# Patient Record
Sex: MALE | Race: WHITE | NOT HISPANIC OR LATINO | URBAN - METROPOLITAN AREA
[De-identification: names, ages, dates, MRNs, and addresses within clinical notes are randomized per-mention and may not be internally consistent; named-entity substitution may affect disease eponyms.]

---

## 2018-01-04 ENCOUNTER — APPOINTMENT (RX ONLY)
Dept: URBAN - METROPOLITAN AREA CLINIC 31 | Facility: CLINIC | Age: 73
Setting detail: DERMATOLOGY
End: 2018-01-04

## 2018-01-04 DIAGNOSIS — L57.8 OTHER SKIN CHANGES DUE TO CHRONIC EXPOSURE TO NONIONIZING RADIATION: ICD-10-CM

## 2018-01-04 DIAGNOSIS — L57.0 ACTINIC KERATOSIS: ICD-10-CM

## 2018-01-04 DIAGNOSIS — L81.4 OTHER MELANIN HYPERPIGMENTATION: ICD-10-CM

## 2018-01-04 DIAGNOSIS — L82.1 OTHER SEBORRHEIC KERATOSIS: ICD-10-CM

## 2018-01-04 DIAGNOSIS — Z85.828 PERSONAL HISTORY OF OTHER MALIGNANT NEOPLASM OF SKIN: ICD-10-CM

## 2018-01-04 DIAGNOSIS — D18.0 HEMANGIOMA: ICD-10-CM

## 2018-01-04 DIAGNOSIS — L72.3 SEBACEOUS CYST: ICD-10-CM

## 2018-01-04 DIAGNOSIS — Z80.8 FAMILY HISTORY OF MALIGNANT NEOPLASM OF OTHER ORGANS OR SYSTEMS: ICD-10-CM

## 2018-01-04 DIAGNOSIS — D22 MELANOCYTIC NEVI: ICD-10-CM

## 2018-01-04 PROBLEM — G89.29 CHRONIC BILATERAL LOW BACK PAIN: Status: ACTIVE | Noted: 2018-01-04

## 2018-01-04 PROBLEM — D22.5 MELANOCYTIC NEVI OF TRUNK: Status: ACTIVE | Noted: 2018-01-04

## 2018-01-04 PROBLEM — D18.01 HEMANGIOMA OF SKIN AND SUBCUTANEOUS TISSUE: Status: ACTIVE | Noted: 2018-01-04

## 2018-01-04 PROBLEM — M54.50 CHRONIC BILATERAL LOW BACK PAIN: Status: ACTIVE | Noted: 2018-01-04

## 2018-01-04 PROCEDURE — 17003 DESTRUCT PREMALG LES 2-14: CPT

## 2018-01-04 PROCEDURE — 99213 OFFICE O/P EST LOW 20 MIN: CPT | Mod: 25

## 2018-01-04 PROCEDURE — ? COUNSELING

## 2018-01-04 PROCEDURE — 17000 DESTRUCT PREMALG LESION: CPT

## 2018-01-04 PROCEDURE — ? LIQUID NITROGEN

## 2018-01-04 ASSESSMENT — LOCATION DETAILED DESCRIPTION DERM
LOCATION DETAILED: RIGHT SUPERIOR HELIX
LOCATION DETAILED: LEFT CENTRAL TEMPLE
LOCATION DETAILED: LEFT INFERIOR MEDIAL MIDBACK
LOCATION DETAILED: LEFT SUPERIOR MEDIAL MIDBACK
LOCATION DETAILED: LEFT INFERIOR CENTRAL MALAR CHEEK
LOCATION DETAILED: NASAL TIP
LOCATION DETAILED: LEFT MEDIAL FRONTAL SCALP
LOCATION DETAILED: RIGHT INFERIOR FOREHEAD
LOCATION DETAILED: RIGHT SUPERIOR MEDIAL FOREHEAD
LOCATION DETAILED: RIGHT SUPERIOR LATERAL LOWER BACK
LOCATION DETAILED: RIGHT FOREHEAD
LOCATION DETAILED: LEFT CENTRAL MALAR CHEEK
LOCATION DETAILED: RIGHT PROXIMAL RADIAL DORSAL FOREARM
LOCATION DETAILED: RIGHT SUPERIOR MEDIAL MIDBACK
LOCATION DETAILED: LEFT PROXIMAL DORSAL FOREARM
LOCATION DETAILED: LEFT CENTRAL FRONTAL SCALP
LOCATION DETAILED: RIGHT PROXIMAL DORSAL FOREARM
LOCATION DETAILED: LEFT SUPERIOR LATERAL BUCCAL CHEEK
LOCATION DETAILED: LEFT DISTAL DORSAL FOREARM
LOCATION DETAILED: LEFT INFERIOR ANTERIOR NECK

## 2018-01-04 ASSESSMENT — LOCATION ZONE DERM
LOCATION ZONE: NECK
LOCATION ZONE: EAR
LOCATION ZONE: NOSE
LOCATION ZONE: FACE
LOCATION ZONE: SCALP
LOCATION ZONE: ARM
LOCATION ZONE: TRUNK

## 2018-01-04 ASSESSMENT — LOCATION SIMPLE DESCRIPTION DERM
LOCATION SIMPLE: LEFT CHEEK
LOCATION SIMPLE: LEFT TEMPLE
LOCATION SIMPLE: RIGHT EAR
LOCATION SIMPLE: LEFT LOWER BACK
LOCATION SIMPLE: NOSE
LOCATION SIMPLE: LEFT ANTERIOR NECK
LOCATION SIMPLE: RIGHT FOREHEAD
LOCATION SIMPLE: RIGHT FOREARM
LOCATION SIMPLE: LEFT SCALP
LOCATION SIMPLE: LEFT FOREARM
LOCATION SIMPLE: RIGHT LOWER BACK

## 2018-01-04 NOTE — HPI: SKIN LESION
Is This A New Presentation, Or A Follow-Up?: Skin Lesion
How Severe Is Your Skin Lesion?: mild
Has Your Skin Lesion Been Treated?: been treated
Additional History: Patient states that he had a cyst and he has punctured it a couple times with it coming back and would like to know if there any way to remove the sac.

## 2018-01-04 NOTE — HPI: UPPER BODY SKIN CHECK
How Severe Are Your Spot(S)?: mild
What Is The Reason For Today's Visit?: Excessive sun exposure
Additional History: Mother with hx of melanoma

## 2018-01-04 NOTE — PROCEDURE: LIQUID NITROGEN
Consent: The patient's consent was obtained including but not limited to risks of crusting, scabbing, blistering, scarring, darker or lighter pigmentary change, recurrence, incomplete removal and infection.
Render Post-Care Instructions In Note?: no
Duration Of Freeze Thaw-Cycle (Seconds): 0
Detail Level: Detailed
Post-Care Instructions: I reviewed with the patient in detail post-care instructions. Patient is to wear sunprotection, and avoid picking at any of the treated lesions. Pt may apply Vaseline  or Aquaphor to crusted or scabbing areas.

## 2018-06-05 PROBLEM — Z98.1 S/P LUMBAR FUSION: Status: ACTIVE | Noted: 2018-06-05

## 2018-06-05 PROBLEM — R26.2 DIFFICULTY WALKING: Status: ACTIVE | Noted: 2018-06-05

## 2018-06-26 ENCOUNTER — APPOINTMENT (RX ONLY)
Dept: URBAN - METROPOLITAN AREA CLINIC 31 | Facility: CLINIC | Age: 73
Setting detail: DERMATOLOGY
End: 2018-06-26

## 2018-06-26 DIAGNOSIS — L82.1 OTHER SEBORRHEIC KERATOSIS: ICD-10-CM

## 2018-06-26 DIAGNOSIS — L57.0 ACTINIC KERATOSIS: ICD-10-CM

## 2018-06-26 PROCEDURE — ? ADDITIONAL NOTES

## 2018-06-26 PROCEDURE — ? COUNSELING

## 2018-06-26 PROCEDURE — ? LIQUID NITROGEN

## 2018-06-26 PROCEDURE — 99212 OFFICE O/P EST SF 10 MIN: CPT | Mod: 25

## 2018-06-26 PROCEDURE — 17000 DESTRUCT PREMALG LESION: CPT

## 2018-06-26 ASSESSMENT — LOCATION SIMPLE DESCRIPTION DERM
LOCATION SIMPLE: LEFT FOREARM
LOCATION SIMPLE: NOSE

## 2018-06-26 ASSESSMENT — LOCATION DETAILED DESCRIPTION DERM
LOCATION DETAILED: LEFT PROXIMAL DORSAL FOREARM
LOCATION DETAILED: NASAL TIP

## 2018-06-26 ASSESSMENT — LOCATION ZONE DERM
LOCATION ZONE: NOSE
LOCATION ZONE: ARM

## 2018-06-26 NOTE — PROCEDURE: ADDITIONAL NOTES
Additional Notes: Includes spot of concern mentioned on intake on the left forearm
Additional Notes: Includes spot of concern mentioned on intake on nose. \\nOffered biopsy if patient worried, trial of LN2 done today.

## 2018-06-26 NOTE — PROCEDURE: LIQUID NITROGEN
Consent: The patient's consent was obtained including but not limited to risks of crusting, scabbing, blistering, scarring, darker or lighter pigmentary change, recurrence, incomplete removal and infection.
Detail Level: Detailed
Duration Of Freeze Thaw-Cycle (Seconds): 0
Post-Care Instructions: I reviewed with the patient in detail post-care instructions. Patient is to wear sunprotection, and avoid picking at any of the treated lesions. Pt may apply Vaseline  or Aquaphor to crusted or scabbing areas.
Render Post-Care Instructions In Note?: no

## 2018-08-02 ENCOUNTER — APPOINTMENT (RX ONLY)
Dept: URBAN - METROPOLITAN AREA CLINIC 31 | Facility: CLINIC | Age: 73
Setting detail: DERMATOLOGY
End: 2018-08-02

## 2018-08-02 DIAGNOSIS — L57.0 ACTINIC KERATOSIS: ICD-10-CM

## 2018-08-02 PROBLEM — D48.5 NEOPLASM OF UNCERTAIN BEHAVIOR OF SKIN: Status: ACTIVE | Noted: 2018-08-02

## 2018-08-02 PROCEDURE — 17000 DESTRUCT PREMALG LESION: CPT

## 2018-08-02 PROCEDURE — ? COUNSELING

## 2018-08-02 PROCEDURE — 11100: CPT | Mod: 59

## 2018-08-02 PROCEDURE — ? LIQUID NITROGEN

## 2018-08-02 PROCEDURE — ? BIOPSY BY SHAVE METHOD

## 2018-08-02 ASSESSMENT — LOCATION DETAILED DESCRIPTION DERM: LOCATION DETAILED: RIGHT DISTAL PRETIBIAL REGION

## 2018-08-02 ASSESSMENT — LOCATION SIMPLE DESCRIPTION DERM: LOCATION SIMPLE: RIGHT PRETIBIAL REGION

## 2018-08-02 ASSESSMENT — LOCATION ZONE DERM: LOCATION ZONE: LEG

## 2018-08-02 NOTE — PROCEDURE: LIQUID NITROGEN
Consent: The patient's consent was obtained including but not limited to risks of crusting, scabbing, blistering, scarring, darker or lighter pigmentary change, recurrence, incomplete removal and infection.
Render Post-Care Instructions In Note?: no
Detail Level: Detailed
Duration Of Freeze Thaw-Cycle (Seconds): 0
Post-Care Instructions: I reviewed with the patient in detail post-care instructions. Patient is to wear sunprotection, and avoid picking at any of the treated lesions. Pt may apply Vaseline  or Aquaphor to crusted or scabbing areas.

## 2018-08-02 NOTE — PROCEDURE: BIOPSY BY SHAVE METHOD
Notification Instructions: Patient will be notified of biopsy results. However, patient instructed to call the office if not contacted within 2 weeks.
Type Of Destruction Used: Curettage
Cryotherapy Text: The wound bed was treated with cryotherapy after the biopsy was performed.
Bill For Surgical Tray: no
Anesthesia Volume In Cc: 0
Electrodesiccation And Curettage Text: The wound bed was treated with electrodesiccation and curettage after the biopsy was performed.
Anesthesia Type: 1% lidocaine with epinephrine
Wound Care: Aquaphor
Billing Type: Third-Party Bill
Depth Of Biopsy: dermis
Curettage Text: The wound bed was treated with curettage after the biopsy was performed.
Dressing: Band-Aid
Biopsy Method: 15 blade
Post-Care Instructions: I reviewed with the patient in detail post-care instructions. Patient is to keep the biopsy site dry overnight, and then apply bacitracin twice daily until healed. Patient may apply hydrogen peroxide soaks to remove any crusting.
Lab: [id number]
Biopsy Type: H and E
Electrodesiccation Text: The wound bed was treated with electrodesiccation after the biopsy was performed.
Lab Facility: 
Detail Level: Detailed
Silver Nitrate Text: The wound bed was treated with silver nitrate after the biopsy was performed.
Consent: Written consent was obtained and risks were reviewed including but not limited to scarring, infection, bleeding, scabbing, incomplete removal, nerve damage and allergy to anesthesia.
Hemostasis: Aluminum Chloride and Electrocautery
Was A Bandage Applied: Yes
Size Of Lesion In Cm: 1.5

## 2018-08-07 ENCOUNTER — RX ONLY (OUTPATIENT)
Age: 73
Setting detail: RX ONLY
End: 2018-08-07

## 2018-08-07 ENCOUNTER — APPOINTMENT (RX ONLY)
Dept: URBAN - METROPOLITAN AREA CLINIC 31 | Facility: CLINIC | Age: 73
Setting detail: DERMATOLOGY
End: 2018-08-07

## 2018-08-07 PROBLEM — C44.91 BASAL CELL CARCINOMA OF SKIN, UNSPECIFIED: Status: ACTIVE | Noted: 2018-08-07

## 2018-08-07 PROCEDURE — ? MOHS SURGERY PHONE CONSULTATION

## 2018-08-07 RX ORDER — AMOXICILLIN 500 MG/1
CAPSULE ORAL
Qty: 4 | Refills: 0 | Status: ERX | COMMUNITY
Start: 2018-08-07

## 2018-08-07 NOTE — PROCEDURE: MOHS SURGERY PHONE CONSULTATION
Date Of Mohs Surgery: 08/16/2018
Does The Patient Have A Pacemaker Or Defibrillator?: No
Patient Reported Location: A-left proximal pretibial region
Does The Patient Take Blood Thinners?: Yes
Which Antibiotic Do They Take For Surgical Prophylaxis?: Amoxicillin (2 grams)
Referring Provider: Autumn Robbins
Office Location Of Mohs Surgery: [place]
If Yes- Details?: Heart attack in 2014. Stent placed
Detail Level: Simple
If Yes- What Blood Thinners Do They Take?: Aspirin
Pathology Report Dx If Different Than Diagnosis Selected: Basal Cell Carcinoma, Nodulo-Infiltrative Type
If Yes- Additional Details: Right knee and Left knee 2010
Additional Information?: Allergies: IODINE • NAPROSYN
If Yes- What Is The Patient's Pharmacy Number?: [email or web address]
Patient's Insurance: /MAGDALENO
Time Of Mohs Surgery: 10:15am
Pathology Accession #: [id number]

## 2018-08-16 ENCOUNTER — APPOINTMENT (RX ONLY)
Dept: URBAN - METROPOLITAN AREA CLINIC 31 | Facility: CLINIC | Age: 73
Setting detail: DERMATOLOGY
End: 2018-08-16

## 2018-08-16 PROBLEM — C44.719 BASAL CELL CARCINOMA OF SKIN OF LEFT LOWER LIMB, INCLUDING HIP: Status: ACTIVE | Noted: 2018-08-16

## 2018-08-16 PROCEDURE — 17313 MOHS 1 STAGE T/A/L: CPT

## 2018-08-16 PROCEDURE — ? MOHS SURGERY

## 2018-08-16 NOTE — PROCEDURE: MOHS SURGERY
Bi-Rhombic Flap Text: The defect edges were debeveled with a #15 scalpel blade.  Given the location of the defect and the proximity to free margins a bi-rhombic flap was deemed most appropriate.  Using a sterile surgical marker, an appropriate rhombic flap was drawn incorporating the defect. The area thus outlined was incised deep to adipose tissue with a #15 scalpel blade.  The skin margins were undermined to an appropriate distance in all directions utilizing iris scissors.
Post-Care Instructions: I reviewed with the patient in detail post-care instructions. Patient is not to engage in any heavy lifting, exercise, or swimming for the next 14 days. Should the patient develop any fevers, chills, bleeding, severe pain patient will contact the office immediately.
Cartilage Fenestration Performed?: No
Quadrants Reporting?: 0
Display The Individual Mohs Indications As Separate Paragraphs: Yes
Closure 4 Information: This tab is for additional flaps and grafts above and beyond our usual structured repairs.  Please note if you enter information here it will not currently bill and you will need to add the billing information manually.
Island Pedicle Flap-Requiring Vessel Identification Text: The defect edges were debeveled with a #15 scalpel blade.  Given the location of the defect, shape of the defect and the proximity to free margins an island pedicle advancement flap was deemed most appropriate.  Using a sterile surgical marker, an appropriate advancement flap was drawn, based on the axial vessel mentioned above, incorporating the defect, outlining the appropriate donor tissue and placing the expected incisions within the relaxed skin tension lines where possible.    The area thus outlined was incised deep to adipose tissue with a #15 scalpel blade.  The skin margins were undermined to an appropriate distance in all directions around the primary defect and laterally outward around the island pedicle utilizing iris scissors.  There was minimal undermining beneath the pedicle flap.
Bilateral Helical Rim Advancement Flap Text: The defect edges were debeveled with a #15 blade scalpel.  Given the location of the defect and the proximity to free margins (helical rim) a bilateral helical rim advancement flap was deemed most appropriate.  Using a sterile surgical marker, the appropriate advancement flaps were drawn incorporating the defect and placing the expected incisions between the helical rim and antihelix where possible.  The area thus outlined was incised through and through with a #15 scalpel blade.  With a skin hook and iris scissors, the flaps were gently and sharply undermined and freed up.
Mohs Histo Method Verbiage: Each section was then chromacoded and processed in the Mohs lab using the Mohs protocol and submitted for frozen section.
Stage 2: Additional Anesthesia Type: 1% lidocaine with epinephrine
Crescentic Advancement Flap Text: The defect edges were debeveled with a #15 scalpel blade.  Given the location of the defect and the proximity to free margins a crescentic advancement flap was deemed most appropriate.  Using a sterile surgical marker, the appropriate advancement flap was drawn incorporating the defect and placing the expected incisions within the relaxed skin tension lines where possible.    The area thus outlined was incised deep to adipose tissue with a #15 scalpel blade.  The skin margins were undermined to an appropriate distance in all directions utilizing iris scissors.
Graft Donor Site Bandage (Optional-Leave Blank If You Don't Want In Note): Steri-strips and a pressure bandage were applied to the donor site.
Complex Repair And Graft Additional Text (Will Appearing After The Standard Complex Repair Text): The complex repair was not sufficient to completely close the primary defect. The remaining additional defect was repaired with the graft mentioned below.
Surgeon/Pathologist Verbiage (Will Incorporate Name Of Surgeon From Intro If Not Blank): operated in two distinct and integrated capacities as the surgeon and pathologist.
Star Wedge Flap Text: The defect edges were debeveled with a #15 scalpel blade.  Given the location of the defect, shape of the defect and the proximity to free margins a star wedge flap was deemed most appropriate.  Using a sterile surgical marker, an appropriate rotation flap was drawn incorporating the defect and placing the expected incisions within the relaxed skin tension lines where possible. The area thus outlined was incised deep to adipose tissue with a #15 scalpel blade.  The skin margins were undermined to an appropriate distance in all directions utilizing iris scissors.
Localized Dermabrasion With Wire Brush Text: The patient was draped in routine manner.  Localized dermabrasion using 3 x 17 mm wire brush was performed in routine manner to papillary dermis. This spot dermabrasion is being performed to complete skin cancer reconstruction. It also will eliminate the other sun damaged precancerous cells that are known to be part of the regional effect of a lifetime's worth of sun exposure. This localized dermabrasion is therapeutic and should not be considered cosmetic in any regard.
Keystone Flap Text: The defect edges were debeveled with a #15 scalpel blade.  Given the location of the defect, shape of the defect a keystone flap was deemed most appropriate.  Using a sterile surgical marker, an appropriate keystone flap was drawn incorporating the defect, outlining the appropriate donor tissue and placing the expected incisions within the relaxed skin tension lines where possible. The area thus outlined was incised deep to adipose tissue with a #15 scalpel blade.  The skin margins were undermined to an appropriate distance in all directions around the primary defect and laterally outward around the flap utilizing iris scissors.
Area M Indication Text: Tumors in this location are included in Area M (cheek, forehead, scalp, neck, jawline and pretibial skin).  Mohs surgery is indicated for tumors in these anatomic locations.
Alternatives Discussed Intro (Do Not Add Period): I discussed alternative treatments to Mohs surgery and specifically discussed the risks and benefits of
Eye Protection Verbiage: Before proceeding with the stage, a plastic scleral shield was inserted. The globe was anesthetized with a few drops of 1% lidocaine with 1:100,000 epinephrine. Then, an appropriate sized scleral shield was chosen and coated with lacrilube ointment. The shield was gently inserted and left in place for the duration of each stage. After the stage was completed, the shield was gently removed.
Date Of Previous Biopsy (Optional): 08/02/2018
Secondary Intention Text (Leave Blank If You Do Not Want): The defect will heal with secondary intention.
Paramedian Forehead Flap Text: A decision was made to reconstruct the defect utilizing an interpolation axial flap and a staged reconstruction.  A telfa template was made of the defect.  This telfa template was then used to outline the paramedian forehead pedicle flap.  The donor area for the pedicle flap was then injected with anesthesia.  The flap was excised through the skin and subcutaneous tissue down to the layer of the underlying musculature.  The pedicle flap was carefully excised within this deep plane to maintain its blood supply.  The edges of the donor site were undermined.   The donor site was closed in a primary fashion.  The pedicle was then rotated into position and sutured.  Once the tube was sutured into place, adequate blood supply was confirmed with blanching and refill.  The pedicle was then wrapped with xeroform gauze and dressed appropriately with a telfa and gauze bandage to ensure continued blood supply and protect the attached pedicle.
Alar Island Pedicle Flap Text: The defect edges were debeveled with a #15 scalpel blade.  Given the location of the defect, shape of the defect and the proximity to the alar rim an island pedicle advancement flap was deemed most appropriate.  Using a sterile surgical marker, an appropriate advancement flap was drawn incorporating the defect, outlining the appropriate donor tissue and placing the expected incisions within the nasal ala running parallel to the alar rim. The area thus outlined was incised with a #15 scalpel blade.  The skin margins were undermined minimally to an appropriate distance in all directions around the primary defect and laterally outward around the island pedicle utilizing iris scissors.  There was minimal undermining beneath the pedicle flap.
Xenograft Text: The defect edges were debeveled with a #15 scalpel blade.  Given the location of the defect, shape of the defect and the proximity to free margins a xenograft was deemed most appropriate.  The graft was then trimmed to fit the size of the defect.  The graft was then placed in the primary defect and oriented appropriately.
Surgeon: Kin Bhatti M.D.
Modified Advancement Flap Text: The defect edges were debeveled with a #15 scalpel blade.  Given the location of the defect, shape of the defect and the proximity to free margins a modified advancement flap was deemed most appropriate.  Using a sterile surgical marker, an appropriate advancement flap was drawn incorporating the defect and placing the expected incisions within the relaxed skin tension lines where possible.    The area thus outlined was incised deep to adipose tissue with a #15 scalpel blade.  The skin margins were undermined to an appropriate distance in all directions utilizing iris scissors.
Hatchet Flap Text: The defect edges were debeveled with a #15 scalpel blade.  Given the location of the defect, shape of the defect and the proximity to free margins a hatchet flap was deemed most appropriate.  Using a sterile surgical marker, an appropriate hatchet flap was drawn incorporating the defect and placing the expected incisions within the relaxed skin tension lines where possible.    The area thus outlined was incised deep to adipose tissue with a #15 scalpel blade.  The skin margins were undermined to an appropriate distance in all directions utilizing iris scissors.
Hemostasis: Electrocautery
Complex Repair Preamble Text (Leave Blank If You Do Not Want): Extensive wide undermining was performed.
Referring Physician (Optional): WALTER STARK
Donor Site Anesthesia Type: same as repair anesthesia
Inflammation Suggestive Of Cancer Camouflage Histology Text: There was a dense lymphocytic infiltrate which prevented adequate histologic evaluation of adjacent structures.
Consent (Marginal Mandibular)/Introductory Paragraph: The rationale for Mohs was explained to the patient and consent was obtained. The risks, benefits and alternatives to therapy were discussed in detail. Specifically, the risks of damage to the marginal mandibular branch of the facial nerve, infection, scarring, bleeding, prolonged wound healing, incomplete removal, allergy to anesthesia, and recurrence were addressed. Prior to the procedure, the treatment site was clearly identified and confirmed by the patient. All components of Universal Protocol/PAUSE Rule completed.
H Plasty Text: Given the location of the defect, shape of the defect and the proximity to free margins a H-plasty was deemed most appropriate for repair.  Using a sterile surgical marker, the appropriate advancement arms of the H-plasty were drawn incorporating the defect and placing the expected incisions within the relaxed skin tension lines where possible. The area thus outlined was incised deep to adipose tissue with a #15 scalpel blade. The skin margins were undermined to an appropriate distance in all directions utilizing iris scissors.  The opposing advancement arms were then advanced into place in opposite direction and anchored with interrupted buried subcutaneous sutures.
Island Pedicle Flap With Canthal Suspension Text: The defect edges were debeveled with a #15 scalpel blade.  Given the location of the defect, shape of the defect and the proximity to free margins an island pedicle advancement flap was deemed most appropriate.  Using a sterile surgical marker, an appropriate advancement flap was drawn incorporating the defect, outlining the appropriate donor tissue and placing the expected incisions within the relaxed skin tension lines where possible. The area thus outlined was incised deep to adipose tissue with a #15 scalpel blade.  The skin margins were undermined to an appropriate distance in all directions around the primary defect and laterally outward around the island pedicle utilizing iris scissors.  There was minimal undermining beneath the pedicle flap. A suspension suture was placed in the canthal tendon to prevent tension and prevent ectropion.
Referred To Mid-Level For Closure Text (Leave Blank If You Do Not Want): After obtaining clear surgical margins the patient was sent to a mid-level provider for surgical repair.  The patient understands they will receive post-surgical care and follow-up from the mid-level provider.
Epidermal Closure Graft Donor Site (Optional): simple interrupted
Cheek-To-Nose Interpolation Flap Text: A decision was made to reconstruct the defect utilizing an interpolation axial flap and a staged reconstruction.  A telfa template was made of the defect.  This telfa template was then used to outline the Cheek-To-Nose Interpolation flap.  The donor area for the pedicle flap was then injected with anesthesia.  The flap was excised through the skin and subcutaneous tissue down to the layer of the underlying musculature.  The interpolation flap was carefully excised within this deep plane to maintain its blood supply.  The edges of the donor site were undermined.   The donor site was closed in a primary fashion.  The pedicle was then rotated into position and sutured.  Once the tube was sutured into place, adequate blood supply was confirmed with blanching and refill.  The pedicle was then wrapped with xeroform gauze and dressed appropriately with a telfa and gauze bandage to ensure continued blood supply and protect the attached pedicle.
Dermal Autograft Text: The defect edges were debeveled with a #15 scalpel blade.  Given the location of the defect, shape of the defect and the proximity to free margins a dermal autograft was deemed most appropriate.  Using a sterile surgical marker, the primary defect shape was transferred to the donor site. The area thus outlined was incised deep to adipose tissue with a #15 scalpel blade.  The harvested graft was then trimmed of adipose and epidermal tissue until only dermis was left.  The skin graft was then placed in the primary defect and oriented appropriately.
Cheiloplasty (Complex) Text: A decision was made to reconstruct the defect with a  cheiloplasty.  The defect was undermined extensively.  Additional obicularis oris muscle was excised with a 15 blade scalpel.  The defect was converted into a full thickness wedge to facilite a better cosmetic result.  Small vessels were then tied off with 5-0 monocyrl. The obicularis oris, superficial fascia, adipose and dermis were then reapproximated.  After the deeper layers were approximated the epidermis was reapproximated with particular care given to realign the vermilion border.
Unna Boot Text: An Unna boot was placed to help immobilize the limb and facilitate more rapid healing.
Transposition Flap Text: The defect edges were debeveled with a #15 scalpel blade.  Given the location of the defect and the proximity to free margins a transposition flap was deemed most appropriate.  Using a sterile surgical marker, an appropriate transposition flap was drawn incorporating the defect.    The area thus outlined was incised deep to adipose tissue with a #15 scalpel blade.  The skin margins were undermined to an appropriate distance in all directions utilizing iris scissors.
Bcc Infiltrative Histology Text: There were numerous aggregates of basaloid cells demonstrating an infiltrative pattern.
Bilobed Flap Text: The defect edges were debeveled with a #15 scalpel blade.  Given the location of the defect and the proximity to free margins a bilobe flap was deemed most appropriate.  Using a sterile surgical marker, an appropriate bilobe flap drawn around the defect.    The area thus outlined was incised deep to adipose tissue with a #15 scalpel blade.  The skin margins were undermined to an appropriate distance in all directions utilizing iris scissors.
Consent (Ear)/Introductory Paragraph: The rationale for Mohs was explained to the patient and consent was obtained. The risks, benefits and alternatives to therapy were discussed in detail. Specifically, the risks of ear deformity, infection, scarring, bleeding, prolonged wound healing, incomplete removal, allergy to anesthesia, nerve injury and recurrence were addressed. Prior to the procedure, the treatment site was clearly identified and confirmed by the patient. All components of Universal Protocol/PAUSE Rule completed.
Purse String (Intermediate) Text: Given the location of the defect and the characteristics of the surrounding skin a purse string intermediate closure was deemed most appropriate.  Undermining was performed circumfirentially around the surgical defect.  A purse string suture was then placed and tightened.
Closure 2 Information: This tab is for additional flaps and grafts, including complex repair and grafts and complex repair and flaps. You can also specify a different location for the additional defect, if the location is the same you do not need to select a new one. We will insert the automated text for the repair you select below just as we do for solitary flaps and grafts. Please note that at this time if you select a location with a different insurance zone you will need to override the ICD10 and CPT if appropriate.
Repair Type: No repair - secondary intention
Area H Indication Text: Tumors in this location are included in Area H (eyelids, eyebrows, nose, lips, chin, ear, pre-auricular, post-auricular, temple, genitalia, hands, feet, ankles and areola).  Tissue conservation is critical in these anatomic locations.
Area L Indication Text: Tumors in this location are included in Area L (trunk and extremities).  Mohs surgery is indicated for larger tumors, or tumors with aggressive histologic features, in these anatomic locations.
Referred To Oculoplastics For Closure Text (Leave Blank If You Do Not Want): After obtaining clear surgical margins the patient was sent to oculoplastics for surgical repair.  The patient understands they will receive post-surgical care and follow-up from the referring physician's office.
Melolabial Transposition Flap Text: The defect edges were debeveled with a #15 scalpel blade.  Given the location of the defect and the proximity to free margins a melolabial flap was deemed most appropriate.  Using a sterile surgical marker, an appropriate melolabial transposition flap was drawn incorporating the defect.    The area thus outlined was incised deep to adipose tissue with a #15 scalpel blade.  The skin margins were undermined to an appropriate distance in all directions utilizing iris scissors.
Rotation Flap Text: The defect edges were debeveled with a #15 scalpel blade.  Given the location of the defect, shape of the defect and the proximity to free margins a rotation flap was deemed most appropriate.  Using a sterile surgical marker, an appropriate rotation flap was drawn incorporating the defect and placing the expected incisions within the relaxed skin tension lines where possible.    The area thus outlined was incised deep to adipose tissue with a #15 scalpel blade.  The skin margins were undermined to an appropriate distance in all directions utilizing iris scissors.
Composite Graft Text: The defect edges were debeveled with a #15 scalpel blade.  Given the location of the defect, shape of the defect, the proximity to free margins and the fact the defect was full thickness a composite graft was deemed most appropriate.  The defect was outline and then transferred to the donor site.  A full thickness graft was then excised from the donor site. The graft was then placed in the primary defect, oriented appropriately and then sutured into place.  The secondary defect was then repaired using a primary closure.
Consent 3/Introductory Paragraph: I gave the patient a chance to ask questions they had about the procedure.  Following this I explained the Mohs procedure and consent was obtained. The risks, benefits and alternatives to therapy were discussed in detail. Specifically, the risks of infection, scarring, bleeding, prolonged wound healing, incomplete removal, allergy to anesthesia, nerve injury and recurrence were addressed. Prior to the procedure, the treatment site was clearly identified and confirmed by the patient. All components of Universal Protocol/PAUSE Rule completed.
Graft Cartilage Fenestration Text: The cartilage was fenestrated with a 2mm punch biopsy to help facilitate graft survival and healing.
Advancement Flap (Single) Text: The defect edges were debeveled with a #15 scalpel blade.  Given the location of the defect and the proximity to free margins a single advancement flap was deemed most appropriate.  Using a sterile surgical marker, an appropriate advancement flap was drawn incorporating the defect and placing the expected incisions within the relaxed skin tension lines where possible.    The area thus outlined was incised deep to adipose tissue with a #15 scalpel blade.  The skin margins were undermined to an appropriate distance in all directions utilizing iris scissors.
Body Location Override (Optional - Billing Will Still Be Based On Selected Body Map Location If Applicable): left proximal pretibial region
V-Y Plasty Text: The defect edges were debeveled with a #15 scalpel blade.  Given the location of the defect, shape of the defect and the proximity to free margins an V-Y advancement flap was deemed most appropriate.  Using a sterile surgical marker, an appropriate advancement flap was drawn incorporating the defect and placing the expected incisions within the relaxed skin tension lines where possible.    The area thus outlined was incised deep to adipose tissue with a #15 scalpel blade.  The skin margins were undermined to an appropriate distance in all directions utilizing iris scissors.
Consent 1/Introductory Paragraph: The rationale for Mohs was explained to the patient and consent was obtained. The risks, benefits and alternatives to therapy were discussed in detail. Specifically, the risks of infection, scarring, bleeding, prolonged wound healing, incomplete removal, allergy to anesthesia, nerve injury and recurrence were addressed. Prior to the procedure, the treatment site was clearly identified and confirmed by the patient. All components of Universal Protocol/PAUSE Rule completed.
O-T Advancement Flap Text: The defect edges were debeveled with a #15 scalpel blade.  Given the location of the defect, shape of the defect and the proximity to free margins an O-T advancement flap was deemed most appropriate.  Using a sterile surgical marker, an appropriate advancement flap was drawn incorporating the defect and placing the expected incisions within the relaxed skin tension lines where possible.    The area thus outlined was incised deep to adipose tissue with a #15 scalpel blade.  The skin margins were undermined to an appropriate distance in all directions utilizing iris scissors.
Ear Star Wedge Flap Text: The defect edges were debeveled with a #15 blade scalpel.  Given the location of the defect and the proximity to free margins (helical rim) an ear star wedge flap was deemed most appropriate.  Using a sterile surgical marker, the appropriate flap was drawn incorporating the defect and placing the expected incisions between the helical rim and antihelix where possible.  The area thus outlined was incised through and through with a #15 scalpel blade.
Postop Diagnosis: same
Interpolation Flap Text: A decision was made to reconstruct the defect utilizing an interpolation axial flap and a staged reconstruction.  A telfa template was made of the defect.  This telfa template was then used to outline the interpolation flap.  The donor area for the pedicle flap was then injected with anesthesia.  The flap was excised through the skin and subcutaneous tissue down to the layer of the underlying musculature.  The interpolation flap was carefully excised within this deep plane to maintain its blood supply.  The edges of the donor site were undermined.   The donor site was closed in a primary fashion.  The pedicle was then rotated into position and sutured.  Once the tube was sutured into place, adequate blood supply was confirmed with blanching and refill.  The pedicle was then wrapped with xeroform gauze and dressed appropriately with a telfa and gauze bandage to ensure continued blood supply and protect the attached pedicle.
Advancement-Rotation Flap Text: The defect edges were debeveled with a #15 scalpel blade.  Given the location of the defect, shape of the defect and the proximity to free margins an advancement-rotation flap was deemed most appropriate.  Using a sterile surgical marker, an appropriate flap was drawn incorporating the defect and placing the expected incisions within the relaxed skin tension lines where possible. The area thus outlined was incised deep to adipose tissue with a #15 scalpel blade.  The skin margins were undermined to an appropriate distance in all directions utilizing iris scissors.
Mastoid Interpolation Flap Text: A decision was made to reconstruct the defect utilizing an interpolation axial flap and a staged reconstruction.  A telfa template was made of the defect.  This telfa template was then used to outline the mastoid interpolation flap.  The donor area for the pedicle flap was then injected with anesthesia.  The flap was excised through the skin and subcutaneous tissue down to the layer of the underlying musculature.  The pedicle flap was carefully excised within this deep plane to maintain its blood supply.  The edges of the donor site were undermined.   The donor site was closed in a primary fashion.  The pedicle was then rotated into position and sutured.  Once the tube was sutured into place, adequate blood supply was confirmed with blanching and refill.  The pedicle was then wrapped with xeroform gauze and dressed appropriately with a telfa and gauze bandage to ensure continued blood supply and protect the attached pedicle.
Consent (Lip)/Introductory Paragraph: The rationale for Mohs was explained to the patient and consent was obtained. The risks, benefits and alternatives to therapy were discussed in detail. Specifically, the risks of lip deformity, changes in the oral aperture, infection, scarring, bleeding, prolonged wound healing, incomplete removal, allergy to anesthesia, nerve injury and recurrence were addressed. Prior to the procedure, the treatment site was clearly identified and confirmed by the patient. All components of Universal Protocol/PAUSE Rule completed.
Referred To Plastics For Closure Text (Leave Blank If You Do Not Want): After obtaining clear surgical margins the patient was sent to plastics for surgical repair.  The patient understands they will receive post-surgical care and follow-up from the referring physician's office.
Z Plasty Text: The lesion was extirpated to the level of the fat with a #15 scalpel blade.  Given the location of the defect, shape of the defect and the proximity to free margins a Z-plasty was deemed most appropriate for repair.  Using a sterile surgical marker, the appropriate transposition arms of the Z-plasty were drawn incorporating the defect and placing the expected incisions within the relaxed skin tension lines where possible.    The area thus outlined was incised deep to adipose tissue with a #15 scalpel blade.  The skin margins were undermined to an appropriate distance in all directions utilizing iris scissors.  The opposing transposition arms were then transposed into place in opposite direction and anchored with interrupted buried subcutaneous sutures.
X Size Of Lesion In Cm (Optional): 0.9
Subsequent Stages Histo Method Verbiage: Using a similar technique to that described above, a thin layer of tissue was removed from all areas where tumor was visible on the previous stage.  The tissue was again oriented, mapped, dyed, and processed as above.
Trilobed Flap Text: The defect edges were debeveled with a #15 scalpel blade.  Given the location of the defect and the proximity to free margins a trilobed flap was deemed most appropriate.  Using a sterile surgical marker, an appropriate trilobed flap drawn around the defect.    The area thus outlined was incised deep to adipose tissue with a #15 scalpel blade.  The skin margins were undermined to an appropriate distance in all directions utilizing iris scissors.
Full Thickness Lip Wedge Repair (Flap) Text: Given the location of the defect and the proximity to free margins a full thickness wedge repair was deemed most appropriate.  Using a sterile surgical marker, the appropriate repair was drawn incorporating the defect and placing the expected incisions perpendicular to the vermilion border.  The vermilion border was also meticulously outlined to ensure appropriate reapproximation during the repair.  The area thus outlined was incised through and through with a #15 scalpel blade.  The muscularis and dermis were reaproximated with deep sutures following hemostasis. Care was taken to realign the vermilion border before proceeding with the superficial closure.  Once the vermilion was realigned the superfical and mucosal closure was finished.
Split-Thickness Skin Graft Text: The defect edges were debeveled with a #15 scalpel blade.  Given the location of the defect, shape of the defect and the proximity to free margins a split thickness skin graft was deemed most appropriate.  Using a sterile surgical marker, the primary defect shape was transferred to the donor site. The split thickness graft was then harvested.  The skin graft was then placed in the primary defect and oriented appropriately.
Surgical Defect Length In Cm (Optional): 2
Consent (Spinal Accessory)/Introductory Paragraph: The rationale for Mohs was explained to the patient and consent was obtained. The risks, benefits and alternatives to therapy were discussed in detail. Specifically, the risks of damage to the spinal accessory nerve, infection, scarring, bleeding, prolonged wound healing, incomplete removal, allergy to anesthesia, and recurrence were addressed. Prior to the procedure, the treatment site was clearly identified and confirmed by the patient. All components of Universal Protocol/PAUSE Rule completed.
Same Histology In Subsequent Stages Text: The pattern and morphology of the tumor is as described in the first stage.
Dressing: pressure dressing with telfa
Consent Type: Consent 1 (Standard)
Lazy S Intermediate Repair Preamble Text (Leave Blank If You Do Not Want): Undermining was performed with blunt dissection.
O-Z Plasty Text: The defect edges were debeveled with a #15 scalpel blade.  Given the location of the defect, shape of the defect and the proximity to free margins an O-Z plasty (double transposition flap) was deemed most appropriate.  Using a sterile surgical marker, the appropriate transposition flaps were drawn incorporating the defect and placing the expected incisions within the relaxed skin tension lines where possible.    The area thus outlined was incised deep to adipose tissue with a #15 scalpel blade.  The skin margins were undermined to an appropriate distance in all directions utilizing iris scissors.  Hemostasis was achieved with electrocautery.  The flaps were then transposed into place, one clockwise and the other counterclockwise, and anchored with interrupted buried subcutaneous sutures.
Wound Care: Aquaphor
Tissue Cultured Epidermal Autograft Text: The defect edges were debeveled with a #15 scalpel blade.  Given the location of the defect, shape of the defect and the proximity to free margins a tissue cultured epidermal autograft was deemed most appropriate.  The graft was then trimmed to fit the size of the defect.  The graft was then placed in the primary defect and oriented appropriately.
Double Island Pedicle Flap Text: The defect edges were debeveled with a #15 scalpel blade.  Given the location of the defect, shape of the defect and the proximity to free margins a double island pedicle advancement flap was deemed most appropriate.  Using a sterile surgical marker, an appropriate advancement flap was drawn incorporating the defect, outlining the appropriate donor tissue and placing the expected incisions within the relaxed skin tension lines where possible.    The area thus outlined was incised deep to adipose tissue with a #15 scalpel blade.  The skin margins were undermined to an appropriate distance in all directions around the primary defect and laterally outward around the island pedicle utilizing iris scissors.  There was minimal undermining beneath the pedicle flap.
Consent (Temporal Branch)/Introductory Paragraph: The rationale for Mohs was explained to the patient and consent was obtained. The risks, benefits and alternatives to therapy were discussed in detail. Specifically, the risks of damage to the temporal branch of the facial nerve, infection, scarring, bleeding, prolonged wound healing, incomplete removal, allergy to anesthesia, and recurrence were addressed. Prior to the procedure, the treatment site was clearly identified and confirmed by the patient. All components of Universal Protocol/PAUSE Rule completed.
Mohs Method Verbiage: An incision at a 45 degree angle following the standard Mohs approach was done and the specimen was harvested as a microscopic controlled layer.
Suture Removal: 14 days
Posterior Auricular Interpolation Flap Text: A decision was made to reconstruct the defect utilizing an interpolation axial flap and a staged reconstruction.  A telfa template was made of the defect.  This telfa template was then used to outline the posterior auricular interpolation flap.  The donor area for the pedicle flap was then injected with anesthesia.  The flap was excised through the skin and subcutaneous tissue down to the layer of the underlying musculature.  The pedicle flap was carefully excised within this deep plane to maintain its blood supply.  The edges of the donor site were undermined.   The donor site was closed in a primary fashion.  The pedicle was then rotated into position and sutured.  Once the tube was sutured into place, adequate blood supply was confirmed with blanching and refill.  The pedicle was then wrapped with xeroform gauze and dressed appropriately with a telfa and gauze bandage to ensure continued blood supply and protect the attached pedicle.
Mucosal Advancement Flap Text: Given the location of the defect, shape of the defect and the proximity to free margins a mucosal advancement flap was deemed most appropriate. Incisions were made with a 15 blade scalpel in the appropriate fashion along the cutaneous vermilion border and the mucosal lip. The remaining actinically damaged mucosal tissue was excised.  The mucosal advancement flap was then elevated to the gingival sulcus with care taken to preserve the neurovascular structures and advanced into the primary defect. Care was taken to ensure that precise realignment of the vermilion border was achieved.
Previous Accession (Optional): [id number]
Cheek Interpolation Flap Text: A decision was made to reconstruct the defect utilizing an interpolation axial flap and a staged reconstruction.  A telfa template was made of the defect.  This telfa template was then used to outline the Cheek Interpolation flap.  The donor area for the pedicle flap was then injected with anesthesia.  The flap was excised through the skin and subcutaneous tissue down to the layer of the underlying musculature.  The interpolation flap was carefully excised within this deep plane to maintain its blood supply.  The edges of the donor site were undermined.   The donor site was closed in a primary fashion.  The pedicle was then rotated into position and sutured.  Once the tube was sutured into place, adequate blood supply was confirmed with blanching and refill.  The pedicle was then wrapped with xeroform gauze and dressed appropriately with a telfa and gauze bandage to ensure continued blood supply and protect the attached pedicle.
Referred To Asc For Closure Text (Leave Blank If You Do Not Want): After obtaining clear surgical margins the patient was sent to an ASC for surgical repair.  The patient understands they will receive post-surgical care and follow-up from the ASC physician.
Island Pedicle Flap Text: The defect edges were debeveled with a #15 scalpel blade.  Given the location of the defect, shape of the defect and the proximity to free margins an island pedicle advancement flap was deemed most appropriate.  Using a sterile surgical marker, an appropriate advancement flap was drawn incorporating the defect, outlining the appropriate donor tissue and placing the expected incisions within the relaxed skin tension lines where possible.    The area thus outlined was incised deep to adipose tissue with a #15 scalpel blade.  The skin margins were undermined to an appropriate distance in all directions around the primary defect and laterally outward around the island pedicle utilizing iris scissors.  There was minimal undermining beneath the pedicle flap.
Detail Level: Detailed
Manual Repair Warning Statement: We plan on removing the manually selected variable below in favor of our much easier automatic structured text blocks found in the previous tab. We decided to do this to help make the flow better and give you the full power of structured data. Manual selection is never going to be ideal in our platform and I would encourage you to avoid using manual selection from this point on, especially since I will be sunsetting this feature. It is important that you do one of two things with the customized text below. First, you can save all of the text in a word file so you can have it for future reference. Second, transfer the text to the appropriate area in the Library tab. Lastly, if there is a flap or graft type which we do not have you need to let us know right away so I can add it in before the variable is hidden. No need to panic, we plan to give you roughly 6 months to make the change.
Ear Wedge Repair Text: A wedge excision was completed by carrying down an excision through the full thickness of the ear and cartilage with an inward facing Burow's triangle. The wound was then closed in a layered fashion.
Anesthesia Volume In Cc: 6
Partial Purse String (Intermediate) Text: Given the location of the defect and the characteristics of the surrounding skin an intermediate purse string closure was deemed most appropriate.  Undermining was performed circumfirentially around the surgical defect.  A purse string suture was then placed and tightened. Wound tension only allowed a partial closure of the circular defect.
A-T Advancement Flap Text: The defect edges were debeveled with a #15 scalpel blade.  Given the location of the defect, shape of the defect and the proximity to free margins an A-T advancement flap was deemed most appropriate.  Using a sterile surgical marker, an appropriate advancement flap was drawn incorporating the defect and placing the expected incisions within the relaxed skin tension lines where possible.    The area thus outlined was incised deep to adipose tissue with a #15 scalpel blade.  The skin margins were undermined to an appropriate distance in all directions utilizing iris scissors.
O-T Plasty Text: The defect edges were debeveled with a #15 scalpel blade.  Given the location of the defect, shape of the defect and the proximity to free margins an O-T plasty was deemed most appropriate.  Using a sterile surgical marker, an appropriate O-T plasty was drawn incorporating the defect and placing the expected incisions within the relaxed skin tension lines where possible.    The area thus outlined was incised deep to adipose tissue with a #15 scalpel blade.  The skin margins were undermined to an appropriate distance in all directions utilizing iris scissors.
No Repair - Repaired With Adjacent Surgical Defect Text (Leave Blank If You Do Not Want): After obtaining clear surgical margins the defect was repaired concurrently with another surgical defect which was in close approximation.
Helical Rim Advancement Flap Text: The defect edges were debeveled with a #15 blade scalpel.  Given the location of the defect and the proximity to free margins (helical rim) a double helical rim advancement flap was deemed most appropriate.  Using a sterile surgical marker, the appropriate advancement flaps were drawn incorporating the defect and placing the expected incisions between the helical rim and antihelix where possible.  The area thus outlined was incised through and through with a #15 scalpel blade.  With a skin hook and iris scissors, the flaps were gently and sharply undermined and freed up.
Mohs Case Number: [id number]
Burow's Advancement Flap Text: The defect edges were debeveled with a #15 scalpel blade.  Given the location of the defect and the proximity to free margins a Burow's advancement flap was deemed most appropriate.  Using a sterile surgical marker, the appropriate advancement flap was drawn incorporating the defect and placing the expected incisions within the relaxed skin tension lines where possible.    The area thus outlined was incised deep to adipose tissue with a #15 scalpel blade.  The skin margins were undermined to an appropriate distance in all directions utilizing iris scissors.
Epidermal Autograft Text: The defect edges were debeveled with a #15 scalpel blade.  Given the location of the defect, shape of the defect and the proximity to free margins an epidermal autograft was deemed most appropriate.  Using a sterile surgical marker, the primary defect shape was transferred to the donor site. The epidermal graft was then harvested.  The skin graft was then placed in the primary defect and oriented appropriately.
Home Suture Removal Text: Patient was provided instructions on removing sutures and will remove their sutures at home.  If they have any questions or difficulties they will call the office.
Epidermal Closure: running
Purse String (Simple) Text: Given the location of the defect and the characteristics of the surrounding skin a purse string closure was deemed most appropriate.  Undermining was performed circumfirentially around the surgical defect.  A purse string suture was then placed and tightened.
Complex Repair And Flap Additional Text (Will Appearing After The Standard Complex Repair Text): The complex repair was not sufficient to completely close the primary defect. The remaining additional defect was repaired with the flap mentioned below.
Dressing (No Sutures): dry sterile dressing
Stage 1: Number Of Blocks?: 1
Wound Care (No Sutures): Petrolatum
Medical Necessity Statement: Based on my medical judgement, Mohs surgery is the most appropriate treatment for this cancer compared to other treatments.
Tumor Debulked?: curette
Tarsorrhaphy Text: A tarsorrhaphy was performed using Frost sutures.
Cheiloplasty (Less Than 50%) Text: A decision was made to reconstruct the defect with a  cheiloplasty.  The defect was undermined extensively.  Additional obicularis oris muscle was excised with a 15 blade scalpel.  The defect was converted into a full thickness wedge, of less than 50% of the vertical height of the lip, to facilite a better cosmetic result.  Small vessels were then tied off with 5-0 monocyrl. The obicularis oris, superficial fascia, adipose and dermis were then reapproximated.  After the deeper layers were approximated the epidermis was reapproximated with particular care given to realign the vermilion border.
Surgical Defect Width In Cm (Optional): 1.3
Mercedes Flap Text: The defect edges were debeveled with a #15 scalpel blade.  Given the location of the defect, shape of the defect and the proximity to free margins a Mercedes flap was deemed most appropriate.  Using a sterile surgical marker, an appropriate advancement flap was drawn incorporating the defect and placing the expected incisions within the relaxed skin tension lines where possible. The area thus outlined was incised deep to adipose tissue with a #15 scalpel blade.  The skin margins were undermined to an appropriate distance in all directions utilizing iris scissors.
Partial Purse String (Simple) Text: Given the location of the defect and the characteristics of the surrounding skin a simple purse string closure was deemed most appropriate.  Undermining was performed circumfirentially around the surgical defect.  A purse string suture was then placed and tightened. Wound tension only allowed a partial closure of the circular defect.
Melolabial Interpolation Flap Text: A decision was made to reconstruct the defect utilizing an interpolation axial flap and a staged reconstruction.  A telfa template was made of the defect.  This telfa template was then used to outline the melolabial interpolation flap.  The donor area for the pedicle flap was then injected with anesthesia.  The flap was excised through the skin and subcutaneous tissue down to the layer of the underlying musculature.  The pedicle flap was carefully excised within this deep plane to maintain its blood supply.  The edges of the donor site were undermined.   The donor site was closed in a primary fashion.  The pedicle was then rotated into position and sutured.  Once the tube was sutured into place, adequate blood supply was confirmed with blanching and refill.  The pedicle was then wrapped with xeroform gauze and dressed appropriately with a telfa and gauze bandage to ensure continued blood supply and protect the attached pedicle.
Referred To Otolaryngology For Closure Text (Leave Blank If You Do Not Want): After obtaining clear surgical margins the patient was sent to otolaryngology for surgical repair.  The patient understands they will receive post-surgical care and follow-up from the referring physician's office.
Consent (Nose)/Introductory Paragraph: The rationale for Mohs was explained to the patient and consent was obtained. The risks, benefits and alternatives to therapy were discussed in detail. Specifically, the risks of nasal deformity, changes in the flow of air through the nose, infection, scarring, bleeding, prolonged wound healing, incomplete removal, allergy to anesthesia, nerve injury and recurrence were addressed. Prior to the procedure, the treatment site was clearly identified and confirmed by the patient. All components of Universal Protocol/PAUSE Rule completed.
Consent 2/Introductory Paragraph: Mohs surgery was explained to the patient and consent was obtained. The risks, benefits and alternatives to therapy were discussed in detail. Specifically, the risks of infection, scarring, bleeding, prolonged wound healing, incomplete removal, allergy to anesthesia, nerve injury and recurrence were addressed. Prior to the procedure, the treatment site was clearly identified and confirmed by the patient. All components of Universal Protocol/PAUSE Rule completed.
O-L Flap Text: The defect edges were debeveled with a #15 scalpel blade.  Given the location of the defect, shape of the defect and the proximity to free margins an O-L flap was deemed most appropriate.  Using a sterile surgical marker, an appropriate advancement flap was drawn incorporating the defect and placing the expected incisions within the relaxed skin tension lines where possible.    The area thus outlined was incised deep to adipose tissue with a #15 scalpel blade.  The skin margins were undermined to an appropriate distance in all directions utilizing iris scissors.
Banner Transposition Flap Text: The defect edges were debeveled with a #15 scalpel blade.  Given the location of the defect and the proximity to free margins a Banner transposition flap was deemed most appropriate.  Using a sterile surgical marker, an appropriate flap drawn around the defect. The area thus outlined was incised deep to adipose tissue with a #15 scalpel blade.  The skin margins were undermined to an appropriate distance in all directions utilizing iris scissors.
Mohs Rapid Report Verbiage: The area of clinically evident tumor was marked with skin marking ink and appropriately hatched.  The initial incision was made following the Mohs approach through the skin.  The specimen was taken to the lab, divided into the necessary number of pieces, chromacoded and processed according to the Mohs protocol.  This was repeated in successive stages until a tumor free defect was achieved.
Mauc Instructions: By selecting yes to the question below the MAUC number will be added into the note.  This will be calculated automatically based on the diagnosis chosen, the size entered, the body zone selected (H,M,L) and the specific indications you chose. You will also have the option to override the Mohs AUC if you disagree with the automatically calculated number and this option is found in the Case Summary tab.
Ftsg Text: The defect edges were debeveled with a #15 scalpel blade.  Given the location of the defect, shape of the defect and the proximity to free margins a full thickness skin graft was deemed most appropriate.  Using a sterile surgical marker, the primary defect shape was transferred to the donor site. The area thus outlined was incised deep to adipose tissue with a #15 scalpel blade.  The harvested graft was then trimmed of adipose tissue until only dermis and epidermis was left.  The skin margins of the secondary defect were undermined to an appropriate distance in all directions utilizing iris scissors.  The secondary defect was closed with interrupted buried subcutaneous sutures.  The skin edges were then re-apposed with running  sutures.  The skin graft was then placed in the primary defect and oriented appropriately.
Non-Graft Cartilage Fenestration Text: The cartilage was fenestrated with a 2mm punch biopsy to help facilitate healing.
No Residual Tumor Seen Histology Text: There were no malignant cells seen in the sections examined.
Repair Performed By Another Provider Text (Leave Blank If You Do Not Want): After obtaining clear surgical margins the defect was repaired by another provider.
Muscle Hinge Flap Text: The defect edges were debeveled with a #15 scalpel blade.  Given the size, depth and location of the defect and the proximity to free margins a muscle hinge flap was deemed most appropriate.  Using a sterile surgical marker, an appropriate hinge flap was drawn incorporating the defect. The area thus outlined was incised with a #15 scalpel blade.  The skin margins were undermined to an appropriate distance in all directions utilizing iris scissors.
Bilobed Transposition Flap Text: The defect edges were debeveled with a #15 scalpel blade.  Given the location of the defect and the proximity to free margins a bilobed transposition flap was deemed most appropriate.  Using a sterile surgical marker, an appropriate bilobe flap drawn around the defect.    The area thus outlined was incised deep to adipose tissue with a #15 scalpel blade.  The skin margins were undermined to an appropriate distance in all directions utilizing iris scissors.
Cartilage Graft Text: The defect edges were debeveled with a #15 scalpel blade.  Given the location of the defect, shape of the defect, the fact the defect involved a full thickness cartilage defect a cartilage graft was deemed most appropriate.  An appropriate donor site was identified, cleansed, and anesthetized. The cartilage graft was then harvested and transferred to the recipient site, oriented appropriately and then sutured into place.  The secondary defect was then repaired using a primary closure.
Bcc Histology Text: There were numerous aggregates of basaloid cells.
Initial Size Of Lesion: 1.6
W Plasty Text: The lesion was extirpated to the level of the fat with a #15 scalpel blade.  Given the location of the defect, shape of the defect and the proximity to free margins a W-plasty was deemed most appropriate for repair.  Using a sterile surgical marker, the appropriate transposition arms of the W-plasty were drawn incorporating the defect and placing the expected incisions within the relaxed skin tension lines where possible.    The area thus outlined was incised deep to adipose tissue with a #15 scalpel blade.  The skin margins were undermined to an appropriate distance in all directions utilizing iris scissors.  The opposing transposition arms were then transposed into place in opposite direction and anchored with interrupted buried subcutaneous sutures.
Anesthesia Volume In Cc: 6.5
Consent (Scalp)/Introductory Paragraph: The rationale for Mohs was explained to the patient and consent was obtained. The risks, benefits and alternatives to therapy were discussed in detail. Specifically, the risks of changes in hair growth pattern secondary to repair, infection, scarring, bleeding, prolonged wound healing, incomplete removal, allergy to anesthesia, nerve injury and recurrence were addressed. Prior to the procedure, the treatment site was clearly identified and confirmed by the patient. All components of Universal Protocol/PAUSE Rule completed.
Consent (Near Eyelid Margin)/Introductory Paragraph: The rationale for Mohs was explained to the patient and consent was obtained. The risks, benefits and alternatives to therapy were discussed in detail. Specifically, the risks of ectropion or eyelid deformity, infection, scarring, bleeding, prolonged wound healing, incomplete removal, allergy to anesthesia, nerve injury and recurrence were addressed. Prior to the procedure, the treatment site was clearly identified and confirmed by the patient. All components of Universal Protocol/PAUSE Rule completed.
Rhombic Flap Text: The defect edges were debeveled with a #15 scalpel blade.  Given the location of the defect and the proximity to free margins a rhombic flap was deemed most appropriate.  Using a sterile surgical marker, an appropriate rhombic flap was drawn incorporating the defect.    The area thus outlined was incised deep to adipose tissue with a #15 scalpel blade.  The skin margins were undermined to an appropriate distance in all directions utilizing iris scissors.
S Plasty Text: Given the location and shape of the defect, and the orientation of relaxed skin tension lines, an S-plasty was deemed most appropriate for repair.  Using a sterile surgical marker, the appropriate outline of the S-plasty was drawn, incorporating the defect and placing the expected incisions within the relaxed skin tension lines where possible.  The area thus outlined was incised deep to adipose tissue with a #15 scalpel blade.  The skin margins were undermined to an appropriate distance in all directions utilizing iris scissors. The skin flaps were advanced over the defect.  The opposing margins were then approximated with interrupted buried subcutaneous sutures.
Skin Substitute Text: The defect edges were debeveled with a #15 scalpel blade.  Given the location of the defect, shape of the defect and the proximity to free margins a skin substitute graft was deemed most appropriate.  The graft material was trimmed to fit the size of the defect. The graft was then placed in the primary defect and oriented appropriately.
Estimated Blood Loss (Cc): minimal
Dorsal Nasal Flap Text: The defect edges were debeveled with a #15 scalpel blade.  Given the location of the defect and the proximity to free margins a dorsal nasal flap was deemed most appropriate.  Using a sterile surgical marker, an appropriate dorsal nasal flap was drawn around the defect.    The area thus outlined was incised deep to adipose tissue with a #15 scalpel blade.  The skin margins were undermined to an appropriate distance in all directions utilizing iris scissors.
Spiral Flap Text: The defect edges were debeveled with a #15 scalpel blade.  Given the location of the defect, shape of the defect and the proximity to free margins a spiral flap was deemed most appropriate.  Using a sterile surgical marker, an appropriate rotation flap was drawn incorporating the defect and placing the expected incisions within the relaxed skin tension lines where possible. The area thus outlined was incised deep to adipose tissue with a #15 scalpel blade.  The skin margins were undermined to an appropriate distance in all directions utilizing iris scissors.
Advancement Flap (Double) Text: The defect edges were debeveled with a #15 scalpel blade.  Given the location of the defect and the proximity to free margins a double advancement flap was deemed most appropriate.  Using a sterile surgical marker, the appropriate advancement flaps were drawn incorporating the defect and placing the expected incisions within the relaxed skin tension lines where possible.    The area thus outlined was incised deep to adipose tissue with a #15 scalpel blade.  The skin margins were undermined to an appropriate distance in all directions utilizing iris scissors.
V-Y Flap Text: The defect edges were debeveled with a #15 scalpel blade.  Given the location of the defect, shape of the defect and the proximity to free margins a V-Y flap was deemed most appropriate.  Using a sterile surgical marker, an appropriate advancement flap was drawn incorporating the defect and placing the expected incisions within the relaxed skin tension lines where possible.    The area thus outlined was incised deep to adipose tissue with a #15 scalpel blade.  The skin margins were undermined to an appropriate distance in all directions utilizing iris scissors.

## 2018-08-28 ENCOUNTER — APPOINTMENT (RX ONLY)
Dept: URBAN - METROPOLITAN AREA CLINIC 31 | Facility: CLINIC | Age: 73
Setting detail: DERMATOLOGY
End: 2018-08-28

## 2018-08-28 DIAGNOSIS — Z48.817 ENCOUNTER FOR SURGICAL AFTERCARE FOLLOWING SURGERY ON THE SKIN AND SUBCUTANEOUS TISSUE: ICD-10-CM

## 2018-08-28 PROCEDURE — 99024 POSTOP FOLLOW-UP VISIT: CPT

## 2018-08-28 PROCEDURE — ? POST-OP WOUND EVALUATION

## 2018-08-28 ASSESSMENT — LOCATION DETAILED DESCRIPTION DERM: LOCATION DETAILED: LEFT DISTAL PRETIBIAL REGION

## 2018-08-28 ASSESSMENT — LOCATION SIMPLE DESCRIPTION DERM: LOCATION SIMPLE: LEFT PRETIBIAL REGION

## 2018-08-28 ASSESSMENT — LOCATION ZONE DERM: LOCATION ZONE: LEG

## 2018-08-28 NOTE — PROCEDURE: POST-OP WOUND EVALUATION
Wound Diameter In Cm(Optional): 0
Detail Level: Detailed
Wound Evaluated By (Optional): Kin Bhatti MD
Body Location Override (Optional): left pretibial region
Add 56611 Cpt? (Important Note: In 2017 The Use Of 21168 Is Being Tracked By Cms To Determine Future Global Period Reimbursement For Global Periods): yes

## 2019-01-10 ENCOUNTER — APPOINTMENT (RX ONLY)
Dept: URBAN - METROPOLITAN AREA CLINIC 31 | Facility: CLINIC | Age: 74
Setting detail: DERMATOLOGY
End: 2019-01-10

## 2019-01-10 DIAGNOSIS — L85.3 XEROSIS CUTIS: ICD-10-CM

## 2019-01-10 DIAGNOSIS — D18.0 HEMANGIOMA: ICD-10-CM

## 2019-01-10 DIAGNOSIS — L82.1 OTHER SEBORRHEIC KERATOSIS: ICD-10-CM

## 2019-01-10 DIAGNOSIS — L56.5 DISSEMINATED SUPERFICIAL ACTINIC POROKERATOSIS (DSAP): ICD-10-CM

## 2019-01-10 DIAGNOSIS — L57.8 OTHER SKIN CHANGES DUE TO CHRONIC EXPOSURE TO NONIONIZING RADIATION: ICD-10-CM

## 2019-01-10 DIAGNOSIS — D22 MELANOCYTIC NEVI: ICD-10-CM

## 2019-01-10 DIAGNOSIS — L57.0 ACTINIC KERATOSIS: ICD-10-CM

## 2019-01-10 PROBLEM — D22.72 MELANOCYTIC NEVI OF LEFT LOWER LIMB, INCLUDING HIP: Status: ACTIVE | Noted: 2019-01-10

## 2019-01-10 PROBLEM — D22.5 MELANOCYTIC NEVI OF TRUNK: Status: ACTIVE | Noted: 2019-01-10

## 2019-01-10 PROBLEM — D18.01 HEMANGIOMA OF SKIN AND SUBCUTANEOUS TISSUE: Status: ACTIVE | Noted: 2019-01-10

## 2019-01-10 PROBLEM — D22.62 MELANOCYTIC NEVI OF LEFT UPPER LIMB, INCLUDING SHOULDER: Status: ACTIVE | Noted: 2019-01-10

## 2019-01-10 PROBLEM — D22.71 MELANOCYTIC NEVI OF RIGHT LOWER LIMB, INCLUDING HIP: Status: ACTIVE | Noted: 2019-01-10

## 2019-01-10 PROBLEM — D22.61 MELANOCYTIC NEVI OF RIGHT UPPER LIMB, INCLUDING SHOULDER: Status: ACTIVE | Noted: 2019-01-10

## 2019-01-10 PROCEDURE — ? LIQUID NITROGEN

## 2019-01-10 PROCEDURE — ? BENIGN DESTRUCTION

## 2019-01-10 PROCEDURE — 99214 OFFICE O/P EST MOD 30 MIN: CPT | Mod: 25

## 2019-01-10 PROCEDURE — 17000 DESTRUCT PREMALG LESION: CPT | Mod: 59

## 2019-01-10 PROCEDURE — 17110 DESTRUCTION B9 LES UP TO 14: CPT

## 2019-01-10 PROCEDURE — ? COUNSELING

## 2019-01-10 PROCEDURE — 17003 DESTRUCT PREMALG LES 2-14: CPT

## 2019-01-10 ASSESSMENT — LOCATION DETAILED DESCRIPTION DERM
LOCATION DETAILED: LEFT PROXIMAL DORSAL FOREARM
LOCATION DETAILED: RIGHT PROXIMAL PRETIBIAL REGION
LOCATION DETAILED: RIGHT MID-UPPER BACK
LOCATION DETAILED: RIGHT DISTAL PRETIBIAL REGION
LOCATION DETAILED: RIGHT RADIAL DORSAL HAND
LOCATION DETAILED: LEFT VENTRAL PROXIMAL FOREARM
LOCATION DETAILED: RIGHT VENTRAL PROXIMAL FOREARM
LOCATION DETAILED: RIGHT PROXIMAL DORSAL FOREARM
LOCATION DETAILED: LEFT LATERAL FOREHEAD
LOCATION DETAILED: LEFT SUPERIOR MEDIAL UPPER BACK
LOCATION DETAILED: LEFT SUPERIOR MEDIAL MIDBACK
LOCATION DETAILED: RIGHT SUPERIOR MEDIAL UPPER BACK
LOCATION DETAILED: RIGHT SUPERIOR LATERAL MALAR CHEEK
LOCATION DETAILED: RIGHT INFERIOR CENTRAL MALAR CHEEK
LOCATION DETAILED: RIGHT PROXIMAL POSTERIOR UPPER ARM
LOCATION DETAILED: LEFT FOREHEAD
LOCATION DETAILED: RIGHT DISTAL DORSAL FOREARM
LOCATION DETAILED: RIGHT MEDIAL INFERIOR CHEST
LOCATION DETAILED: LEFT ULNAR DORSAL HAND
LOCATION DETAILED: RIGHT INFERIOR LATERAL FOREHEAD
LOCATION DETAILED: LEFT MEDIAL UPPER BACK
LOCATION DETAILED: LEFT PROXIMAL CALF
LOCATION DETAILED: RIGHT PROXIMAL CALF
LOCATION DETAILED: LEFT PROXIMAL PRETIBIAL REGION
LOCATION DETAILED: LEFT DISTAL DORSAL FOREARM
LOCATION DETAILED: RIGHT ULNAR DORSAL HAND
LOCATION DETAILED: LEFT DISTAL POSTERIOR UPPER ARM
LOCATION DETAILED: RIGHT INFERIOR POSTERIOR NECK
LOCATION DETAILED: RIGHT LATERAL MALAR CHEEK
LOCATION DETAILED: LEFT PROXIMAL POSTERIOR UPPER ARM
LOCATION DETAILED: RIGHT DISTAL POSTERIOR UPPER ARM
LOCATION DETAILED: EPIGASTRIC SKIN
LOCATION DETAILED: LEFT SUPERIOR ANTERIOR NECK

## 2019-01-10 ASSESSMENT — LOCATION SIMPLE DESCRIPTION DERM
LOCATION SIMPLE: RIGHT CHEEK
LOCATION SIMPLE: LEFT FOREHEAD
LOCATION SIMPLE: LEFT LOWER BACK
LOCATION SIMPLE: LEFT HAND
LOCATION SIMPLE: LEFT CALF
LOCATION SIMPLE: LEFT UPPER ARM
LOCATION SIMPLE: CHEST
LOCATION SIMPLE: LEFT PRETIBIAL REGION
LOCATION SIMPLE: RIGHT HAND
LOCATION SIMPLE: RIGHT UPPER ARM
LOCATION SIMPLE: RIGHT CALF
LOCATION SIMPLE: LEFT UPPER BACK
LOCATION SIMPLE: POSTERIOR NECK
LOCATION SIMPLE: RIGHT UPPER BACK
LOCATION SIMPLE: RIGHT FOREARM
LOCATION SIMPLE: ABDOMEN
LOCATION SIMPLE: LEFT ANTERIOR NECK
LOCATION SIMPLE: RIGHT PRETIBIAL REGION
LOCATION SIMPLE: LEFT FOREARM
LOCATION SIMPLE: RIGHT FOREHEAD

## 2019-01-10 ASSESSMENT — LOCATION ZONE DERM
LOCATION ZONE: LEG
LOCATION ZONE: TRUNK
LOCATION ZONE: HAND
LOCATION ZONE: NECK
LOCATION ZONE: ARM
LOCATION ZONE: FACE

## 2019-01-10 NOTE — PROCEDURE: LIQUID NITROGEN
Number Of Freeze-Thaw Cycles: 1 freeze-thaw cycle
Render Post-Care Instructions In Note?: no
Post-Care Instructions: I reviewed with the patient in detail post-care instructions. Patient is to wear sunprotection, and avoid picking at any of the treated lesions. Pt may apply Vaseline to crusted or scabbing areas.
Consent: The patient's consent was obtained including but not limited to risks of crusting, scabbing, blistering, scarring, darker or lighter pigmentary change, recurrence, incomplete removal and infection.
Detail Level: Detailed
Duration Of Freeze Thaw-Cycle (Seconds): 15

## 2019-01-10 NOTE — PROCEDURE: BENIGN DESTRUCTION
Medical Necessity Information: It is in your best interest to select a reason for this procedure from the list below. All of these items fulfill various CMS LCD requirements except the new and changing color options.
Render Post-Care Instructions In Note?: yes
Consent: The patient's consent was obtained including but not limited to risks of crusting, scabbing, blistering, scarring, darker or lighter pigmentary change, recurrence, incomplete removal and infection.
Add 52 Modifier (Optional): no
Anesthesia Volume In Cc: 0.5
Medical Necessity Clause: This procedure was medically necessary because the lesions that were treated were:
Detail Level: Detailed
Post-Care Instructions: I reviewed with the patient in detail post-care instructions. Patient is to wear sunprotection, and avoid picking at any of the treated lesions. Pt may apply Vaseline to crusted or scabbing areas.
Treatment Number (Will Not Render If 0): 0

## 2019-07-11 ENCOUNTER — APPOINTMENT (RX ONLY)
Dept: URBAN - METROPOLITAN AREA CLINIC 4 | Facility: CLINIC | Age: 74
Setting detail: DERMATOLOGY
End: 2019-07-11

## 2019-07-11 DIAGNOSIS — L56.5 DISSEMINATED SUPERFICIAL ACTINIC POROKERATOSIS (DSAP): ICD-10-CM

## 2019-07-11 DIAGNOSIS — L90.5 SCAR CONDITIONS AND FIBROSIS OF SKIN: ICD-10-CM

## 2019-07-11 DIAGNOSIS — Z85.828 PERSONAL HISTORY OF OTHER MALIGNANT NEOPLASM OF SKIN: ICD-10-CM

## 2019-07-11 DIAGNOSIS — L82.1 OTHER SEBORRHEIC KERATOSIS: ICD-10-CM

## 2019-07-11 DIAGNOSIS — L57.0 ACTINIC KERATOSIS: ICD-10-CM

## 2019-07-11 PROCEDURE — 17000 DESTRUCT PREMALG LESION: CPT

## 2019-07-11 PROCEDURE — ? COUNSELING

## 2019-07-11 PROCEDURE — 99213 OFFICE O/P EST LOW 20 MIN: CPT | Mod: 25

## 2019-07-11 PROCEDURE — ? LIQUID NITROGEN

## 2019-07-11 PROCEDURE — 17003 DESTRUCT PREMALG LES 2-14: CPT

## 2019-07-11 PROCEDURE — ? ADDITIONAL NOTES

## 2019-07-11 ASSESSMENT — LOCATION DETAILED DESCRIPTION DERM
LOCATION DETAILED: LEFT DISTAL DORSAL FOREARM
LOCATION DETAILED: RIGHT DISTAL PRETIBIAL REGION
LOCATION DETAILED: LEFT SUPERIOR PARIETAL SCALP
LOCATION DETAILED: RIGHT CENTRAL FRONTAL SCALP
LOCATION DETAILED: POSTERIOR MID-PARIETAL SCALP
LOCATION DETAILED: LEFT CENTRAL FRONTAL SCALP
LOCATION DETAILED: PERIUMBILICAL SKIN
LOCATION DETAILED: LEFT PROXIMAL PRETIBIAL REGION
LOCATION DETAILED: RIGHT SUPERIOR FOREHEAD
LOCATION DETAILED: LEFT SUPERIOR FOREHEAD
LOCATION DETAILED: LEFT SUPERIOR UPPER BACK
LOCATION DETAILED: NASAL TIP

## 2019-07-11 ASSESSMENT — LOCATION SIMPLE DESCRIPTION DERM
LOCATION SIMPLE: LEFT SCALP
LOCATION SIMPLE: SCALP
LOCATION SIMPLE: RIGHT FOREHEAD
LOCATION SIMPLE: RIGHT SCALP
LOCATION SIMPLE: RIGHT PRETIBIAL REGION
LOCATION SIMPLE: LEFT PRETIBIAL REGION
LOCATION SIMPLE: ABDOMEN
LOCATION SIMPLE: LEFT UPPER BACK
LOCATION SIMPLE: POSTERIOR SCALP
LOCATION SIMPLE: LEFT FOREARM
LOCATION SIMPLE: LEFT FOREHEAD
LOCATION SIMPLE: NOSE

## 2019-07-11 ASSESSMENT — LOCATION ZONE DERM
LOCATION ZONE: LEG
LOCATION ZONE: TRUNK
LOCATION ZONE: NOSE
LOCATION ZONE: SCALP
LOCATION ZONE: ARM
LOCATION ZONE: FACE

## 2019-07-11 NOTE — PROCEDURE: ADDITIONAL NOTES
Additional Notes: Discussed possibility of BCC. \\nOffered biopsy or watch and observe. \\nReassure and observe for changes.
Detail Level: Simple
Additional Notes: Includes spots of concern mentioned on intake. \\nRecommend gentle cleansers and moisturizers daily, like Cetaphil or CeraVe. Moisturize within 3 minutes of showering.

## 2019-10-31 ENCOUNTER — APPOINTMENT (RX ONLY)
Dept: URBAN - METROPOLITAN AREA CLINIC 4 | Facility: CLINIC | Age: 74
Setting detail: DERMATOLOGY
End: 2019-10-31

## 2019-10-31 DIAGNOSIS — D22 MELANOCYTIC NEVI: ICD-10-CM

## 2019-10-31 DIAGNOSIS — L82.1 OTHER SEBORRHEIC KERATOSIS: ICD-10-CM

## 2019-10-31 DIAGNOSIS — L57.8 OTHER SKIN CHANGES DUE TO CHRONIC EXPOSURE TO NONIONIZING RADIATION: ICD-10-CM

## 2019-10-31 DIAGNOSIS — Z71.89 OTHER SPECIFIED COUNSELING: ICD-10-CM

## 2019-10-31 DIAGNOSIS — L57.0 ACTINIC KERATOSIS: ICD-10-CM

## 2019-10-31 DIAGNOSIS — L81.4 OTHER MELANIN HYPERPIGMENTATION: ICD-10-CM

## 2019-10-31 DIAGNOSIS — Z85.828 PERSONAL HISTORY OF OTHER MALIGNANT NEOPLASM OF SKIN: ICD-10-CM

## 2019-10-31 DIAGNOSIS — D18.0 HEMANGIOMA: ICD-10-CM

## 2019-10-31 PROBLEM — D22.5 MELANOCYTIC NEVI OF TRUNK: Status: ACTIVE | Noted: 2019-10-31

## 2019-10-31 PROBLEM — D48.5 NEOPLASM OF UNCERTAIN BEHAVIOR OF SKIN: Status: ACTIVE | Noted: 2019-10-31

## 2019-10-31 PROBLEM — D18.01 HEMANGIOMA OF SKIN AND SUBCUTANEOUS TISSUE: Status: ACTIVE | Noted: 2019-10-31

## 2019-10-31 PROCEDURE — ? BIOPSY BY SHAVE METHOD

## 2019-10-31 PROCEDURE — 69100 BIOPSY OF EXTERNAL EAR: CPT | Mod: 59

## 2019-10-31 PROCEDURE — ? COUNSELING

## 2019-10-31 PROCEDURE — ? LIQUID NITROGEN

## 2019-10-31 PROCEDURE — 17000 DESTRUCT PREMALG LESION: CPT

## 2019-10-31 PROCEDURE — 17003 DESTRUCT PREMALG LES 2-14: CPT

## 2019-10-31 PROCEDURE — 99213 OFFICE O/P EST LOW 20 MIN: CPT | Mod: 25

## 2019-10-31 ASSESSMENT — LOCATION ZONE DERM
LOCATION ZONE: LEG
LOCATION ZONE: NOSE
LOCATION ZONE: TRUNK
LOCATION ZONE: SCALP
LOCATION ZONE: FACE
LOCATION ZONE: NECK
LOCATION ZONE: ARM

## 2019-10-31 ASSESSMENT — LOCATION SIMPLE DESCRIPTION DERM
LOCATION SIMPLE: SCALP
LOCATION SIMPLE: LEFT ANTERIOR NECK
LOCATION SIMPLE: RIGHT LOWER BACK
LOCATION SIMPLE: LEFT LOWER BACK
LOCATION SIMPLE: NOSE
LOCATION SIMPLE: LEFT PRETIBIAL REGION
LOCATION SIMPLE: LEFT FOREARM
LOCATION SIMPLE: LEFT SCALP
LOCATION SIMPLE: LEFT FOREHEAD
LOCATION SIMPLE: LEFT UPPER BACK
LOCATION SIMPLE: RIGHT SCALP
LOCATION SIMPLE: RIGHT FOREARM
LOCATION SIMPLE: RIGHT ZYGOMA
LOCATION SIMPLE: LEFT CHEEK

## 2019-10-31 ASSESSMENT — LOCATION DETAILED DESCRIPTION DERM
LOCATION DETAILED: LEFT PROXIMAL DORSAL FOREARM
LOCATION DETAILED: RIGHT SUPERIOR LATERAL LOWER BACK
LOCATION DETAILED: LEFT INFERIOR CENTRAL MALAR CHEEK
LOCATION DETAILED: LEFT CENTRAL FRONTAL SCALP
LOCATION DETAILED: LEFT SUPERIOR UPPER BACK
LOCATION DETAILED: LEFT DISTAL DORSAL FOREARM
LOCATION DETAILED: LEFT PROXIMAL PRETIBIAL REGION
LOCATION DETAILED: RIGHT PROXIMAL DORSAL FOREARM
LOCATION DETAILED: LEFT SUPERIOR PARIETAL SCALP
LOCATION DETAILED: RIGHT CENTRAL ZYGOMA
LOCATION DETAILED: LEFT SUPERIOR FOREHEAD
LOCATION DETAILED: LEFT FOREHEAD
LOCATION DETAILED: LEFT INFERIOR ANTERIOR NECK
LOCATION DETAILED: LEFT SUPERIOR MEDIAL MIDBACK
LOCATION DETAILED: LEFT SUPERIOR LATERAL BUCCAL CHEEK
LOCATION DETAILED: RIGHT SUPERIOR MEDIAL MIDBACK
LOCATION DETAILED: RIGHT PROXIMAL RADIAL DORSAL FOREARM
LOCATION DETAILED: RIGHT CENTRAL FRONTAL SCALP
LOCATION DETAILED: NASAL TIP
LOCATION DETAILED: LEFT INFERIOR MEDIAL MIDBACK

## 2019-10-31 NOTE — PROCEDURE: BIOPSY BY SHAVE METHOD
Hemostasis: Aluminum Chloride and Electrocautery
Render In Bullet Format When Appropriate: No
Size Of Lesion In Cm: 0.7
Lab: [id number]
Billing Type: Third-Party Bill
Curettage Text: The wound bed was treated with curettage after the biopsy was performed.
Cryotherapy Text: The wound bed was treated with cryotherapy after the biopsy was performed.
Post-Care Instructions: I reviewed with the patient in detail post-care instructions. Patient is to keep the biopsy site dry overnight, and then apply bacitracin twice daily until healed. Patient may apply hydrogen peroxide soaks to remove any crusting.
Anesthesia Type: 1% lidocaine with epinephrine
Wound Care: Aquaphor
Additional Anesthesia Volume In Cc (Will Not Render If 0): 0
Biopsy Type: H and E
Depth Of Biopsy: dermis
Lab Facility: 
Consent: Written consent was obtained and risks were reviewed including but not limited to scarring, infection, bleeding, scabbing, incomplete removal, nerve damage and allergy to anesthesia.
Electrodesiccation Text: The wound bed was treated with electrodesiccation after the biopsy was performed.
Notification Instructions: Patient will be notified of biopsy results. However, patient instructed to call the office if not contacted within 2 weeks.
Type Of Destruction Used: Curettage
Biopsy Method: 15 blade
Was A Bandage Applied: Yes
Electrodesiccation And Curettage Text: The wound bed was treated with electrodesiccation and curettage after the biopsy was performed.
Silver Nitrate Text: The wound bed was treated with silver nitrate after the biopsy was performed.
Detail Level: Detailed
Dressing: Band-Aid

## 2019-11-25 ENCOUNTER — APPOINTMENT (RX ONLY)
Dept: URBAN - METROPOLITAN AREA CLINIC 31 | Facility: CLINIC | Age: 74
Setting detail: DERMATOLOGY
End: 2019-11-25

## 2019-11-25 DIAGNOSIS — L57.0 ACTINIC KERATOSIS: ICD-10-CM

## 2019-11-25 PROCEDURE — 17000 DESTRUCT PREMALG LESION: CPT

## 2019-11-25 PROCEDURE — ? LIQUID NITROGEN

## 2019-11-25 PROCEDURE — ? COUNSELING

## 2019-11-25 ASSESSMENT — LOCATION ZONE DERM: LOCATION ZONE: EAR

## 2019-11-25 ASSESSMENT — LOCATION DETAILED DESCRIPTION DERM: LOCATION DETAILED: RIGHT SUPERIOR CRUS OF ANTIHELIX

## 2019-11-25 ASSESSMENT — LOCATION SIMPLE DESCRIPTION DERM: LOCATION SIMPLE: RIGHT EAR

## 2020-01-09 ENCOUNTER — APPOINTMENT (RX ONLY)
Dept: URBAN - METROPOLITAN AREA CLINIC 4 | Facility: CLINIC | Age: 75
Setting detail: DERMATOLOGY
End: 2020-01-09

## 2020-01-09 DIAGNOSIS — L85.3 XEROSIS CUTIS: ICD-10-CM

## 2020-01-09 DIAGNOSIS — L57.8 OTHER SKIN CHANGES DUE TO CHRONIC EXPOSURE TO NONIONIZING RADIATION: ICD-10-CM

## 2020-01-09 DIAGNOSIS — Z80.8 FAMILY HISTORY OF MALIGNANT NEOPLASM OF OTHER ORGANS OR SYSTEMS: ICD-10-CM

## 2020-01-09 DIAGNOSIS — L81.4 OTHER MELANIN HYPERPIGMENTATION: ICD-10-CM

## 2020-01-09 DIAGNOSIS — Z85.828 PERSONAL HISTORY OF OTHER MALIGNANT NEOPLASM OF SKIN: ICD-10-CM

## 2020-01-09 DIAGNOSIS — D22 MELANOCYTIC NEVI: ICD-10-CM

## 2020-01-09 DIAGNOSIS — D18.0 HEMANGIOMA: ICD-10-CM

## 2020-01-09 DIAGNOSIS — Z71.89 OTHER SPECIFIED COUNSELING: ICD-10-CM

## 2020-01-09 DIAGNOSIS — L82.1 OTHER SEBORRHEIC KERATOSIS: ICD-10-CM

## 2020-01-09 DIAGNOSIS — L57.0 ACTINIC KERATOSIS: ICD-10-CM

## 2020-01-09 PROBLEM — D18.01 HEMANGIOMA OF SKIN AND SUBCUTANEOUS TISSUE: Status: ACTIVE | Noted: 2020-01-09

## 2020-01-09 PROBLEM — D48.5 NEOPLASM OF UNCERTAIN BEHAVIOR OF SKIN: Status: ACTIVE | Noted: 2020-01-09

## 2020-01-09 PROBLEM — D22.5 MELANOCYTIC NEVI OF TRUNK: Status: ACTIVE | Noted: 2020-01-09

## 2020-01-09 PROCEDURE — ? COUNSELING

## 2020-01-09 PROCEDURE — ? BIOPSY BY SHAVE METHOD

## 2020-01-09 PROCEDURE — ? LIQUID NITROGEN

## 2020-01-09 PROCEDURE — ? DIAGNOSIS COMMENT

## 2020-01-09 PROCEDURE — 99213 OFFICE O/P EST LOW 20 MIN: CPT | Mod: 25

## 2020-01-09 PROCEDURE — 17003 DESTRUCT PREMALG LES 2-14: CPT

## 2020-01-09 PROCEDURE — ? ADDITIONAL NOTES

## 2020-01-09 PROCEDURE — 11102 TANGNTL BX SKIN SINGLE LES: CPT

## 2020-01-09 PROCEDURE — 17000 DESTRUCT PREMALG LESION: CPT | Mod: 59

## 2020-01-09 ASSESSMENT — LOCATION DETAILED DESCRIPTION DERM
LOCATION DETAILED: RIGHT CENTRAL MALAR CHEEK
LOCATION DETAILED: RIGHT PROXIMAL DORSAL FOREARM
LOCATION DETAILED: RIGHT SUPERIOR LATERAL LOWER BACK
LOCATION DETAILED: LEFT INFERIOR CENTRAL MALAR CHEEK
LOCATION DETAILED: RIGHT SUPERIOR MEDIAL MIDBACK
LOCATION DETAILED: LEFT PROXIMAL PRETIBIAL REGION
LOCATION DETAILED: RIGHT CENTRAL FRONTAL SCALP
LOCATION DETAILED: LEFT INFERIOR ANTERIOR NECK
LOCATION DETAILED: RIGHT PROXIMAL RADIAL DORSAL FOREARM
LOCATION DETAILED: LEFT CENTRAL FRONTAL SCALP
LOCATION DETAILED: LEFT INFERIOR MEDIAL MIDBACK
LOCATION DETAILED: LEFT DISTAL DORSAL FOREARM
LOCATION DETAILED: LEFT SUPERIOR PARIETAL SCALP
LOCATION DETAILED: LEFT PROXIMAL DORSAL FOREARM
LOCATION DETAILED: RIGHT SUPERIOR PARIETAL SCALP
LOCATION DETAILED: LEFT SUPERIOR UPPER BACK
LOCATION DETAILED: LEFT SUPERIOR MEDIAL MIDBACK
LOCATION DETAILED: RIGHT MEDIAL UPPER BACK
LOCATION DETAILED: RIGHT FOREHEAD
LOCATION DETAILED: LEFT SUPERIOR LATERAL BUCCAL CHEEK

## 2020-01-09 ASSESSMENT — LOCATION SIMPLE DESCRIPTION DERM
LOCATION SIMPLE: RIGHT UPPER BACK
LOCATION SIMPLE: LEFT ANTERIOR NECK
LOCATION SIMPLE: LEFT PRETIBIAL REGION
LOCATION SIMPLE: LEFT UPPER BACK
LOCATION SIMPLE: RIGHT FOREHEAD
LOCATION SIMPLE: LEFT FOREARM
LOCATION SIMPLE: RIGHT CHEEK
LOCATION SIMPLE: SCALP
LOCATION SIMPLE: RIGHT LOWER BACK
LOCATION SIMPLE: RIGHT FOREARM
LOCATION SIMPLE: LEFT LOWER BACK
LOCATION SIMPLE: RIGHT SCALP
LOCATION SIMPLE: LEFT CHEEK
LOCATION SIMPLE: LEFT SCALP

## 2020-01-09 ASSESSMENT — LOCATION ZONE DERM
LOCATION ZONE: NECK
LOCATION ZONE: SCALP
LOCATION ZONE: TRUNK
LOCATION ZONE: LEG
LOCATION ZONE: ARM
LOCATION ZONE: FACE

## 2020-01-09 NOTE — PROCEDURE: BIOPSY BY SHAVE METHOD
Hide Biopsy Depth?: No
Dressing: Band-Aid
Hemostasis: Aluminum Chloride and Electrocautery
Silver Nitrate Text: The wound bed was treated with silver nitrate after the biopsy was performed.
Curettage Text: The wound bed was treated with curettage after the biopsy was performed.
Depth Of Biopsy: dermis
Size Of Lesion In Cm: 0.8
Billing Type: Third-Party Bill
Lab: [id number]
Additional Anesthesia Volume In Cc (Will Not Render If 0): 0
Cryotherapy Text: The wound bed was treated with cryotherapy after the biopsy was performed.
Wound Care: Aquaphor
Lab Facility: 
Biopsy Type: H and E
Anesthesia Type: 1% lidocaine with epinephrine
Post-Care Instructions: I reviewed with the patient in detail post-care instructions. Patient is to keep the biopsy site dry overnight, and then apply bacitracin twice daily until healed. Patient may apply hydrogen peroxide soaks to remove any crusting.
Consent: Written consent was obtained and risks were reviewed including but not limited to scarring, infection, bleeding, scabbing, incomplete removal, nerve damage and allergy to anesthesia.
Electrodesiccation Text: The wound bed was treated with electrodesiccation after the biopsy was performed.
Notification Instructions: Patient will be notified of biopsy results. However, patient instructed to call the office if not contacted within 2 weeks.
Biopsy Method: 15 blade
Type Of Destruction Used: Curettage
Detail Level: Detailed
Electrodesiccation And Curettage Text: The wound bed was treated with electrodesiccation and curettage after the biopsy was performed.
Was A Bandage Applied: Yes

## 2020-01-09 NOTE — PROCEDURE: ADDITIONAL NOTES
Detail Level: Simple
Additional Notes: Recommended moisturizing daily.
Additional Notes: Includes spots of concern on intake.

## 2020-03-11 NOTE — PROCEDURE: COUNSELING
Detail Level: Zone Plan: .\\n\\nI spoke to PCP medical director, who will help coordinate therapy for patient via insurance. Detail Level: Zone Continue Regimen: .\\nSilvadene 1% cream, apply to affected areas twice daily x 10 more days\\nMupirocin 2% cream, apply to affected areas twice daily x 10 more days\\n\\nLeg elevation stressed. Initiate Treatment: .\\n- Triamcinolone 0.1% cream Apply to right leg twice daily for 2 weeks (on closed wounds)

## 2020-05-20 PROBLEM — S46.011A TRAUMATIC COMPLETE TEAR OF RIGHT ROTATOR CUFF: Status: ACTIVE | Noted: 2020-05-20

## 2020-05-20 PROBLEM — K21.00 GASTROESOPHAGEAL REFLUX DISEASE WITH ESOPHAGITIS: Status: ACTIVE | Noted: 2020-05-20

## 2020-05-20 PROBLEM — I25.10 CAD (CORONARY ARTERY DISEASE): Status: ACTIVE | Noted: 2020-05-20

## 2020-05-20 PROBLEM — I10 ESSENTIAL HYPERTENSION: Status: ACTIVE | Noted: 2020-05-20

## 2020-05-20 PROBLEM — I21.9 MI (MYOCARDIAL INFARCTION) (HCC): Status: ACTIVE | Noted: 2020-05-20

## 2020-05-20 PROBLEM — J44.9 CHRONIC OBSTRUCTIVE PULMONARY DISEASE (COPD) (HCC): Status: ACTIVE | Noted: 2020-05-20

## 2020-05-27 ENCOUNTER — APPOINTMENT (RX ONLY)
Dept: URBAN - METROPOLITAN AREA CLINIC 4 | Facility: CLINIC | Age: 75
Setting detail: DERMATOLOGY
End: 2020-05-27

## 2020-05-27 DIAGNOSIS — D18.0 HEMANGIOMA: ICD-10-CM

## 2020-05-27 DIAGNOSIS — L82.1 OTHER SEBORRHEIC KERATOSIS: ICD-10-CM

## 2020-05-27 DIAGNOSIS — L57.0 ACTINIC KERATOSIS: ICD-10-CM

## 2020-05-27 DIAGNOSIS — L57.8 OTHER SKIN CHANGES DUE TO CHRONIC EXPOSURE TO NONIONIZING RADIATION: ICD-10-CM

## 2020-05-27 DIAGNOSIS — Z85.828 PERSONAL HISTORY OF OTHER MALIGNANT NEOPLASM OF SKIN: ICD-10-CM

## 2020-05-27 DIAGNOSIS — D22 MELANOCYTIC NEVI: ICD-10-CM

## 2020-05-27 DIAGNOSIS — Z80.8 FAMILY HISTORY OF MALIGNANT NEOPLASM OF OTHER ORGANS OR SYSTEMS: ICD-10-CM

## 2020-05-27 DIAGNOSIS — L81.4 OTHER MELANIN HYPERPIGMENTATION: ICD-10-CM

## 2020-05-27 DIAGNOSIS — Z71.89 OTHER SPECIFIED COUNSELING: ICD-10-CM

## 2020-05-27 PROBLEM — D18.01 HEMANGIOMA OF SKIN AND SUBCUTANEOUS TISSUE: Status: ACTIVE | Noted: 2020-05-27

## 2020-05-27 PROBLEM — D48.5 NEOPLASM OF UNCERTAIN BEHAVIOR OF SKIN: Status: ACTIVE | Noted: 2020-05-27

## 2020-05-27 PROBLEM — D22.5 MELANOCYTIC NEVI OF TRUNK: Status: ACTIVE | Noted: 2020-05-27

## 2020-05-27 PROCEDURE — ? BIOPSY BY SHAVE METHOD

## 2020-05-27 PROCEDURE — 99214 OFFICE O/P EST MOD 30 MIN: CPT | Mod: 25

## 2020-05-27 PROCEDURE — ? COUNSELING

## 2020-05-27 PROCEDURE — ? DIAGNOSIS COMMENT

## 2020-05-27 PROCEDURE — 17003 DESTRUCT PREMALG LES 2-14: CPT

## 2020-05-27 PROCEDURE — 11102 TANGNTL BX SKIN SINGLE LES: CPT

## 2020-05-27 PROCEDURE — ? LIQUID NITROGEN

## 2020-05-27 PROCEDURE — 17000 DESTRUCT PREMALG LESION: CPT | Mod: 59

## 2020-05-27 ASSESSMENT — LOCATION DETAILED DESCRIPTION DERM
LOCATION DETAILED: LEFT SUPERIOR PARIETAL SCALP
LOCATION DETAILED: LEFT SUPERIOR MEDIAL MIDBACK
LOCATION DETAILED: LEFT SUPERIOR LATERAL BUCCAL CHEEK
LOCATION DETAILED: LEFT PROXIMAL DORSAL FOREARM
LOCATION DETAILED: LEFT INFERIOR ANTERIOR NECK
LOCATION DETAILED: RIGHT SUPERIOR MEDIAL MIDBACK
LOCATION DETAILED: LEFT SUPERIOR UPPER BACK
LOCATION DETAILED: LEFT CENTRAL FRONTAL SCALP
LOCATION DETAILED: RIGHT PROXIMAL RADIAL DORSAL FOREARM
LOCATION DETAILED: RIGHT SUPERIOR LATERAL LOWER BACK
LOCATION DETAILED: MID-FRONTAL SCALP
LOCATION DETAILED: LEFT PROXIMAL PRETIBIAL REGION
LOCATION DETAILED: RIGHT CENTRAL FRONTAL SCALP
LOCATION DETAILED: LEFT INFERIOR MEDIAL MIDBACK
LOCATION DETAILED: RIGHT PROXIMAL DORSAL FOREARM
LOCATION DETAILED: LEFT MEDIAL FRONTAL SCALP
LOCATION DETAILED: LEFT INFERIOR CENTRAL MALAR CHEEK
LOCATION DETAILED: LEFT DISTAL DORSAL FOREARM

## 2020-05-27 ASSESSMENT — LOCATION ZONE DERM
LOCATION ZONE: LEG
LOCATION ZONE: SCALP
LOCATION ZONE: TRUNK
LOCATION ZONE: ARM
LOCATION ZONE: NECK
LOCATION ZONE: FACE

## 2020-05-27 ASSESSMENT — LOCATION SIMPLE DESCRIPTION DERM
LOCATION SIMPLE: RIGHT FOREARM
LOCATION SIMPLE: SCALP
LOCATION SIMPLE: LEFT FOREARM
LOCATION SIMPLE: RIGHT LOWER BACK
LOCATION SIMPLE: RIGHT SCALP
LOCATION SIMPLE: LEFT UPPER BACK
LOCATION SIMPLE: ANTERIOR SCALP
LOCATION SIMPLE: LEFT PRETIBIAL REGION
LOCATION SIMPLE: LEFT CHEEK
LOCATION SIMPLE: LEFT LOWER BACK
LOCATION SIMPLE: LEFT SCALP
LOCATION SIMPLE: LEFT ANTERIOR NECK

## 2020-05-27 NOTE — PROCEDURE: BIOPSY BY SHAVE METHOD
Detail Level: Detailed
Depth Of Biopsy: dermis
Was A Bandage Applied: Yes
Size Of Lesion In Cm: 2.2
X Size Of Lesion In Cm: 0
Biopsy Type: H and E
Biopsy Method: 15 blade
Anesthesia Type: 1% lidocaine with epinephrine
Hemostasis: Aluminum Chloride and Electrocautery
Wound Care: Aquaphor
Dressing: Band-Aid
Destruction After The Procedure: No
Type Of Destruction Used: Curettage
Curettage Text: The wound bed was treated with curettage after the biopsy was performed.
Cryotherapy Text: The wound bed was treated with cryotherapy after the biopsy was performed.
Electrodesiccation Text: The wound bed was treated with electrodesiccation after the biopsy was performed.
Electrodesiccation And Curettage Text: The wound bed was treated with electrodesiccation and curettage after the biopsy was performed.
Silver Nitrate Text: The wound bed was treated with silver nitrate after the biopsy was performed.
Lab: [id number]
Lab Facility: 
Consent: Written consent was obtained and risks were reviewed including but not limited to scarring, infection, bleeding, scabbing, incomplete removal, nerve damage and allergy to anesthesia.
Post-Care Instructions: I reviewed with the patient in detail post-care instructions. Patient is to keep the biopsy site dry overnight, and then apply bacitracin twice daily until healed. Patient may apply hydrogen peroxide soaks to remove any crusting.
Notification Instructions: Patient will be notified of biopsy results. However, patient instructed to call the office if not contacted within 2 weeks.
Billing Type: Third-Party Bill
Information: Selecting Yes will display possible errors in your note based on the variables you have selected. This validation is only offered as a suggestion for you. PLEASE NOTE THAT THE VALIDATION TEXT WILL BE REMOVED WHEN YOU FINALIZE YOUR NOTE. IF YOU WANT TO FAX A PRELIMINARY NOTE YOU WILL NEED TO TOGGLE THIS TO 'NO' IF YOU DO NOT WANT IT IN YOUR FAXED NOTE.

## 2020-05-27 NOTE — PROCEDURE: LIQUID NITROGEN
Render Post-Care Instructions In Note?: no
Post-Care Instructions: I reviewed with the patient in detail post-care instructions. Patient is to wear sunprotection, and avoid picking at any of the treated lesions. Pt may apply Vaseline  or Aquaphor to crusted or scabbing areas.
Detail Level: Detailed
Duration Of Freeze Thaw-Cycle (Seconds): 0
Consent: The patient's consent was obtained including but not limited to risks of crusting, scabbing, blistering, scarring, darker or lighter pigmentary change, recurrence, incomplete removal and infection.

## 2020-09-18 PROBLEM — G45.9 TIA (TRANSIENT ISCHEMIC ATTACK): Status: ACTIVE | Noted: 2020-09-18

## 2020-09-19 PROBLEM — G45.9 TIA (TRANSIENT ISCHEMIC ATTACK): Status: RESOLVED | Noted: 2020-09-18 | Resolved: 2020-09-19

## 2020-09-30 ENCOUNTER — APPOINTMENT (RX ONLY)
Dept: URBAN - METROPOLITAN AREA CLINIC 31 | Facility: CLINIC | Age: 75
Setting detail: DERMATOLOGY
End: 2020-09-30

## 2020-09-30 VITALS — TEMPERATURE: 97.5 F

## 2020-09-30 DIAGNOSIS — Z71.89 OTHER SPECIFIED COUNSELING: ICD-10-CM

## 2020-09-30 DIAGNOSIS — Z85.828 PERSONAL HISTORY OF OTHER MALIGNANT NEOPLASM OF SKIN: ICD-10-CM

## 2020-09-30 DIAGNOSIS — L57.0 ACTINIC KERATOSIS: ICD-10-CM

## 2020-09-30 PROBLEM — D48.5 NEOPLASM OF UNCERTAIN BEHAVIOR OF SKIN: Status: ACTIVE | Noted: 2020-09-30

## 2020-09-30 PROCEDURE — ? BIOPSY BY SHAVE METHOD

## 2020-09-30 PROCEDURE — ? COUNSELING

## 2020-09-30 PROCEDURE — 11102 TANGNTL BX SKIN SINGLE LES: CPT

## 2020-09-30 PROCEDURE — 99212 OFFICE O/P EST SF 10 MIN: CPT | Mod: 25

## 2020-09-30 ASSESSMENT — LOCATION SIMPLE DESCRIPTION DERM
LOCATION SIMPLE: SCALP
LOCATION SIMPLE: LEFT FOREARM
LOCATION SIMPLE: RIGHT SCALP
LOCATION SIMPLE: LEFT SCALP
LOCATION SIMPLE: RIGHT PRETIBIAL REGION

## 2020-09-30 ASSESSMENT — LOCATION ZONE DERM
LOCATION ZONE: SCALP
LOCATION ZONE: ARM
LOCATION ZONE: LEG

## 2020-09-30 ASSESSMENT — LOCATION DETAILED DESCRIPTION DERM
LOCATION DETAILED: LEFT CENTRAL FRONTAL SCALP
LOCATION DETAILED: RIGHT CENTRAL FRONTAL SCALP
LOCATION DETAILED: LEFT SUPERIOR PARIETAL SCALP
LOCATION DETAILED: RIGHT MEDIAL DISTAL PRETIBIAL REGION
LOCATION DETAILED: LEFT DISTAL DORSAL FOREARM

## 2020-09-30 NOTE — PROCEDURE: BIOPSY BY SHAVE METHOD
Detail Level: Detailed
Depth Of Biopsy: dermis
Was A Bandage Applied: Yes
Size Of Lesion In Cm: 2
X Size Of Lesion In Cm: 0
Biopsy Type: H and E
Biopsy Method: Personna blade
Anesthesia Type: 1% lidocaine with epinephrine
Anesthesia Volume In Cc: 0.5
Hemostasis: Drysol and Electrocautery
Wound Care: Vaseline
Dressing: bandage
Destruction After The Procedure: No
Type Of Destruction Used: Curettage
Curettage Text: The wound bed was treated with curettage after the biopsy was performed.
Cryotherapy Text: The wound bed was treated with cryotherapy after the biopsy was performed.
Electrodesiccation Text: The wound bed was treated with electrodesiccation after the biopsy was performed.
Electrodesiccation And Curettage Text: The wound bed was treated with electrodesiccation and curettage after the biopsy was performed.
Silver Nitrate Text: The wound bed was treated with silver nitrate after the biopsy was performed.
Lab: [id number]
Lab Facility: 
Consent: Written consent was obtained and risks were reviewed including but not limited to scarring, infection, bleeding, scabbing, incomplete removal, nerve damage and allergy to anesthesia.
Post-Care Instructions: I reviewed with the patient in detail post-care instructions. Patient is to keep the biopsy site dry overnight, and then apply vaseline twice daily until healed.
Notification Instructions: Patient will be notified of biopsy results. However, patient instructed to call the office if not contacted within 2 weeks.
Billing Type: Third-Party Bill
Information: Selecting Yes will display possible errors in your note based on the variables you have selected. This validation is only offered as a suggestion for you. PLEASE NOTE THAT THE VALIDATION TEXT WILL BE REMOVED WHEN YOU FINALIZE YOUR NOTE. IF YOU WANT TO FAX A PRELIMINARY NOTE YOU WILL NEED TO TOGGLE THIS TO 'NO' IF YOU DO NOT WANT IT IN YOUR FAXED NOTE.

## 2021-02-25 PROBLEM — Z95.0 PACEMAKER: Status: ACTIVE | Noted: 2021-02-25

## 2021-02-25 PROBLEM — F51.01 PRIMARY INSOMNIA: Status: ACTIVE | Noted: 2021-02-25

## 2021-02-25 PROBLEM — Z76.89 ENCOUNTER TO ESTABLISH CARE: Status: ACTIVE | Noted: 2021-02-25

## 2021-02-25 PROBLEM — E11.9 TYPE 2 DIABETES MELLITUS WITHOUT COMPLICATION, WITHOUT LONG-TERM CURRENT USE OF INSULIN (HCC): Status: ACTIVE | Noted: 2021-02-25

## 2021-04-20 ENCOUNTER — APPOINTMENT (RX ONLY)
Dept: URBAN - METROPOLITAN AREA CLINIC 31 | Facility: CLINIC | Age: 76
Setting detail: DERMATOLOGY
End: 2021-04-20

## 2021-04-20 DIAGNOSIS — Z85.828 PERSONAL HISTORY OF OTHER MALIGNANT NEOPLASM OF SKIN: ICD-10-CM

## 2021-04-20 DIAGNOSIS — L56.5 DISSEMINATED SUPERFICIAL ACTINIC POROKERATOSIS (DSAP): ICD-10-CM

## 2021-04-20 DIAGNOSIS — L81.4 OTHER MELANIN HYPERPIGMENTATION: ICD-10-CM

## 2021-04-20 DIAGNOSIS — L91.8 OTHER HYPERTROPHIC DISORDERS OF THE SKIN: ICD-10-CM

## 2021-04-20 DIAGNOSIS — D22 MELANOCYTIC NEVI: ICD-10-CM

## 2021-04-20 DIAGNOSIS — D18.0 HEMANGIOMA: ICD-10-CM

## 2021-04-20 DIAGNOSIS — L57.0 ACTINIC KERATOSIS: ICD-10-CM

## 2021-04-20 DIAGNOSIS — Z71.89 OTHER SPECIFIED COUNSELING: ICD-10-CM

## 2021-04-20 DIAGNOSIS — L82.1 OTHER SEBORRHEIC KERATOSIS: ICD-10-CM

## 2021-04-20 PROBLEM — D18.01 HEMANGIOMA OF SKIN AND SUBCUTANEOUS TISSUE: Status: ACTIVE | Noted: 2021-04-20

## 2021-04-20 PROBLEM — D22.5 MELANOCYTIC NEVI OF TRUNK: Status: ACTIVE | Noted: 2021-04-20

## 2021-04-20 PROBLEM — D22.62 MELANOCYTIC NEVI OF LEFT UPPER LIMB, INCLUDING SHOULDER: Status: ACTIVE | Noted: 2021-04-20

## 2021-04-20 PROBLEM — D22.61 MELANOCYTIC NEVI OF RIGHT UPPER LIMB, INCLUDING SHOULDER: Status: ACTIVE | Noted: 2021-04-20

## 2021-04-20 PROCEDURE — 99213 OFFICE O/P EST LOW 20 MIN: CPT | Mod: 25

## 2021-04-20 PROCEDURE — 17003 DESTRUCT PREMALG LES 2-14: CPT

## 2021-04-20 PROCEDURE — ? LIQUID NITROGEN

## 2021-04-20 PROCEDURE — ? COUNSELING

## 2021-04-20 PROCEDURE — ? ADDITIONAL NOTES

## 2021-04-20 PROCEDURE — 17000 DESTRUCT PREMALG LESION: CPT

## 2021-04-20 ASSESSMENT — LOCATION SIMPLE DESCRIPTION DERM
LOCATION SIMPLE: UPPER BACK
LOCATION SIMPLE: LEFT SCALP
LOCATION SIMPLE: LEFT SHOULDER
LOCATION SIMPLE: RIGHT CHEEK
LOCATION SIMPLE: RIGHT ELBOW
LOCATION SIMPLE: RIGHT UPPER BACK
LOCATION SIMPLE: LEFT CHEEK
LOCATION SIMPLE: RIGHT FOREHEAD
LOCATION SIMPLE: ABDOMEN
LOCATION SIMPLE: RIGHT TEMPLE
LOCATION SIMPLE: LEFT TEMPLE
LOCATION SIMPLE: LEFT FOREARM
LOCATION SIMPLE: SCALP
LOCATION SIMPLE: LEFT EYEBROW
LOCATION SIMPLE: RIGHT BREAST
LOCATION SIMPLE: RIGHT PRETIBIAL REGION
LOCATION SIMPLE: RIGHT FOREARM
LOCATION SIMPLE: RIGHT SCALP
LOCATION SIMPLE: RIGHT SHOULDER
LOCATION SIMPLE: LEFT HAND
LOCATION SIMPLE: RIGHT HAND
LOCATION SIMPLE: LEFT FOREHEAD
LOCATION SIMPLE: LEFT ELBOW

## 2021-04-20 ASSESSMENT — LOCATION DETAILED DESCRIPTION DERM
LOCATION DETAILED: RIGHT ANTECUBITAL SKIN
LOCATION DETAILED: LEFT MID TEMPLE
LOCATION DETAILED: RIGHT LATERAL ELBOW
LOCATION DETAILED: RIGHT PROXIMAL DORSAL FOREARM
LOCATION DETAILED: EPIGASTRIC SKIN
LOCATION DETAILED: LEFT VENTRAL DISTAL FOREARM
LOCATION DETAILED: INFERIOR THORACIC SPINE
LOCATION DETAILED: 2ND WEB SPACE LEFT HAND
LOCATION DETAILED: LEFT POSTERIOR SHOULDER
LOCATION DETAILED: RIGHT CENTRAL FRONTAL SCALP
LOCATION DETAILED: RIGHT PROXIMAL PRETIBIAL REGION
LOCATION DETAILED: LEFT LATERAL FOREHEAD
LOCATION DETAILED: RIGHT INFERIOR UPPER BACK
LOCATION DETAILED: RIGHT CENTRAL MALAR CHEEK
LOCATION DETAILED: LEFT CENTRAL MALAR CHEEK
LOCATION DETAILED: LEFT ELBOW
LOCATION DETAILED: RIGHT POSTERIOR SHOULDER
LOCATION DETAILED: RIGHT ULNAR DORSAL HAND
LOCATION DETAILED: LEFT LATERAL EYEBROW
LOCATION DETAILED: LEFT RADIAL DORSAL HAND
LOCATION DETAILED: LEFT PROXIMAL DORSAL FOREARM
LOCATION DETAILED: RIGHT INFRAMAMMARY CREASE (INNER QUADRANT)
LOCATION DETAILED: LEFT SUPERIOR PARIETAL SCALP
LOCATION DETAILED: PERIUMBILICAL SKIN
LOCATION DETAILED: LEFT VENTRAL PROXIMAL FOREARM
LOCATION DETAILED: LEFT CENTRAL FRONTAL SCALP
LOCATION DETAILED: RIGHT VENTRAL DISTAL FOREARM
LOCATION DETAILED: RIGHT RADIAL DORSAL HAND
LOCATION DETAILED: RIGHT SUPERIOR TEMPLE
LOCATION DETAILED: LEFT ULNAR DORSAL HAND
LOCATION DETAILED: RIGHT LATERAL FOREHEAD

## 2021-04-20 ASSESSMENT — LOCATION ZONE DERM
LOCATION ZONE: FACE
LOCATION ZONE: HAND
LOCATION ZONE: TRUNK
LOCATION ZONE: SCALP
LOCATION ZONE: ARM
LOCATION ZONE: LEG

## 2021-04-20 NOTE — PROCEDURE: ADDITIONAL NOTES
Additional Notes: Area of concern noted on intake.\\nDiscussed chronic nature of porokeratosis and small risk of development of skin cancer. Pt instructed to monitor area for any enlargement, increasing scale, emerging lesion, or other worsening, and f/u as needed.
Render Risk Assessment In Note?: no
Detail Level: Simple

## 2021-04-20 NOTE — HPI: SKIN LESION
Is This A New Presentation, Or A Follow-Up?: Skin Lesion
What Type Of Note Output Would You Prefer (Optional)?: Standard Output
How Severe Is Your Skin Lesion?: mild
Has Your Skin Lesion Been Treated?: been treated
When Was It Treated?: 9/2020

## 2021-06-09 PROBLEM — K22.70 BARRETT'S ESOPHAGUS WITHOUT DYSPLASIA: Status: ACTIVE | Noted: 2021-01-25

## 2021-06-09 PROBLEM — E66.09 OBESITY DUE TO EXCESS CALORIES: Status: ACTIVE | Noted: 2021-01-25

## 2021-06-09 PROBLEM — Z79.84 LONG TERM CURRENT USE OF ORAL HYPOGLYCEMIC DRUG: Status: ACTIVE | Noted: 2021-01-25

## 2021-06-09 PROBLEM — G47.33 OBSTRUCTIVE SLEEP APNEA SYNDROME: Status: ACTIVE | Noted: 2021-06-09

## 2021-06-09 PROBLEM — I25.2 HISTORY OF MI (MYOCARDIAL INFARCTION): Status: ACTIVE | Noted: 2021-06-09

## 2021-06-09 PROBLEM — K21.9 GASTRO-ESOPHAGEAL REFLUX DISEASE WITHOUT ESOPHAGITIS: Status: ACTIVE | Noted: 2021-06-09

## 2021-06-09 PROBLEM — E11.9 TYPE 2 DIABETES MELLITUS WITHOUT COMPLICATION (HCC): Status: ACTIVE | Noted: 2021-01-25

## 2021-06-09 PROBLEM — E78.5 DYSLIPIDEMIA: Status: ACTIVE | Noted: 2021-01-25

## 2021-06-09 PROBLEM — I50.32 CHRONIC HEART FAILURE WITH PRESERVED EJECTION FRACTION (HCC): Status: ACTIVE | Noted: 2021-06-09

## 2021-06-09 PROBLEM — L03.90 CELLULITIS: Status: ACTIVE | Noted: 2021-01-26

## 2021-06-09 PROBLEM — R09.02 HYPOXIA: Status: ACTIVE | Noted: 2021-01-25

## 2021-06-09 PROBLEM — I51.89 DIASTOLIC DYSFUNCTION: Status: ACTIVE | Noted: 2021-01-25

## 2021-06-09 PROBLEM — R00.1 BRADYCARDIA: Status: ACTIVE | Noted: 2020-12-30

## 2021-06-09 PROBLEM — E78.5 HYPERLIPIDEMIA: Status: ACTIVE | Noted: 2021-06-09

## 2021-06-09 PROBLEM — I20.9 ANGINA PECTORIS (HCC): Status: ACTIVE | Noted: 2020-05-20

## 2021-06-09 PROBLEM — I44.2 AV BLOCK, 3RD DEGREE (HCC): Status: ACTIVE | Noted: 2020-12-30

## 2021-06-09 PROBLEM — K29.50 CHRONIC GASTRITIS WITHOUT BLEEDING: Status: ACTIVE | Noted: 2021-01-25

## 2021-06-09 PROBLEM — I34.0 NON-RHEUMATIC MITRAL REGURGITATION: Status: ACTIVE | Noted: 2021-01-25

## 2021-06-09 PROBLEM — J45.40 MODERATE PERSISTENT ASTHMA WITHOUT COMPLICATION: Status: ACTIVE | Noted: 2021-01-25

## 2021-06-09 PROBLEM — I48.0 PAROXYSMAL ATRIAL FIBRILLATION (HCC): Status: ACTIVE | Noted: 2021-02-02

## 2021-06-09 PROBLEM — K44.9 DIAPHRAGMATIC HERNIA WITHOUT OBSTRUCTION AND WITHOUT GANGRENE: Status: ACTIVE | Noted: 2021-01-25

## 2021-06-10 PROBLEM — Z76.89 ENCOUNTER TO ESTABLISH CARE: Status: RESOLVED | Noted: 2021-02-25 | Resolved: 2021-06-10

## 2021-10-21 ENCOUNTER — APPOINTMENT (RX ONLY)
Dept: URBAN - METROPOLITAN AREA CLINIC 31 | Facility: CLINIC | Age: 76
Setting detail: DERMATOLOGY
End: 2021-10-21

## 2021-10-21 DIAGNOSIS — L81.4 OTHER MELANIN HYPERPIGMENTATION: ICD-10-CM

## 2021-10-21 DIAGNOSIS — D485 NEOPLASM OF UNCERTAIN BEHAVIOR OF SKIN: ICD-10-CM

## 2021-10-21 DIAGNOSIS — Z71.89 OTHER SPECIFIED COUNSELING: ICD-10-CM

## 2021-10-21 DIAGNOSIS — L57.0 ACTINIC KERATOSIS: ICD-10-CM

## 2021-10-21 DIAGNOSIS — L82.1 OTHER SEBORRHEIC KERATOSIS: ICD-10-CM

## 2021-10-21 DIAGNOSIS — D18.0 HEMANGIOMA: ICD-10-CM

## 2021-10-21 DIAGNOSIS — Z85.828 PERSONAL HISTORY OF OTHER MALIGNANT NEOPLASM OF SKIN: ICD-10-CM

## 2021-10-21 DIAGNOSIS — D22 MELANOCYTIC NEVI: ICD-10-CM

## 2021-10-21 PROBLEM — D22.5 MELANOCYTIC NEVI OF TRUNK: Status: ACTIVE | Noted: 2021-10-21

## 2021-10-21 PROBLEM — D22.62 MELANOCYTIC NEVI OF LEFT UPPER LIMB, INCLUDING SHOULDER: Status: ACTIVE | Noted: 2021-10-21

## 2021-10-21 PROBLEM — D48.5 NEOPLASM OF UNCERTAIN BEHAVIOR OF SKIN: Status: ACTIVE | Noted: 2021-10-21

## 2021-10-21 PROBLEM — D22.61 MELANOCYTIC NEVI OF RIGHT UPPER LIMB, INCLUDING SHOULDER: Status: ACTIVE | Noted: 2021-10-21

## 2021-10-21 PROBLEM — D18.01 HEMANGIOMA OF SKIN AND SUBCUTANEOUS TISSUE: Status: ACTIVE | Noted: 2021-10-21

## 2021-10-21 PROCEDURE — ? LIQUID NITROGEN

## 2021-10-21 PROCEDURE — 17000 DESTRUCT PREMALG LESION: CPT | Mod: 59

## 2021-10-21 PROCEDURE — 17003 DESTRUCT PREMALG LES 2-14: CPT

## 2021-10-21 PROCEDURE — ? COUNSELING

## 2021-10-21 PROCEDURE — 99213 OFFICE O/P EST LOW 20 MIN: CPT | Mod: 25

## 2021-10-21 PROCEDURE — 11102 TANGNTL BX SKIN SINGLE LES: CPT

## 2021-10-21 PROCEDURE — ? BIOPSY BY SHAVE METHOD

## 2021-10-21 ASSESSMENT — LOCATION ZONE DERM
LOCATION ZONE: EAR
LOCATION ZONE: SCALP
LOCATION ZONE: ARM
LOCATION ZONE: HAND
LOCATION ZONE: TRUNK
LOCATION ZONE: FACE

## 2021-10-21 ASSESSMENT — LOCATION SIMPLE DESCRIPTION DERM
LOCATION SIMPLE: LEFT LOWER BACK
LOCATION SIMPLE: RIGHT CHEEK
LOCATION SIMPLE: RIGHT EAR
LOCATION SIMPLE: LEFT UPPER ARM
LOCATION SIMPLE: LEFT FOREARM
LOCATION SIMPLE: RIGHT FOREARM
LOCATION SIMPLE: RIGHT SCALP
LOCATION SIMPLE: LEFT SCALP
LOCATION SIMPLE: RIGHT UPPER BACK
LOCATION SIMPLE: RIGHT HAND
LOCATION SIMPLE: RIGHT SHOULDER
LOCATION SIMPLE: RIGHT ZYGOMA
LOCATION SIMPLE: LEFT CHEEK
LOCATION SIMPLE: ABDOMEN
LOCATION SIMPLE: LEFT SHOULDER
LOCATION SIMPLE: RIGHT ELBOW
LOCATION SIMPLE: LEFT HAND
LOCATION SIMPLE: RIGHT LOWER BACK
LOCATION SIMPLE: LEFT UPPER BACK
LOCATION SIMPLE: LEFT ELBOW
LOCATION SIMPLE: SCALP

## 2021-10-21 ASSESSMENT — LOCATION DETAILED DESCRIPTION DERM
LOCATION DETAILED: RIGHT VENTRAL DISTAL FOREARM
LOCATION DETAILED: LEFT CENTRAL FRONTAL SCALP
LOCATION DETAILED: LEFT VENTRAL DISTAL FOREARM
LOCATION DETAILED: LEFT POSTERIOR SHOULDER
LOCATION DETAILED: RIGHT INFERIOR CENTRAL MALAR CHEEK
LOCATION DETAILED: RIGHT CENTRAL FRONTAL SCALP
LOCATION DETAILED: LEFT ANTECUBITAL SKIN
LOCATION DETAILED: RIGHT DISTAL DORSAL FOREARM
LOCATION DETAILED: LEFT SUPERIOR UPPER BACK
LOCATION DETAILED: RIGHT MEDIAL ZYGOMA
LOCATION DETAILED: LEFT DISTAL POSTERIOR UPPER ARM
LOCATION DETAILED: RIGHT ANTECUBITAL SKIN
LOCATION DETAILED: RIGHT ELBOW
LOCATION DETAILED: RIGHT SUPERIOR MEDIAL MIDBACK
LOCATION DETAILED: LEFT ULNAR DORSAL HAND
LOCATION DETAILED: RIGHT POSTERIOR SHOULDER
LOCATION DETAILED: LEFT INFERIOR CENTRAL MALAR CHEEK
LOCATION DETAILED: LEFT INFERIOR MEDIAL MIDBACK
LOCATION DETAILED: LEFT SUPERIOR PARIETAL SCALP
LOCATION DETAILED: EPIGASTRIC SKIN
LOCATION DETAILED: LEFT MEDIAL UPPER BACK
LOCATION DETAILED: RIGHT SUPERIOR HELIX
LOCATION DETAILED: RIGHT INFERIOR MEDIAL UPPER BACK
LOCATION DETAILED: RIGHT ULNAR DORSAL HAND
LOCATION DETAILED: LEFT PROXIMAL DORSAL FOREARM

## 2021-10-21 NOTE — PROCEDURE: LIQUID NITROGEN
Render Note In Bullet Format When Appropriate: No
Post-Care Instructions: I reviewed with the patient in detail post-care instructions. Patient is to wear sunprotection, and avoid picking at any of the treated lesions. Pt may apply Vaseline to crusted or scabbing areas.
Detail Level: Detailed
Show Applicator Variable?: Yes
Duration Of Freeze Thaw-Cycle (Seconds): 0
Consent: The patient's consent was obtained including but not limited to risks of crusting, scabbing, blistering, scarring, darker or lighter pigmentary change, recurrence, incomplete removal and infection.

## 2022-01-27 ENCOUNTER — APPOINTMENT (RX ONLY)
Dept: URBAN - METROPOLITAN AREA CLINIC 31 | Facility: CLINIC | Age: 77
Setting detail: DERMATOLOGY
End: 2022-01-27

## 2022-01-27 DIAGNOSIS — L82.0 INFLAMED SEBORRHEIC KERATOSIS: ICD-10-CM

## 2022-01-27 DIAGNOSIS — D22 MELANOCYTIC NEVI: ICD-10-CM

## 2022-01-27 DIAGNOSIS — L72.0 EPIDERMAL CYST: ICD-10-CM

## 2022-01-27 PROBLEM — D22.5 MELANOCYTIC NEVI OF TRUNK: Status: ACTIVE | Noted: 2022-01-27

## 2022-01-27 PROCEDURE — ? COUNSELING

## 2022-01-27 PROCEDURE — ? BENIGN DESTRUCTION

## 2022-01-27 PROCEDURE — 99213 OFFICE O/P EST LOW 20 MIN: CPT | Mod: 25

## 2022-01-27 PROCEDURE — ? ADDITIONAL NOTES

## 2022-01-27 PROCEDURE — 17110 DESTRUCTION B9 LES UP TO 14: CPT

## 2022-01-27 ASSESSMENT — LOCATION SIMPLE DESCRIPTION DERM
LOCATION SIMPLE: RIGHT CHEEK
LOCATION SIMPLE: LEFT CHEEK
LOCATION SIMPLE: LEFT LOWER BACK
LOCATION SIMPLE: LEFT SCALP

## 2022-01-27 ASSESSMENT — LOCATION DETAILED DESCRIPTION DERM
LOCATION DETAILED: LEFT INFERIOR MEDIAL MIDBACK
LOCATION DETAILED: LEFT CENTRAL FRONTAL SCALP
LOCATION DETAILED: LEFT CENTRAL MALAR CHEEK
LOCATION DETAILED: RIGHT CENTRAL MALAR CHEEK

## 2022-01-27 ASSESSMENT — LOCATION ZONE DERM
LOCATION ZONE: TRUNK
LOCATION ZONE: SCALP
LOCATION ZONE: FACE

## 2022-01-27 NOTE — PROCEDURE: BENIGN DESTRUCTION
Consent: The patient's consent was obtained including but not limited to risks of crusting, scabbing, blistering, scarring, darker or lighter pigmentary change, recurrence, incomplete removal and infection.
Detail Level: Detailed
Include Z78.9 (Other Specified Conditions Influencing Health Status) As An Associated Diagnosis?: No
Treatment Number (Will Not Render If 0): 0
Medical Necessity Clause: Treatment of this lesion necessary based on:
Anesthesia Volume In Cc: 0.5
Medical Necessity Information: It is in your best interest to select a reason for this procedure from the list below. All of these items fulfill various CMS LCD requirements except the new and changing color options.
Post-Care Instructions: I reviewed with the patient in detail post-care instructions. Patient is to wear sunprotection, and avoid picking at any of the treated lesions. Pt may apply Vaseline to crusted or scabbing areas.

## 2022-01-27 NOTE — PROCEDURE: ADDITIONAL NOTES
Render Risk Assessment In Note?: no
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 As he is bothered by their presence, the pt is given information for [facility] in order to seek consultation for surgical management.
Detail Level: Zone
Additional Notes: F/u in event of any recurrent pigment or lesion.

## 2022-01-27 NOTE — PROCEDURE: MIPS QUALITY
Detail Level: Detailed
Quality 226: Preventive Care And Screening: Tobacco Use: Screening And Cessation Intervention: Patient screened for tobacco use and is an ex/non-smoker
Quality 111:Pneumonia Vaccination Status For Older Adults: Pneumococcal Vaccination Previously Received
Quality 265: Biopsy Follow-Up: Biopsy results reviewed, communicated, tracked, and documented
Quality 130: Documentation Of Current Medications In The Medical Record: Current Medications Documented

## 2022-03-15 ENCOUNTER — HOSPITAL ENCOUNTER (OUTPATIENT)
Facility: MEDICAL CENTER | Age: 77
End: 2022-03-15
Attending: PLASTIC SURGERY
Payer: MEDICARE

## 2022-03-15 LAB — PATHOLOGY CONSULT NOTE: NORMAL

## 2022-03-15 PROCEDURE — 88305 TISSUE EXAM BY PATHOLOGIST: CPT

## 2022-03-17 LAB — PATHOLOGY CONSULT NOTE: NORMAL

## 2022-07-28 NOTE — HPI: SKIN LESION
Is This A New Presentation, Or A Follow-Up?: Growth
What Type Of Note Output Would You Prefer (Optional)?: Standard Output
How Severe Is Your Skin Lesion?: moderate
Has Your Skin Lesion Been Treated?: not been treated
Is This A New Presentation, Or A Follow-Up?: Skin Lesion
Follow up with hand within 1-2 days    take tylenol for pain   take antibiotics x 7 days   keep splint in place     return if new or worsening symptoms     Fracture    A fracture is a break in one of your bones. This can occur from a variety of injuries, especially traumatic ones. Symptoms include pain, bruising, or swelling. Do not use the injured limb. If a fracture is in one of the bones below your waist, do not put weight on that limb unless instructed to do so by your healthcare provider. Crutches or a cane may have been provided. A splint or cast may have been applied by your health care provider. Make sure to keep it dry and follow up with an orthopedist as instructed.    SEEK IMMEDIATE MEDICAL CARE IF YOU HAVE ANY OF THE FOLLOWING SYMPTOMS: numbness, tingling, increasing pain, or weakness in any part of the injured limb.

## 2022-08-03 PROBLEM — E66.01 MORBID OBESITY (HCC): Status: ACTIVE | Noted: 2021-01-25

## 2022-08-03 PROBLEM — G62.9 PERIPHERAL NEUROPATHY: Status: ACTIVE | Noted: 2022-08-03

## 2023-02-01 PROBLEM — J44.9 CHRONIC OBSTRUCTIVE PULMONARY DISEASE (HCC): Status: RESOLVED | Noted: 2020-05-20 | Resolved: 2023-02-01

## 2023-02-16 PROBLEM — M43.26 FUSION OF SPINE, LUMBAR REGION: Status: ACTIVE | Noted: 2018-03-25

## 2023-02-16 PROBLEM — I25.2 OLD MYOCARDIAL INFARCTION: Status: ACTIVE | Noted: 2018-03-25

## 2023-02-16 PROBLEM — M96.1 POSTLAMINECTOMY SYNDROME, NOT ELSEWHERE CLASSIFIED: Status: ACTIVE | Noted: 2018-03-25

## 2023-02-16 PROBLEM — K22.719 BARRETT'S ESOPHAGUS WITH DYSPLASIA, UNSPECIFIED: Status: ACTIVE | Noted: 2018-03-25

## 2023-02-16 PROBLEM — G47.00 INSOMNIA, UNSPECIFIED: Status: ACTIVE | Noted: 2018-03-29

## 2023-02-16 PROBLEM — M62.81 MUSCLE WEAKNESS (GENERALIZED): Status: ACTIVE | Noted: 2018-03-25

## 2023-02-16 PROBLEM — E78.5 HYPERLIPIDEMIA, UNSPECIFIED: Status: ACTIVE | Noted: 2018-03-25

## 2023-02-16 PROBLEM — K21.9 GASTRO-ESOPHAGEAL REFLUX DISEASE WITHOUT ESOPHAGITIS: Status: ACTIVE | Noted: 2018-03-25

## 2023-02-16 PROBLEM — M19.90 UNSPECIFIED OSTEOARTHRITIS, UNSPECIFIED SITE: Status: ACTIVE | Noted: 2018-03-25

## 2023-02-16 PROBLEM — R48.8 OTHER SYMBOLIC DYSFUNCTIONS: Status: ACTIVE | Noted: 2018-03-25

## 2023-02-16 PROBLEM — M26.81 ANTERIOR SOFT TISSUE IMPINGEMENT: Status: ACTIVE | Noted: 2018-03-25

## 2023-04-20 ENCOUNTER — APPOINTMENT (RX ONLY)
Dept: URBAN - METROPOLITAN AREA CLINIC 31 | Facility: CLINIC | Age: 78
Setting detail: DERMATOLOGY
End: 2023-04-20

## 2023-04-20 DIAGNOSIS — L57.0 ACTINIC KERATOSIS: ICD-10-CM

## 2023-04-20 DIAGNOSIS — Z71.89 OTHER SPECIFIED COUNSELING: ICD-10-CM

## 2023-04-20 DIAGNOSIS — Z85.828 PERSONAL HISTORY OF OTHER MALIGNANT NEOPLASM OF SKIN: ICD-10-CM

## 2023-04-20 DIAGNOSIS — L82.1 OTHER SEBORRHEIC KERATOSIS: ICD-10-CM

## 2023-04-20 DIAGNOSIS — D22 MELANOCYTIC NEVI: ICD-10-CM

## 2023-04-20 DIAGNOSIS — L81.4 OTHER MELANIN HYPERPIGMENTATION: ICD-10-CM

## 2023-04-20 PROBLEM — D22.61 MELANOCYTIC NEVI OF RIGHT UPPER LIMB, INCLUDING SHOULDER: Status: ACTIVE | Noted: 2023-04-20

## 2023-04-20 PROBLEM — D22.62 MELANOCYTIC NEVI OF LEFT UPPER LIMB, INCLUDING SHOULDER: Status: ACTIVE | Noted: 2023-04-20

## 2023-04-20 PROCEDURE — ? COUNSELING

## 2023-04-20 PROCEDURE — 99213 OFFICE O/P EST LOW 20 MIN: CPT | Mod: 25

## 2023-04-20 PROCEDURE — ? LIQUID NITROGEN

## 2023-04-20 PROCEDURE — 17000 DESTRUCT PREMALG LESION: CPT

## 2023-04-20 PROCEDURE — 17003 DESTRUCT PREMALG LES 2-14: CPT

## 2023-04-20 ASSESSMENT — LOCATION DETAILED DESCRIPTION DERM
LOCATION DETAILED: LEFT PROXIMAL DORSAL FOREARM
LOCATION DETAILED: RIGHT DISTAL DORSAL FOREARM
LOCATION DETAILED: RIGHT SUPERIOR OCCIPITAL SCALP
LOCATION DETAILED: EPIGASTRIC SKIN
LOCATION DETAILED: RIGHT ULNAR DORSAL HAND
LOCATION DETAILED: LEFT ANTECUBITAL SKIN
LOCATION DETAILED: LEFT SUPERIOR FOREHEAD
LOCATION DETAILED: LEFT ULNAR DORSAL HAND
LOCATION DETAILED: RIGHT FOREHEAD
LOCATION DETAILED: RIGHT CENTRAL FRONTAL SCALP
LOCATION DETAILED: LEFT SUPERIOR PARIETAL SCALP
LOCATION DETAILED: NASAL TIP
LOCATION DETAILED: LEFT FOREHEAD
LOCATION DETAILED: LEFT DISTAL POSTERIOR UPPER ARM
LOCATION DETAILED: RIGHT ANTECUBITAL SKIN
LOCATION DETAILED: RIGHT ELBOW
LOCATION DETAILED: LEFT MEDIAL UPPER BACK
LOCATION DETAILED: LEFT CENTRAL FRONTAL SCALP
LOCATION DETAILED: LEFT POSTERIOR SHOULDER
LOCATION DETAILED: LEFT INFERIOR CENTRAL MALAR CHEEK
LOCATION DETAILED: RIGHT POSTERIOR SHOULDER
LOCATION DETAILED: RIGHT INFERIOR CENTRAL MALAR CHEEK

## 2023-04-20 ASSESSMENT — LOCATION SIMPLE DESCRIPTION DERM
LOCATION SIMPLE: LEFT UPPER ARM
LOCATION SIMPLE: LEFT UPPER BACK
LOCATION SIMPLE: RIGHT ELBOW
LOCATION SIMPLE: SCALP
LOCATION SIMPLE: LEFT CHEEK
LOCATION SIMPLE: RIGHT FOREARM
LOCATION SIMPLE: RIGHT CHEEK
LOCATION SIMPLE: LEFT HAND
LOCATION SIMPLE: LEFT SCALP
LOCATION SIMPLE: LEFT FOREARM
LOCATION SIMPLE: POSTERIOR SCALP
LOCATION SIMPLE: LEFT FOREHEAD
LOCATION SIMPLE: RIGHT HAND
LOCATION SIMPLE: LEFT SHOULDER
LOCATION SIMPLE: NOSE
LOCATION SIMPLE: ABDOMEN
LOCATION SIMPLE: RIGHT FOREHEAD
LOCATION SIMPLE: RIGHT SHOULDER
LOCATION SIMPLE: RIGHT SCALP
LOCATION SIMPLE: LEFT ELBOW

## 2023-04-20 ASSESSMENT — LOCATION ZONE DERM
LOCATION ZONE: TRUNK
LOCATION ZONE: SCALP
LOCATION ZONE: FACE
LOCATION ZONE: ARM
LOCATION ZONE: NOSE
LOCATION ZONE: HAND

## 2023-09-06 ENCOUNTER — APPOINTMENT (RX ONLY)
Dept: URBAN - METROPOLITAN AREA CLINIC 31 | Facility: CLINIC | Age: 78
Setting detail: DERMATOLOGY
End: 2023-09-06

## 2023-09-06 DIAGNOSIS — L57.0 ACTINIC KERATOSIS: ICD-10-CM

## 2023-09-06 PROCEDURE — 17000 DESTRUCT PREMALG LESION: CPT

## 2023-09-06 PROCEDURE — 17003 DESTRUCT PREMALG LES 2-14: CPT

## 2023-09-06 PROCEDURE — ? LIQUID NITROGEN

## 2023-09-06 ASSESSMENT — LOCATION DETAILED DESCRIPTION DERM
LOCATION DETAILED: LEFT SUPERIOR MEDIAL FOREHEAD
LOCATION DETAILED: RIGHT SUPERIOR FOREHEAD
LOCATION DETAILED: LEFT SUPERIOR PARIETAL SCALP
LOCATION DETAILED: LEFT CENTRAL PARIETAL SCALP
LOCATION DETAILED: LEFT MEDIAL FRONTAL SCALP
LOCATION DETAILED: RIGHT CENTRAL PARIETAL SCALP

## 2023-09-06 ASSESSMENT — LOCATION SIMPLE DESCRIPTION DERM
LOCATION SIMPLE: SCALP
LOCATION SIMPLE: LEFT FOREHEAD
LOCATION SIMPLE: RIGHT FOREHEAD
LOCATION SIMPLE: LEFT SCALP

## 2023-09-06 ASSESSMENT — LOCATION ZONE DERM
LOCATION ZONE: FACE
LOCATION ZONE: SCALP

## 2024-03-12 PROBLEM — E11.29 TYPE 2 DIABETES MELLITUS WITH RENAL COMPLICATION (HCC): Status: ACTIVE | Noted: 2021-01-25

## 2024-04-09 ENCOUNTER — APPOINTMENT (RX ONLY)
Dept: URBAN - METROPOLITAN AREA CLINIC 31 | Facility: CLINIC | Age: 79
Setting detail: DERMATOLOGY
End: 2024-04-09

## 2024-04-09 DIAGNOSIS — Z71.89 OTHER SPECIFIED COUNSELING: ICD-10-CM

## 2024-04-09 DIAGNOSIS — L57.0 ACTINIC KERATOSIS: ICD-10-CM

## 2024-04-09 DIAGNOSIS — D18.0 HEMANGIOMA: ICD-10-CM

## 2024-04-09 DIAGNOSIS — Z85.828 PERSONAL HISTORY OF OTHER MALIGNANT NEOPLASM OF SKIN: ICD-10-CM

## 2024-04-09 DIAGNOSIS — L81.4 OTHER MELANIN HYPERPIGMENTATION: ICD-10-CM

## 2024-04-09 DIAGNOSIS — D22 MELANOCYTIC NEVI: ICD-10-CM

## 2024-04-09 DIAGNOSIS — L82.1 OTHER SEBORRHEIC KERATOSIS: ICD-10-CM

## 2024-04-09 PROBLEM — D22.5 MELANOCYTIC NEVI OF TRUNK: Status: ACTIVE | Noted: 2024-04-09

## 2024-04-09 PROBLEM — D18.01 HEMANGIOMA OF SKIN AND SUBCUTANEOUS TISSUE: Status: ACTIVE | Noted: 2024-04-09

## 2024-04-09 PROCEDURE — 17000 DESTRUCT PREMALG LESION: CPT

## 2024-04-09 PROCEDURE — ? COUNSELING

## 2024-04-09 PROCEDURE — ? LIQUID NITROGEN

## 2024-04-09 PROCEDURE — 17003 DESTRUCT PREMALG LES 2-14: CPT

## 2024-04-09 PROCEDURE — 99213 OFFICE O/P EST LOW 20 MIN: CPT | Mod: 25

## 2024-04-09 ASSESSMENT — LOCATION ZONE DERM
LOCATION ZONE: SCALP
LOCATION ZONE: FACE
LOCATION ZONE: TRUNK

## 2024-04-09 ASSESSMENT — LOCATION DETAILED DESCRIPTION DERM
LOCATION DETAILED: RIGHT MEDIAL FRONTAL SCALP
LOCATION DETAILED: RIGHT SUPERIOR FOREHEAD
LOCATION DETAILED: RIGHT MEDIAL UPPER BACK
LOCATION DETAILED: RIGHT MID-UPPER BACK
LOCATION DETAILED: RIGHT SUPERIOR MEDIAL UPPER BACK
LOCATION DETAILED: RIGHT SUPERIOR UPPER BACK

## 2024-04-09 ASSESSMENT — LOCATION SIMPLE DESCRIPTION DERM
LOCATION SIMPLE: RIGHT UPPER BACK
LOCATION SIMPLE: RIGHT SCALP
LOCATION SIMPLE: RIGHT FOREHEAD

## 2024-04-09 NOTE — PROCEDURE: COUNSELING
Detail Level: Generalized
Sunscreen Recommendations: Sun screen (SPF 30 or greater) should be applied during peak UV exposure (between 10am and 2pm) and reapplied after exercise or swimming.\\nRecommended La Roche- Posay Anthelios with SPF 60 or Chad Tone Water Babies with SPF 30 and above.

## 2024-05-01 ENCOUNTER — APPOINTMENT (RX ONLY)
Dept: URBAN - METROPOLITAN AREA CLINIC 31 | Facility: CLINIC | Age: 79
Setting detail: DERMATOLOGY
End: 2024-05-01

## 2024-05-01 DIAGNOSIS — L82.1 OTHER SEBORRHEIC KERATOSIS: ICD-10-CM

## 2024-05-01 PROCEDURE — 99212 OFFICE O/P EST SF 10 MIN: CPT

## 2024-05-01 PROCEDURE — ? COUNSELING

## 2024-05-01 ASSESSMENT — LOCATION DETAILED DESCRIPTION DERM
LOCATION DETAILED: LEFT PROXIMAL POSTERIOR UPPER ARM
LOCATION DETAILED: RIGHT LATERAL MANDIBULAR CHEEK

## 2024-05-01 ASSESSMENT — LOCATION SIMPLE DESCRIPTION DERM
LOCATION SIMPLE: LEFT UPPER ARM
LOCATION SIMPLE: RIGHT CHEEK

## 2024-05-01 ASSESSMENT — LOCATION ZONE DERM
LOCATION ZONE: FACE
LOCATION ZONE: ARM

## 2024-08-16 PROBLEM — J15.9 BACTERIAL PNEUMONIA: Status: ACTIVE | Noted: 2024-08-16

## 2024-08-16 PROBLEM — E83.42 HYPOMAGNESEMIA: Status: ACTIVE | Noted: 2024-08-16

## 2024-08-16 PROBLEM — N17.9 AKI (ACUTE KIDNEY INJURY) (HCC): Status: ACTIVE | Noted: 2024-08-16

## 2024-08-16 PROBLEM — E87.20 LACTIC ACIDOSIS: Status: ACTIVE | Noted: 2024-08-16

## 2024-08-16 PROBLEM — D72.829 LEUKOCYTOSIS: Status: ACTIVE | Noted: 2024-08-16

## 2024-08-17 PROBLEM — A41.9 SEPTIC SHOCK (HCC): Status: ACTIVE | Noted: 2024-08-17

## 2024-08-17 PROBLEM — R65.21 SEPTIC SHOCK (HCC): Status: ACTIVE | Noted: 2024-08-17

## 2024-08-17 PROBLEM — R78.81 GRAM-POSITIVE BACTEREMIA: Status: ACTIVE | Noted: 2024-08-17

## 2024-10-10 ENCOUNTER — APPOINTMENT (RX ONLY)
Dept: URBAN - METROPOLITAN AREA CLINIC 31 | Facility: CLINIC | Age: 79
Setting detail: DERMATOLOGY
End: 2024-10-10

## 2024-10-10 DIAGNOSIS — L57.0 ACTINIC KERATOSIS: ICD-10-CM

## 2024-10-10 PROCEDURE — ? LIQUID NITROGEN

## 2024-10-10 PROCEDURE — 17003 DESTRUCT PREMALG LES 2-14: CPT

## 2024-10-10 PROCEDURE — 17000 DESTRUCT PREMALG LESION: CPT

## 2024-10-10 ASSESSMENT — LOCATION SIMPLE DESCRIPTION DERM
LOCATION SIMPLE: LEFT SCALP
LOCATION SIMPLE: LEFT FOREHEAD
LOCATION SIMPLE: RIGHT FOREHEAD

## 2024-10-10 ASSESSMENT — LOCATION DETAILED DESCRIPTION DERM
LOCATION DETAILED: LEFT MEDIAL FRONTAL SCALP
LOCATION DETAILED: LEFT SUPERIOR FOREHEAD
LOCATION DETAILED: RIGHT SUPERIOR FOREHEAD

## 2024-10-10 ASSESSMENT — LOCATION ZONE DERM
LOCATION ZONE: SCALP
LOCATION ZONE: FACE

## 2025-01-25 PROBLEM — J43.8 OTHER EMPHYSEMA (HCC): Status: ACTIVE | Noted: 2020-05-20

## 2025-01-25 PROBLEM — R29.6 FALLS FREQUENTLY: Status: ACTIVE | Noted: 2025-01-25

## 2025-01-25 PROBLEM — F43.21 GRIEF REACTION: Status: ACTIVE | Noted: 2025-01-25

## 2025-01-25 PROBLEM — E11.9 NON-INSULIN DEPENDENT TYPE 2 DIABETES MELLITUS (HCC): Status: ACTIVE | Noted: 2025-01-25

## 2025-01-25 PROBLEM — H01.143: Status: ACTIVE | Noted: 2025-01-25

## 2025-01-25 PROBLEM — R60.0 EDEMA OF BOTH LEGS: Status: ACTIVE | Noted: 2025-01-25

## 2025-01-26 ENCOUNTER — HOSPITAL ENCOUNTER (OUTPATIENT)
Dept: RADIOLOGY | Facility: MEDICAL CENTER | Age: 80
End: 2025-01-26

## 2025-01-26 ENCOUNTER — HOSPITAL ENCOUNTER (INPATIENT)
Facility: MEDICAL CENTER | Age: 80
LOS: 2 days | DRG: 964 | End: 2025-01-28
Attending: EMERGENCY MEDICINE | Admitting: STUDENT IN AN ORGANIZED HEALTH CARE EDUCATION/TRAINING PROGRAM
Payer: MEDICARE

## 2025-01-26 DIAGNOSIS — N17.9 AKI (ACUTE KIDNEY INJURY) (HCC): ICD-10-CM

## 2025-01-26 DIAGNOSIS — E53.8 B12 DEFICIENCY: ICD-10-CM

## 2025-01-26 DIAGNOSIS — S32.10XA CLOSED FRACTURE OF SACRUM, UNSPECIFIED PORTION OF SACRUM, INITIAL ENCOUNTER (HCC): ICD-10-CM

## 2025-01-26 DIAGNOSIS — I95.1 ORTHOSTASIS: ICD-10-CM

## 2025-01-26 DIAGNOSIS — R29.6 MULTIPLE FALLS: ICD-10-CM

## 2025-01-26 DIAGNOSIS — R29.6 FALLS FREQUENTLY: ICD-10-CM

## 2025-01-26 DIAGNOSIS — F51.01 PRIMARY INSOMNIA: ICD-10-CM

## 2025-01-26 DIAGNOSIS — R78.81 GRAM-POSITIVE BACTEREMIA: ICD-10-CM

## 2025-01-26 DIAGNOSIS — F43.21 GRIEF REACTION: ICD-10-CM

## 2025-01-26 DIAGNOSIS — I62.9 INTRACRANIAL HEMORRHAGE (HCC): ICD-10-CM

## 2025-01-26 PROBLEM — T14.90XA TRAUMA: Status: ACTIVE | Noted: 2025-01-26

## 2025-01-26 PROBLEM — S06.5XAA SUBDURAL HEMATOMA (HCC): Status: ACTIVE | Noted: 2025-01-26

## 2025-01-26 PROBLEM — G93.40 ENCEPHALOPATHY: Status: RESOLVED | Noted: 2025-01-26 | Resolved: 2025-01-26

## 2025-01-26 PROBLEM — G93.40 ENCEPHALOPATHY: Status: ACTIVE | Noted: 2025-01-26

## 2025-01-26 PROBLEM — R53.81 DECLINING FUNCTIONAL STATUS: Status: ACTIVE | Noted: 2025-01-26

## 2025-01-26 PROBLEM — Z71.89 ACP (ADVANCE CARE PLANNING): Status: ACTIVE | Noted: 2021-02-25

## 2025-01-26 LAB
ALBUMIN SERPL BCP-MCNC: 3.1 G/DL (ref 3.2–4.9)
ALBUMIN/GLOB SERPL: 1 G/DL
ALP SERPL-CCNC: 61 U/L (ref 30–99)
ALT SERPL-CCNC: 17 U/L (ref 2–50)
ANION GAP SERPL CALC-SCNC: 11 MMOL/L (ref 7–16)
AST SERPL-CCNC: 28 U/L (ref 12–45)
BASOPHILS # BLD AUTO: 0.2 % (ref 0–1.8)
BASOPHILS # BLD: 0.02 K/UL (ref 0–0.12)
BILIRUB SERPL-MCNC: 1 MG/DL (ref 0.1–1.5)
BUN SERPL-MCNC: 13 MG/DL (ref 8–22)
CALCIUM ALBUM COR SERPL-MCNC: 9.1 MG/DL (ref 8.5–10.5)
CALCIUM SERPL-MCNC: 8.4 MG/DL (ref 8.5–10.5)
CHLORIDE SERPL-SCNC: 101 MMOL/L (ref 96–112)
CO2 SERPL-SCNC: 22 MMOL/L (ref 20–33)
CREAT SERPL-MCNC: 0.89 MG/DL (ref 0.5–1.4)
EOSINOPHIL # BLD AUTO: 0.14 K/UL (ref 0–0.51)
EOSINOPHIL NFR BLD: 1.5 % (ref 0–6.9)
ERYTHROCYTE [DISTWIDTH] IN BLOOD BY AUTOMATED COUNT: 49.2 FL (ref 35.9–50)
GFR SERPLBLD CREATININE-BSD FMLA CKD-EPI: 87 ML/MIN/1.73 M 2
GLOBULIN SER CALC-MCNC: 3 G/DL (ref 1.9–3.5)
GLUCOSE BLD STRIP.AUTO-MCNC: 115 MG/DL (ref 65–99)
GLUCOSE SERPL-MCNC: 105 MG/DL (ref 65–99)
HCT VFR BLD AUTO: 40.7 % (ref 42–52)
HGB BLD-MCNC: 13.6 G/DL (ref 14–18)
HIV 1+2 AB+HIV1 P24 AG SERPL QL IA: NORMAL
IMM GRANULOCYTES # BLD AUTO: 0.03 K/UL (ref 0–0.11)
IMM GRANULOCYTES NFR BLD AUTO: 0.3 % (ref 0–0.9)
LYMPHOCYTES # BLD AUTO: 1.76 K/UL (ref 1–4.8)
LYMPHOCYTES NFR BLD: 18.3 % (ref 22–41)
MCH RBC QN AUTO: 30.8 PG (ref 27–33)
MCHC RBC AUTO-ENTMCNC: 33.4 G/DL (ref 32.3–36.5)
MCV RBC AUTO: 92.3 FL (ref 81.4–97.8)
MONOCYTES # BLD AUTO: 1.2 K/UL (ref 0–0.85)
MONOCYTES NFR BLD AUTO: 12.5 % (ref 0–13.4)
NEUTROPHILS # BLD AUTO: 6.48 K/UL (ref 1.82–7.42)
NEUTROPHILS NFR BLD: 67.2 % (ref 44–72)
NRBC # BLD AUTO: 0 K/UL
NRBC BLD-RTO: 0 /100 WBC (ref 0–0.2)
PLATELET # BLD AUTO: 236 K/UL (ref 164–446)
PMV BLD AUTO: 9.7 FL (ref 9–12.9)
POTASSIUM SERPL-SCNC: 4.4 MMOL/L (ref 3.6–5.5)
PROT SERPL-MCNC: 6.1 G/DL (ref 6–8.2)
RBC # BLD AUTO: 4.41 M/UL (ref 4.7–6.1)
SODIUM SERPL-SCNC: 134 MMOL/L (ref 135–145)
T PALLIDUM AB SER QL IA: NORMAL
TSH SERPL-ACNC: 1.73 UIU/ML (ref 0.35–5.5)
VIT B12 SERPL-MCNC: 296 PG/ML (ref 211–911)
WBC # BLD AUTO: 9.6 K/UL (ref 4.8–10.8)

## 2025-01-26 PROCEDURE — 82962 GLUCOSE BLOOD TEST: CPT

## 2025-01-26 PROCEDURE — 99222 1ST HOSP IP/OBS MODERATE 55: CPT | Performed by: STUDENT IN AN ORGANIZED HEALTH CARE EDUCATION/TRAINING PROGRAM

## 2025-01-26 PROCEDURE — 84425 ASSAY OF VITAMIN B-1: CPT

## 2025-01-26 PROCEDURE — 99497 ADVNCD CARE PLAN 30 MIN: CPT | Mod: 25 | Performed by: STUDENT IN AN ORGANIZED HEALTH CARE EDUCATION/TRAINING PROGRAM

## 2025-01-26 PROCEDURE — 84443 ASSAY THYROID STIM HORMONE: CPT

## 2025-01-26 PROCEDURE — 82607 VITAMIN B-12: CPT

## 2025-01-26 PROCEDURE — 36415 COLL VENOUS BLD VENIPUNCTURE: CPT

## 2025-01-26 PROCEDURE — 86780 TREPONEMA PALLIDUM: CPT

## 2025-01-26 PROCEDURE — 85025 COMPLETE CBC W/AUTO DIFF WBC: CPT

## 2025-01-26 PROCEDURE — 770001 HCHG ROOM/CARE - MED/SURG/GYN PRIV*

## 2025-01-26 PROCEDURE — 700102 HCHG RX REV CODE 250 W/ 637 OVERRIDE(OP): Performed by: STUDENT IN AN ORGANIZED HEALTH CARE EDUCATION/TRAINING PROGRAM

## 2025-01-26 PROCEDURE — A9270 NON-COVERED ITEM OR SERVICE: HCPCS | Performed by: STUDENT IN AN ORGANIZED HEALTH CARE EDUCATION/TRAINING PROGRAM

## 2025-01-26 PROCEDURE — 99223 1ST HOSP IP/OBS HIGH 75: CPT | Mod: 25 | Performed by: STUDENT IN AN ORGANIZED HEALTH CARE EDUCATION/TRAINING PROGRAM

## 2025-01-26 PROCEDURE — 305950 HCHG YELLOW TRAUMA ACT PRE-NOTIFY NO CC

## 2025-01-26 PROCEDURE — G0475 HIV COMBINATION ASSAY: HCPCS

## 2025-01-26 PROCEDURE — 80053 COMPREHEN METABOLIC PANEL: CPT

## 2025-01-26 PROCEDURE — 700105 HCHG RX REV CODE 258: Performed by: EMERGENCY MEDICINE

## 2025-01-26 PROCEDURE — 99285 EMERGENCY DEPT VISIT HI MDM: CPT

## 2025-01-26 RX ORDER — LABETALOL HYDROCHLORIDE 5 MG/ML
10 INJECTION, SOLUTION INTRAVENOUS EVERY 4 HOURS PRN
Status: DISCONTINUED | OUTPATIENT
Start: 2025-01-26 | End: 2025-01-28 | Stop reason: HOSPADM

## 2025-01-26 RX ORDER — TRAZODONE HYDROCHLORIDE 100 MG/1
100 TABLET ORAL NIGHTLY
COMMUNITY

## 2025-01-26 RX ORDER — SODIUM CHLORIDE 9 MG/ML
1000 INJECTION, SOLUTION INTRAVENOUS ONCE
Status: COMPLETED | OUTPATIENT
Start: 2025-01-26 | End: 2025-01-26

## 2025-01-26 RX ORDER — ATORVASTATIN CALCIUM 40 MG/1
40 TABLET, FILM COATED ORAL
Status: DISCONTINUED | OUTPATIENT
Start: 2025-01-26 | End: 2025-01-28 | Stop reason: HOSPADM

## 2025-01-26 RX ORDER — ONDANSETRON 2 MG/ML
4 INJECTION INTRAMUSCULAR; INTRAVENOUS EVERY 4 HOURS PRN
Status: DISCONTINUED | OUTPATIENT
Start: 2025-01-26 | End: 2025-01-28 | Stop reason: HOSPADM

## 2025-01-26 RX ORDER — ONDANSETRON 4 MG/1
4 TABLET, ORALLY DISINTEGRATING ORAL EVERY 4 HOURS PRN
Status: DISCONTINUED | OUTPATIENT
Start: 2025-01-26 | End: 2025-01-28 | Stop reason: HOSPADM

## 2025-01-26 RX ORDER — INSULIN LISPRO 100 [IU]/ML
1-6 INJECTION, SOLUTION INTRAVENOUS; SUBCUTANEOUS
Status: DISCONTINUED | OUTPATIENT
Start: 2025-01-26 | End: 2025-01-28 | Stop reason: HOSPADM

## 2025-01-26 RX ORDER — TRAZODONE HYDROCHLORIDE 100 MG/1
100 TABLET ORAL NIGHTLY
Status: DISCONTINUED | OUTPATIENT
Start: 2025-01-26 | End: 2025-01-28 | Stop reason: HOSPADM

## 2025-01-26 RX ORDER — INSULIN LISPRO 100 [IU]/ML
0.2 INJECTION, SOLUTION INTRAVENOUS; SUBCUTANEOUS
Status: DISCONTINUED | OUTPATIENT
Start: 2025-01-27 | End: 2025-01-26

## 2025-01-26 RX ORDER — TAMSULOSIN HYDROCHLORIDE 0.4 MG/1
0.4 CAPSULE ORAL
Status: DISCONTINUED | OUTPATIENT
Start: 2025-01-27 | End: 2025-01-28 | Stop reason: HOSPADM

## 2025-01-26 RX ORDER — BUMETANIDE 1 MG/1
1 TABLET ORAL 2 TIMES DAILY
Status: DISCONTINUED | OUTPATIENT
Start: 2025-01-26 | End: 2025-01-28 | Stop reason: HOSPADM

## 2025-01-26 RX ORDER — ACETAMINOPHEN 325 MG/1
650 TABLET ORAL EVERY 6 HOURS PRN
Status: DISCONTINUED | OUTPATIENT
Start: 2025-01-26 | End: 2025-01-28 | Stop reason: HOSPADM

## 2025-01-26 RX ORDER — DEXTROSE MONOHYDRATE 25 G/50ML
25 INJECTION, SOLUTION INTRAVENOUS
Status: DISCONTINUED | OUTPATIENT
Start: 2025-01-26 | End: 2025-01-28 | Stop reason: HOSPADM

## 2025-01-26 RX ORDER — CARVEDILOL 6.25 MG/1
12.5 TABLET ORAL 2 TIMES DAILY WITH MEALS
Status: DISCONTINUED | OUTPATIENT
Start: 2025-01-26 | End: 2025-01-28 | Stop reason: HOSPADM

## 2025-01-26 RX ADMIN — CARVEDILOL 12.5 MG: 12.5 TABLET, FILM COATED ORAL at 18:55

## 2025-01-26 RX ADMIN — ATORVASTATIN CALCIUM 40 MG: 40 TABLET, FILM COATED ORAL at 21:09

## 2025-01-26 RX ADMIN — BUMETANIDE 1 MG: 1 TABLET ORAL at 18:55

## 2025-01-26 RX ADMIN — TRAZODONE HYDROCHLORIDE 100 MG: 100 TABLET ORAL at 21:09

## 2025-01-26 RX ADMIN — SODIUM CHLORIDE 1000 ML: 9 INJECTION, SOLUTION INTRAVENOUS at 17:17

## 2025-01-26 ASSESSMENT — FIBROSIS 4 INDEX
FIB4 SCORE: 2.27
FIB4 SCORE: 2.07

## 2025-01-26 ASSESSMENT — ENCOUNTER SYMPTOMS
RESPIRATORY NEGATIVE: 1
PSYCHIATRIC NEGATIVE: 1
EYES NEGATIVE: 1
CARDIOVASCULAR NEGATIVE: 1
MUSCULOSKELETAL NEGATIVE: 1
WEAKNESS: 1
GASTROINTESTINAL NEGATIVE: 1

## 2025-01-26 ASSESSMENT — PAIN DESCRIPTION - PAIN TYPE: TYPE: ACUTE PAIN

## 2025-01-26 NOTE — ED TRIAGE NOTES
"Chief Complaint   Patient presents with    Trauma Yellow     Pt presents to ER as trauma YELLOW transfer from [facility] after multiple GLF at home. Pt was found to have ICH with +thinners (xarelto) Pt arrives A+Ox4, GCS 15.          Pt arrives to ER for above complaint. Pt has had multiple falls at home- pt lives at home alone. Pt has POLST with comfort measures only. ERP to call pt's son.     BP (!) 145/65   Pulse (!) 20   Temp 36.6 °C (97.9 °F)   Resp (!) 63   Ht 1.803 m (5' 11\")   Wt 112 kg (248 lb)   SpO2 95%     "

## 2025-01-26 NOTE — ASSESSMENT & PLAN NOTE
GLF.  Trauma Yellow Transfer Activation from [facility] in [place], [place].  Daniel Coe MD. Trauma Surgery.

## 2025-01-26 NOTE — RESPIRATORY CARE
Responded to trauma yellow transfer. Pt on RA. No respiratory distress noted. Respiratory released by ER MD.

## 2025-01-26 NOTE — PROGRESS NOTES
Trauma Response    Referral: Trauma yellow transfer Response    Intervention: SW responded to trauma yellow transfer.  Pt was B[place] [facility] after GLF.  Pt was alert upon arrival.  Pts name is Chilango Carr (: [birth year]).  SW obtained the following pt information: Per EMS pt fell yesterday and was taken to [place] where he was found to have a small head bleed.  SW was did not get in touch with any of pts family or friends as pt arrived to trauma bay with his cell phone in his hand and speaking coherently.    Plan: SW will remain available to support as necessary.

## 2025-01-26 NOTE — CONSULTS
CHIEF COMPLAINT: GLF.     HISTORY OF PRESENT ILLNESS: The patient is a 79 year-old White elderly man who presents to the trauma bay as a transfer from [place] for a ICH. He sustained two GLFs yesterday morning/afternoon, he states that his legs felt weak and he fell. He denies any LOC. He does take xerelto. The original CTH was read as negative overnight, but then over read this am with a small punctate bleed along the septum pellucidum. A repeat CT head this am was stable. He was subsequently transferred to [facility] for higher level of care. His xerelto was held    Today, he stats he feels ok and only c/o right hip pain. His is alert and oriented. He does have a POLST which states he only wants comfort measures which he confirms. In addiiton to his ICH a CT of the cspine shows no acute injury and a CT of the right hip shos a S5 vertebral body fracture    TRIAGE CATEGORY: The patient was triaged as a Trauma Yellow Transfer Activation. The patient was initially evaluated at [facility] in [place], [place] where CT imaging demonstrated a ICH. He was transported to [facility] in [place], [place] for a Trauma Surgery and Neurosurgical trauma evaluation. An expeditious primary and secondary survey with required adjuncts was conducted. See Trauma Narrator for full details.    PAST MEDICAL HISTORY:  has a past medical history of Arthritis, Perez's esophagus, COPD (chronic obstructive pulmonary disease) (HCC), Dental disorder, Gastroesophageal reflux disease with esophagitis (5/20/2020), HTN (hypertension), Hypertension, Indigestion, Myocardial infarct (HCC) (2011 and 2018), and Snoring.    PAST SURGICAL HISTORY:  has a past surgical history that includes zzz cardiac cath (2012); cholecystectomy (2012); knee arthroplasty total (2007); shoulder arthroscopy w/ rotator cuff repair (2008); toe fusion (2006); cataract extraction with iol (2011); tonsillectomy; colonoscopy (2009); egd esophagus with  "endoscopic us (2009); colonoscopy - endo (8/13/2014); orif, wrist (Right, 1992); zzz cardiac cath (9/27/2012); amputation, toe; gastroscopy-endo (N/A, 6/22/2015); pr shldr arthroscop,surg,w/rotat cuff repr (Right, 5/20/2020); and other cardiac surgery.    ALLERGIES:   Allergies   Allergen Reactions    Iodine Unspecified    Naproxen Unspecified       CURRENT MEDICATIONS:   Home Medications       Reviewed by Enriqueta Castro R.N. (Registered Nurse) on 01/26/25 at 1538  Med List Status: Partial     Medication Last Dose Status   atorvastatin (LIPITOR) 40 MG Tab  Active   bumetanide (BUMEX) 1 MG Tab  Active   carvedilol (COREG) 12.5 MG Tab  Active   Empagliflozin (JARDIANCE) 10 MG Tab tablet  Active   fluticasone-umeclidinium-vilanterol (TRELEGY ELLIPTA) 200-62.5-25 mcg/act inhaler  Active   GEMTESA 75 MG Tab  Active   Lancets  Active   losartan (COZAAR) 100 MG Tab  Active   metFORMIN (GLUCOPHAGE) 500 MG Tab  Active   Multiple Vitamins-Minerals (CENTRUM ADULTS) Tab tablet  Active   multivitamin (THERAGRAN) TABS  Active   nitroglycerin (NITROSTAT) 0.4 MG SL Tab  Active   omeprazole (PRILOSEC) 20 MG delayed-release capsule  Active   potassium chloride SA (KDUR) 20 MEQ Tab CR  Active   tamsulosin (FLOMAX) 0.4 MG capsule  Active                  FAMILY HISTORY: family history includes Cancer (age of onset: 95) in his father; Multiple Sclerosis in his sister.    SOCIAL HISTORY:  reports that he quit smoking about 49 years ago. His smoking use included cigarettes. He quit smokeless tobacco use about 17 years ago. He reports current alcohol use. He reports that he does not use drugs.    REVIEW OF SYSTEMS: Comprehensive review of systems is negative with the exception of the aforementioned HPI, PMH, and PSH bullets in accordance with CMS guidelines.    PHYSICAL EXAMINATION:    Vital Signs: BP (!) 145/65   Pulse 63   Temp 36.6 °C (97.9 °F)   Resp 20   Ht 1.803 m (5' 11\")   Wt 112 kg (248 lb)   SpO2 95%   Physical " Exam  Vitals and nursing note reviewed.   Constitutional:       Appearance: Normal appearance.   HENT:      Head: Normocephalic.      Nose: Nose normal.      Mouth/Throat:      Mouth: Mucous membranes are dry.      Pharynx: Oropharynx is clear.   Eyes:      General: No scleral icterus.     Conjunctiva/sclera: Conjunctivae normal.   Cardiovascular:      Rate and Rhythm: Normal rate and regular rhythm.   Pulmonary:      Effort: Pulmonary effort is normal. No respiratory distress.   Abdominal:      General: Abdomen is flat. There is no distension.      Palpations: Abdomen is soft.      Tenderness: There is no abdominal tenderness.   Musculoskeletal:         General: Swelling present. No tenderness or deformity.      Cervical back: Normal range of motion. No rigidity or tenderness.   Skin:     General: Skin is warm and dry.   Neurological:      General: No focal deficit present.      Mental Status: He is alert.   Psychiatric:         Mood and Affect: Mood normal.       EVENT NOTE - C-SPINE CLEARANCE  CLINICAL EVALUATION (NEXUS CRITERIA):  1. Altered level of consciousness: No  2. Intoxication:  No  3. Focal neurologic deficit:  No  4. Midline c-spine tenderness: No  5. Distracting injury: No  Outside CT Cspine negative  Cspine cleared    LABORATORY VALUES:   Recent Labs     01/25/25  1840 01/26/25  0535   WBC 11.4* 9.4   RBC 4.56* 4.02*   HEMOGLOBIN 14.1 12.4*   HEMATOCRIT 41.5* 37.0*   MCV 91.0 92.0   MCH 30.9 30.8   MCHC 34.0 33.5   RDW 14.5 14.6*   PLATELETCT 256 241   MPV 9.7 10.0     Recent Labs     01/25/25  1840 01/26/25  0535   SODIUM 138 140   POTASSIUM 4.1 4.2   CHLORIDE 102 105   CO2 24 28   GLUCOSE 131* 106*   BUN 13 12   CREATININE 1.3 1.1   CALCIUM 8.9 8.5     Recent Labs     01/25/25  1840   ASTSGOT 29   ALTSGPT 21   TBILIRUBIN 1.4*   ALKPHOSPHAT 66            IMAGING:   No orders to display   Outside imaging reviewed     ASSESSMENT AND PLAN:   Trauma  GLF.  Trauma Yellow Transfer Activation from [facility] in [place], [place].  Daniel Coe MD. Trauma Surgery.    SDH (subdural hematoma) (HCC)  SDH   Finding missed on initial head CT 1/25  Repeat head CT 1/26 without progression   Patient is comfort care measures only  Small ICH on xerelto qualifies as a BIG 3, however he has already been observed for 24 hours and has a stable CTH.  OK for q4 neurochecks  OK for admission to medicine for work up of weakness    Sacral fracture (HCC)  S5 fracture   Comfort care measures only  Donut cushion ordered    DISPOSITION: Medical evaluation and admission for weakness. [facility] Trauma Surgery Service will follow. Interval Trauma tertiary survey.     ____________________________________     Daniel Coe M.D.    DD: 1/26/2025  3:34 PM  Injury Scoring Scale

## 2025-01-26 NOTE — ASSESSMENT & PLAN NOTE
CT from sending facility: What likely represent hemorrhage along the septum pellucidum is stable from study on 1/25  Finding was initially missed on first head CT 1/25  Repeat head CT 1/26 without progression   Patient is comfort care measures only

## 2025-01-26 NOTE — ED NOTES
Pt is a 79 year old M who had a mGLF x 2 yesterday. On Xarelto for Afib. Head CT showed a small ICH. He is alert and oriented x 4, moving all extremities and following commands, neuro is intact. Dr. Saenz was consulted by [facility].     Pt is transferred from [facility] via [facility].

## 2025-01-27 ENCOUNTER — APPOINTMENT (OUTPATIENT)
Dept: RADIOLOGY | Facility: MEDICAL CENTER | Age: 80
DRG: 964 | End: 2025-01-27
Attending: STUDENT IN AN ORGANIZED HEALTH CARE EDUCATION/TRAINING PROGRAM
Payer: MEDICARE

## 2025-01-27 PROBLEM — E53.8 B12 DEFICIENCY: Status: ACTIVE | Noted: 2025-01-27

## 2025-01-27 LAB
ANION GAP SERPL CALC-SCNC: 10 MMOL/L (ref 7–16)
BUN SERPL-MCNC: 12 MG/DL (ref 8–22)
CALCIUM SERPL-MCNC: 8.1 MG/DL (ref 8.5–10.5)
CHLORIDE SERPL-SCNC: 101 MMOL/L (ref 96–112)
CO2 SERPL-SCNC: 25 MMOL/L (ref 20–33)
CREAT SERPL-MCNC: 1.01 MG/DL (ref 0.5–1.4)
ERYTHROCYTE [DISTWIDTH] IN BLOOD BY AUTOMATED COUNT: 49.1 FL (ref 35.9–50)
GFR SERPLBLD CREATININE-BSD FMLA CKD-EPI: 76 ML/MIN/1.73 M 2
GLUCOSE BLD STRIP.AUTO-MCNC: 123 MG/DL (ref 65–99)
GLUCOSE BLD STRIP.AUTO-MCNC: 144 MG/DL (ref 65–99)
GLUCOSE BLD STRIP.AUTO-MCNC: 162 MG/DL (ref 65–99)
GLUCOSE BLD STRIP.AUTO-MCNC: 166 MG/DL (ref 65–99)
GLUCOSE SERPL-MCNC: 142 MG/DL (ref 65–99)
HCT VFR BLD AUTO: 40.5 % (ref 42–52)
HGB BLD-MCNC: 13.4 G/DL (ref 14–18)
MCH RBC QN AUTO: 30.7 PG (ref 27–33)
MCHC RBC AUTO-ENTMCNC: 33.1 G/DL (ref 32.3–36.5)
MCV RBC AUTO: 92.9 FL (ref 81.4–97.8)
PLATELET # BLD AUTO: 260 K/UL (ref 164–446)
PMV BLD AUTO: 9.4 FL (ref 9–12.9)
POTASSIUM SERPL-SCNC: 3.7 MMOL/L (ref 3.6–5.5)
RBC # BLD AUTO: 4.36 M/UL (ref 4.7–6.1)
SODIUM SERPL-SCNC: 136 MMOL/L (ref 135–145)
WBC # BLD AUTO: 10.5 K/UL (ref 4.8–10.8)

## 2025-01-27 PROCEDURE — A9270 NON-COVERED ITEM OR SERVICE: HCPCS | Performed by: STUDENT IN AN ORGANIZED HEALTH CARE EDUCATION/TRAINING PROGRAM

## 2025-01-27 PROCEDURE — 36415 COLL VENOUS BLD VENIPUNCTURE: CPT

## 2025-01-27 PROCEDURE — 770001 HCHG ROOM/CARE - MED/SURG/GYN PRIV*

## 2025-01-27 PROCEDURE — 97535 SELF CARE MNGMENT TRAINING: CPT

## 2025-01-27 PROCEDURE — 700102 HCHG RX REV CODE 250 W/ 637 OVERRIDE(OP): Performed by: INTERNAL MEDICINE

## 2025-01-27 PROCEDURE — 82962 GLUCOSE BLOOD TEST: CPT | Mod: 91

## 2025-01-27 PROCEDURE — 80048 BASIC METABOLIC PNL TOTAL CA: CPT

## 2025-01-27 PROCEDURE — 70551 MRI BRAIN STEM W/O DYE: CPT

## 2025-01-27 PROCEDURE — 85027 COMPLETE CBC AUTOMATED: CPT

## 2025-01-27 PROCEDURE — A9270 NON-COVERED ITEM OR SERVICE: HCPCS | Performed by: INTERNAL MEDICINE

## 2025-01-27 PROCEDURE — 97166 OT EVAL MOD COMPLEX 45 MIN: CPT

## 2025-01-27 PROCEDURE — 97162 PT EVAL MOD COMPLEX 30 MIN: CPT

## 2025-01-27 PROCEDURE — 700102 HCHG RX REV CODE 250 W/ 637 OVERRIDE(OP): Performed by: STUDENT IN AN ORGANIZED HEALTH CARE EDUCATION/TRAINING PROGRAM

## 2025-01-27 PROCEDURE — 99233 SBSQ HOSP IP/OBS HIGH 50: CPT | Performed by: INTERNAL MEDICINE

## 2025-01-27 RX ORDER — GAUZE BANDAGE 2" X 2"
100 BANDAGE TOPICAL DAILY
Status: DISCONTINUED | OUTPATIENT
Start: 2025-01-27 | End: 2025-01-28 | Stop reason: HOSPADM

## 2025-01-27 RX ORDER — QUETIAPINE FUMARATE 25 MG/1
12.5 TABLET, FILM COATED ORAL NIGHTLY
Status: DISCONTINUED | OUTPATIENT
Start: 2025-01-27 | End: 2025-01-28 | Stop reason: HOSPADM

## 2025-01-27 RX ORDER — MULTIVITAMIN WITH IRON
1000 TABLET ORAL DAILY
Status: DISCONTINUED | OUTPATIENT
Start: 2025-01-27 | End: 2025-01-28 | Stop reason: HOSPADM

## 2025-01-27 RX ADMIN — BUMETANIDE 1 MG: 1 TABLET ORAL at 06:21

## 2025-01-27 RX ADMIN — ATORVASTATIN CALCIUM 40 MG: 40 TABLET, FILM COATED ORAL at 21:35

## 2025-01-27 RX ADMIN — CYANOCOBALAMIN TAB 500 MCG 1000 MCG: 500 TAB at 13:16

## 2025-01-27 RX ADMIN — TRAZODONE HYDROCHLORIDE 100 MG: 100 TABLET ORAL at 21:35

## 2025-01-27 RX ADMIN — CARVEDILOL 12.5 MG: 12.5 TABLET, FILM COATED ORAL at 17:58

## 2025-01-27 RX ADMIN — THERA TABS 1 TABLET: TAB at 06:21

## 2025-01-27 RX ADMIN — BUMETANIDE 1 MG: 1 TABLET ORAL at 17:58

## 2025-01-27 RX ADMIN — QUETIAPINE FUMARATE 12.5 MG: 25 TABLET ORAL at 21:35

## 2025-01-27 RX ADMIN — TAMSULOSIN HYDROCHLORIDE 0.4 MG: 0.4 CAPSULE ORAL at 09:42

## 2025-01-27 RX ADMIN — Medication 100 MG: at 17:58

## 2025-01-27 RX ADMIN — INSULIN LISPRO 1 UNITS: 100 INJECTION, SOLUTION INTRAVENOUS; SUBCUTANEOUS at 22:02

## 2025-01-27 RX ADMIN — CARVEDILOL 12.5 MG: 12.5 TABLET, FILM COATED ORAL at 09:42

## 2025-01-27 RX ADMIN — INSULIN LISPRO 1 UNITS: 100 INJECTION, SOLUTION INTRAVENOUS; SUBCUTANEOUS at 13:19

## 2025-01-27 SDOH — ECONOMIC STABILITY: TRANSPORTATION INSECURITY
IN THE PAST 12 MONTHS, HAS LACK OF RELIABLE TRANSPORTATION KEPT YOU FROM MEDICAL APPOINTMENTS, MEETINGS, WORK OR FROM GETTING THINGS NEEDED FOR DAILY LIVING?: NO

## 2025-01-27 SDOH — ECONOMIC STABILITY: TRANSPORTATION INSECURITY
IN THE PAST 12 MONTHS, HAS THE LACK OF TRANSPORTATION KEPT YOU FROM MEDICAL APPOINTMENTS OR FROM GETTING MEDICATIONS?: NO

## 2025-01-27 ASSESSMENT — PAIN DESCRIPTION - PAIN TYPE
TYPE: ACUTE PAIN

## 2025-01-27 ASSESSMENT — COGNITIVE AND FUNCTIONAL STATUS - GENERAL
WALKING IN HOSPITAL ROOM: A LOT
DAILY ACTIVITIY SCORE: 18
TOILETING: A LOT
MOVING TO AND FROM BED TO CHAIR: A LITTLE
MOBILITY SCORE: 15
STANDING UP FROM CHAIR USING ARMS: A LITTLE
HELP NEEDED FOR BATHING: A LOT
HELP NEEDED FOR BATHING: A LOT
TURNING FROM BACK TO SIDE WHILE IN FLAT BAD: A LOT
DRESSING REGULAR UPPER BODY CLOTHING: A LITTLE
TOILETING: A LITTLE
SUGGESTED CMS G CODE MODIFIER DAILY ACTIVITY: CK
CLIMB 3 TO 5 STEPS WITH RAILING: A LOT
SUGGESTED CMS G CODE MODIFIER DAILY ACTIVITY: CK
CLIMB 3 TO 5 STEPS WITH RAILING: A LOT
MOVING FROM LYING ON BACK TO SITTING ON SIDE OF FLAT BED: A LOT
DRESSING REGULAR UPPER BODY CLOTHING: A LOT
MOVING TO AND FROM BED TO CHAIR: A LOT
MOBILITY SCORE: 12
MOVING FROM LYING ON BACK TO SITTING ON SIDE OF FLAT BED: A LOT
WALKING IN HOSPITAL ROOM: A LITTLE
SUGGESTED CMS G CODE MODIFIER MOBILITY: CK
STANDING UP FROM CHAIR USING ARMS: A LOT
PERSONAL GROOMING: A LITTLE
DRESSING REGULAR LOWER BODY CLOTHING: A LOT
EATING MEALS: A LITTLE
SUGGESTED CMS G CODE MODIFIER MOBILITY: CL
TURNING FROM BACK TO SIDE WHILE IN FLAT BAD: A LOT
DAILY ACTIVITIY SCORE: 14
DRESSING REGULAR LOWER BODY CLOTHING: A LOT

## 2025-01-27 ASSESSMENT — SOCIAL DETERMINANTS OF HEALTH (SDOH)
WITHIN THE LAST YEAR, HAVE TO BEEN RAPED OR FORCED TO HAVE ANY KIND OF SEXUAL ACTIVITY BY YOUR PARTNER OR EX-PARTNER?: NO
WITHIN THE PAST 12 MONTHS, YOU WORRIED THAT YOUR FOOD WOULD RUN OUT BEFORE YOU GOT THE MONEY TO BUY MORE: NEVER TRUE
IN THE PAST 12 MONTHS, HAS THE ELECTRIC, GAS, OIL, OR WATER COMPANY THREATENED TO SHUT OFF SERVICE IN YOUR HOME?: NO
WITHIN THE LAST YEAR, HAVE TO BEEN RAPED OR FORCED TO HAVE ANY KIND OF SEXUAL ACTIVITY BY YOUR PARTNER OR EX-PARTNER?: NO
WITHIN THE LAST YEAR, HAVE YOU BEEN AFRAID OF YOUR PARTNER OR EX-PARTNER?: NO
WITHIN THE LAST YEAR, HAVE YOU BEEN KICKED, HIT, SLAPPED, OR OTHERWISE PHYSICALLY HURT BY YOUR PARTNER OR EX-PARTNER?: NO
WITHIN THE LAST YEAR, HAVE YOU BEEN HUMILIATED OR EMOTIONALLY ABUSED IN OTHER WAYS BY YOUR PARTNER OR EX-PARTNER?: NO
WITHIN THE PAST 12 MONTHS, THE FOOD YOU BOUGHT JUST DIDN'T LAST AND YOU DIDN'T HAVE MONEY TO GET MORE: NEVER TRUE
WITHIN THE LAST YEAR, HAVE YOU BEEN AFRAID OF YOUR PARTNER OR EX-PARTNER?: NO
WITHIN THE LAST YEAR, HAVE YOU BEEN HUMILIATED OR EMOTIONALLY ABUSED IN OTHER WAYS BY YOUR PARTNER OR EX-PARTNER?: NO

## 2025-01-27 ASSESSMENT — GAIT ASSESSMENTS
ASSISTIVE DEVICE: FRONT WHEEL WALKER
GAIT LEVEL OF ASSIST: MINIMAL ASSIST
DEVIATION: BRADYKINETIC;DECREASED HEEL STRIKE;DECREASED TOE OFF;OTHER (COMMENT)
DISTANCE (FEET): 45

## 2025-01-27 ASSESSMENT — ENCOUNTER SYMPTOMS
CONSTIPATION: 0
WEIGHT LOSS: 0
PHOTOPHOBIA: 0
COUGH: 0
DIZZINESS: 0
SENSORY CHANGE: 0
MYALGIAS: 0
DOUBLE VISION: 0
TREMORS: 0
SPEECH CHANGE: 0
WEAKNESS: 1
NECK PAIN: 0
SHORTNESS OF BREATH: 0
TINGLING: 0
HEADACHES: 0
FEVER: 0
DIARRHEA: 0
VOMITING: 0
PALPITATIONS: 0
BACK PAIN: 0
MYALGIAS: 1
HEMOPTYSIS: 0
NERVOUS/ANXIOUS: 1
NAUSEA: 0
ORTHOPNEA: 0
CLAUDICATION: 0
ABDOMINAL PAIN: 0
BLURRED VISION: 0
CHILLS: 0

## 2025-01-27 ASSESSMENT — LIFESTYLE VARIABLES
TOTAL SCORE: 0
TOTAL SCORE: 0
CONSUMPTION TOTAL: NEGATIVE
DOES PATIENT WANT TO STOP DRINKING: NO
AVERAGE NUMBER OF DAYS PER WEEK YOU HAVE A DRINK CONTAINING ALCOHOL: 0
EVER HAD A DRINK FIRST THING IN THE MORNING TO STEADY YOUR NERVES TO GET RID OF A HANGOVER: NO
ALCOHOL_USE: NO
ON A TYPICAL DAY WHEN YOU DRINK ALCOHOL HOW MANY DRINKS DO YOU HAVE: 0
HOW MANY TIMES IN THE PAST YEAR HAVE YOU HAD 5 OR MORE DRINKS IN A DAY: 0
TOTAL SCORE: 0
HAVE PEOPLE ANNOYED YOU BY CRITICIZING YOUR DRINKING: NO
EVER FELT BAD OR GUILTY ABOUT YOUR DRINKING: NO
HAVE YOU EVER FELT YOU SHOULD CUT DOWN ON YOUR DRINKING: NO

## 2025-01-27 ASSESSMENT — ACTIVITIES OF DAILY LIVING (ADL): TOILETING: INDEPENDENT

## 2025-01-27 ASSESSMENT — PATIENT HEALTH QUESTIONNAIRE - PHQ9
SUM OF ALL RESPONSES TO PHQ9 QUESTIONS 1 AND 2: 0
2. FEELING DOWN, DEPRESSED, IRRITABLE, OR HOPELESS: NOT AT ALL
1. LITTLE INTEREST OR PLEASURE IN DOING THINGS: NOT AT ALL

## 2025-01-27 NOTE — PROGRESS NOTES
4 Eyes Skin Assessment Completed by SUZI Lloyd and SUZI Kearns.    Head WDL  Ears WDL  Nose WDL  Mouth WDL  Neck WDL  Breast/Chest WDL  Shoulder Blades WDL  Spine surgical scar  (R) Arm/Elbow/Hand Redness, Blanching, and Scab  (L) Arm/Elbow/Hand Redness, Blanching, and Scab  Abdomen WDL  Groin Redness and Blanching  Scrotum/Coccyx/Buttocks Redness and Blanching  (R) Leg Scab, Swelling, and Edema  (L) Leg Redness, Blanching, Scar, Scab, Swelling, and Edema  (R) Heel/Foot/Toe Swelling and Edema, scab, redness, blanching  (L) Heel/Foot/Toe Swelling and Edema, scab, redness, blanching          Devices In Places Blood Pressure Cuff and Pulse Ox      Interventions In Place InterDry, Heel Mepilex, Sacral Mepilex, Elbow Mepilex, Q2 Turns, and Barrier Cream    Possible Skin Injury No    Pictures Uploaded Into Epic Yes  Wound Consult Placed N/A  RN Wound Prevention Protocol Ordered Yes

## 2025-01-27 NOTE — CARE PLAN
The patient is Stable - Low risk of patient condition declining or worsening    Shift Goals  Clinical Goals: neuro monitoring and safety  Patient Goals: updates  Family Goals: NICHOLE    Progress made toward(s) clinical / shift goals:    Problem: Neuro Status  Goal: Neuro status will remain stable or improve  1/27/2025 0227 by Gabino Ngo R.N.  Outcome: Progressing  Note: Q 4 neuro checks in place. Patient A&O x 3, disoriented to time. FSI.      Problem: Skin Integrity  Goal: Skin integrity is maintained or improved  1/27/2025 0227 by Gabino Ngo R.N.  Outcome: Progressing  Note: Mepelix in use. TAPS in use. Heel float boots in use.      Problem: Fall Risk  Goal: Patient will remain free from falls  1/27/2025 0227 by ELLEN DuttaN.  Outcome: Progressing  Note: Fall precautions in place. Bed locked and in the lowest position. Call light in reach. Strip alarm in use.     Patient is not progressing towards the following goals: N/A

## 2025-01-27 NOTE — PROGRESS NOTES
Hospital Medicine Daily Progress Note    Date of Service  2025    Chief Complaint  Vince Carr is a 79 y.o. male admitted 2025 with falls    Hospital Course:    79-year-old male with history of atrial fibrillation, coronary artery disease, diabetes and hypertension who presented  with fall.  Recently his wife  and since the patient lives by himself, patient states he is getting worse and weaker, recurrent falls, patient was found on the ground by neighbors, CT scan of her head showed subdural hematoma, Xarelto was held, trauma was consulted also neurosurgeon who did not recommend any intervention, repeat MRI without contrast later showed stable however recommended MRI for brain with contrast in 6 weeks due to abnormal and alternative possibility for bleeding.  His vital signs around baseline and patient was on room air, no significant leukocytosis and patient has a chronic anemia around 13 also kidney function was stable with no signs of infection.  PT and OT evaluation.        Interval Problem Update  -Evaluate and examined the patient at bedside, patient stated he has generalized weakness with some pain on the right hip  -Labs reviewed and vital signs reviewed, around baseline.  -PT and OT, PMR and likely placement  -MRI without contrast for brain was reviewed and needed repeat MRI with contrast in 6 weeks.  -Trauma and neurosurgery cleared the patient.   -Start with B12 supplementations.  -Case was discussed in detail with the patient and his son also daughter-in-law, answered all their questions, discussed the plan of care, they understood and agreed.      I have discussed this patient's plan of care and discharge plan at IDT rounds today with Case Management, Nursing, Nursing leadership, and other members of the IDT team.    Consultants/Specialty  Neurosurgery and trauma    Code Status  DNAR/DNI    Disposition  The patient is medically cleared for discharge to home or a post-acute  facility.  Anticipate discharge to: skilled nursing facility    I have placed the appropriate orders for post-discharge needs.    Review of Systems  Review of Systems   Constitutional:  Positive for malaise/fatigue. Negative for chills, fever and weight loss.   HENT:  Negative for ear pain, hearing loss and tinnitus.    Eyes:  Negative for blurred vision, double vision and photophobia.   Respiratory:  Negative for cough and hemoptysis.    Cardiovascular:  Negative for chest pain, palpitations, orthopnea and claudication.   Gastrointestinal:  Negative for abdominal pain, constipation, diarrhea, nausea and vomiting.   Genitourinary:  Negative for dysuria, frequency and urgency.   Musculoskeletal:  Negative for myalgias and neck pain.   Skin:  Negative for rash.   Neurological:  Positive for weakness. Negative for dizziness and speech change.   Psychiatric/Behavioral:  The patient is nervous/anxious.         Physical Exam  Temp:  [36.1 °C (97 °F)-37.1 °C (98.8 °F)] 36.1 °C (97 °F)  Pulse:  [61-77] 70  Resp:  [17-20] 17  BP: ()/(63-85) 140/75  SpO2:  [92 %-97 %] 96 %    Physical Exam  Constitutional:       General: He is not in acute distress.     Appearance: He is not ill-appearing.   Eyes:      General: No scleral icterus.  Cardiovascular:      Rate and Rhythm: Normal rate.      Heart sounds: No murmur heard.  Pulmonary:      Effort: No respiratory distress.      Breath sounds: No wheezing.   Abdominal:      General: There is no distension.      Tenderness: There is no abdominal tenderness. There is no right CVA tenderness, left CVA tenderness or guarding.   Musculoskeletal:      Right lower leg: No edema.      Left lower leg: No edema.   Lymphadenopathy:      Cervical: No cervical adenopathy.   Skin:     Coloration: Skin is not jaundiced.      Findings: No bruising, lesion or rash.   Neurological:      General: No focal deficit present.      Mental Status: He is alert and oriented to person, place, and time.  Mental status is at baseline.      Cranial Nerves: No cranial nerve deficit.      Motor: No weakness.      Gait: Gait normal.   Psychiatric:      Comments: Patient looks sad however denied depression         Fluids    Intake/Output Summary (Last 24 hours) at 2025 1522  Last data filed at 2025 1100  Gross per 24 hour   Intake 1240 ml   Output 750 ml   Net 490 ml        Laboratory  Recent Labs     25  0535 25  1744 25  0109   WBC 9.4 9.6 10.5   RBC 4.02* 4.41* 4.36*   HEMOGLOBIN 12.4* 13.6* 13.4*   HEMATOCRIT 37.0* 40.7* 40.5*   MCV 92.0 92.3 92.9   MCH 30.8 30.8 30.7   MCHC 33.5 33.4 33.1   RDW 14.6* 49.2 49.1   PLATELETCT 241 236 260   MPV 10.0 9.7 9.4     Recent Labs     25  0535 25  1744 25  0109   SODIUM 140 134* 136   POTASSIUM 4.2 4.4 3.7   CHLORIDE 105 101 101   CO2 28 22 25   GLUCOSE 106* 105* 142*   BUN 12 13 12   CREATININE 1.1 0.89 1.01   CALCIUM 8.5 8.4* 8.1*                   Imaging  MR-BRAIN-W/O   Final Result         T1 shortening noted within the posterior septum pellucidum and adjacent bilateral fornices more prominent on the left side. This is an unusual location for a bleed, however given the signal characteristics, this may represent subacute hemorrhage. Another    alternative possibility is a long-standing lesion such as a lipoma that is partially calcified. There is no evidence of layering intraventricular hemorrhage or subarachnoid hemorrhage. Comparison with prior remote examinations of the brain would be    helpful, if available. Recommend MRI brain with contrast follow-up in 6 weeks.           Assessment/Plan  * Declining functional status  Assessment & Plan  Failure to thrive with the frailty  Worsening recently after his wife   Patient lives by himself, recurrent falls  MRI without contrast did not show any signs of stroke however patient needs repeat MRI with contrast in 6 weeks  PT and OT and placement  Nonpharmacological treatment avoid  delirium  Patient might need long-term placement like a group home  Close monitoring and consider SSRI if needed, patient denied any SI or HI        B12 deficiency  Assessment & Plan  Start with supplementation orally    Intracranial hemorrhage (HCC)- (present on admission)  Assessment & Plan  Patient has had 2 CT heads, second CT head showing a stable bleed to the septum pellucidum.    Cleared by trauma and neurosurgery  Neurocheck every 4 hours  Hold Xarelto for now  Fall precautions    Sacral fracture (HCC)- (present on admission)  Assessment & Plan  CT showing S5 sacral fracture  PT OT and placement  Pain medications as needed, stool softener    SDH (subdural hematoma) ([facility])- (present on admission)  Assessment & Plan  Likely related to trauma  Trauma and neurosurgeon cleared the patient  For PT and OT and placement  Consider CT scan for head for any changes on neuroexam    Non-insulin dependent type 2 diabetes mellitus (HCC)- (present on admission)  Assessment & Plan  A1c 7 controlled for his age   sliding-scale  Holding oral medications including metformin    PAF (paroxysmal atrial fibrillation) ([facility])  Assessment & Plan  Continue home medications  Hold Xarelto, patient might need repeat images before resume anticoagulation also extensive discussion with the patient and family    Dyslipidemia- (present on admission)  Assessment & Plan  Continue atorvastatin    CAD in native artery- (present on admission)  Assessment & Plan  Holding antiplatelets and anticoagulation due to intracranial hemorrhage  Denies significant chest pain  Continue atorvastatin and beta-blockers    Hypertension  Assessment & Plan  Avoid aggressive treatment   Continue carvedilol 12.5 mg twice daily       ACP (advance care planning)  Assessment & Plan  DNR/DNI         VTE prophylaxis:   SCDs/TEDs   pharmacologic prophylaxis contraindicated due to Subdural hematoma      I have performed a physical exam and reviewed and updated ROS and Plan  today (1/27/2025). In review of yesterday's note (1/26/2025), there are no changes except as documented above.      Greater than 51 minutes spent prepping to see patient (e.g. review of tests) obtaining and/or reviewing separately obtained history. Performing a medically appropriate examination and/ evaluation.  Counseling and educating the patient/family/caregiver.  Ordering medications, tests, or procedures.  Referring and communicating with other health care professionals.  Documenting clinical information in EPIC.  Independently interpreting results and communicating results to patient/family/caregiver.  Care coordination

## 2025-01-27 NOTE — DISCHARGE PLANNING
Case Management Discharge Planning    Admission Date: 1/26/2025  GMLOS: 2.5  ALOS: 1    6-Clicks ADL Score: 14  6-Clicks Mobility Score: 12  PT and/or OT Eval ordered: Yes  Post-acute Referrals Ordered: Yes  Post-acute Choice Obtained: Yes  Has referral(s) been sent to post-acute provider:  Yes      Anticipated Discharge Dispo: Discharge Disposition: D/T to SNF with Medicare cert in anticipation of skilled care (03)    DME Needed: No    Action(s) Taken: DC Assessment Complete (See below) and Choice obtained    Escalations Completed: Provider    Medically Clear: No    Next Steps: f/u with pt and medical team to discuss dc needs and barriers.    Barriers to Discharge: Medical clearance    Is the patient up for discharge tomorrow: No      Care Transition Team Assessment    RN CM met with Pt at bedside, Pt's son and daughter in law is present as well. Pt is oriented to self and place but tends to be very forgetful. Pt's son provided all the information. Pt lives alone in a one story house in [facility]. At baseline, Pt is independent with his ADL's and AIDL's. Pt owns walker and cane. PCP is Dr. Gela Galeana. Pt's son Morgan is listed as emergency contact. Morgan lives at [facility]. Pt has no history of drug/ETOH abuse. Pt is agreeable for post acute placement and prefers [facility]. Pt's son is aware that they will cover the transport cost.     RN CM called [facility] and confirmed they have a bed for Pt on Wednesday. Pt needs 3 midnight stay. Dr. Wallace informed.     Information Source  Orientation Level: Oriented to person, Oriented to place, Disoriented to time, Disoriented to situation  Information Given By: Other (Comments) (son)  Who is responsible for making decisions for patient? : Legal next of kin  Name(s) of Primary Decision Maker: Morgan Carr    Readmission Evaluation  Is this a readmission?: Yes - unplanned readmission    Elopement Risk  Legal Hold: No  Ambulatory or Self Mobile  in Wheelchair: No-Not an Elopement Risk  Elopement Risk: Not at Risk for Elopement    Interdisciplinary Discharge Planning  Lives with - Patient's Self Care Capacity: Alone and Unable to Care For Self  Patient or legal guardian wants to designate a caregiver: No  Support Systems: Children, Family Member(s), Friends / Neighbors  Housing / Facility: [address]    Discharge Preparedness  What is your plan after discharge?: Skilled nursing facility  What are your discharge supports?: Child  Prior Functional Level: Ambulatory, Independent with Activities of Daily Living, Drives Self, Uses Cane, Uses Walker  Difficulity with ADLs: None  Difficulity with IADLs: None    Functional Assesment  Prior Functional Level: Ambulatory, Independent with Activities of Daily Living, Drives Self, Uses Cane, Uses Walker    Finances  Financial Barriers to Discharge: No  Prescription Coverage: Yes    Vision / Hearing Impairment  Vision Impairment : No  Hearing Impairment : No         Advance Directive  Advance Directive?: POLST    Domestic Abuse  Have you ever been the victim of abuse or violence?: No  Possible Abuse/Neglect Reported to:: Not Applicable    Psychological Assessment  History of Substance Abuse: None    Discharge Risks or Barriers  Discharge risks or barriers?: Complex medical needs  Patient risk factors: Complex medical needs    Anticipated Discharge Information  Discharge Disposition: D/T to SNF with Medicare cert in anticipation of skilled care (03)

## 2025-01-27 NOTE — HOSPITAL COURSE
79-year-old male with history of atrial fibrillation, coronary artery disease, diabetes and hypertension who presented  with fall.  Recently his wife  and since the patient lives by himself, patient states he is getting worse and weaker, recurrent falls, patient was found on the ground by neighbors.  CT scan of his head showed subdural hematoma.  Patient was also found to have a sacral fracture.  Xarelto was held.  Trauma was consulted also neurosurgeon who did not recommend any intervention.  Repeat MRI without contrast later showed stable however recommended MRI for brain with contrast in 6 weeks due to abnormal and alternative possibility for bleeding.  His vital signs around baseline and patient was on room air, no significant leukocytosis and patient has a chronic anemia around 13 also kidney function was stable with no signs of infection.  PT and OT evaluation recommended postacute placement.  Patient was accepted by inpatient rehab.  Medically stable to discharge to inpatient rehab.  Hold rivaroxaban until follow-up MRI brain.

## 2025-01-27 NOTE — ASSESSMENT & PLAN NOTE
Failure to thrive with the frailty  Worsening recently after his wife   Patient lives by himself, recurrent falls  MRI without contrast did not show any signs of stroke however patient needs repeat MRI with contrast in 6 weeks  PT and OT and placement  Nonpharmacological treatment avoid delirium  Patient might need long-term placement like a group home  Close monitoring and consider SSRI if needed, patient denied any SI or HI

## 2025-01-27 NOTE — THERAPY
"Occupational Therapy   Initial Evaluation     Patient Name: Vince Carr  Age:  79 y.o., Sex:  male  Medical Record #: [id number]  Today's Date: 1/27/2025     Precautions  Precautions: Fall Risk  Comments: S5 fx    Assessment  Patient is 79 y.o. male  history of type 2 diabetes, atrial fibrillation on Xarelto, CAD status post stent 2021, COPD on 2 L of home oxygen.  Patient reported to have fallen about 10 times within the last month, mostly when BLE are weak. CT revealed small punctate bleed along the septum pellucidum as well as a S5 vertebral body fracture. Admitted d/t SDH. Awaiting MRI.   Pt seen for OT eval. Pt reports he lives alone since his wife passing this month, his son has been staying with him. Pt reports frequent falls at home, demo'd with impaired strength, balance, activity tolerance, and cognition impacting all ADLs and functional mobility. Pt performing functional mobility with FWW Darrel overall however required increased assist with LOB in bathroom. Pt will continue to benefit from inpt OT. Recommend post acute placement upon dc.     Plan    Occupational Therapy Initial Treatment Plan   Treatment Interventions: Self Care / Activities of Daily Living, Adaptive Equipment, Neuro Re-Education / Balance, Therapeutic Exercises, Therapeutic Activity  Treatment Frequency: 3 Times per Week  Duration: Until Therapy Goals Met    DC Equipment Recommendations: (P) Unable to determine at this time  Discharge Recommendations: (P) Recommend post-acute placement for additional occupational therapy services prior to discharge home     Subjective    \"I was yelling out for the neighbors\"      Objective       01/27/25 1050   Prior Living Situation   Prior Services Home-Independent   Housing / Facility 1 Story House   Steps Into Home 1   Steps In Home 0   Bathroom Set up Walk In Shower;Shower Chair;Grab Bars   Equipment Owned 4-Wheel Walker;Single Point Cane;Grab Bar(s) By Toilet;Tub / Shower Seat   Lives with - " Patient's Self Care Capacity Alone and Unable to Care For Self   Comments Pt lives alone, spouse recently   and son has been staying with him intermittently.   Prior Level of ADL Function   Self Feeding Independent   Grooming / Hygiene Independent   Bathing Independent   Dressing Independent   Toileting Independent   Prior Level of IADL Function   Medication Management Independent   Laundry Independent   Kitchen Mobility Independent   Finances Independent   Home Management Independent   Shopping Independent   Prior Level Of Mobility Independent With Device in Community   Driving / Transportation Driving Independent   Occupation (Pre-Hospital Vocational) Not Employed   Comments pt normally is fully independent   History of Falls   History of Falls Yes   Date of Last Fall   (reason for admit, found down outside home)   Precautions   Precautions Fall Risk   Comments S5 fx   Vitals   Patient BP Position Sitting   Blood Pressure  92/79   Pulse Oximetry 96 %   O2 Delivery Device None - Room Air   Pain   Intervention Repositioned   Pain 0 - 10 Group   Location Back;Hip;Buttock   Therapist Pain Assessment During Activity;Post Activity Pain Same as Prior to Activity;Nurse Notified  (unrated pain)   Cognition    Cognition / Consciousness X   Level of Consciousness Alert   Ability To Follow Commands 1 Step   Safety Awareness Impaired   New Learning Impaired   Attention Impaired   Comments pt disoriented to exact date. able to make needs known and follow commands, pt with inconsistencies in PLOF and demo'd with diminished memory and higher level problem solving   Passive ROM Upper Body   Passive ROM Upper Body WDL   Active ROM Upper Body   Active ROM Upper Body  WDL   Dominant Hand Right   Strength Upper Body   Upper Body Strength  X   Gross Strength Generalized Weakness, Equal Bilaterally.    Sensation Upper Body   Upper Extremity Sensation  WDL   Upper Body Muscle Tone   Upper Body Muscle Tone  WDL   Neurological  Concerns   Neurological Concerns No   Coordination Upper Body   Coordination WDL   Balance Assessment   Sitting Balance (Static) Fair   Sitting Balance (Dynamic) Fair -   Standing Balance (Static) Fair -   Standing Balance (Dynamic) Poor +   Weight Shift Sitting Fair   Weight Shift Standing Poor   Comments w/FWW   Bed Mobility    Supine to Sit Moderate Assist   Rolling Moderate Assist to Lt.   Comments HOB slightly elevated ,use of bed rail   ADL Assessment   Eating Independent   Grooming Supervision;Seated  (oral care, hand hygiene)   Upper Body Dressing Minimal Assist   Lower Body Dressing Maximal Assist   Toileting Minimal Assist  (assist for thoroughness)   Functional Mobility   Sit to Stand Minimal Assist   Bed, Chair, Wheelchair Transfer Minimal Assist   Toilet Transfers Minimal Assist   Transfer Method Stand Step   Mobility sup< sit EOB> i nrm> bathroom> chair   Comments w.FWW   Visual Perception   Visual Perception  WDL   Edema / Skin Assessment   Edema / Skin  X   Comments BLE edema, bruised B toe   Activity Tolerance   Sitting in Chair post session   Sitting Edge of Bed 5 min   Standing 6 min total   Comments limited d/t fatigue   Patient / Family Goals   Patient / Family Goal #1 to get better   Short Term Goals   Short Term Goal # 1 Pt will complete toilet transfers SBA   Short Term Goal # 2 Pt will complete LB dressing with A/E SBA   Short Term Goal # 3 Pt will complete toileting SBA   Short Term Goal # 4 pt will complete standing grooming tasks SBA   Education Group   Education Provided Activities of Daily Living;Role of Occupational Therapist   Role of Occupational Therapist Patient Response Patient;Acceptance;Explanation;Demonstration;Action Demonstration;Verbal Demonstration   ADL Patient Response Patient;Acceptance;Demonstration;Explanation;Verbal Demonstration;Action Demonstration   Occupational Therapy Initial Treatment Plan    Treatment Interventions Self Care / Activities of Daily  Living;Adaptive Equipment;Neuro Re-Education / Balance;Therapeutic Exercises;Therapeutic Activity   Treatment Frequency 3 Times per Week   Duration Until Therapy Goals Met   Problem List   Problem List Decreased Active Daily Living Skills;Decreased Upper Extremity Strength Right;Decreased Upper Extremity Strength Left;Decreased Functional Mobility;Decreased Activity Tolerance;Safety Awareness Deficits / Cognition;Impaired Posture / Trunk Alignment;Impaired Postural Control / Balance   Anticipated Discharge Equipment and Recommendations   DC Equipment Recommendations Unable to determine at this time   Discharge Recommendations Recommend post-acute placement for additional occupational therapy services prior to discharge home   Interdisciplinary Plan of Care Collaboration   IDT Collaboration with  Nursing;Physical Therapist   Patient Position at End of Therapy Seated;Chair Alarm On;Tray Table within Reach;Call Light within Reach   Collaboration Comments RN updated   Session Information   Date / Session Number  1/27, #1 (1/3, 2/2)       Patient seen for team evaluation with Physical Therapist for the following reason(s):  Patient required 2 person assistance for safety and to provide effective interventions. Each discipline assisted patient with appropriate and separate goals. Occupational Therapist facilitated ADLs, standing balance while Physical Therapist simultaneously treated pt according to POC.

## 2025-01-27 NOTE — ASSESSMENT & PLAN NOTE
Continue home medications  Hold Xarelto, patient might need repeat images before resume anticoagulation also extensive discussion with the patient and family

## 2025-01-27 NOTE — ED NOTES
"Med rec updated and complete. Allergies reviewed.    Pt able to confirm name and date of birth.    Pt remembers taking his medications on 01/24/25.   Pt stated that he doesn't remember taking them yesterday ( 01/25/25) and didn't take any medication today 01/26/25. Pt stated he was unable to get \" his legs\" to work .    Last dose of Rivaroxaban 01/24/25.    Pt denies antibiotic use in last 30 days at home.      Preferred pharmacy  [facility] = [phone]  "

## 2025-01-27 NOTE — PROGRESS NOTES
"          Mental status adequate for full examination?: Yes        REVIEW OF SYSTEMS:  Review of Systems   Constitutional:  Negative for chills and fever.   Eyes:  Negative for blurred vision and double vision.   Respiratory:  Negative for cough and shortness of breath.    Cardiovascular:  Negative for chest pain.   Gastrointestinal:  Negative for abdominal pain, nausea and vomiting.   Musculoskeletal:  Positive for myalgias. Negative for back pain, joint pain and neck pain.   Neurological:  Negative for tingling, tremors, sensory change, speech change and headaches.       PHYSICAL EXAMINATION:  BP (!) 141/85   Pulse 75   Temp 36.6 °C (97.9 °F) (Temporal)   Resp 17   Ht 1.803 m (5' 11\")   Wt 116 kg (256 lb 6.3 oz)   SpO2 97%   BMI 35.76 kg/m²   Physical Exam  Vitals and nursing note reviewed.   Constitutional:       Appearance: He is not toxic-appearing.   HENT:      Head: Normocephalic.      Right Ear: External ear normal.      Left Ear: External ear normal.      Nose: Nose normal.      Mouth/Throat:      Mouth: Mucous membranes are dry.      Pharynx: Oropharynx is clear.   Eyes:      Extraocular Movements: Extraocular movements intact.      Conjunctiva/sclera: Conjunctivae normal.   Cardiovascular:      Rate and Rhythm: Normal rate and regular rhythm.      Pulses: Normal pulses.   Pulmonary:      Effort: Pulmonary effort is normal. No respiratory distress.   Chest:      Chest wall: No tenderness.   Abdominal:      General: There is no distension.      Palpations: Abdomen is soft.      Tenderness: There is no abdominal tenderness. There is no guarding.   Musculoskeletal:      Cervical back: No tenderness.      Right lower leg: Edema present.      Left lower leg: Edema present.   Skin:     General: Skin is warm.      Capillary Refill: Capillary refill takes 2 to 3 seconds.   Neurological:      Mental Status: He is alert and oriented to person, place, and time.         LABORATORY VALUES:  Recent Labs     " 01/26/25  0535 01/26/25  1744 01/27/25  0109   WBC 9.4 9.6 10.5   RBC 4.02* 4.41* 4.36*   HEMOGLOBIN 12.4* 13.6* 13.4*   HEMATOCRIT 37.0* 40.7* 40.5*   MCV 92.0 92.3 92.9   MCH 30.8 30.8 30.7   MCHC 33.5 33.4 33.1   RDW 14.6* 49.2 49.1   PLATELETCT 241 236 260   MPV 10.0 9.7 9.4     Recent Labs     01/26/25  0535 01/26/25  1744 01/27/25  0109   SODIUM 140 134* 136   POTASSIUM 4.2 4.4 3.7   CHLORIDE 105 101 101   CO2 28 22 25   GLUCOSE 106* 105* 142*   BUN 12 13 12   CREATININE 1.1 0.89 1.01   CALCIUM 8.5 8.4* 8.1*     Recent Labs     01/25/25  1840 01/26/25 1744   ASTSGOT 29 28   ALTSGPT 21 17   TBILIRUBIN 1.4* 1.0   ALKPHOSPHAT 66 61   GLOBULIN  --  3.0           IMAGING:  MR-BRAIN-W/O   Final Result         T1 shortening noted within the posterior septum pellucidum and adjacent bilateral fornices more prominent on the left side. This is an unusual location for a bleed, however given the signal characteristics, this may represent subacute hemorrhage. Another    alternative possibility is a long-standing lesion such as a lipoma that is partially calcified. There is no evidence of layering intraventricular hemorrhage or subarachnoid hemorrhage. Comparison with prior remote examinations of the brain would be    helpful, if available. Recommend MRI brain with contrast follow-up in 6 weeks.            CAGE Results: negative Blood Alcohol>0.08: not completed       PDI Score: Not completed  (Score > 23 = Psychiatry consult)        Newly identified traumatic injuries.  None noted at time of exam     Continue care per primary team and neurosurgery  Trauma will sign off, please call with any questions or concerns

## 2025-01-27 NOTE — DISCHARGE PLANNING
0945  DPA sent referral to [place] SNF's. Per RN NEERAJ Spence.

## 2025-01-27 NOTE — ASSESSMENT & PLAN NOTE
Patient has had 2 CT heads, second CT head showing a stable bleed to the septum pellucidum.    Cleared by trauma and neurosurgery  Neurocheck every 4 hours  Hold Xarelto for now  Fall precautions

## 2025-01-27 NOTE — ASSESSMENT & PLAN NOTE
Holding antiplatelets and anticoagulation due to intracranial hemorrhage  Denies significant chest pain  Continue atorvastatin and beta-blockers

## 2025-01-27 NOTE — ED NOTES
Patient is awake and alert. Vital signs are stable. Fall precautions in place. Patient has no additional needs at this time.

## 2025-01-27 NOTE — ED NOTES
Bedside report to Modesta ARCHER.  POC discussed with patient. Call light within reach, all needs addressed at this time.         Fall risk interventions in place: In place (all applicable per [place] Fall risk assessment)   Continuous monitoring: Cardiac Leads, Pulse Ox, or Blood Pressure  IVF/IV medications: Not Applicable   Oxygen: Room Air  Bedside sitter: Not Applicable   Isolation: Not Applicable

## 2025-01-27 NOTE — ASSESSMENT & PLAN NOTE
Likely related to trauma  Trauma and neurosurgeon cleared the patient  For PT and OT and placement  Consider CT scan for head for any changes on neuroexam

## 2025-01-27 NOTE — H&P
Hospital Medicine History & Physical Note    Date of Service  1/26/2025    Primary Care Physician  SOL Alcocer.    Consultants  General surgery    Code Status  DNAR/DNI    Chief Complaint  Chief Complaint   Patient presents with    Trauma Yellow     Pt presents to ER as trauma YELLOW transfer from [facility] after multiple GLF at home. Pt was found to have ICH with +thinners (xarelto) Pt arrives A+Ox4, GCS 15.        History of Presenting Illness  Vince Carr is a 79 y.o. male who presented 1/26/2025 with an ICH.    Patient has a history of type 2 diabetes, atrial fibrillation on Xarelto, CAD status post stent 2021, COPD on 2 L of home oxygen.  Patient reported to have fallen about 10 times within the last month.  He was recently admitted at [facility] for a fall when his legs gave away, causing him to fall on the ground.  He was on the ground for some time until neighbors were able to check on him.  He was brought to outside facility ER.  CT head was read as normal initially, however upon second read, it showed a small bleed to the septum pellucidum.  His Xarelto was then held, repeat CT head was done, which showed no changes.  CT hip showing S5 vertebral body fracture.  Hospitalist at outside hospital spoke with neurosurgery at [facility], and requested transfer to ED for neurosurgery evaluation.    At [facility] ED,  CT head performed in ER at [facility] showing hemorrhage along the septum pellucidum.  Stable compared to prior CTs.  ERP spoke with neurosurgery, no intervention from their standpoint. Admitted for further workup.    I discussed the plan of care with patient.    Review of Systems  Review of Systems   Constitutional:  Positive for malaise/fatigue.   HENT: Negative.     Eyes: Negative.    Respiratory: Negative.     Cardiovascular: Negative.    Gastrointestinal: Negative.    Genitourinary: Negative.    Musculoskeletal: Negative.    Skin: Negative.    Neurological:  Positive for  weakness.   Endo/Heme/Allergies: Negative.    Psychiatric/Behavioral: Negative.         Past Medical History   has a past medical history of Arthritis, Perez's esophagus, COPD (chronic obstructive pulmonary disease) (HCC), Dental disorder, Gastroesophageal reflux disease with esophagitis (5/20/2020), HTN (hypertension), Hypertension, Indigestion, Myocardial infarct (HCC) (2011 and 2018), and Snoring.    Surgical History   has a past surgical history that includes zzz cardiac cath (2012); cholecystectomy (2012); knee arthroplasty total (2007); shoulder arthroscopy w/ rotator cuff repair (2008); toe fusion (2006); cataract extraction with iol (2011); tonsillectomy; colonoscopy (2009); egd esophagus with endoscopic us (2009); colonoscopy - endo (8/13/2014); orif, wrist (Right, 1992); zzz cardiac cath (9/27/2012); amputation, toe; gastroscopy-endo (N/A, 6/22/2015); pr shldr arthroscop,surg,w/rotat cuff repr (Right, 5/20/2020); and other cardiac surgery.     Family History  family history includes Cancer (age of onset: 95) in his father; Multiple Sclerosis in his sister.   Family history reviewed with patient. There is no family history that is pertinent to the chief complaint.     Social History   reports that he quit smoking about 49 years ago. His smoking use included cigarettes. He quit smokeless tobacco use about 17 years ago. He reports current alcohol use. He reports that he does not use drugs.    Allergies  Allergies   Allergen Reactions    Iodine Unspecified    Naproxen Unspecified       Medications  Prior to Admission Medications   Prescriptions Last Dose Informant Patient Reported? Taking?   Empagliflozin (JARDIANCE) 10 MG Tab tablet 1/24/2025  No No   Sig: Take 1 Tablet by mouth every day.   GEMTESA 75 MG Tab 1/24/2025  Yes No   Sig: Take 75 mg by mouth every day.   atorvastatin (LIPITOR) 40 MG Tab 1/24/2025  No No   Sig: TAKE ONE TABLET BY MOUTH AT BEDTIME   bumetanide (BUMEX) 1 MG Tab 1/24/2025  Yes No    Sig: Take 1 mg by mouth 2 times a day.   carvedilol (COREG) 12.5 MG Tab 1/24/2025  Yes No   Sig: Take 12.5 mg by mouth 2 times a day.   fluticasone-umeclidinium-vilanterol (TRELEGY ELLIPTA) 200-62.5-25 mcg/act inhaler 1/24/2025  Yes No   Sig: Trelegy Ellipta 200 mcg-62.5 mcg-25 mcg powder for inhalation   inhale 1 puff by mouth and INTO THE LUNGS once daily   metFORMIN (GLUCOPHAGE) 500 MG Tab 1/24/2025  No No   Sig: Take 2 Tablets by mouth 2 times a day with meals.   multivitamin (THERAGRAN) TABS 1/24/2025  Yes No   Sig: Take 1 Tab by mouth every day.   omeprazole (PRILOSEC) 20 MG delayed-release capsule 1/24/2025  No No   Sig: Take 1 Capsule by mouth every day.   potassium chloride SA (KDUR) 20 MEQ Tab CR 1/24/2025  No No   Sig: TAKE ONE TABLET BY MOUTH EVERY DAY   rivaroxaban (XARELTO) 20 MG Tab tablet 1/24/2025  Yes Yes   Sig: Take 20 mg by mouth with dinner.   tamsulosin (FLOMAX) 0.4 MG capsule 1/24/2025  No No   Sig: TAKE ONE CAPSULE BY MOUTH EVERY DAY ONE-HALF HOUR AFTER BREAKFAST      Facility-Administered Medications: None       Physical Exam  Temp:  [36.4 °C (97.5 °F)-37.3 °C (99.2 °F)] 36.6 °C (97.9 °F)  Pulse:  [62-82] 63  Resp:  [18-31] 20  BP: ()/(45-84) 145/65  SpO2:  [92 %-98 %] 95 %  Blood Pressure : (!) 145/65   Temperature: 36.6 °C (97.9 °F)   Pulse: 63   Respiration: 20   Pulse Oximetry: 95 %       Physical Exam  Constitutional:       Appearance: Normal appearance. He is normal weight.      Comments: Overall generalized weakness  Follows simple commands     HENT:      Head: Normocephalic.      Nose: Nose normal.      Mouth/Throat:      Mouth: Mucous membranes are moist.   Eyes:      Pupils: Pupils are equal, round, and reactive to light.   Cardiovascular:      Rate and Rhythm: Normal rate and regular rhythm.      Pulses: Normal pulses.   Pulmonary:      Effort: Pulmonary effort is normal.      Breath sounds: Normal breath sounds.   Abdominal:      General: Abdomen is flat. Bowel sounds are  "normal.      Palpations: Abdomen is soft.   Musculoskeletal:         General: Swelling present.      Cervical back: Neck supple.   Skin:     General: Skin is warm.   Neurological:      General: No focal deficit present.      Mental Status: He is alert.      Comments: AAO x 1         Laboratory:  Recent Labs     01/25/25 1840 01/26/25  0535   WBC 11.4* 9.4   RBC 4.56* 4.02*   HEMOGLOBIN 14.1 12.4*   HEMATOCRIT 41.5* 37.0*   MCV 91.0 92.0   MCH 30.9 30.8   MCHC 34.0 33.5   RDW 14.5 14.6*   PLATELETCT 256 241   MPV 9.7 10.0     Recent Labs     01/25/25 1840 01/26/25  0535   SODIUM 138 140   POTASSIUM 4.1 4.2   CHLORIDE 102 105   CO2 24 28   GLUCOSE 131* 106*   BUN 13 12   CREATININE 1.3 1.1   CALCIUM 8.9 8.5     Recent Labs     01/25/25 1840 01/26/25  0535   ALTSGPT 21  --    ASTSGOT 29  --    ALKPHOSPHAT 66  --    TBILIRUBIN 1.4*  --    LIPASE 15*  --    GLUCOSE 131* 106*         No results for input(s): \"NTPROBNP\" in the last 72 hours.      No results for input(s): \"TROPONINT\" in the last 72 hours.    Imaging:  No orders to display       X-Ray:  I have personally reviewed the images and compared with prior images.    Assessment/Plan:  Justification for Admission Status  I anticipate this patient will require at least two midnights for appropriate medical management, necessitating inpatient admission because pt has ich    Patient will need a Med/Surg bed on MEDICAL service .  The need is secondary to ich.    * Declining functional status  Assessment & Plan  Discussed with son and daughter-in-law at bedside.  They are concerned about patient's worsening cognitive and functional status in the last year and a half.  Patient has had many episodes of confusion and asking questions repetitively.  He normally ambulates with a cane, but this has increasingly become more difficult for patient.  I explained to them that I suspect this could be age-related decline and/or dementia but will order further studies to rule out " metabolic causes as a culprit for his symptoms  B12, RPR, HIV, B1, TSH ordered  MRI brain without contrast  PT OT        Intracranial hemorrhage (HCC)- (present on admission)  Assessment & Plan  Patient has had 2 CT heads, second CT head showing a stable bleed to the septum pellucidum.  Cleared by trauma and neurosurgery  Neurocheck every 4 hours  Hold Xarelto for now  Fall precautions    Sacral fracture (HCC)- (present on admission)  Assessment & Plan  CT showing S5 sacral fracture  PT OT  Pain medications as needed    Non-insulin dependent type 2 diabetes mellitus ([facility])- (present on admission)  Assessment & Plan  Sliding-scale    PAF (paroxysmal atrial fibrillation) ([facility])  Assessment & Plan  Continue home medications  Hold Xarelto    Dyslipidemia- (present on admission)  Assessment & Plan  Lipitor    ACP (advance care planning)  Assessment & Plan  18 minutes spent discussing goals of care with patient, his son and daughter-in-law at bedside.  Son states that there was a confusion regarding patient's previous POLST form which indicated comfort care measures only.  Patient and family members agree that patient should be DNR/DNI, okay for medical treatment.    Hypertension  Assessment & Plan  Resume home meds        VTE prophylaxis: pharmacologic prophylaxis contraindicated due to ich

## 2025-01-27 NOTE — DISCHARGE PLANNING
[facility] Transitional Care Coordination     Referral from: Dr. Wallace  Insurance Provider on Facesheet: MCR  Potential Rehab Diagnosis: ICH     Chart review indicates patient may have on going medical management and may have therapy needs to possibly meet inpatient rehab facility criteria with the goal of returning to community.     D/C support: [facility]     Physiatry consultation pended per protocol.  Would appreciate therapy evaluations as clinically appropriate   TCC will follow      Thank you for the referral.

## 2025-01-27 NOTE — RESPIRATORY CARE
COPD EDUCATION by COPD CLINICAL EDUCATOR  1/27/2025 at 7:19 AM by Kasey Jarvis, RRT     Patient reviewed by education team. Patient does not have a history or diagnosis of COPD and is a non-smoker. He see's [facility] for his MARSHA/RLD Moderate Persistent Asthma.  Therefore, patient does not qualify for the COPD program.

## 2025-01-27 NOTE — ED PROVIDER NOTES
ED Provider Note    CHIEF COMPLAINT  Chief Complaint   Patient presents with    Trauma Yellow     Pt presents to ER as trauma YELLOW transfer from [facility] after multiple GLF at home. Pt was found to have ICH with +thinners (xarelto) Pt arrives A+Ox4, GCS 15.        EXTERNAL RECORDS REVIEWED  Records from [facility], ground-level fall with ICH, S5 fracture    HPI/ROS  LIMITATION TO HISTORY   Disorientation  OUTSIDE HISTORIAN(S):  EMS    Vince Carr is a 79 y.o. male who presents via transfer for evaluation of an intracranial hemorrhage status post ground-level fall.  The patient initially presented yesterday, there were 2 falls reported at home, he had a CT scan that apparently was negative for hemorrhage as reported by the nighttime virtual radiology but when it was read in the morning by staff radiologist there is indication of intracranial hemorrhage.  He is anticoagulated so a repeat head CT was obtained, redemonstration of the hemorrhage but it was stable without worsening.  For that reason the patient was transferred here for further evaluation.  He actually has no specific complaints.  He does seem a little bit confused.    In reviewing the records, the patient also has an S5 sacral fracture.  Additionally, he has a POLST form indicating comfort measures only.      I was able to reach out to the son Morgan via telephone, he is the power of , he reports for the past week the father has been a little confused which is atypical.  He does live alone with his 2 dogs.        Morgan and his wife are now at the bedside, they report that the POLST form is incorrect and a misunderstanding, he is not comfort measures only.  He does wish to be DNR but otherwise would like to pursue treatment options.  Additionally, the patient's daughter-in-law reports that she was told at the outside facility he was orthostatic, when he sat up his blood pressure got as low as 60/40.        PAST MEDICAL  HISTORY   has a past medical history of Arthritis, Perez's esophagus, COPD (chronic obstructive pulmonary disease) (HCC), Dental disorder, Gastroesophageal reflux disease with esophagitis (2020), HTN (hypertension), Hypertension, Indigestion, Myocardial infarct (HCC) ( and ), and Snoring.    SURGICAL HISTORY   has a past surgical history that includes zzz cardiac cath (); cholecystectomy (); knee arthroplasty total (); shoulder arthroscopy w/ rotator cuff repair (); toe fusion (); cataract extraction with iol (); tonsillectomy; colonoscopy (); egd esophagus with endoscopic us (); colonoscopy - endo (2014); orif, wrist (Right, ); zzz cardiac cath (2012); amputation, toe; gastroscopy-endo (N/A, 2015); shldr arthroscop,surg,w/rotat cuff repr (Right, 2020); and other cardiac surgery.    FAMILY HISTORY  Family History   Problem Relation Age of Onset    Cancer Father 95        Prostate    Multiple Sclerosis Sister        SOCIAL HISTORY  Social History     Tobacco Use    Smoking status: Former     Current packs/day: 0.00     Types: Cigarettes     Quit date: 1975     Years since quittin.5    Smokeless tobacco: Former     Quit date: 2007   Vaping Use    Vaping status: Never Used   Substance and Sexual Activity    Alcohol use: Yes     Comment: rare    Drug use: No    Sexual activity: Not on file       CURRENT MEDICATIONS  Home Medications       Reviewed by Enriqueta Castro R.N. (Registered Nurse) on 25 at 1538  Med List Status: Partial     Medication Last Dose Status   atorvastatin (LIPITOR) 40 MG Tab  Active   bumetanide (BUMEX) 1 MG Tab  Active   carvedilol (COREG) 12.5 MG Tab  Active   Empagliflozin (JARDIANCE) 10 MG Tab tablet  Active   fluticasone-umeclidinium-vilanterol (TRELEGY ELLIPTA) 200-62.5-25 mcg/act inhaler  Active   GEMTESA 75 MG Tab  Active   Lancets  Active   losartan (COZAAR) 100 MG Tab  Active   metFORMIN  "(GLUCOPHAGE) 500 MG Tab  Active   Multiple Vitamins-Minerals (CENTRUM ADULTS) Tab tablet  Active   multivitamin (THERAGRAN) TABS  Active   nitroglycerin (NITROSTAT) 0.4 MG SL Tab  Active   omeprazole (PRILOSEC) 20 MG delayed-release capsule  Active   potassium chloride SA (KDUR) 20 MEQ Tab CR  Active   tamsulosin (FLOMAX) 0.4 MG capsule  Active                    ALLERGIES  Allergies   Allergen Reactions    Iodine Unspecified    Naproxen Unspecified       PHYSICAL EXAM  VITAL SIGNS: BP (!) 145/65   Pulse 63   Temp 36.6 °C (97.9 °F)   Resp 20   Ht 1.803 m (5' 11\")   Wt 112 kg (248 lb)   SpO2 95%   BMI 34.59 kg/m²    Constitutional: Awake, alert, no acute distress  HENT: Normocephalic, no obvious evidence of acute trauma.  Eyes: No scleral icterus. Normal conjunctiva   Thorax & Lungs: Normal nonlabored respirations. I appreciate no wheezing, rhonchi or rales. There is normal air movement.  Upon cardiac ascultation I appreciate a regular heart rhythm and a normal rate.   Abdomen: The abdomen is not visibly distended. Upon palpation, I find it to be without tenderness.  No mass appreciated.  Skin: The exposed portions of skin reveal no obvious rash or other abnormalities.  Extremities/Musculoskeletal: He does have some tenderness overlying the lowest portion of the sacral region but no midline lumbar tenderness.  Neurologic: Awake, alert, is disoriented to time.  Normal and symmetric upper and lower extremity motor and sensory function bilaterally      EKG/LABS  Results for orders placed or performed during the hospital encounter of 01/26/25   CBC WITH DIFFERENTIAL    Collection Time: 01/26/25  5:44 PM   Result Value Ref Range    WBC 9.6 4.8 - 10.8 K/uL    RBC 4.41 (L) 4.70 - 6.10 M/uL    Hemoglobin 13.6 (L) 14.0 - 18.0 g/dL    Hematocrit 40.7 (L) 42.0 - 52.0 %    MCV 92.3 81.4 - 97.8 fL    MCH 30.8 27.0 - 33.0 pg    MCHC 33.4 32.3 - 36.5 g/dL    RDW 49.2 35.9 - 50.0 fL    Platelet Count 236 164 - 446 K/uL    MPV " 9.7 9.0 - 12.9 fL    Neutrophils-Polys 67.20 44.00 - 72.00 %    Lymphocytes 18.30 (L) 22.00 - 41.00 %    Monocytes 12.50 0.00 - 13.40 %    Eosinophils 1.50 0.00 - 6.90 %    Basophils 0.20 0.00 - 1.80 %    Immature Granulocytes 0.30 0.00 - 0.90 %    Nucleated RBC 0.00 0.00 - 0.20 /100 WBC    Neutrophils (Absolute) 6.48 1.82 - 7.42 K/uL    Lymphs (Absolute) 1.76 1.00 - 4.80 K/uL    Monos (Absolute) 1.20 (H) 0.00 - 0.85 K/uL    Eos (Absolute) 0.14 0.00 - 0.51 K/uL    Baso (Absolute) 0.02 0.00 - 0.12 K/uL    Immature Granulocytes (abs) 0.03 0.00 - 0.11 K/uL    NRBC (Absolute) 0.00 K/uL   COMP METABOLIC PANEL    Collection Time: 01/26/25  5:44 PM   Result Value Ref Range    Sodium 134 (L) 135 - 145 mmol/L    Potassium 4.4 3.6 - 5.5 mmol/L    Chloride 101 96 - 112 mmol/L    Co2 22 20 - 33 mmol/L    Anion Gap 11.0 7.0 - 16.0    Glucose 105 (H) 65 - 99 mg/dL    Bun 13 8 - 22 mg/dL    Creatinine 0.89 0.50 - 1.40 mg/dL    Calcium 8.4 (L) 8.5 - 10.5 mg/dL    Correct Calcium 9.1 8.5 - 10.5 mg/dL    AST(SGOT) 28 12 - 45 U/L    ALT(SGPT) 17 2 - 50 U/L    Alkaline Phosphatase 61 30 - 99 U/L    Total Bilirubin 1.0 0.1 - 1.5 mg/dL    Albumin 3.1 (L) 3.2 - 4.9 g/dL    Total Protein 6.1 6.0 - 8.2 g/dL    Globulin 3.0 1.9 - 3.5 g/dL    A-G Ratio 1.0 g/dL   VITAMIN B12    Collection Time: 01/26/25  5:44 PM   Result Value Ref Range    Vitamin B12 -True Cobalamin 296 211 - 911 pg/mL   TSH    Collection Time: 01/26/25  5:44 PM   Result Value Ref Range    TSH 1.730 0.350 - 5.500 uIU/mL   ESTIMATED GFR    Collection Time: 01/26/25  5:44 PM   Result Value Ref Range    GFR (CKD-EPI) 87 >60 mL/min/1.73 m 2   HIV AG/AB COMBO ASSAY SCREENING    Collection Time: 01/26/25  6:00 PM   Result Value Ref Range    HIV Ag/Ab Combo Assay Non-Reactive Non Reactive   T.PALLIDUM AB DEBORA (SCREENING)    Collection Time: 01/26/25  6:00 PM   Result Value Ref Range    Syphilis, Treponemal Qual Non-Reactive Non-Reactive            COURSE & MEDICAL DECISION  MAKING    ASSESSMENT, COURSE AND PLAN  Care Narrative: This is a 79-year-old male who is anticoagulated transferred here with a stable intracranial hemorrhage.  He actually fell yesterday, CT scan yesterday was missed read by nighttime radiologist, when it was read this morning by their staff radiologist it revealed a small ICH, CT was repeated and the ICH was stable.  He was transferred here but upon arrival we are presented with a POLST form indicating comfort measures only.  Imaging at the outside facility also revealed a sacral fracture.  At this point the son who has power of  Morgan is on his way.  We will discuss how to proceed with the son once he arrives especially considering the patient wishes to experience comfort measures only.  Of note, I discussed the case with the orthopedic team, they recommended spine consultation for the S5 fracture, I did discuss the case with Dr. Saenz, on-call for neurosurgery and spine surgery, no surgical intervention indicated      Morgan has arrived.  First, regarding the POLST.  This was a mistake.  The son reports that his father did not understand what he was filling out.  While he does wish to be DNR, he does not wish to be under any sort of comfort measures only status.  He reports that for the past week or 2 he has been confused, just like he is now.  That is atypical for him.  Also he apparently was orthostatic at the outside facility with a blood pressure as low as 60/40 when he sits or stands.  That has not been redemonstrated here but he will receive some more IV fluids.  Blood work will be obtained.  At this point I did discuss the case with Dr. Coe again, given the time since the initial diagnosis of ICH with a repeat head CT in the interim there is no need for admission to the trauma floor.  He recommends admission to the medical floor for workup.  I discussed the case with Dr. Rainey, admitting hospitalist who has accepted the patient for  admission      DISPOSITION AND DISCUSSIONS  I have discussed management of the patient with the following physicians and RASHEL's: Dr. Saenz, neurosurgery.  Dr. Coe, trauma surgery.  Dr. Rainey, hospitalist      FINAL DIAGNOSIS  1. Intracranial hemorrhage (HCC)    2. Closed fracture of sacrum, unspecified portion of sacrum, initial encounter (HCC)    3. Multiple falls    4. Orthostasis         Electronically signed by: Sudarshan Pitt M.D., 1/26/2025 4:00 PM

## 2025-01-28 ENCOUNTER — HOSPITAL ENCOUNTER (INPATIENT)
Facility: REHABILITATION | Age: 80
DRG: 949 | End: 2025-01-28
Attending: PHYSICAL MEDICINE & REHABILITATION | Admitting: PHYSICAL MEDICINE & REHABILITATION
Payer: MEDICARE

## 2025-01-28 VITALS
HEART RATE: 78 BPM | HEIGHT: 71 IN | DIASTOLIC BLOOD PRESSURE: 59 MMHG | SYSTOLIC BLOOD PRESSURE: 95 MMHG | OXYGEN SATURATION: 95 % | BODY MASS INDEX: 35.9 KG/M2 | TEMPERATURE: 97.9 F | RESPIRATION RATE: 18 BRPM | WEIGHT: 256.39 LBS

## 2025-01-28 DIAGNOSIS — R07.9 CHEST PAIN, UNSPECIFIED TYPE: ICD-10-CM

## 2025-01-28 DIAGNOSIS — F43.21 GRIEF REACTION: ICD-10-CM

## 2025-01-28 DIAGNOSIS — R41.89 COGNITIVE IMPAIRMENT: ICD-10-CM

## 2025-01-28 DIAGNOSIS — I62.9 INTRACRANIAL HEMORRHAGE (HCC): ICD-10-CM

## 2025-01-28 DIAGNOSIS — F51.01 PRIMARY INSOMNIA: ICD-10-CM

## 2025-01-28 LAB
GLUCOSE BLD STRIP.AUTO-MCNC: 136 MG/DL (ref 65–99)
GLUCOSE BLD STRIP.AUTO-MCNC: 147 MG/DL (ref 65–99)
GLUCOSE BLD STRIP.AUTO-MCNC: 149 MG/DL (ref 65–99)
GLUCOSE BLD STRIP.AUTO-MCNC: 203 MG/DL (ref 65–99)

## 2025-01-28 PROCEDURE — 82962 GLUCOSE BLOOD TEST: CPT | Mod: 91

## 2025-01-28 PROCEDURE — 700102 HCHG RX REV CODE 250 W/ 637 OVERRIDE(OP): Performed by: INTERNAL MEDICINE

## 2025-01-28 PROCEDURE — 770010 HCHG ROOM/CARE - REHAB SEMI PRIVAT*

## 2025-01-28 PROCEDURE — 94760 N-INVAS EAR/PLS OXIMETRY 1: CPT

## 2025-01-28 PROCEDURE — A9270 NON-COVERED ITEM OR SERVICE: HCPCS | Performed by: PHYSICAL MEDICINE & REHABILITATION

## 2025-01-28 PROCEDURE — 700102 HCHG RX REV CODE 250 W/ 637 OVERRIDE(OP): Performed by: STUDENT IN AN ORGANIZED HEALTH CARE EDUCATION/TRAINING PROGRAM

## 2025-01-28 PROCEDURE — 99223 1ST HOSP IP/OBS HIGH 75: CPT | Performed by: PHYSICAL MEDICINE & REHABILITATION

## 2025-01-28 PROCEDURE — 700102 HCHG RX REV CODE 250 W/ 637 OVERRIDE(OP): Performed by: PHYSICAL MEDICINE & REHABILITATION

## 2025-01-28 PROCEDURE — 82962 GLUCOSE BLOOD TEST: CPT

## 2025-01-28 PROCEDURE — A9270 NON-COVERED ITEM OR SERVICE: HCPCS | Performed by: INTERNAL MEDICINE

## 2025-01-28 PROCEDURE — A9270 NON-COVERED ITEM OR SERVICE: HCPCS | Performed by: STUDENT IN AN ORGANIZED HEALTH CARE EDUCATION/TRAINING PROGRAM

## 2025-01-28 PROCEDURE — 99239 HOSP IP/OBS DSCHRG MGMT >30: CPT | Performed by: INTERNAL MEDICINE

## 2025-01-28 RX ORDER — BUMETANIDE 1 MG/1
1 TABLET ORAL 2 TIMES DAILY
Status: DISCONTINUED | OUTPATIENT
Start: 2025-01-28 | End: 2025-02-11 | Stop reason: HOSPADM

## 2025-01-28 RX ORDER — TRAZODONE HYDROCHLORIDE 50 MG/1
50 TABLET ORAL
Status: DISCONTINUED | OUTPATIENT
Start: 2025-01-28 | End: 2025-02-11 | Stop reason: HOSPADM

## 2025-01-28 RX ORDER — HYDRALAZINE HYDROCHLORIDE 25 MG/1
25 TABLET, FILM COATED ORAL EVERY 8 HOURS PRN
Status: DISCONTINUED | OUTPATIENT
Start: 2025-01-28 | End: 2025-02-11 | Stop reason: HOSPADM

## 2025-01-28 RX ORDER — LANOLIN ALCOHOL/MO/W.PET/CERES
100 CREAM (GRAM) TOPICAL DAILY
Status: SHIPPED
Start: 2025-01-29

## 2025-01-28 RX ORDER — BUMETANIDE 1 MG/1
1 TABLET ORAL 2 TIMES DAILY
Status: CANCELLED | OUTPATIENT
Start: 2025-01-28

## 2025-01-28 RX ORDER — OXYCODONE HYDROCHLORIDE 5 MG/1
5 TABLET ORAL
Status: DISCONTINUED | OUTPATIENT
Start: 2025-01-28 | End: 2025-02-11 | Stop reason: HOSPADM

## 2025-01-28 RX ORDER — GAUZE BANDAGE 2" X 2"
100 BANDAGE TOPICAL DAILY
Status: CANCELLED | OUTPATIENT
Start: 2025-01-29

## 2025-01-28 RX ORDER — TAMSULOSIN HYDROCHLORIDE 0.4 MG/1
0.4 CAPSULE ORAL
Status: CANCELLED | OUTPATIENT
Start: 2025-01-29

## 2025-01-28 RX ORDER — OXYCODONE HYDROCHLORIDE 10 MG/1
10 TABLET ORAL
Status: DISCONTINUED | OUTPATIENT
Start: 2025-01-28 | End: 2025-02-11 | Stop reason: HOSPADM

## 2025-01-28 RX ORDER — ONDANSETRON 4 MG/1
4 TABLET, ORALLY DISINTEGRATING ORAL 4 TIMES DAILY PRN
Status: DISCONTINUED | OUTPATIENT
Start: 2025-01-28 | End: 2025-02-11 | Stop reason: HOSPADM

## 2025-01-28 RX ORDER — TRAZODONE HYDROCHLORIDE 100 MG/1
100 TABLET ORAL NIGHTLY
Status: CANCELLED | OUTPATIENT
Start: 2025-01-28

## 2025-01-28 RX ORDER — CARVEDILOL 6.25 MG/1
12.5 TABLET ORAL 2 TIMES DAILY WITH MEALS
Status: CANCELLED | OUTPATIENT
Start: 2025-01-28

## 2025-01-28 RX ORDER — OMEPRAZOLE 20 MG/1
20 CAPSULE, DELAYED RELEASE ORAL DAILY
Status: DISCONTINUED | OUTPATIENT
Start: 2025-01-29 | End: 2025-02-11 | Stop reason: HOSPADM

## 2025-01-28 RX ORDER — ATORVASTATIN CALCIUM 40 MG/1
40 TABLET, FILM COATED ORAL
Status: DISCONTINUED | OUTPATIENT
Start: 2025-01-28 | End: 2025-02-11 | Stop reason: HOSPADM

## 2025-01-28 RX ORDER — MULTIVITAMIN WITH IRON
1000 TABLET ORAL DAILY
Status: CANCELLED | OUTPATIENT
Start: 2025-01-29

## 2025-01-28 RX ORDER — MIDAZOLAM HYDROCHLORIDE 5 MG/ML
5 INJECTION INTRAMUSCULAR; INTRAVENOUS PRN
Status: DISCONTINUED | OUTPATIENT
Start: 2025-01-28 | End: 2025-02-11 | Stop reason: HOSPADM

## 2025-01-28 RX ORDER — POLYETHYLENE GLYCOL 3350 17 G/17G
1 POWDER, FOR SOLUTION ORAL
Status: DISCONTINUED | OUTPATIENT
Start: 2025-01-28 | End: 2025-02-11 | Stop reason: HOSPADM

## 2025-01-28 RX ORDER — INSULIN LISPRO 100 [IU]/ML
1-6 INJECTION, SOLUTION INTRAVENOUS; SUBCUTANEOUS
Status: CANCELLED | OUTPATIENT
Start: 2025-01-28

## 2025-01-28 RX ORDER — AMOXICILLIN 250 MG
2 CAPSULE ORAL EVERY EVENING
Status: DISCONTINUED | OUTPATIENT
Start: 2025-01-28 | End: 2025-02-11 | Stop reason: HOSPADM

## 2025-01-28 RX ORDER — QUETIAPINE FUMARATE 25 MG/1
12.5 TABLET, FILM COATED ORAL NIGHTLY
Status: CANCELLED | OUTPATIENT
Start: 2025-01-28

## 2025-01-28 RX ORDER — TRAZODONE HYDROCHLORIDE 100 MG/1
100 TABLET ORAL NIGHTLY
Status: DISCONTINUED | OUTPATIENT
Start: 2025-01-28 | End: 2025-02-11 | Stop reason: HOSPADM

## 2025-01-28 RX ORDER — QUETIAPINE FUMARATE 25 MG/1
12.5 TABLET, FILM COATED ORAL NIGHTLY
Status: DISCONTINUED | OUTPATIENT
Start: 2025-01-28 | End: 2025-02-06

## 2025-01-28 RX ORDER — CARBOXYMETHYLCELLULOSE SODIUM 5 MG/ML
1 SOLUTION/ DROPS OPHTHALMIC PRN
Status: DISCONTINUED | OUTPATIENT
Start: 2025-01-28 | End: 2025-02-11 | Stop reason: HOSPADM

## 2025-01-28 RX ORDER — ECHINACEA PURPUREA EXTRACT 125 MG
2 TABLET ORAL PRN
Status: DISCONTINUED | OUTPATIENT
Start: 2025-01-28 | End: 2025-02-11 | Stop reason: HOSPADM

## 2025-01-28 RX ORDER — CARVEDILOL 12.5 MG/1
12.5 TABLET ORAL 2 TIMES DAILY WITH MEALS
Status: DISCONTINUED | OUTPATIENT
Start: 2025-01-28 | End: 2025-02-02

## 2025-01-28 RX ORDER — INSULIN LISPRO 100 [IU]/ML
1-6 INJECTION, SOLUTION INTRAVENOUS; SUBCUTANEOUS
Status: DISCONTINUED | OUTPATIENT
Start: 2025-01-28 | End: 2025-01-30

## 2025-01-28 RX ORDER — ATORVASTATIN CALCIUM 40 MG/1
40 TABLET, FILM COATED ORAL
Status: CANCELLED | OUTPATIENT
Start: 2025-01-28

## 2025-01-28 RX ORDER — TAMSULOSIN HYDROCHLORIDE 0.4 MG/1
0.4 CAPSULE ORAL
Status: DISCONTINUED | OUTPATIENT
Start: 2025-01-29 | End: 2025-02-11 | Stop reason: HOSPADM

## 2025-01-28 RX ORDER — MULTIVITAMIN WITH IRON
1000 TABLET ORAL DAILY
Status: DISCONTINUED | OUTPATIENT
Start: 2025-01-29 | End: 2025-02-11 | Stop reason: HOSPADM

## 2025-01-28 RX ORDER — ONDANSETRON 2 MG/ML
4 INJECTION INTRAMUSCULAR; INTRAVENOUS 4 TIMES DAILY PRN
Status: DISCONTINUED | OUTPATIENT
Start: 2025-01-28 | End: 2025-02-11 | Stop reason: HOSPADM

## 2025-01-28 RX ORDER — ALUMINA, MAGNESIA, AND SIMETHICONE 2400; 2400; 240 MG/30ML; MG/30ML; MG/30ML
20 SUSPENSION ORAL
Status: DISCONTINUED | OUTPATIENT
Start: 2025-01-28 | End: 2025-02-11 | Stop reason: HOSPADM

## 2025-01-28 RX ORDER — GAUZE BANDAGE 2" X 2"
100 BANDAGE TOPICAL DAILY
Status: DISCONTINUED | OUTPATIENT
Start: 2025-01-29 | End: 2025-02-11 | Stop reason: HOSPADM

## 2025-01-28 RX ORDER — DEXTROSE MONOHYDRATE 25 G/50ML
25 INJECTION, SOLUTION INTRAVENOUS
Status: CANCELLED | OUTPATIENT
Start: 2025-01-28

## 2025-01-28 RX ORDER — QUETIAPINE FUMARATE 25 MG/1
12.5 TABLET, FILM COATED ORAL NIGHTLY
Status: ON HOLD
Start: 2025-01-28 | End: 2025-02-10

## 2025-01-28 RX ORDER — ACETAMINOPHEN 325 MG/1
650 TABLET ORAL EVERY 4 HOURS PRN
Status: DISCONTINUED | OUTPATIENT
Start: 2025-01-28 | End: 2025-02-11 | Stop reason: HOSPADM

## 2025-01-28 RX ORDER — DEXTROSE MONOHYDRATE 25 G/50ML
25 INJECTION, SOLUTION INTRAVENOUS
Status: DISCONTINUED | OUTPATIENT
Start: 2025-01-28 | End: 2025-01-30

## 2025-01-28 RX ADMIN — CARVEDILOL 12.5 MG: 12.5 TABLET, FILM COATED ORAL at 08:21

## 2025-01-28 RX ADMIN — BUMETANIDE 1 MG: 1 TABLET ORAL at 05:56

## 2025-01-28 RX ADMIN — FLUTICASONE FUROATE, UMECLIDINIUM BROMIDE AND VILANTEROL TRIFENATATE 1 PUFF: 200; 62.5; 25 POWDER RESPIRATORY (INHALATION) at 05:56

## 2025-01-28 RX ADMIN — ATORVASTATIN CALCIUM 40 MG: 40 TABLET, FILM COATED ORAL at 20:39

## 2025-01-28 RX ADMIN — TAMSULOSIN HYDROCHLORIDE 0.4 MG: 0.4 CAPSULE ORAL at 08:21

## 2025-01-28 RX ADMIN — TRAZODONE HYDROCHLORIDE 100 MG: 100 TABLET ORAL at 20:39

## 2025-01-28 RX ADMIN — CYANOCOBALAMIN TAB 500 MCG 1000 MCG: 500 TAB at 05:56

## 2025-01-28 RX ADMIN — THERA TABS 1 TABLET: TAB at 05:56

## 2025-01-28 RX ADMIN — Medication 100 MG: at 05:56

## 2025-01-28 RX ADMIN — BUMETANIDE 1 MG: 1 TABLET ORAL at 20:50

## 2025-01-28 RX ADMIN — INSULIN LISPRO 2 UNITS: 100 INJECTION, SOLUTION INTRAVENOUS; SUBCUTANEOUS at 13:25

## 2025-01-28 ASSESSMENT — LIFESTYLE VARIABLES
TOTAL SCORE: 0
AVERAGE NUMBER OF DAYS PER WEEK YOU HAVE A DRINK CONTAINING ALCOHOL: 0
EVER_SMOKED: YES
EVER HAD A DRINK FIRST THING IN THE MORNING TO STEADY YOUR NERVES TO GET RID OF A HANGOVER: NO
EVER FELT BAD OR GUILTY ABOUT YOUR DRINKING: NO
TOTAL SCORE: 0
HAVE PEOPLE ANNOYED YOU BY CRITICIZING YOUR DRINKING: NO
HAVE YOU EVER FELT YOU SHOULD CUT DOWN ON YOUR DRINKING: NO
TOTAL SCORE: 0
ON A TYPICAL DAY WHEN YOU DRINK ALCOHOL HOW MANY DRINKS DO YOU HAVE: 0
DOES PATIENT WANT TO STOP DRINKING: NO
HOW MANY TIMES IN THE PAST YEAR HAVE YOU HAD 5 OR MORE DRINKS IN A DAY: 0
ALCOHOL_USE: NO
CONSUMPTION TOTAL: NEGATIVE

## 2025-01-28 ASSESSMENT — PATIENT HEALTH QUESTIONNAIRE - PHQ9
SUM OF ALL RESPONSES TO PHQ9 QUESTIONS 1 AND 2: 0
2. FEELING DOWN, DEPRESSED, IRRITABLE, OR HOPELESS: NOT AT ALL
1. LITTLE INTEREST OR PLEASURE IN DOING THINGS: NOT AT ALL
2. FEELING DOWN, DEPRESSED, IRRITABLE, OR HOPELESS: NOT AT ALL
SUM OF ALL RESPONSES TO PHQ9 QUESTIONS 1 AND 2: 0
1. LITTLE INTEREST OR PLEASURE IN DOING THINGS: NOT AT ALL

## 2025-01-28 ASSESSMENT — PAIN DESCRIPTION - PAIN TYPE
TYPE: ACUTE PAIN
TYPE: ACUTE PAIN

## 2025-01-28 ASSESSMENT — FIBROSIS 4 INDEX: FIB4 SCORE: 2.06

## 2025-01-28 NOTE — THERAPY
Physical Therapy   Initial Evaluation     Patient Name: Vince Carr  Age:  79 y.o., Sex:  male  Medical Record #: [id number]  Today's Date: 1/27/2025     Precautions  Precautions: Fall Risk  Comments: S5 fx    Assessment  Patient is 79 y.o. male transfer from [facility] on 1/26/2025 after multiple GLF at home.    Currently been managed for intracranial hemorrhage, sacral fracture     CT hip 1/26: S5 vertebral body fracture.    PMH: type 2 diabetes, atrial fibrillation, CAD status post stent 2021, COPD on 2 L of home oxygen, GERD, HTN, MI, DLP   H/O 10 falls within the last month     Patient seen for PT evaluation. Patient in bed, agreeable for the session. Able to demonstrate functional mobility tasks as detailed below. Currently appears to be below baseline level of functional mobility. Will continue to benefit from PT services to help improve overall functional mobility. Recommend post-acute placement at this time.     Plan    Physical Therapy Initial Treatment Plan   Treatment Plan : Bed Mobility, Equipment, Family / Caregiver Training, Gait Training, Neuro Re-Education / Balance, Stair Training, Therapeutic Activities, Therapeutic Exercise  Treatment Frequency: 4 Times per Week  Duration: Until Therapy Goals Met    DC Equipment Recommendations: Unable to determine at this time  Discharge Recommendations: Recommend post-acute placement for additional physical therapy services prior to discharge home     Objective     01/27/25 1048   Time In/Time Out   Therapy Start Time 1014   Therapy End Time 1048   Total Therapy Time 34   Initial Contact Note    Initial Contact Note Order Received and Verified, Physical Therapy Evaluation in Progress with Full Report to Follow.   Precautions   Precautions Fall Risk;Spinal / Back Precautions    Comments Spinal/back precautions for comfort   Vitals   Pulse 86   Patient BP Position Sitting  (EOB)   Blood Pressure  92/79   Pulse Oximetry 95 %   O2 Delivery Device None - Room Air    Vitals Comments Post activity, seated in the chair: BP 95/61, MAP 72, HR 88, SpO2 95   Pain   Pain Scales 0 to 10 Scale    Intervention Repositioned   Pain 0 - 10 Group   Location Hip;Back;Other (Comments)  (Sacral bone)   Therapist Pain Assessment Prior to Activity;During Activity;Post Activity;Post Activity Pain Same as Prior to Activity   Prior Living Situation   Prior Services None   Housing / Facility 1 Story House   Steps Into Home 1  (Threshold)   Steps In Home 0   Rail None   Equipment Owned Front-Wheel Walker;4-Wheel Walker;Single Point Cane;Oxygen   Lives with - Patient's Self Care Capacity Adult Children;Related Adult  (Son, Daughter in law)   Comments Patient's wife passed away recently, since then his son & daughter in law were staying with him. His son is a retired . Patient was inconsistent with the above information, may have to confirm with family when able.   Prior Level of Functional Mobility   Bed Mobility Independent   Transfer Status Independent   Ambulation Independent   Ambulation Distance Limited community   Assistive Devices Used Single Point Cane   Stairs Independent   History of Falls   History of Falls Yes   Date of Last Fall   (Per EMR, H/O multiple falls at home)   Cognition    Cognition / Consciousness X   Orientation Level   (Oriented to self, place, month, year)   Level of Consciousness Alert   Ability To Follow Commands 1 Step   Safety Awareness Impaired   New Learning Impaired   Attention Impaired   Passive ROM Lower Body   Passive ROM Lower Body WDL   Active ROM Lower Body    Active ROM Lower Body  X   Comments B/L hip flexion-95 degree, B/L ankle DF/PF <10 degree, B/L knee flexion-slightly impaired, B/L knee extension-70 degree   Strength Lower Body   Lower Body Strength  X   Comments Grossly B/L hip flexor 3/5, B/L knee extensor 3+/5, B/L knee flexor 3+/5, B/L ankle DF/PF 3/5   Sensation Lower Body   Lower Extremity Sensation   X   Comments Reports of  numbness/tingling in LE, worse on B/L foot. Reports of absent/very minimal light touch sensation in B/L foot   Lower Body Muscle Tone   Lower Body Muscle Tone  WDL   Other Treatments   Other Treatments Provided Discussed about DC recommendations-explained about possible need for short term rehab at this time, patient agreeable for the same. Discussed about appropriate DME for ambulation, patient agreeable to use FWW for improved balance. Reinforced importance of daily & frequent mobility, OOB to chair for meals and ambulate with assistance from nursing staff.   Balance Assessment   Sitting Balance (Static) Fair   Sitting Balance (Dynamic) Fair -   Standing Balance (Static) Fair -   Standing Balance (Dynamic) Poor +   Weight Shift Sitting Fair   Weight Shift Standing Poor   Comments Seated EOB; Standing with FWW   Bed Mobility    Supine to Sit Moderate Assist   Scooting Minimal Assist   Rolling Moderate Assist to Lt.   Comments HOB raised upto 25 degree, use of bed rail, towards L side; cues for log roll technique   Gait Analysis   Gait Level Of Assist Minimal Assist   Assistive Device Front Wheel Walker   Distance (Feet) 45  (2 seated rest breaks)   Deviation Bradykinetic;Decreased Heel Strike;Decreased Toe Off;Other (Comment)  (Asymmetrical step & stride length, forward trunk lean)   Comments Cues for directions, appropriate use of FWW, distance b/w self and AD, pacing   Functional Mobility   Sit to Stand Minimal Assist   Bed, Chair, Wheelchair Transfer Minimal Assist   Transfer Method Stand Step   Mobility Bed-EOB-sit-stand-ambulate in the room-EOB-sit-stand-ambulate to bathroom-toilet transfer-ambulate in the room-chair   Comments W FWW; Cues for hand placement, LE placement, sequencing   6 Clicks Assessment - How much HELP from from another person do you currently need... (If the patient hasn't done an activity recently, how much help from another person do you think he/she would need if he/she tried?)   Turning  from your back to your side while in a flat bed without using bedrails? 2   Moving from lying on your back to sitting on the side of a flat bed without using bedrails? 2   Moving to and from a bed to a chair (including a wheelchair)? 3   Standing up from a chair using your arms (e.g., wheelchair, or bedside chair)? 3   Walking in hospital room? 3   Climbing 3-5 steps with a railing? 2   6 clicks Mobility Score 15   Activity Tolerance   Sitting in Chair Post session   Edema / Skin Assessment   Edema / Skin  X   Comments LE edema   Patient / Family Goals    Patient / Family Goal #1 To return to prior level of functional mobility   Short Term Goals    Short Term Goal # 1 Patient will perform supine-sit, sit-supine with HOB flat without rails with supervision in 6 visits to safely get in & out of bed   Short Term Goal # 2 Patient will perform sit-stand with LRAD with supervision in 6 visits to progress with functional mobility   Short Term Goal # 3 Patient will perform chair transfers with LRAD with supervision in 6 visits to safely get OOB to chair   Short Term Goal # 4 Patient will ambulate 150 feet with LRAD with supervision in 6 visits to safely ambulate household & limited community distance   Short Term Goal # 5 Patient will negotiate 1 step threshold with supervision with LRAD in 6 visits to safely get in & out home   Education Group   Education Provided Role of Physical Therapist   Role of Physical Therapist Patient Response Patient;Acceptance;Explanation;Verbal Demonstration   Physical Therapy Initial Treatment Plan    Treatment Plan  Bed Mobility;Equipment;Family / Caregiver Training;Gait Training;Neuro Re-Education / Balance;Stair Training;Therapeutic Activities;Therapeutic Exercise   Treatment Frequency 4 Times per Week   Duration Until Therapy Goals Met   Problem List    Problems Pain;Impaired Bed Mobility;Impaired Transfers;Impaired Ambulation;Functional Strength Deficit;Impaired Balance;Decreased Activity  Tolerance;Safety Awareness Deficits / Cognition   Anticipated Discharge Equipment and Recommendations   DC Equipment Recommendations Unable to determine at this time   Discharge Recommendations Recommend post-acute placement for additional physical therapy services prior to discharge home   Interdisciplinary Plan of Care Collaboration   IDT Collaboration with  Nursing;Occupational Therapist   Patient Position at End of Therapy Call Light within Reach;Tray Table within Reach;Seated;Chair Alarm On   Session Information   Date / Session Number  1/27-1(1/4, 2/2)     Patient seen for team evaluation with Occupational Therapist for the following reason(s):  Patient required 2 person assistance for safety and to provide effective interventions. Each discipline assisted patient with appropriate and separate goals.

## 2025-01-28 NOTE — DISCHARGE SUMMARY
Discharge Summary    CHIEF COMPLAINT ON ADMISSION  Chief Complaint   Patient presents with    Trauma Yellow     Pt presents to ER as trauma YELLOW transfer from [facility] after multiple GLF at home. Pt was found to have ICH with +thinners (xarelto) Pt arrives A+Ox4, GCS 15.        Reason for Admission  Trauma yellow transfer 79M     Admission Date  2025    CODE STATUS  DNAR/DNI    HPI & HOSPITAL COURSE  79-year-old male with history of atrial fibrillation, coronary artery disease, diabetes and hypertension who presented  with fall.  Recently his wife  and since the patient lives by himself, patient states he is getting worse and weaker, recurrent falls, patient was found on the ground by neighbors.  CT scan of his head showed subdural hematoma.  Patient was also found to have a sacral fracture.  Xarelto was held.  Trauma was consulted also neurosurgeon who did not recommend any intervention.  Repeat MRI without contrast later showed stable however recommended MRI for brain with contrast in 6 weeks due to abnormal and alternative possibility for bleeding.  His vital signs around baseline and patient was on room air, no significant leukocytosis and patient has a chronic anemia around 13 also kidney function was stable with no signs of infection.  PT and OT evaluation recommended postacute placement.  Patient was accepted by inpatient rehab.  Medically stable to discharge to inpatient rehab.  Hold rivaroxaban until follow-up MRI brain.    Therefore, he is discharged in fair and stable condition to an inpatient rehabilitation hospital.    The patient met 2-midnight criteria for an inpatient stay at the time of discharge.    Discharge Date  2025      FOLLOW UP ITEMS POST DISCHARGE  None    DISCHARGE DIAGNOSES  Principal Problem:    Declining functional status (POA: Yes)  Active Problems:    Hypertension (POA: Yes)    CAD in native artery (POA: Yes)    ACP (advance care planning) (POA: Yes)    Dyslipidemia  (POA: Yes)    PAF (paroxysmal atrial fibrillation) (HCC) (POA: Yes)    Non-insulin dependent type 2 diabetes mellitus (HCC) (POA: Yes)    SDH (subdural hematoma) (HCC) (POA: Yes)    Trauma (POA: Yes)    Sacral fracture (HCC) (POA: Yes)    Intracranial hemorrhage (HCC) (POA: Yes)    B12 deficiency (POA: Yes)  Resolved Problems:    Encephalopathy (POA: Unknown)      FOLLOW UP  Future Appointments   Date Time Provider Department Center   11/25/2025  9:00 AM Dylan Bell M.D. [facility] None     [address] [address]  [phone]          MEDICATIONS ON DISCHARGE     Medication List        START taking these medications        Instructions   cyanocobalamin 1000 MCG Tabs  Start taking on: January 29, 2025  Commonly known as: Vitamin B12   Take 1 Tablet by mouth every day.  Dose: 1,000 mcg     QUEtiapine 25 MG Tabs  Commonly known as: SEROquel   Take 0.5 Tablets by mouth every evening.  Dose: 12.5 mg     thiamine 100 MG tablet  Start taking on: January 29, 2025  Commonly known as: Thiamine   Take 1 Tablet by mouth every day.  Dose: 100 mg            CONTINUE taking these medications        Instructions   atorvastatin 40 MG Tabs  Commonly known as: Lipitor   TAKE ONE TABLET BY MOUTH AT BEDTIME  Dose: 40 mg     bumetanide 1 MG Tabs  Commonly known as: Bumex   Take 1 mg by mouth 2 times a day.  Dose: 1 mg     carvedilol 12.5 MG Tabs  Commonly known as: Coreg   Take 12.5 mg by mouth 2 times a day.  Dose: 12.5 mg     Gemtesa 75 MG tablet  Generic drug: vibegron   Take 75 mg by mouth every day.  Dose: 75 mg     Jardiance 10 MG Tabs tablet  Generic drug: Empagliflozin   Take 1 Tablet by mouth every day.  Dose: 1 Tablet     metFORMIN 500 MG Tabs  Commonly known as: Glucophage   Take 2 Tablets by mouth 2 times a day with meals.  Dose: 1,000 mg     multivitamin Tabs   Take 1 Tab by mouth every day.  Dose: 1 Tablet     omeprazole 20 MG delayed-release  capsule  Commonly known as: PriLOSEC   Take 1 Capsule by mouth every day.  Dose: 20 mg     potassium chloride SA 20 MEQ Tbcr  Commonly known as: Kdur   TAKE ONE TABLET BY MOUTH EVERY DAY  Dose: 20 mEq     tamsulosin 0.4 MG capsule  Commonly known as: Flomax   TAKE ONE CAPSULE BY MOUTH EVERY DAY ONE-HALF HOUR AFTER BREAKFAST  Dose: 0.4 mg     traZODone 100 MG Tabs  Commonly known as: Desyrel   Take 100 mg by mouth every evening.  Dose: 100 mg     Trelegy Ellipta 200-62.5-25 MCG/ACT inhaler  Generic drug: fluticasone-umeclidinium-vilanterol   Trelegy Ellipta 200 mcg-62.5 mcg-25 mcg powder for inhalation   inhale 1 puff by mouth and INTO THE LUNGS once daily            STOP taking these medications      Xarelto 20 MG Tabs tablet  Generic drug: rivaroxaban              Allergies  Allergies   Allergen Reactions    Iodine Unspecified    Naproxen Unspecified       DIET  Orders Placed This Encounter   Procedures    Diet Order Diet: Regular     Standing Status:   Standing     Number of Occurrences:   1     Order Specific Question:   Diet:     Answer:   Regular [1]       ACTIVITY  As tolerated.  Weight bearing as tolerated    CONSULTATIONS  Trauma, NSG    PROCEDURES  None    LABORATORY  Lab Results   Component Value Date    SODIUM 136 01/27/2025    POTASSIUM 3.7 01/27/2025    CHLORIDE 101 01/27/2025    CO2 25 01/27/2025    GLUCOSE 142 (H) 01/27/2025    BUN 12 01/27/2025    CREATININE 1.01 01/27/2025    GLOMRATE 53 (L) 07/12/2022        Lab Results   Component Value Date    WBC 10.5 01/27/2025    HEMOGLOBIN 13.4 (L) 01/27/2025    HEMATOCRIT 40.5 (L) 01/27/2025    PLATELETCT 260 01/27/2025        I discussed medications and side effects with the patient.  I discussed prognosis and importance of medical compliance with the patient.  I counseled the patient about diet, exercise, weight loss, smoking cessation, and life style modifications.  All questions and concerns have been addressed.  Total time of the discharge process was  36 minutes.

## 2025-01-28 NOTE — DISCHARGE PLANNING
GMT to transfer Chilango to [facility] today [id number]  Via mitch Chan accepting  Bedside RN to call [phone] for report  Care team notified via Voalte    Son Morgan notified via message left on phone

## 2025-01-28 NOTE — DISCHARGE PLANNING
Receieved call back from son Morgan.  Introduced IPR level of care and goals  Discussed admission and family training.  Discussed clothing needs, locations and possible LOS.  Chilango will be returning to his single story home with [address].  Morgan and his wife will be staying with Chilango until further notice.  They will provide 24/7 care upon discharge from [facility].

## 2025-01-28 NOTE — CARE PLAN
The patient is Stable - Low risk of patient condition declining or worsening    Shift Goals  Clinical Goals: Monitor neuro status, mobiltiy, maintain skin integrity  Patient Goals: rest  Family Goals: NICHOLE    Progress made toward(s) clinical / shift goals:    Problem: Knowledge Deficit - Stroke Education  Goal: Patient's knowledge of stroke and risk factors will improve  Outcome: Progressing  Note: Patient forgetful. Patient verbalized understanding plan of care. Teaching reinforcement as needed.     Problem: Neuro Status  Goal: Neuro status will remain stable or improve  Outcome: Progressing  Note: Patient aox3 not oriented to time.      Problem: Hemodynamic Monitoring  Goal: Patient's hemodynamics, fluid balance and neurologic status will be stable or improve  Outcome: Progressing     Problem: Urinary Elimination  Goal: Establish and maintain regular urinary output  Outcome: Progressing  Note: Patient able to void in urinal.     Problem: Skin Integrity  Goal: Skin integrity is maintained or improved  Outcome: Progressing  Note: Patient encouraged mobility to prevent skin breakdown. Patient verbalized understanding. Teaching reinforcement as needed.      Problem: Fall Risk  Goal: Patient will remain free from falls  Outcome: Progressing  Note: Bed alarm in place. Bed locked and in lowest position.        Patient is not progressing towards the following goals:

## 2025-01-28 NOTE — PREADMISSION SCREENING NOTE
Pre-Admission Screening Form    Patient Information:   Name: Vince Carr     MRN: [id number]       : 1945      Age: 79 y.o.   Gender: male      Race: White [7]       Marital Status:  [2]  Family Contact: BrunoMorgan Chad        Relationship: Son [15]  Son [15]  Home Phone:              Cell Phone: [phone]  Advanced Directives: Copy in Chart  Code Status:  DNAR/DNI  Current Attending Provider: Augusto Reilly D.O.  Referring Physician: Dr. Wallace      Physiatrist Consult: Dr. Lino       Referral Date: 2025  Primary Payor Source:  MEDICARE  Secondary Payor Source:  [facility]    Medical Information:   Date of Admission to Acute Care [place]2025  Room Number: S198/01  Rehabilitation Diagnosis: 0002.22 - Brain Dysfunction: Traumatic, Closed Injury  Immunization History   Administered Date(s) Administered    Influenza Seasonal Injectable - Historical Data 2016, 2020    Influenza Vaccine Adult HD 09/10/2013, 2016, 2021    Influenza Vaccine Quad Inj (Pf) 2015, 2020    Influenza high-dose trivalent (PF) 10/12/2024    Influenza split virus trivalent (PF) 10/15/2011, 2012, 2013    Influenza, unspecified formulation 2018, 2019, 10/07/2022    Daniele SARS-CoV-2 Vaccine 2021, 2021    MODERNA BIVALENT BOOSTER SARS-COV-2 VACCINE (6+) 2023    Pneumococcal Conjugate Vaccine (Prevnar/PCV-13) 2016    Pneumococcal polysaccharide vaccine (PPSV-23) 2013, 2016    Tdap Vaccine 2021    Zoster Vaccine Live (ZVL) (Zostavax) - HISTORICAL DATA 2012, 2013    Zoster Vaccine Recombinant (RZV) (SHINGRIX) 2020, 2023     Allergies   Allergen Reactions    Iodine Unspecified    Naproxen Unspecified     Past Medical History:   Diagnosis Date    Arthritis     osteo    Perez's esophagus     COPD (chronic obstructive pulmonary disease) (HCC)     on O2 at  night / uses inhalers daily    Dental disorder      pt has partials    Gastroesophageal reflux disease with esophagitis 5/20/2020    HTN (hypertension)     on Losartan    Hypertension     Indigestion     GERD     Myocardial infarct (HCC) 2011 and 2018    stent in LAD, stent in rad    Snoring      Past Surgical History:   Procedure Laterality Date    PB SHLDR ARTHROSCOP,SURG,W/ROTAT CUFF REPB Right 5/20/2020    Procedure: RIGHT SHOULDER ARTHROSCOPY ROTATOR CUFF REPAIR, SUBACROMIAL DECOMPRESSION, DISTAL CLAVICLE RESECTION;  Surgeon: Jayant Gonzales M.D.;  Location: SURGERY [facility];  Service: Orthopedics    GASTROSCOPY-ENDO N/A 6/22/2015    Procedure: GASTROSCOPY-ENDO;  Surgeon: Torsten Tariq M.D.;  Location: SURGERY [facility];  Service:     COLONOSCOPY - ENDO  8/13/2014    Performed by Torsten Tariq M.D. at [facility]    ZZZ CARDIAC CATH  9/27/2012    stent in LAD    ZZZ CARDIAC CATH  2012    placement of stent in LAD    CHOLECYSTECTOMY  2012    CATARACT EXTRACTION WITH IOL  2011         COLONOSCOPY  2009    benign polyps found    EGD ESOPHAGUS WITH ENDOSCOPIC US  2009    Barretts esophagus found    SHOULDER ARTHROSCOPY W/ ROTATOR CUFF REPAIR  2008    R & L    KNEE ARTHROPLASTY TOTAL  2007    R & L    TOE FUSION  2006     left foot    ORIF, WRIST Right 1992    AMPUTATION, TOE      left foot 2nd toe    OTHER CARDIAC SURGERY      pace maker    TONSILLECTOMY         History Leading to Admission, Conditions that Caused the Need for Rehab (CMS): closed head injury, pelvic fx,b12 deficiency, A-fib, DM2, HTN, sundowning  Co-morbidities:  as listed above and below   Potential Risk - Complications: Aphasia, Cognitive Impairment, Contractures, Deep Vein Thrombosis, Dysphagia, Incontinence, Malnutrition, Pain, Paralysis, and Perceptual Impairment  Level of Risk: High    Ongoing Medical Management Needed (Medical/Nursing Needs):   Patient Active Problem List    Diagnosis Date Noted     B12 deficiency 01/27/2025    SDH (subdural hematoma) ([facility]) 01/26/2025    Trauma 01/26/2025    Sacral fracture ([facility]) 01/26/2025    Intracranial hemorrhage ([facility]) 01/26/2025    Declining functional status 01/26/2025    Falls frequently 01/25/2025    Grief reaction 01/25/2025    Edema of both legs 01/25/2025    Dryness of eyelid, right 01/25/2025    Non-insulin dependent type 2 diabetes mellitus ([facility]) 01/25/2025    Gram-positive bacteremia 08/17/2024    Septic shock ([facility]) 08/17/2024    Bacterial pneumonia 08/16/2024    KENDY (acute kidney injury) ([facility]) 08/16/2024    Leukocytosis 08/16/2024    Lactic acidosis 08/16/2024    Hypomagnesemia 08/16/2024    Peripheral neuropathy 08/03/2022    Chronic heart failure with preserved ejection fraction ([facility]) 06/09/2021    History of MI (myocardial infarction) 06/09/2021    Obstructive sleep apnea syndrome 06/09/2021    MRI safe cardiac pacemaker in situ 02/25/2021    ACP (advance care planning) 02/25/2021    PAF (paroxysmal atrial fibrillation) ([facility]) 02/02/2021    Cellulitis 01/26/2021    Type 2 diabetes mellitus with renal complication ([facility]) 01/25/2021    Body mass index (BMI)40.0-44.9, adult 01/25/2021    Chronic gastritis without bleeding 01/25/2021    Diaphragmatic hernia without obstruction and without gangrene 01/25/2021    Diastolic dysfunction 01/25/2021    Dyslipidemia 01/25/2021    Hypoxia 01/25/2021    Long term current use of oral hypoglycemic drug 01/25/2021    Moderate persistent asthma without complication 01/25/2021    Non-rheumatic mitral regurgitation 01/25/2021    Morbid obesity ([facility]) 01/25/2021    AV block, 3rd degree ([facility]) 12/30/2020    Bradycardia 12/30/2020    Other emphysema ([facility]) 05/20/2020    Hypertension 05/20/2020    CAD in native artery 05/20/2020    Angina pectoris ([facility]) 05/20/2020    Gastroesophageal reflux disease with esophagitis 05/20/2020    Traumatic complete tear of right rotator cuff 05/20/2020    S/P lumbar fusion 06/05/2018    Difficulty walking  06/05/2018    Insomnia, unspecified 03/29/2018    Perez's esophagus with dysplasia, unspecified 03/25/2018    Gastro-esophageal reflux disease without esophagitis 03/25/2018    Hyperlipidemia, unspecified 03/25/2018    Anterior soft tissue impingement 03/25/2018    Unspecified osteoarthritis, unspecified site 03/25/2018    Fusion of spine, lumbar region 03/25/2018    Muscle weakness (generalized) 03/25/2018    Old myocardial infarction 03/25/2018    Other symbolic dysfunctions 03/25/2018    Postlaminectomy syndrome, not elsewhere classified 03/25/2018    Chronic bilateral low back pain 01/04/2018                                                              Physical Medicine and Rehabilitation Consultation                                                                            Date of initial consultation: 1/28/2025  Consulting provider: Neville Wallace M.D.   Reason for consultation: assess for acute inpatient rehab appropriateness  LOS: 2 Day(s)     Chief complaint: Ground-level fall with ICH     HPI: The patient is a 79 y.o. right hand dominant male with a past medical history of atrial fibrillation on Xarelto, coronary artery disease, diabetes, hypertension, frequent falls;  who presented on 1/26/2025  3:10 PM as a transfer from [facility] with ICH.  Patient had 2 ground-level falls, reported due to weakness, no loss of consciousness but he does take Xarelto.  For this episode he was found down by neighbors who activated EMS.  Initial CT head was negative but repeat CT head due to anticoagulation showed ICH and patient was transferred to [facility].  CT head here on 1/25 found questionable ICH that was stable     The patient currently reports frequent falls, neck pain,  right hip pain. He uses a single point cane but has a 2ww and 4ww walker at home. He has not walked in the hospital yet but wants to return home. I spoke to his son who was considering SNF [place] and cites some cognitive decline. He  wants an inpatient setting for his father and some place where he will be challenged. After discussion, we decided IPR at [facility] would be best. Son is a  who is in the process of retiring.      ROS  Pertinent positives are mentioned in the HPI, all others reviewed and are negative.     Social Hx:  1 SH  1 SUZANNA  With: Alone unable to care for self.  Patient's wife  recently.     THERAPY:  Restrictions: Fall risk  PT: Functional mobility   : Min assist sit to stand and transfer     OT: ADLs  : Max assist lower body dressing, min assist toileting, upper body dressing     SLP:   None     IMAGING:  CT head 2025  What likely represents hemorrhage along the septum pellucidum is stable as previously described on study of 2025.     CT hip 2025  1.  S5 vertebral body fracture.  2.  No definite femoral fracture is seen.  3.  Please note that in a patient of this age with negative plain films when there is a high clinical suspicion of fracture, MRI is considered the imaging modality of choice.     PROCEDURES:  None      PMH:  Past Medical History  Past Medical History:  Diagnosis Date   Arthritis      osteo   Perez's esophagus     COPD (chronic obstructive pulmonary disease) (HCC)      on O2 at night / uses inhalers daily   Dental disorder       pt has partials   Gastroesophageal reflux disease with esophagitis 2020   HTN (hypertension)      on Losartan   Hypertension     Indigestion      GERD    Myocardial infarct ([facility])  and     stent in LAD, stent in rad   Snoring            PSH:  Past Surgical History  Past Surgical History:  Procedure Laterality Date   PB SHLDR ARTHROSCOP,SURG,W/ROTAT CUFF REPB Right 2020    Procedure: RIGHT SHOULDER ARTHROSCOPY ROTATOR CUFF REPAIR, SUBACROMIAL DECOMPRESSION, DISTAL CLAVICLE RESECTION;  Surgeon: Jayant Gonzales M.D.;  Location: SURGERY [facility];  Service: Orthopedics   GASTROSCOPY-ENDO N/A 2015    Procedure:  GASTROSCOPY-ENDO;  Surgeon: Torsten Tariq M.D.;  Location: SURGERY [facility];  Service:    COLONOSCOPY - ENDO   8/13/2014    Performed by Torsten Tariq M.D. at SURGERY [facility]   Z CARDIAC CATH   9/27/2012    stent in LAD   ZZZ CARDIAC CATH   2012    placement of stent in LAD   CHOLECYSTECTOMY   2012   CATARACT EXTRACTION WITH IOL   2011        COLONOSCOPY   2009    benign polyps found   EGD ESOPHAGUS WITH ENDOSCOPIC US   2009    Barretts esophagus found   SHOULDER ARTHROSCOPY W/ ROTATOR CUFF REPAIR   2008    R & L   KNEE ARTHROPLASTY TOTAL   2007    R & L   TOE FUSION   2006     left foot   ORIF, WRIST Right 1992   AMPUTATION, TOE        left foot 2nd toe   OTHER CARDIAC SURGERY        pace maker   TONSILLECTOMY              FHX:  Family History  Family History  Problem Relation Age of Onset   Cancer Father 95        Prostate   Multiple Sclerosis Sister            Medications:  Current Facility-Administered Medications  Medication Dose   cyanocobalamin (Vitamin B-12) tablet 1,000 mcg  1,000 mcg   thiamine (Vitamin B-1) tablet 100 mg  100 mg   QUEtiapine (SEROquel) tablet 12.5 mg  12.5 mg   atorvastatin (Lipitor) tablet 40 mg  40 mg   bumetanide (Bumex) tablet 1 mg  1 mg   carvedilol (Coreg) tablet 12.5 mg  12.5 mg   multivitamin tablet 1 Tablet  1 Tablet   tamsulosin (Flomax) capsule 0.4 mg  0.4 mg   traZODone (Desyrel) tablet 100 mg  100 mg   acetaminophen (Tylenol) tablet 650 mg  650 mg   ondansetron (Zofran) syringe/vial injection 4 mg  4 mg   ondansetron (Zofran ODT) dispertab 4 mg  4 mg   labetalol (Normodyne/Trandate) injection 10 mg  10 mg   insulin lispro (HumaLOG,AdmeLOG) subcutaneous injection  1-6 Units    And   dextrose 50% (D50W) injection 25 g  25 g   fluticasone-umeclidinium-vilanterol (Trelegy Ellipta) 200-62.5-25 mcg/act inhaler 1 Puff  1 Puff        Allergies:  Allergies  Allergies  Allergen Reactions   Iodine Unspecified   Naproxen Unspecified             Physical  "Exam:  Vitals: /76   Pulse 68   Temp 36.5 °C (97.7 °F) (Temporal)   Resp 18   Ht 1.803 m (5' 11\")   Wt 116 kg (256 lb 6.3 oz)   SpO2 94%   Gen: NAD  Head: NC/AT  Eyes/ Nose/ Mouth: PERRLA, moist mucous membranes  Cardio: RRR, good distal perfusion, warm extremities  Pulm: normal respiratory effort, no cyanosis   Abd: Soft NTND, negative borborygmi   Ext: No peripheral edema. No calf tenderness. No clubbing. 3+ Pedal edema right, 2 + pre-tibial edema right     Mental status: Awake and appropriate. Can't remember where he lives. Son cites some night time confusion  Speech: fluent, no aphasia or dysarthria     Motor:                            Upper Extremity  Myotome R L  Shoulder flexion C5 4+/5 5  Elbow flexion C5 4+/5 5  Wrist extension C6 5 5  Elbow extension C7 4+/5 5  Finger flexion C8 5 5  Finger abduction T1 5 5     Lower Extremity Myotome R L  Hip flexion L2 3/5 5  Knee extension L3 4/5 5  Ankle dorsiflexion L4 5 5  Toe extension L5 5 5  Ankle plantarflexion S1 5 5     Skin:    1/26 sacrum        1/26 right hallux         1/26 left hallux      Labs: Reviewed and significant for   Recent Labs    01/26/25  0535 01/26/25  1744 01/27/25  0109  RBC 4.02* 4.41* 4.36*  HEMOGLOBIN 12.4* 13.6* 13.4*  HEMATOCRIT 37.0* 40.7* 40.5*  PLATELETCT 241 236 260     Recent Labs    01/26/25  0535 01/26/25  1744 01/27/25  0109  SODIUM 140 134* 136  POTASSIUM 4.2 4.4 3.7  CHLORIDE 105 101 101  CO2 28 22 25  GLUCOSE 106* 105* 142*  BUN 12 13 12  CREATININE 1.1 0.89 1.01  CALCIUM 8.5 8.4* 8.1*     Recent Results  Recent Results (from the past 24 hours)  POCT glucose device results    Collection Time: 01/27/25  9:37 AM  Result Value Ref Range    POC Glucose, Blood 123 (H) 65 - 99 mg/dL  POCT glucose device results    Collection Time: 01/27/25  1:16 PM  Result Value Ref Range    POC Glucose, Blood 166 (H) 65 - 99 mg/dL  POCT glucose device results    Collection Time: 01/27/25  6:01 PM  Result Value Ref Range    POC " Glucose, Blood 144 (H) 65 - 99 mg/dL  POCT glucose device results    Collection Time: 01/27/25  9:37 PM  Result Value Ref Range    POC Glucose, Blood 162 (H) 65 - 99 mg/dL             ASSESSMENT:  Patient is a 79 y.o. male admitted with frequent falls      Rehabilitation: Impaired ADLs and mobility  Barriers to transfer include: Insurance authorization, TCCs to verify disposition, medical clearance and bed availability. All cases are subject to administrative review.      [facility] Code / Diagnosis to Support: 0002.22 - Brain Dysfunction: Traumatic, Closed Injury     Disposition recommendations:  - Good candidate for IPR.  Patient has deficits with mobility and ADLs secondary to closed head injury.  Patient has good discharge support from son, and a stable discharge environment which is single story home.   - TCC to verify DC support   -PMR to follow in the periphery for rehab appropriateness, please reach out with questions or request for medical management     Medical Complexity:     Closed head injury   - Secondary to frequent falls   - CT evidence of ICH  - Holding Xarelto, would appreciate recommendations on when to restart   - Has some night time confusion   - Needs comprehensive PT/OT and SLP   - Good candidate for IPR      Pelvic fracture   - CT evidence of S5 fracture   - WBAT   - Has right 'hip' pain   - Continue PT/OT   - Needs to be trained on walker      B12 deficiency   - Supplementing 1000 mcg daily      Paroxysmal Atrial fib   - On xarelto at home      DM 2  - A1c 7%  - SSI 0-6 units      HTN   - Bumex 1mg BID     Sundowning   - Seroquel 12.5mg QHS         DVT PPX: SCDs        Thank you for allowing us to participate in the care of this patient.      Total time: >80 minutes. This includes time spent reviewing patient's history, notes, labs, imaging, vitals, medications, examining patient, discussing care with patient and family including prognosis, risk reduction, benefits of treatment, and options for  next stage of care, and communicating recommendations to primary team.      Hebert Lino,    Physical Medicine and Rehabilitation                       CHIEF COMPLAINT: GLF.      HISTORY OF PRESENT ILLNESS: The patient is a 79 year-old White elderly man who presents to the trauma bay as a transfer from [place] for a ICH. He sustained two GLFs yesterday morning/afternoon, he states that his legs felt weak and he fell. He denies any LOC. He does take xerelto. The original CTH was read as negative overnight, but then over read this am with a small punctate bleed along the septum pellucidum. A repeat CT head this am was stable. He was subsequently transferred to [facility] for higher level of care. His xerelto was held     Today, he stats he feels ok and only c/o right hip pain. His is alert and oriented. He does have a POLST which states he only wants comfort measures which he confirms. In addiiton to his ICH a CT of the cspine shows no acute injury and a CT of the right hip shos a S5 vertebral body fracture     TRIAGE CATEGORY: The patient was triaged as a Trauma Yellow Transfer Activation. The patient was initially evaluated at [facility] in [place], [place] where CT imaging demonstrated a ICH. He was transported to [facility] in [place], [place] for a Trauma Surgery and Neurosurgical trauma evaluation. An expeditious primary and secondary survey with required adjuncts was conducted. See Trauma Narrator for full details.     PAST MEDICAL HISTORY:  has a past medical history of Arthritis, Perez's esophagus, COPD (chronic obstructive pulmonary disease) ([facility]), Dental disorder, Gastroesophageal reflux disease with esophagitis (5/20/2020), HTN (hypertension), Hypertension, Indigestion, Myocardial infarct (HCC) (2011 and 2018), and Snoring.     PAST SURGICAL HISTORY:  has a past surgical history that includes zzz cardiac cath (2012); cholecystectomy (2012); knee arthroplasty total (2007);  shoulder arthroscopy w/ rotator cuff repair (2008); toe fusion (2006); cataract extraction with iol (2011); tonsillectomy; colonoscopy (2009); egd esophagus with endoscopic us (2009); colonoscopy - endo (8/13/2014); orif, wrist (Right, 1992); zzz cardiac cath (9/27/2012); amputation, toe; gastroscopy-endo (N/A, 6/22/2015); pr shldr arthroscop,surg,w/rotat cuff repr (Right, 5/20/2020); and other cardiac surgery.     ALLERGIES:   Allergies  Allergies  Allergen Reactions   Iodine Unspecified   Naproxen Unspecified          CURRENT MEDICATIONS:   Home Medications          Reviewed by Enriqueta Castro R.N. (Registered Nurse) on 01/26/25 at 1538  Med List Status: Partial       Medication Last Dose Status  atorvastatin (LIPITOR) 40 MG Tab   Active  bumetanide (BUMEX) 1 MG Tab   Active  carvedilol (COREG) 12.5 MG Tab   Active  Empagliflozin (JARDIANCE) 10 MG Tab tablet   Active  fluticasone-umeclidinium-vilanterol (TRELEGY ELLIPTA) 200-62.5-25 mcg/act inhaler   Active  GEMTESA 75 MG Tab   Active  Lancets   Active  losartan (COZAAR) 100 MG Tab   Active  metFORMIN (GLUCOPHAGE) 500 MG Tab   Active  Multiple Vitamins-Minerals (CENTRUM ADULTS) Tab tablet   Active  multivitamin (THERAGRAN) TABS   Active  nitroglycerin (NITROSTAT) 0.4 MG SL Tab   Active  omeprazole (PRILOSEC) 20 MG delayed-release capsule   Active  potassium chloride SA (KDUR) 20 MEQ Tab CR   Active  tamsulosin (FLOMAX) 0.4 MG capsule   Active                     FAMILY HISTORY: family history includes Cancer (age of onset: 95) in his father; Multiple Sclerosis in his sister.     SOCIAL HISTORY:  reports that he quit smoking about 49 years ago. His smoking use included cigarettes. He quit smokeless tobacco use about 17 years ago. He reports current alcohol use. He reports that he does not use drugs.     REVIEW OF SYSTEMS: Comprehensive review of systems is negative with the exception of the aforementioned HPI, PMH, and PSH bullets in accordance with CMS  "guidelines.     PHYSICAL EXAMINATION:    Vital Signs: BP (!) 145/65   Pulse 63   Temp 36.6 °C (97.9 °F)   Resp 20   Ht 1.803 m (5' 11\")   Wt 112 kg (248 lb)   SpO2 95%   Physical Exam  Vitals and nursing note reviewed.   Constitutional:       Appearance: Normal appearance.   HENT:      Head: Normocephalic.      Nose: Nose normal.      Mouth/Throat:      Mouth: Mucous membranes are dry.      Pharynx: Oropharynx is clear.   Eyes:      General: No scleral icterus.     Conjunctiva/sclera: Conjunctivae normal.   Cardiovascular:      Rate and Rhythm: Normal rate and regular rhythm.   Pulmonary:      Effort: Pulmonary effort is normal. No respiratory distress.   Abdominal:      General: Abdomen is flat. There is no distension.      Palpations: Abdomen is soft.      Tenderness: There is no abdominal tenderness.   Musculoskeletal:         General: Swelling present. No tenderness or deformity.      Cervical back: Normal range of motion. No rigidity or tenderness.   Skin:     General: Skin is warm and dry.   Neurological:      General: No focal deficit present.      Mental Status: He is alert.   Psychiatric:         Mood and Affect: Mood normal.         EVENT NOTE - C-SPINE CLEARANCE  CLINICAL EVALUATION (NEXUS CRITERIA):  1.Altered level of consciousness: No  2.        Intoxication:  No  3.        Focal neurologic deficit:  No  4.Midline c-spine tenderness: No  5.Distracting injury: No  Outside CT Cspine negative  Cspine cleared     LABORATORY VALUES:   Recent Labs    01/25/25  1840 01/26/25  0535  WBC 11.4* 9.4  RBC 4.56* 4.02*  HEMOGLOBIN 14.1 12.4*  HEMATOCRIT 41.5* 37.0*  MCV 91.0 92.0  MCH 30.9 30.8  MCHC 34.0 33.5  RDW 14.5 14.6*  PLATELETCT 256 241  MPV 9.7 10.0     Recent Labs    01/25/25  1840 01/26/25  0535  SODIUM 138 140  POTASSIUM 4.1 4.2  CHLORIDE 102 105  CO2 24 28  GLUCOSE 131* 106*  BUN 13 12  CREATININE 1.3 1.1  CALCIUM 8.9 8.5     Recent Labs   " " 01/25/25  1840  ASTSGOT 29  ALTSGPT 21  TBILIRUBIN 1.4*  ALKPHOSPHAT 66            IMAGING:   No orders to display  Outside imaging reviewed      ASSESSMENT AND PLAN:   Trauma  GLF.  Trauma Yellow Transfer Activation from [facility] in [place], [place].  Daniel Coe MD. Trauma Surgery.     SDH (subdural hematoma) (HCC)  SDH   Finding missed on initial head CT 1/25  Repeat head CT 1/26 without progression   Patient is comfort care measures only  Small ICH on xerelto qualifies as a BIG 3, however he has already been observed for 24 hours and has a stable CTH.  OK for q4 neurochecks  OK for admission to medicine for work up of weakness     Sacral fracture (HCC)  S5 fracture   Comfort care measures only  Donut cushion ordered     DISPOSITION: Medical evaluation and admission for weakness. [facility] Trauma Surgery Service will follow. Interval Trauma tertiary survey.     ____________________________________     Daniel Coe M.D                          Current Vital Signs:   Temperature: 36.6 °C (97.9 °F) Pulse: 78 Respiration: 18 Blood Pressure : 95/59  Weight: 116 kg (256 lb 6.3 oz) Height: 180.3 cm (5' 11\")  Pulse Oximetry: 95 % O2 (LPM): 2      Completed Laboratory Reports:  Recent Labs     01/25/25  1840 01/26/25  0535 01/26/25  0749 01/26/25  1148 01/26/25  1744 01/27/25  0109   WBC 11.4* 9.4  --   --  9.6 10.5   HEMOGLOBIN 14.1 12.4*  --   --  13.6* 13.4*   HEMATOCRIT 41.5* 37.0*  --   --  40.7* 40.5*   PLATELETCT 256 241  --   --  236 260   SODIUM 138 140  --   --  134* 136   POTASSIUM 4.1 4.2  --   --  4.4 3.7   BUN 13 12  --   --  13 12   CREATININE 1.3 1.1  --   --  0.89 1.01   ALBUMIN 2.9*  --   --   --  3.1*  --    GLUCOSE 131* 106*  --   --  105* 142*   POCGLUCOSE  --   --  97 106*  --   --      Additional Labs: Not Applicable    Prior Living Situation:   Housing / Facility: 1 Story House  Steps Into Home: 1  Steps In Home: 0  Lives with - Patient's Self Care Capacity: Alone and " Unable to Care For Self  Equipment Owned: 4-Wheel Walker, Single Point Cane, Grab Bar(s) By Toilet, Tub / Shower Seat    Prior Level of Function / Living Situation:   Physical Therapy: Prior Services: Home-Independent  Housing / Facility: [address] [facility]  Steps Into Home: 1  Steps In Home: 0  Rail: None  Bathroom Set up: Walk In Shower, Shower Chair, Grab Bars  Equipment Owned: 4-Wheel Walker, Single Point Cane, Grab Bar(s) By Toilet, Tub / Shower Seat  Lives with - Patient's Self Care Capacity: Alone and Unable to Care For Self  Bed Mobility: Independent  Transfer Status: Independent  Ambulation: Independent  Assistive Devices Used: Single Point Cane  Stairs: Independent  Current Level of Function:   Gait Level Of Assist: Minimal Assist  Assistive Device: Front Wheel Walker  Distance (Feet): 45 (2 seated rest breaks)  Deviation: Bradykinetic, Decreased Heel Strike, Decreased Toe Off, Other (Comment) (Asymmetrical step & stride length, forward trunk lean)  Supine to Sit: Moderate Assist  Scooting: Minimal Assist  Rolling: Moderate Assist to Lt.  Comments: HOB slightly elevated ,use of bed rail  Sit to Stand: Minimal Assist  Bed, Chair, Wheelchair Transfer: Minimal Assist  Toilet Transfers: Minimal Assist  Transfer Method: Stand Step  Sitting in Chair: post session  Sitting Edge of Bed: 5 min  Standin min total  Occupational Therapy:   Self Feeding: Independent  Grooming / Hygiene: Independent  Bathing: Independent  Dressing: Independent  Toileting: Independent  Medication Management: Independent  Laundry: Independent  Kitchen Mobility: Independent  Finances: Independent  Home Management: Independent  Shopping: Independent  Prior Level Of Mobility: Independent With Device in Community  Driving / Transportation: Driving Independent  Prior Services: Home-Independent  Housing / Facility: [address] [facility]  Occupation (Pre-Hospital Vocational): Not Employed  Current Level of Function:   Eating: Independent  Upper Body  Dressing: Minimal Assist  Lower Body Dressing: Maximal Assist  Toileting: Minimal Assist (assist for thoroughness)  Speech Language Pathology:      Rehabilitation Prognosis/Potential: Good  Estimated Length of Stay: 12-14 days    Nursing:      Continent    Scope/Intensity of Services Recommended:  Physical Therapy: 1 hr / day  5 days / week. Therapeutic Interventions Required: Maximize Endurance, Mobility, Strength, and Safety  Occupational Therapy: 1 hr / day 5 days / week. Therapeutic Interventions Required: Maximize Self Care, ADLs, IADLs, and Energy Conservation  Speech & Language Pathology: 1 hr / day 5 days / week. Therapeutic Interventions Required: Maximize Cognition, Swallowing, and Safety  Rehabilitation Nursin/7. Therapeutic Interventions Required: Monitor Pain, Skin, Wound(s), Vital Signs, Intake and Output, Labs, Safety, Aspiration Risk, and Family Training  Rehabilitation Physician: 3 - 5 days / week. Therapeutic Interventions Required: Medical Management  Respiratory Care: consult. Therapeutic Interventions Required: Pulmonary Toileting  Dietician: consult. Therapeutic Interventions Required: Please advise on a diet that is both healthy and promotes healing    He requires 24-hour rehabilitation nursing to manage bowel and bladder function, skin care, wound, nutrition and fluid intake, pulmonary hygiene, pain control, safety, medication management, and patient/family goals. In addition, rehabilitation nursing will reiterate and reinforce therapy skills and equipment use, including ADLs, as well as provide education to the patient and family. Vince Aguirredelmar is willing to participate in and is able to tolerate the proposed plan of care.    Rehabilitation Goals and Plan (Expected frequency & duration of treatment in the IRF):   Return to the Community and Family Able to Provide 24/7 Assistance  Anticipated Date of Rehabilitation Admission: 2025  Patient/Family oriented IRF level of  care/facility/plan: Yes  Patient/Family willing to participate in IRF care/facility/plan: Yes  Patient able to tolerate IRF level of care proposed: Yes  Patient has potential to benefit IRF level of care proposed: Yes  Comments: Receieved call back from son Morgan.  Introduced IPR level of care and goals  Discussed admission and family training.  Discussed clothing needs, locations and possible LOS.  Chilango will be returning to his single story home with 1 [address].  Morgan and his wife will be staying with Chilango until further notice.  They will provide 24/7 care upon discharge from [facility]      Special Needs or Precautions - Medical Necessity:  Safety Concerns/Precautions:  Fall Risk / High Risk for Falls  Diet:   DIET ORDERS (From admission to next 24h)       Start     Ordered    01/26/25 1747  Diet Order Diet: Regular  ALL MEALS        Question:  Diet:  Answer:  Regular    01/26/25 1746                    Anticipated Discharge Destination / Patient/Family Goal:  Destination: Home with Assistance Support System: Family   Anticipated home health services: OT, PT, SLP, and Nursing  Previously used HH service/ provider: Not Applicable  Anticipated DME Needs: Walker  Outpatient Services: OT, PT, and SLP  Alternative resources to address additional identified needs:   Future Appointments   Date Time Provider Department Center   11/25/2025  9:00 AM CYNDIE Roman None       Pre-Screen Completed: 1/28/2025 11:44 AM Michelle Nesbitt L.P.N.

## 2025-01-28 NOTE — DISCHARGE PLANNING
Case Management Discharge Planning    Admission Date: 1/26/2025  GMLOS: 2.5  ALOS: 2    6-Clicks ADL Score: 18  6-Clicks Mobility Score: 15  PT and/or OT Eval ordered: Yes  Post-acute Referrals Ordered: Yes  Post-acute Choice Obtained: Yes  Has referral(s) been sent to post-acute provider:  Yes      Anticipated Discharge Dispo: Discharge Disposition: Disch to  rehab facility or distinct part unit (62)    DME Needed: No    Action(s) Taken: Updated Provider/Nurse on Discharge Plan    1130-Pt was discussed during rounds. Pt is medically cleared. Per note, Pt's son is now interested for [facility].    1220- RN CM received a communication from [facility] that they can take Pt today. Transport set up at 0701-0303 via [facility]. Pt's son Morgan updated. COBRA packet completed, given to bedside nurse.     Escalations Completed: Provider    Medically Clear: Yes    Next Steps: RN CM to continue to assist in Pt's discharge needs.     Barriers to Discharge: None    Is the patient up for discharge tomorrow: No  Pt is discharging today.

## 2025-01-28 NOTE — DISCHARGE PLANNING
PMR consult complete.  Per Dr. Lino:         Good candidate for IPR.  Patient has deficits with mobility and ADLs secondary to closed head injury.  Patient has good discharge support from son, and a stable discharge environment which is single story home.   - TCC to verify DC support    Call out to ric Mora [phone] no answer. TC

## 2025-01-28 NOTE — PROGRESS NOTES
Patient discharged to [facility] via [facility]. PIV removed, dressing applied. DC education provided to pt and son at bedside. All belongings accounted for at time of discharge. Report given to Lise.

## 2025-01-28 NOTE — CARE PLAN
The patient is Stable - Low risk of patient condition declining or worsening    Shift Goals  Clinical Goals: MRI and stable neuro assessments  Patient Goals: get out of bed  Family Goals: NICHOLE    Progress made toward(s) clinical / shift goals:    Problem: Neuro Status  Goal: Neuro status will remain stable or improve  Outcome: Progressing  Note: Patient has remained A&O x3-4 throughout this shift, patient is able to move all extremities but says he has numbness, tingling and no sensation when feet are touched but this has been going on for a couple of years now.      Problem: Mobility - Stroke  Goal: Patient's capacity to carry out activities will improve  Outcome: Progressing  Note: Patient was able to work with OT this shift and is X1 with a FWW, patient also tolerated sitting in the chair for breakfast and a few hours after breakfast before getting back to bed.        Patient is not progressing towards the following goals:

## 2025-01-28 NOTE — CONSULTS
Physical Medicine and Rehabilitation Consultation          Date of initial consultation: 2025  Consulting provider: Neville Wallace M.D.   Reason for consultation: assess for acute inpatient rehab appropriateness  LOS: 2 Day(s)    Chief complaint: Ground-level fall with ICH    HPI: The patient is a 79 y.o. right hand dominant male with a past medical history of atrial fibrillation on Xarelto, coronary artery disease, diabetes, hypertension, frequent falls;  who presented on 2025  3:10 PM as a transfer from [facility] with ICH.  Patient had 2 ground-level falls, reported due to weakness, no loss of consciousness but he does take Xarelto.  For this episode he was found down by neighbors who activated EMS.  Initial CT head was negative but repeat CT head due to anticoagulation showed ICH and patient was transferred to [facility].  CT head here on  found questionable ICH that was stable    The patient currently reports frequent falls, neck pain,  right hip pain. He uses a single point cane but has a 2ww and 4ww walker at home. He has not walked in the hospital yet but wants to return home. I spoke to his son who was considering SNF In [place] and cites some cognitive decline. He wants an inpatient setting for his father and some place where he will be challenged. After discussion, we decided IPR at [facility] would be best. Son is a  who is in the process of retiring.     ROS  Pertinent positives are mentioned in the HPI, all others reviewed and are negative.    Social Hx:  1 SH  1 SUZANNA  With: Alone unable to care for self.  Patient's wife  recently.    THERAPY:  Restrictions: Fall risk  PT: Functional mobility   : Min assist sit to stand and transfer    OT: ADLs  : Max assist lower body dressing, min assist toileting, upper body dressing    SLP:   None    IMAGING:  CT head 2025  What likely represents hemorrhage along the septum pellucidum is stable as previously described  on study of 1/25/2025.    CT hip 1/25/2025  1.  S5 vertebral body fracture.  2.  No definite femoral fracture is seen.  3.  Please note that in a patient of this age with negative plain films when there is a high clinical suspicion of fracture, MRI is considered the imaging modality of choice.    PROCEDURES:  None     PMH:  Past Medical History:   Diagnosis Date    Arthritis     osteo    Perez's esophagus     COPD (chronic obstructive pulmonary disease) (HCC)     on O2 at night / uses inhalers daily    Dental disorder      pt has partials    Gastroesophageal reflux disease with esophagitis 5/20/2020    HTN (hypertension)     on Losartan    Hypertension     Indigestion     GERD     Myocardial infarct (HCC) 2011 and 2018    stent in LAD, stent in rad    Snoring        PSH:  Past Surgical History:   Procedure Laterality Date    PB SHLDR ARTHROSCOP,SURG,W/ROTAT CUFF REPB Right 5/20/2020    Procedure: RIGHT SHOULDER ARTHROSCOPY ROTATOR CUFF REPAIR, SUBACROMIAL DECOMPRESSION, DISTAL CLAVICLE RESECTION;  Surgeon: Jayant Gonzales M.D.;  Location: SURGERY [facility];  Service: Orthopedics    GASTROSCOPY-ENDO N/A 6/22/2015    Procedure: GASTROSCOPY-ENDO;  Surgeon: Torsten Tariq M.D.;  Location: [facility];  Service:     COLONOSCOPY - ENDO  8/13/2014    Performed by Torsten Tariq M.D. 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    OTHER CARDIAC SURGERY      pace maker    TONSILLECTOMY         FHX:  Family History   Problem  "Relation Age of Onset    Cancer Father 95        Prostate    Multiple Sclerosis Sister        Medications:  Current Facility-Administered Medications   Medication Dose    cyanocobalamin (Vitamin B-12) tablet 1,000 mcg  1,000 mcg    thiamine (Vitamin B-1) tablet 100 mg  100 mg    QUEtiapine (SEROquel) tablet 12.5 mg  12.5 mg    atorvastatin (Lipitor) tablet 40 mg  40 mg    bumetanide (Bumex) tablet 1 mg  1 mg    carvedilol (Coreg) tablet 12.5 mg  12.5 mg    multivitamin tablet 1 Tablet  1 Tablet    tamsulosin (Flomax) capsule 0.4 mg  0.4 mg    traZODone (Desyrel) tablet 100 mg  100 mg    acetaminophen (Tylenol) tablet 650 mg  650 mg    ondansetron (Zofran) syringe/vial injection 4 mg  4 mg    ondansetron (Zofran ODT) dispertab 4 mg  4 mg    labetalol (Normodyne/Trandate) injection 10 mg  10 mg    insulin lispro (HumaLOG,AdmeLOG) subcutaneous injection  1-6 Units    And    dextrose 50% (D50W) injection 25 g  25 g    fluticasone-umeclidinium-vilanterol (Trelegy Ellipta) 200-62.5-25 mcg/act inhaler 1 Puff  1 Puff       Allergies:  Allergies   Allergen Reactions    Iodine Unspecified    Naproxen Unspecified         Physical Exam:  Vitals: /76   Pulse 68   Temp 36.5 °C (97.7 °F) (Temporal)   Resp 18   Ht 1.803 m (5' 11\")   Wt 116 kg (256 lb 6.3 oz)   SpO2 94%   Gen: NAD  Head: NC/AT  Eyes/ Nose/ Mouth: PERRLA, moist mucous membranes  Cardio: RRR, good distal perfusion, warm extremities  Pulm: normal respiratory effort, no cyanosis   Abd: Soft NTND, negative borborygmi   Ext: No peripheral edema. No calf tenderness. No clubbing. 3+ Pedal edema right, 2 + pre-tibial edema right    Mental status: Awake and appropriate. Can't remember where he lives. Son cites some night time confusion  Speech: fluent, no aphasia or dysarthria    Motor:      Upper Extremity  Myotome R L   Shoulder flexion C5 4+/5 5   Elbow flexion C5 4+/5 5   Wrist extension C6 5 5   Elbow extension C7 4+/5 5   Finger flexion C8 5 5   Finger " abduction T1 5 5     Lower Extremity Myotome R L   Hip flexion L2 3/5 5   Knee extension L3 4/5 5   Ankle dorsiflexion L4 5 5   Toe extension L5 5 5   Ankle plantarflexion S1 5 5     Skin:    1/26 sacrum       1/26 right hallux        1/26 left hallux     Labs: Reviewed and significant for   Recent Labs     01/26/25  0535 01/26/25  1744 01/27/25  0109   RBC 4.02* 4.41* 4.36*   HEMOGLOBIN 12.4* 13.6* 13.4*   HEMATOCRIT 37.0* 40.7* 40.5*   PLATELETCT 241 236 260     Recent Labs     01/26/25  0535 01/26/25  1744 01/27/25  0109   SODIUM 140 134* 136   POTASSIUM 4.2 4.4 3.7   CHLORIDE 105 101 101   CO2 28 22 25   GLUCOSE 106* 105* 142*   BUN 12 13 12   CREATININE 1.1 0.89 1.01   CALCIUM 8.5 8.4* 8.1*     Recent Results (from the past 24 hours)   POCT glucose device results    Collection Time: 01/27/25  9:37 AM   Result Value Ref Range    POC Glucose, Blood 123 (H) 65 - 99 mg/dL   POCT glucose device results    Collection Time: 01/27/25  1:16 PM   Result Value Ref Range    POC Glucose, Blood 166 (H) 65 - 99 mg/dL   POCT glucose device results    Collection Time: 01/27/25  6:01 PM   Result Value Ref Range    POC Glucose, Blood 144 (H) 65 - 99 mg/dL   POCT glucose device results    Collection Time: 01/27/25  9:37 PM   Result Value Ref Range    POC Glucose, Blood 162 (H) 65 - 99 mg/dL         ASSESSMENT:  Patient is a 79 y.o. male admitted with frequent falls     Rehabilitation: Impaired ADLs and mobility  Barriers to transfer include: Insurance authorization, TCCs to verify disposition, medical clearance and bed availability. All cases are subject to administrative review.     [facility] Code / Diagnosis to Support: 0002.22 - Brain Dysfunction: Traumatic, Closed Injury    Disposition recommendations:  - Good candidate for IPR.  Patient has deficits with mobility and ADLs secondary to closed head injury.  Patient has good discharge support from son, and a stable discharge environment which is single story home.   - TCC to verify DC  support   -PMR to follow in the periphery for rehab appropriateness, please reach out with questions or request for medical management    Medical Complexity:    Closed head injury   - Secondary to frequent falls   - CT evidence of ICH  - Holding Xarelto, would appreciate recommendations on when to restart   - Has some night time confusion   - Needs comprehensive PT/OT and SLP   - Good candidate for IPR     Pelvic fracture   - CT evidence of S5 fracture   - WBAT   - Has right 'hip' pain   - Continue PT/OT   - Needs to be trained on walker     B12 deficiency   - Supplementing 1000 mcg daily     Paroxysmal Atrial fib   - On xarelto at home     DM 2  - A1c 7%  - SSI 0-6 units     HTN   - Bumex 1mg BID    Sundowning   - Seroquel 12.5mg QHS       DVT PPX: SCDs      Thank you for allowing us to participate in the care of this patient.     Total time: >80 minutes. This includes time spent reviewing patient's history, notes, labs, imaging, vitals, medications, examining patient, discussing care with patient and family including prognosis, risk reduction, benefits of treatment, and options for next stage of care, and communicating recommendations to primary team.     Hebert Lino, DO   Physical Medicine and Rehabilitation     Please note that this dictation was created using voice recognition software. I have made every reasonable attempt to correct obvious errors, but there may be errors of grammar and possibly content that I did not discover before finalizing the note.

## 2025-01-29 ENCOUNTER — APPOINTMENT (OUTPATIENT)
Dept: OCCUPATIONAL THERAPY | Facility: REHABILITATION | Age: 80
DRG: 949 | End: 2025-01-29
Attending: PHYSICAL MEDICINE & REHABILITATION
Payer: MEDICARE

## 2025-01-29 ENCOUNTER — APPOINTMENT (OUTPATIENT)
Dept: SPEECH THERAPY | Facility: REHABILITATION | Age: 80
DRG: 949 | End: 2025-01-29
Attending: PHYSICAL MEDICINE & REHABILITATION
Payer: MEDICARE

## 2025-01-29 ENCOUNTER — APPOINTMENT (OUTPATIENT)
Dept: RADIOLOGY | Facility: REHABILITATION | Age: 80
DRG: 949 | End: 2025-01-29
Attending: PHYSICAL MEDICINE & REHABILITATION
Payer: MEDICARE

## 2025-01-29 ENCOUNTER — APPOINTMENT (OUTPATIENT)
Dept: PHYSICAL THERAPY | Facility: REHABILITATION | Age: 80
DRG: 949 | End: 2025-01-29
Attending: PHYSICAL MEDICINE & REHABILITATION
Payer: MEDICARE

## 2025-01-29 LAB
25(OH)D3 SERPL-MCNC: 32 NG/ML (ref 30–100)
ALBUMIN SERPL BCP-MCNC: 3 G/DL (ref 3.2–4.9)
ALBUMIN/GLOB SERPL: 0.9 G/DL
ALP SERPL-CCNC: 62 U/L (ref 30–99)
ALT SERPL-CCNC: 14 U/L (ref 2–50)
ANION GAP SERPL CALC-SCNC: 12 MMOL/L (ref 7–16)
APPEARANCE UR: CLEAR
AST SERPL-CCNC: 26 U/L (ref 12–45)
BASOPHILS # BLD AUTO: 0.2 % (ref 0–1.8)
BASOPHILS # BLD: 0.02 K/UL (ref 0–0.12)
BILIRUB SERPL-MCNC: 1 MG/DL (ref 0.1–1.5)
BILIRUB UR QL STRIP.AUTO: NEGATIVE
BUN SERPL-MCNC: 14 MG/DL (ref 8–22)
CALCIUM ALBUM COR SERPL-MCNC: 9.2 MG/DL (ref 8.5–10.5)
CALCIUM SERPL-MCNC: 8.4 MG/DL (ref 8.5–10.5)
CHLORIDE SERPL-SCNC: 101 MMOL/L (ref 96–112)
CO2 SERPL-SCNC: 22 MMOL/L (ref 20–33)
COLOR UR: ABNORMAL
CREAT SERPL-MCNC: 0.89 MG/DL (ref 0.5–1.4)
EOSINOPHIL # BLD AUTO: 0.03 K/UL (ref 0–0.51)
EOSINOPHIL NFR BLD: 0.2 % (ref 0–6.9)
ERYTHROCYTE [DISTWIDTH] IN BLOOD BY AUTOMATED COUNT: 49.8 FL (ref 35.9–50)
EST. AVERAGE GLUCOSE BLD GHB EST-MCNC: 151 MG/DL
GFR SERPLBLD CREATININE-BSD FMLA CKD-EPI: 87 ML/MIN/1.73 M 2
GLOBULIN SER CALC-MCNC: 3.2 G/DL (ref 1.9–3.5)
GLUCOSE BLD STRIP.AUTO-MCNC: 160 MG/DL (ref 65–99)
GLUCOSE BLD STRIP.AUTO-MCNC: 166 MG/DL (ref 65–99)
GLUCOSE BLD STRIP.AUTO-MCNC: 176 MG/DL (ref 65–99)
GLUCOSE BLD STRIP.AUTO-MCNC: 188 MG/DL (ref 65–99)
GLUCOSE SERPL-MCNC: 147 MG/DL (ref 65–99)
GLUCOSE UR STRIP.AUTO-MCNC: >=1000 MG/DL
HBA1C MFR BLD: 6.9 % (ref 4–5.6)
HCT VFR BLD AUTO: 40.5 % (ref 42–52)
HGB BLD-MCNC: 13.5 G/DL (ref 14–18)
IMM GRANULOCYTES # BLD AUTO: 0.09 K/UL (ref 0–0.11)
IMM GRANULOCYTES NFR BLD AUTO: 0.7 % (ref 0–0.9)
KETONES UR STRIP.AUTO-MCNC: ABNORMAL MG/DL
LEUKOCYTE ESTERASE UR QL STRIP.AUTO: NEGATIVE
LYMPHOCYTES # BLD AUTO: 0.95 K/UL (ref 1–4.8)
LYMPHOCYTES NFR BLD: 7.2 % (ref 22–41)
MCH RBC QN AUTO: 30.9 PG (ref 27–33)
MCHC RBC AUTO-ENTMCNC: 33.3 G/DL (ref 32.3–36.5)
MCV RBC AUTO: 92.7 FL (ref 81.4–97.8)
MICRO URNS: ABNORMAL
MONOCYTES # BLD AUTO: 1.65 K/UL (ref 0–0.85)
MONOCYTES NFR BLD AUTO: 12.5 % (ref 0–13.4)
NEUTROPHILS # BLD AUTO: 10.44 K/UL (ref 1.82–7.42)
NEUTROPHILS NFR BLD: 79.2 % (ref 44–72)
NITRITE UR QL STRIP.AUTO: NEGATIVE
NRBC # BLD AUTO: 0 K/UL
NRBC BLD-RTO: 0 /100 WBC (ref 0–0.2)
PH UR STRIP.AUTO: 6 [PH] (ref 5–8)
PLATELET # BLD AUTO: 274 K/UL (ref 164–446)
PMV BLD AUTO: 9.8 FL (ref 9–12.9)
POTASSIUM SERPL-SCNC: 4 MMOL/L (ref 3.6–5.5)
PROT SERPL-MCNC: 6.2 G/DL (ref 6–8.2)
PROT UR QL STRIP: NEGATIVE MG/DL
RBC # BLD AUTO: 4.37 M/UL (ref 4.7–6.1)
RBC UR QL AUTO: NEGATIVE
SODIUM SERPL-SCNC: 135 MMOL/L (ref 135–145)
SP GR UR STRIP.AUTO: 1.03
TSH SERPL DL<=0.005 MIU/L-ACNC: 1.38 UIU/ML (ref 0.38–5.33)
UROBILINOGEN UR STRIP.AUTO-MCNC: 1 EU/DL
VIT B1 BLD-MCNC: 158 NMOL/L (ref 70–180)
WBC # BLD AUTO: 13.2 K/UL (ref 4.8–10.8)

## 2025-01-29 PROCEDURE — 82306 VITAMIN D 25 HYDROXY: CPT

## 2025-01-29 PROCEDURE — A9270 NON-COVERED ITEM OR SERVICE: HCPCS

## 2025-01-29 PROCEDURE — A9270 NON-COVERED ITEM OR SERVICE: HCPCS | Performed by: PHYSICAL MEDICINE & REHABILITATION

## 2025-01-29 PROCEDURE — 99223 1ST HOSP IP/OBS HIGH 75: CPT | Performed by: PHYSICAL MEDICINE & REHABILITATION

## 2025-01-29 PROCEDURE — 97535 SELF CARE MNGMENT TRAINING: CPT

## 2025-01-29 PROCEDURE — 94760 N-INVAS EAR/PLS OXIMETRY 1: CPT

## 2025-01-29 PROCEDURE — 97162 PT EVAL MOD COMPLEX 30 MIN: CPT

## 2025-01-29 PROCEDURE — 81003 URINALYSIS AUTO W/O SCOPE: CPT

## 2025-01-29 PROCEDURE — 71045 X-RAY EXAM CHEST 1 VIEW: CPT

## 2025-01-29 PROCEDURE — 94640 AIRWAY INHALATION TREATMENT: CPT

## 2025-01-29 PROCEDURE — 92523 SPEECH SOUND LANG COMPREHEN: CPT

## 2025-01-29 PROCEDURE — 770010 HCHG ROOM/CARE - REHAB SEMI PRIVAT*

## 2025-01-29 PROCEDURE — 82962 GLUCOSE BLOOD TEST: CPT | Mod: 91

## 2025-01-29 PROCEDURE — 84443 ASSAY THYROID STIM HORMONE: CPT

## 2025-01-29 PROCEDURE — 85025 COMPLETE CBC W/AUTO DIFF WBC: CPT

## 2025-01-29 PROCEDURE — 36415 COLL VENOUS BLD VENIPUNCTURE: CPT

## 2025-01-29 PROCEDURE — 700102 HCHG RX REV CODE 250 W/ 637 OVERRIDE(OP)

## 2025-01-29 PROCEDURE — 700102 HCHG RX REV CODE 250 W/ 637 OVERRIDE(OP): Performed by: PHYSICAL MEDICINE & REHABILITATION

## 2025-01-29 PROCEDURE — 97530 THERAPEUTIC ACTIVITIES: CPT

## 2025-01-29 PROCEDURE — 80053 COMPREHEN METABOLIC PANEL: CPT

## 2025-01-29 PROCEDURE — 83036 HEMOGLOBIN GLYCOSYLATED A1C: CPT

## 2025-01-29 RX ORDER — ENOXAPARIN SODIUM 100 MG/ML
40 INJECTION SUBCUTANEOUS DAILY
Status: DISCONTINUED | OUTPATIENT
Start: 2025-01-30 | End: 2025-02-11 | Stop reason: HOSPADM

## 2025-01-29 RX ADMIN — QUETIAPINE FUMARATE 12.5 MG: 25 TABLET ORAL at 20:25

## 2025-01-29 RX ADMIN — OMEPRAZOLE 20 MG: 20 CAPSULE, DELAYED RELEASE ORAL at 08:32

## 2025-01-29 RX ADMIN — CYANOCOBALAMIN TAB 500 MCG 1000 MCG: 500 TAB at 08:32

## 2025-01-29 RX ADMIN — TAMSULOSIN HYDROCHLORIDE 0.4 MG: 0.4 CAPSULE ORAL at 08:32

## 2025-01-29 RX ADMIN — SENNOSIDES AND DOCUSATE SODIUM 2 TABLET: 50; 8.6 TABLET ORAL at 20:25

## 2025-01-29 RX ADMIN — ATORVASTATIN CALCIUM 40 MG: 40 TABLET, FILM COATED ORAL at 20:29

## 2025-01-29 RX ADMIN — INSULIN LISPRO 1 UNITS: 100 INJECTION, SOLUTION INTRAVENOUS; SUBCUTANEOUS at 11:11

## 2025-01-29 RX ADMIN — INSULIN LISPRO 1 UNITS: 100 INJECTION, SOLUTION INTRAVENOUS; SUBCUTANEOUS at 08:22

## 2025-01-29 RX ADMIN — THERA TABS 1 TABLET: TAB at 08:32

## 2025-01-29 RX ADMIN — INSULIN LISPRO 1 UNITS: 100 INJECTION, SOLUTION INTRAVENOUS; SUBCUTANEOUS at 17:32

## 2025-01-29 RX ADMIN — INSULIN LISPRO 1 UNITS: 100 INJECTION, SOLUTION INTRAVENOUS; SUBCUTANEOUS at 20:20

## 2025-01-29 RX ADMIN — FLUTICASONE FUROATE, UMECLIDINIUM BROMIDE AND VILANTEROL TRIFENATATE: 200; 62.5; 25 POWDER RESPIRATORY (INHALATION) at 06:27

## 2025-01-29 RX ADMIN — TRAZODONE HYDROCHLORIDE 100 MG: 100 TABLET ORAL at 20:25

## 2025-01-29 RX ADMIN — Medication 100 MG: at 08:32

## 2025-01-29 ASSESSMENT — ACTIVITIES OF DAILY LIVING (ADL)
TOILETING: INDEPENDENT
BED_CHAIR_WHEELCHAIR_TRANSFER_DESCRIPTION: ADAPTIVE EQUIPMENT

## 2025-01-29 ASSESSMENT — GAIT ASSESSMENTS
DISTANCE (FEET): 10
ASSISTIVE DEVICE: PARALLEL BARS
DEVIATION: ANTALGIC;DECREASED HEEL STRIKE;DECREASED TOE OFF;OTHER (COMMENT)
GAIT LEVEL OF ASSIST: TOTAL ASSIST X 2

## 2025-01-29 ASSESSMENT — BRIEF INTERVIEW FOR MENTAL STATUS (BIMS)
WHAT DAY OF THE WEEK IS IT: CORRECT
ASKED TO RECALL BED: NO, COULD NOT RECALL
INITIAL REPETITION OF BED BLUE SOCK - FIRST ATTEMPT: 3
ASKED TO RECALL SOCK: NO, COULD NOT RECALL
WHAT YEAR IS IT: NO ANSWER
ASKED TO RECALL BLUE: YES, AFTER CUEING (A COLOR")"
WHAT MONTH IS IT: MISSED BY 6 DAYS TO 1 MONTH
BIMS SUMMARY SCORE: 6

## 2025-01-29 ASSESSMENT — PAIN DESCRIPTION - PAIN TYPE
TYPE: ACUTE PAIN
TYPE: ACUTE PAIN

## 2025-01-29 NOTE — FLOWSHEET NOTE
01/28/25 1618   Events/Summary/Plan   Events/Summary/Plan RT Assessment   Skin Integrity Intact   Protective Device Foam Pads   Location ears   Vital Signs   Pulse 76   Respiration 18   Pulse Oximetry 96 %   $ Pulse Oximetry (Spot Check) Yes   Respiratory Assessment   Respiratory Pattern Within Normal Limits   Level of Consciousness Alert   Chest Exam   Work Of Breathing / Effort Within Normal Limits   Breath Sounds   RUL Breath Sounds Clear   RML Breath Sounds Clear   RLL Breath Sounds Clear   PRATIK Breath Sounds Clear   LLL Breath Sounds Clear   Oxygen   O2 (LPM) 2   O2 Delivery Device Nasal Cannula   Non-Invasive Ventilation [facility]   Nocturnal CPAP or BIPAP BIPAP - Home Unit   Cleanliness and Damage Inspection Performed Yes   Settings (If Known) 14/11   FiO2 or LPM 2   Smoking History   Have you ever smoked Yes   Have you smoked in the last 12 months No

## 2025-01-29 NOTE — FLOWSHEET NOTE
01/29/25 0626   Events/Summary/Plan   Events/Summary/Plan mdi   Vital Signs   Pulse 66   Respiration 18   Pulse Oximetry 96 %   $ Pulse Oximetry (Spot Check) Yes   Respiratory Assessment   Level of Consciousness Alert   Chest Exam   Work Of Breathing / Effort Within Normal Limits   Breath Sounds   RUL Breath Sounds Clear   RML Breath Sounds Clear   RLL Breath Sounds Clear   PRATIK Breath Sounds Clear   LLL Breath Sounds Clear   Oxygen   O2 (LPM) 2   O2 Delivery Device Silicone Nasal Cannula

## 2025-01-29 NOTE — WOUND TEAM
[facility]  Inpatient Services  Initial Wound and Skin Care Evaluation    Admission Date: 2025     Last order of IP CONSULT TO WOUND CARE was found on 2025 from Hospital Encounter on 2025     HPI, PMH, SH: Reviewed    Past Surgical History:   Procedure Laterality Date    PB SHLDR ARTHROSCOP,SURG,W/ROTAT CUFF REPB Right 2020    Procedure: RIGHT SHOULDER ARTHROSCOPY ROTATOR CUFF REPAIR, SUBACROMIAL DECOMPRESSION, DISTAL CLAVICLE RESECTION;  Surgeon: Jayant Goznales M.D.;  Location: [facility];  Service: Orthopedics    GASTROSCOPY-ENDO N/A 2015    Procedure: GASTROSCOPY-ENDO;  Surgeon: Torsten Tariq M.D.;  Location: [facility];  Service:     COLONOSCOPY - ENDO  2014    Performed by Torsten Tariq M.D. at [facility]    ZZZ CARDIAC CATH  2012    stent in LAD    Z CARDIAC CATH      placement of stent in LAD    CHOLECYSTECTOMY      CATARACT EXTRACTION WITH IOL           COLONOSCOPY      benign polyps found    EGD ESOPHAGUS WITH ENDOSCOPIC US      Barretts esophagus found    SHOULDER ARTHROSCOPY W/ ROTATOR CUFF REPAIR  2008    [facility]    KNEE ARTHROPLASTY TOTAL  2007    [facility]    TOE FUSION  2006     left foot    ORIF, WRIST Right 1992    AMPUTATION, TOE      left foot 2nd toe    OTHER CARDIAC SURGERY      pace maker    TONSILLECTOMY       Social History     Tobacco Use    Smoking status: Former     Current packs/day: 0.00     Types: Cigarettes     Quit date: 1975     Years since quittin.5    Smokeless tobacco: Former     Quit date: 2007   Substance Use Topics    Alcohol use: Yes     Comment: rare     No chief complaint on file.    Diagnosis: Intracranial hemorrhage (HCC) [I62.9]    Unit where seen by Wound Team: [facility]     WOUND CONSULT RELATED TO:  Bi-lateral feet, buttocks    WOUND TEAM PLAN OF CARE - Frequency of Follow-up:   Nursing to follow dressing orders written for wound care.  Contact wound team if area fails to progress, deteriorates or with any questions/concerns if something comes up before next scheduled follow up (See below as to whether wound is following and frequency of wound follow up)   Not following, consult as needed  - n/a    WOUND HISTORY:   Patient with bi-lateral abrasions to feet from [facility]. Pt has neuropathy and edema at baseline.       WOUND ASSESSMENT/LDA  Wound 01/25/25 Foot Left (Active)   Date First Assessed/Time First Assessed: 01/25/25 2325   Present on Original Admission: Yes  Location: Foot  Laterality: Left      Assessments 1/29/2025  1:00 PM   Wound Image     Site Assessment Intact;Dry;Scabbed   Periwound Assessment Red;Normal Temperature   Drainage Amount None   Dressing Status Open to Air   Wound Team Following Not following       Wound 01/25/25 Foot Anterior Right Wound (Active)   Date First Assessed/Time First Assessed: 01/25/25 2330   Present on Original Admission: Yes  Hand Hygiene Completed: Yes  Location: Foot  Wound Orientation: Anterior  Laterality: Right  Wound Description (Comments): Wound      Assessments 1/29/2025  1:00 PM   Wound Image     Site Assessment Dry;Intact;Scabbed   Periwound Assessment Dry;Intact   Dressing Status Open to Air   Wound Team Following Not following        Vascular:    CATALINA:   No results found.    Lab Values:    Lab Results   Component Value Date/Time    WBC 13.2 (H) 01/29/2025 11:34 AM    RBC 4.37 (L) 01/29/2025 11:34 AM    HEMOGLOBIN 13.5 (L) 01/29/2025 11:34 AM    HEMATOCRIT 40.5 (L) 01/29/2025 11:34 AM    CREACTPROT 7.8 (H) 04/01/2023 10:15 AM    SEDRATEWES 16 12/14/2017 02:50 PM    HBA1C 6.9 (H) 01/29/2025 11:34 AM         Culture Results show:  No results found for this or any previous visit (from the past 720 hours).    Pain Level/Medicated:  None, Tolerated without pain medication       INTERVENTIONS BY WOUND TEAM:  Chart and images reviewed. Discussed with bedside RN. All areas of concern (based on picture review,  LDA review and discussion with bedside RN) have been thoroughly assessed. Documentation of areas based on significant findings. This RN in to assess patient. Performed standard wound care which includes appropriate positioning, dressing removal and non-selective debridement. Pictures and measurements obtained weekly if/when required.    Wound:  Left foot  Cleansed/Non-selectively Debrided with:  Wound cleanser and Gauze  ABILIO , moisturizer applied     Wound:  Right foot  Cleansed/Non-selectively Debrided with:  Wound cleanser and Gauze  ABILIO, moisturizer applied    Advanced Wound Care Discharge Planning  Number of Clinicians necessary to complete wound care: 1  Is patient requiring IV pain medications for dressing changes:  No   Length of time for dressing change 30 min. (This does not include chart review, pre-medication time, set up, clean up or time spent charting.)    Interdisciplinary consultation: Patient, Bedside RN (Rossy)anna .  Pressure injury and staging reviewed with N/A.    EVALUATION / RATIONALE FOR TREATMENT:     Date:  01/29/25  Wound Status:  Initial evaluation    Bi-lateral toes with abrasions. Pt states he got these when he was trying to help his wife when she fell causing him to have a fall as well. He reports that he has edema and neuropathy at baseline. Educated on monitoring feet daily to check for wounds and make sure they are free from s/s infection. Suggested compression for the legs but patient refused stating he has had that before and hates it. Pt was in too much pain to turn ion bed to check buttocks he is currently on a waffle matress,photo does show some redness spoke with bedside nurse will check for blanching when he stands for therapy.          Goals: Steady decrease in wound area and depth weekly.    NURSING PLAN OF CARE ORDERS:  Skin care: See Skin Care orders    NUTRITION RECOMMENDATIONS   Wound Team Recommendations:  N/A    DIET ORDERS (From admission to next 24h)        Start     Ordered    01/28/25 1626  Diet Order Diet: Consistent CHO (Diabetic)  ALL MEALS        Question:  Diet:  Answer:  Consistent CHO (Diabetic)    01/28/25 1625                    PREVENTATIVE INTERVENTIONS:    Q shift Thanh - performed per nursing policy  Q shift pressure point assessments - performed per nursing policy    Surface/Positioning  Waffle overlay  - Currently in Place    Mobilization      Therapy working with patient      Anticipated discharge plans:  Self/Family Care        Vac Discharge Needs:  Vac Discharge plan is purely a recommendation from wound team and not a requirement for discharge unless otherwise stated by physician.  Not Applicable Pt not on a wound vac

## 2025-01-29 NOTE — H&P
Physical Medicine & Rehabilitation  History and Physical (H&P)  &     Post Admission Physician Evaluation (EMIL)       Date of Admission: 1/28/2025  Date of Service: 1/29/2025   Vince Carr    [facility] Code to Support Admission: 0002.22 - Brain Dysfunction: Traumatic, Closed Injury  Etiologic Diagnosis: Intracranial hemorrhage (HCC)    _______________________________________________    Chief Complaint: Decreased mobility    History of Present Illness:  Adapted from the PM&R Consult by Dr. Lino:   The patient is a 79 y.o. right hand dominant male with a past medical history of atrial fibrillation on Xarelto, coronary artery disease, diabetes, hypertension, frequent falls;  who presented on 1/26/2025  3:10 PM as a transfer from [facility] with ICH.  Patient had 2 ground-level falls, reported due to weakness, no loss of consciousness but he does take Xarelto.  For this episode he was found down by neighbors who activated EMS.  Initial CT head was negative but repeat CT head due to anticoagulation showed ICH and patient was transferred to [facility].  CT head here on 1/25 found questionable ICH that was stable     Patient tolerated transfer. He reports low back pain which he has a S5 fracture per CT imaging. He reports poor sleep. He reports no SOB. Denies NVD.     Review of Systems:     Comprehensive 14 point ROS was reviewed and all were negative except as noted elsewhere in this document.     Past Medical History:  Past Medical History:   Diagnosis Date    Arthritis     osteo    Perez's esophagus     COPD (chronic obstructive pulmonary disease) ([facility])     on O2 at night / uses inhalers daily    Dental disorder      pt has partials    Gastroesophageal reflux disease with esophagitis 5/20/2020    HTN (hypertension)     on Losartan    Hypertension     Indigestion     GERD     Myocardial infarct (HCC) 2011 and 2018    stent in LAD, stent in rad    Snoring        Past Surgical History:  Past Surgical History:    Procedure Laterality Date    PB SHLDR ARTHROSCOP,SURG,W/ROTAT CUFF REPB Right 5/20/2020    Procedure: RIGHT SHOULDER ARTHROSCOPY ROTATOR CUFF REPAIR, SUBACROMIAL DECOMPRESSION, DISTAL CLAVICLE RESECTION;  Surgeon: Jayant Gonzales M.D.;  Location: [facility];  Service: Orthopedics    GASTROSCOPY-ENDO N/A 6/22/2015    Procedure: GASTROSCOPY-ENDO;  Surgeon: Torsten Tariq M.D.;  Location: [facility];  Service:     COLONOSCOPY - ENDO  8/13/2014    Performed by Torsten Tariq M.D. at [facility]    Z CARDIAC CATH  9/27/2012    stent in LAD    ZZZ CARDIAC CATH  2012    placement of stent in LAD    CHOLECYSTECTOMY  2012    CATARACT EXTRACTION WITH IOL  2011         COLONOSCOPY  2009    benign polyps found    EGD ESOPHAGUS WITH ENDOSCOPIC US  2009    Barretts esophagus found    SHOULDER ARTHROSCOPY W/ ROTATOR CUFF REPAIR  2008    R & L    KNEE ARTHROPLASTY TOTAL  2007    R & L    TOE FUSION  2006     left foot    ORIF, WRIST Right 1992    AMPUTATION, TOE      left foot 2nd toe    OTHER CARDIAC SURGERY      pace maker    TONSILLECTOMY         Family History:  Family History   Problem Relation Age of Onset    Cancer Father 95        Prostate    Multiple Sclerosis Sister        Medications:  Current Facility-Administered Medications   Medication Dose    Pharmacy Consult Request ...Pain Management Review 1 Each  1 Each    hydrALAZINE (Apresoline) tablet 25 mg  25 mg    acetaminophen (Tylenol) tablet 650 mg  650 mg    senna-docusate (Pericolace Or Senokot S) 8.6-50 MG per tablet 2 Tablet  2 Tablet    And    polyethylene glycol/lytes (Miralax) Packet 1 Packet  1 Packet    docusate sodium (Enemeez) enema 283 mg  283 mg    magnesium hydroxide (Milk Of Magnesia) suspension 30 mL  30 mL    omeprazole (PriLOSEC) capsule 20 mg  20 mg    carboxymethylcellulose (Refresh Tears) 0.5 % ophthalmic drops 1 Drop  1 Drop    benzocaine-menthol (Cepacol) lozenge 1 Lozenge  1 Lozenge    mag  hydrox-al hydrox-simeth (Maalox Plus Es Or Mylanta Ds) suspension 20 mL  20 mL    ondansetron (Zofran ODT) dispertab 4 mg  4 mg    Or    ondansetron (Zofran) syringe/vial injection 4 mg  4 mg    traZODone (Desyrel) tablet 50 mg  50 mg    sodium chloride (Ocean) 0.65 % nasal spray 2 Spray  2 Spray    oxyCODONE immediate-release (Roxicodone) tablet 5 mg  5 mg    Or    oxyCODONE immediate release (Roxicodone) tablet 10 mg  10 mg    midazolam (VERSED) 5 mg/mL (1 mL vial)  5 mg    atorvastatin (Lipitor) tablet 40 mg  40 mg    bumetanide (Bumex) tablet 1 mg  1 mg    carvedilol (Coreg) tablet 12.5 mg  12.5 mg    cyanocobalamin (Vitamin B-12) tablet 1,000 mcg  1,000 mcg    fluticasone-umeclidinium-vilanterol (Trelegy Ellipta) 200-62.5-25 mcg/act inhaler 1 Puff  1 Puff    insulin lispro (HumaLOG,AdmeLOG) subcutaneous injection  1-6 Units    And    dextrose 50% (D50W) injection 25 g  25 g    multivitamin tablet 1 Tablet  1 Tablet    QUEtiapine (SEROquel) tablet 12.5 mg  12.5 mg    tamsulosin (Flomax) capsule 0.4 mg  0.4 mg    thiamine (Vitamin B-1) tablet 100 mg  100 mg    traZODone (Desyrel) tablet 100 mg  100 mg       Allergies:  Iodine and Naproxen    Psychosocial History:  Housing / Facility: 1 Story House  Steps Into Home: 1  Steps In Home: 0  Lives with - Patient's Self Care Capacity: Alone and Unable to Care For Self  Equipment Owned: 4-Wheel Walker, Single Point Cane, Grab Bar(s) By Toilet, Tub / Shower Seat     Prior Level of Function / Living Situation:   Physical Therapy: Prior Services: Home-Independent  Housing / Facility: 1 Story House  Steps Into Home: 1  Steps In Home: 0  Rail: None  Bathroom Set up: Walk In Shower, Shower Chair, Grab Bars  Equipment Owned: 4-Wheel Walker, Single Point Cane, Grab Bar(s) By Toilet, Tub / Shower Seat  Lives with - Patient's Self Care Capacity: Alone and Unable to Care For Self  Bed Mobility: Independent  Transfer Status: Independent  Ambulation: Independent  Assistive Devices  "Used: Single Point Cane  Stairs: Independent  Current Level of Function:   Gait Level Of Assist: Minimal Assist  Assistive Device: Front Wheel Walker  Distance (Feet): 45 (2 seated rest breaks)  Deviation: Bradykinetic, Decreased Heel Strike, Decreased Toe Off, Other (Comment) (Asymmetrical step & stride length, forward trunk lean)  Supine to Sit: Moderate Assist  Scooting: Minimal Assist  Rolling: Moderate Assist to Lt.  Comments: HOB slightly elevated ,use of bed rail  Sit to Stand: Minimal Assist  Bed, Chair, Wheelchair Transfer: Minimal Assist  Toilet Transfers: Minimal Assist  Transfer Method: Stand Step  Sitting in Chair: post session  Sitting Edge of Bed: 5 min  Standin min total  Occupational Therapy:   Self Feeding: Independent  Grooming / Hygiene: Independent  Bathing: Independent  Dressing: Independent  Toileting: Independent  Medication Management: Independent  Laundry: Independent  Kitchen Mobility: Independent  Finances: Independent  Home Management: Independent  Shopping: Independent  Prior Level Of Mobility: Independent With Device in Community  Driving / Transportation: Driving Independent  Prior Services: Home-Independent  Housing / Facility: [address] [place] [address] House  Occupation (Pre-Hospital Vocational): Not Employed  Current Level of Function:   Eating: Independent  Upper Body Dressing: Minimal Assist  Lower Body Dressing: Maximal Assist  Toileting: Minimal Assist (assist for thoroughness)    CURRENT LEVEL OF FUNCTION:   Same as level of function prior to admission to [facility]    Physical Examination:     VITAL SIGNS:   height is 1.803 m (5' 11\") and weight is 110 kg (243 lb 9.7 oz). His oral temperature is 36.7 °C (98.1 °F). His blood pressure is 115/60 and his pulse is 89. His respiration is 18 and oxygen saturation is 90%.   GENERAL: No apparent distress  HEENT: Normocephalic/atraumatic, EOMI, and PERRL  CARDIAC: Regular rate and rhythm, normal S1, S2   LUNGS: Clear to " auscultation   ABDOMINAL: soft and nontender    EXTREMITIES: no contractures, spasticity, or edema.   NEURO:  Mental status: answers questions appropriately follows commands  Speech: fluent, no aphasia or dysarthria  CRANIAL NERVES:  Face symmetric    Motor:    5/5 BUE  4/5 B HF/KE, 4+/5 BLE  Sensory: intact to light touch through out  DTRs: 2+ in bilateral biceps and patellar tendons    Radiology:                 Results for orders placed during the hospital encounter of 01/26/25    MR-BRAIN-W/O    Impression  T1 shortening noted within the posterior septum pellucidum and adjacent bilateral fornices more prominent on the left side. This is an unusual location for a bleed, however given the signal characteristics, this may represent subacute hemorrhage. Another  alternative possibility is a long-standing lesion such as a lipoma that is partially calcified. There is no evidence of layering intraventricular hemorrhage or subarachnoid hemorrhage. Comparison with prior remote examinations of the brain would be  helpful, if available. Recommend MRI brain with contrast follow-up in 6 weeks.        Laboratory Values:  Recent Labs     01/26/25  1744 01/27/25  0109 01/29/25  0630   SODIUM 134* 136 135   POTASSIUM 4.4 3.7 4.0   CHLORIDE 101 101 101   CO2 22 25 22   GLUCOSE 105* 142* 147*   BUN 13 12 14   CREATININE 0.89 1.01 0.89   CALCIUM 8.4* 8.1* 8.4*     Recent Labs     01/26/25  1744 01/27/25  0109 01/29/25  1134   WBC 9.6 10.5 13.2*   RBC 4.41* 4.36* 4.37*   HEMOGLOBIN 13.6* 13.4* 13.5*   HEMATOCRIT 40.7* 40.5* 40.5*   MCV 92.3 92.9 92.7   MCH 30.8 30.7 30.9   MCHC 33.4 33.1 33.3   RDW 49.2 49.1 49.8   PLATELETCT 236 260 274   MPV 9.7 9.4 9.8           Primary Rehabilitation Diagnosis:    This patient is a 79 y.o. male admitted for acute inpatient rehabilitation with Intracranial hemorrhage (HCC).    Impairments:   Cognitive  ADLs/IADLs  Mobility    Secondary Diagnosis/Medical Co-morbidities Affecting  Function  HTN/CAD/A fib  S5 fracture  Hyponatremia  Anemia  Leukocytosis  DM2 with hyperglycemia      Relevant Changes Since Preadmission Evaluation:    Status unchanged    The patient's rehabilitation potential is Good  The patient's medical prognosis is good    Rehabilitation Plan:   Discussion and Recommendations:   1. The patient requires an acute inpatient rehabilitation program with a coordinated program of care at an intensity and frequency not available at a lower level of care. This recommendation is substantiated by the patient's medical physicians who recommend that the patient's intervention and assessment of medical issues needs to be done at an acute level of care for patient's safety and maximum outcome.   2. A coordinated program of care will be supplied by an interdisciplinary team of physical therapy, occupational therapy, rehab physician, rehab nursing, and, if needed, speech therapy and rehab psychology. Rehab team presents a patient-specific rehabilitation and education program concentrating on prevention of future problems related to accessibility, mobility, skin, bowel, bladder, sexuality, and psychosocial and medical/surgical problems.   3. Need for Rehabilitation Physician: The rehab physician will be evaluating the patient on a multi-weekly basis to help coordinate the program of care. The rehab physician communicates between medical physicians, therapists, and nurses to maximize the patient's potential outcome. Specific areas in which the rehab physician will be providing daily assessment include the following:   A. Assessing the patient's heart rate and blood pressure response (vitals monitoring) to activity and making adjustments in medications or conservative measures as needed.   B. The rehab physician will be assessing the frequency at which the program can be increased to allow the patient to reach optimal functional outcome.   C. The rehab physician will also provide assessments in  daily skin care, especially in light of patient's impairments in mobility.   D. The rehab physician will provide special expertise in understanding how to work with functional impairment and recommend appropriate interventions, compensatory techniques, and education that will facilitate the patient's outcome.   4. Rehab R.N.   The rehab RN will be working with patient to carry over in room mobility and activities of daily living when the patient is not in 3 hours of skilled therapy. Rehab nursing will be working in conjunction with rehab physician to address all the medical issues above and continue to assess laboratory work and discuss abnormalities with the treating physicians, assess vitals, and response to activity, and discuss and report abnormalities with the rehab physician. Rehab RN will also continue daily skin care, supervise bladder/bowel program, instruct in medication administration, and ensure patient safety.   5. Rehab Therapy: Therapies to treat at intensity and frequency of (may change after completion of evaluation by all therapeutic disciplines):       PT:  Physical therapy to address mobility, transfer, gait training and evaluation for adaptive equipment needs 1 hour/day at least 5 days/week for the duration of the ELOS (see below)       OT:  Occupational therapy to address ADLs, self-care, home management training, functional mobility/transfers and assistive device evaluation, and community re-integration 1  hour/day at least 5 days/week for the duration of the ELOS (see below).        ST/Dysphagia:  Speech therapy to address speech, language, and cognitive deficits as well as swallowing difficulties with retraining/dysphagia management and community re-integration with comprehension, expression, cognitive training 1  hour/day at least 5 days/week for the duration of the ELOS (see below).     Medical management / Rehabilitation Issues/ Adverse Potential as part of rehabilitation plan      Rehabilitation Issues/Adverse Potential  1.  SDH - Patient with fall on 1/26/25 with SDH with conservative management. Patient demonstrates functional deficits in strength, balance, coordination, and ADL's. Patient is admitted to [facility] for comprehensive rehabilitation therapy as described below.   Rehabilitation nursing monitors bowel and bladder control, educates on medication administration, co-morbidities and monitors patient safety.    2.  Neurostimulants: None at this time but continue to assess daily for need to initiate should status change.    3.  DVT prophylaxis:  Patient is on Lovenox for anticoagulation upon transfer. Encourage OOB. Monitor daily for signs and symptoms of DVT including but not limited to swelling and pain to prevent the development of DVT that may interfere with therapies.    4.  GI prophylaxis:  On prilosec to prevent gastritis/dyspepsia which may interfere with therapies.    5.  Pain: No issues with pain currently / Controlled with APAP/Oxycodone    6.  Nutrition/Dysphagia: Dietician monitors nutrient intake, recommend supplements prn and provide nutrition education to pt/family to promote optimal nutrition for wound healing/recovery.     7.  Bladder/bowel:  Start bowel and bladder program as described below, to prevent constipation, urinary retention (which may lead to UTI), and urinary incontinence (which will impact upon pt's functional independence).   - Post void bladder scans, I&O cath for PVRs >400  - up to commode after meal     8.  Skin/dermal ulcer prophylaxis: Monitor for new skin conditions with q.2 h. turns as required to prevent the development of skin breakdown.     9.  Cognition/Behavior: As needed psychologist provides adjustment counseling to illness and psychosocial barriers that may be potential barriers to rehabilitation.     10. Respiratory therapy: RT performs O2 management prn, breathing retraining, pulmonary hygiene and bronchospasm  management prn to optimize participation in therapies.     Medical Co-Morbidities/Adverse Potential Affecting Function:   SDH - Patient with fall on 1/26/25 with SDH with conservative management  -PT and OT for mobility and ADLs. Per guidelines, 15 hours per week between PT, OT and/or SLP.  -Follow-up NSG. On Seroquel 12.5 mg QHS    HTN/CAD/A fib - Patient on Bumex, Coreg 12.5 mg    S5 fracture - Conservative management. PRN Tylenol and Oxycodone     Hyponatremia - Check AM CMP    Anemia - Check AM CBC    Leukocytosis - Check AM CBC    DM2 with hyperglycemia - Patient on SSI on transfer    BPH - Patient on Tamsulosin 0.4 mg daily    Insomnia - Patient on Trazodone 100 mg daily    COPD - Patient on Trelegy daily    Pain - Patient on PRN Tylenol and Oxycodone     Skin - Patient at risk for skin breakdown due to debility in areas including sacrum, achilles, elbows and head in addition to other sites. Nursing to assess skin daily.     GI Ppx - Patient on Prilosec for GERD prophylaxis. Patient on Senna-docusate for constipation prophylaxis.   Last BM:  (Prior to arrival)    DVT Ppx - Patient Lovenox on transfer.    I personally performed a complete drug regimen review and no potential clinically significant medication issues were identified.     Goals/Expected Level of Function Based on Current Medical and Functional Status:  (may change based on patient's medical status and rate of impairment recovery)  Transfers:   Supervision  Mobility/Gait:   Supervision  ADL's:   Supervision  Cognition:  Supervision    DISPOSITION: Discharge to pre-morbid independent living setting with the supportive care of patient's family.    ELOS: 10-14 days  ____________________________________    T. Car Chan MD/PhD  [facility] - Physical Medicine & Rehabilitation   [facility] - Brain Injury Medicine   ____________________________________    Pt was seen today for 76 min. Time spent included pre-admission screening, pre-admission review of vitals  and laboratory values/tests, unit/floor time, face-to-face time with the patient including physical examination, care coordination, counseling of patient and/or family, ordering medications/procedures/tests, discussion with other healthcare providers, and/or documentation in the electronic medical record.

## 2025-01-29 NOTE — THERAPY
Occupational Therapy   Initial Evaluation     Patient Name: Vince Carr  Age:  79 y.o., Sex:  male  Medical Record #: [id number]  Today's Date: 1/29/2025     Subjective    Pt in bed upon arrival, agreeable to participate in OT.      Objective     01/29/25 0701   OT Charge Group   Charges Yes   OT Self Care / ADL (Units) 1   OT Total Time Spent   OT Individual Total Time Spent (Mins) 60   Prior Living Situation   Prior Services None;Home-Independent   Housing / Facility [address] [facility]  ([place])   Steps Into Home 0  (threshold only)   Bathroom Set up Walk In Shower;Shower Glass Doors;Shower Chair   Equipment Owned 4-Wheel Walker;Single Point Cane;Tub / Shower Seat;Grab Bar(s) By Toilet;Sock Aid   Lives with - Patient's Self Care Capacity Alone and Able to Care For Self  (pt reports spouse passed away January 1st of this year)   Comments Pt reports he lives alone, spouse of 30+ years recently passed away (1/1/25); reports one of his sons (from [place]) will stay with him temporarily following DC from rehab.   Prior Level of ADL Function   Self Feeding Independent   Grooming / Hygiene Independent   Bathing Independent   Dressing Independent   Toileting Independent   Comments w/ sock aid, no AD   Prior Level of IADL Function   Medication Management Independent   Laundry Independent   Kitchen Mobility Independent   Finances   (pt reports spouse previously responsible for finance mgmt however she passed away earlier this month)   Home Management Independent   Shopping Independent   Prior Level Of Mobility Independent With Device in Community;Independent Without Device in Home  (SPC for outdoor mobility)   Driving / Transportation Driving Independent   Occupation (Pre-Hospital Vocational) Retired Due To Age  (Retired )   Prior Functioning: Everyday Activities   Self Care Independent   Indoor Mobility (Ambulation) Independent   Stairs Independent   Functional Cognition Independent   Prior Device  "Use None of the given options  (pt reports he used SPC for community mobility)   Vitals   O2 Delivery Device Room air w/o2 available   Cognition    Level of Consciousness Alert   Cognitive Pattern Assessment   Cognitive Pattern Assessment Used BIMS   Brief Interview for Mental Status (BIMS)   Repetition of Three Words (First Attempt) 3   Temporal Orientation: Year No answer  (\"I don't know\")   Temporal Orientation: Month Missed by 6 days to 1 month  (\"February\")   Temporal Orientation: Day Correct   Recall: \"Sock\" No, could not recall   Recall: \"Blue\" Yes, after cueing (\"a color\")   Recall: \"Bed\" No, could not recall   BIMS Summary Score 6   Confusion Assessment Method (CAM)   Is there evidence of an acute change in mental status from the patient's baseline? Yes  (decreased memory)   Inattention Behavior not present   Disorganized thinking Behavior not present   Altered level of consciousness Behavior not present   Vision Screen   Vision   (History of cataract surgery)   Active ROM Upper Body   Active ROM Upper Body  X   Dominant Hand Right   Strength Upper Body   Upper Body Strength  X   Comments Impaired proximal RUE strength due to shoulder pain; RUE supination limited to neutral; endorsed history of B rotator cuff surgery   Upper Body Muscle Tone   Upper Body Muscle Tone  WDL   Bed Mobility    Supine to Sit Standby Assist   Sit to Stand Minimal Assist   Scooting Contact Guard Assist   Hearing, Speech, and Vision   Ability to Hear Adequate   Ability to See in Adequate Light Adequate   Expression of Ideas and Wants Without difficulty   Understanding Verbal and Non-Verbal Content Understands   Functional Level of Assist   Eating Stand by Assist  (set-up)   Grooming Standby Assist  (set-up)   Bathing Minimal Assist   Upper Body Dressing Stand by Assist  (set-up to don t-shirt while seated)   Lower Body Dressing Maximal Assist   Toileting Minimal Assist   Bed, Chair, Wheelchair Transfer Minimal Assist   Toilet " Transfers Minimal Assist   Tub / Shower Transfers Minimal Assist   Eating   Assistance Needed Set-up / clean-up   CARE Score - Eating 5   Oral Hygiene   Assistance Needed Set-up / clean-up   CARE Score - Oral Hygiene 5   Shower/Bathe Self   Assistance Needed Physical assistance   Physical Assistance Level 25% or less   CARE Score - Shower/Bathe Self 3   Upper Body Dressing   Assistance Needed Set-up / clean-up   CARE Score - Upper Body Dressing 5   Lower Body Dressing   Assistance Needed Physical assistance   Physical Assistance Level 51%-75%   CARE Score - Lower Body Dressing 2   Putting On/Taking Off Footwear   Assistance Needed Physical assistance   Physical Assistance Level 51%-75%   CARE Score - Putting On/Taking Off Footwear 2   Toileting Hygiene   Assistance Needed Physical assistance   Physical Assistance Level 25% or less   CARE Score - Toileting Hygiene 3   Toilet Transfer   Assistance Needed Physical assistance   Physical Assistance Level 25% or less   CARE Score - Toilet Transfer 3   Problem List   Problem List Decreased Active Daily Living Skills;Decreased Homemaking Skills;Decreased Upper Extremity Strength Right;Decreased Upper Extremity AROM Right;Decreased Functional Mobility;Decreased Activity Tolerance;Safety Awareness Deficits / Cognition;Impaired Cognitive Function;Impaired Postural Control / Balance   Precautions   Precautions Fall Risk   Current Discharge Plan   Current Discharge Plan Return to Prior Living Situation   Benefit    Therapy Benefit Patient Would Benefit from Inpatient Rehab Occupational Therapy to Maximize [place] with ADLs, IADLs and Functional Mobility.   Interdisciplinary Plan of Care Collaboration   IDT Collaboration with  Physical Therapist   Patient Position at End of Therapy Seated;Self Releasing Lap Belt Applied   Collaboration Comments CLOF, POC   OT DME Recommendations   Bathroom Equipment   (pt owns shower chair)   Additional Equipment   (TBD; pt owns sock aid)    Strengths & Barriers   Strengths Willingly participates in therapeutic activities;Supportive family;Pleasant and cooperative;Motivated for self care and independence;Manages pain appropriately;Independent prior level of function   Barriers Pain;Limited mobility;Impaired balance;Impaired activity tolerance;Impaired functional cognition;Generalized weakness;Fatigue   Occupational Therapist Assigned   Assigned OT / Treatment Time / Comments ISAAC Mon-Thurs     Gordon w/ RN re: skin check for distal BLEs.     Assessment  Patient is 79 y.o. male with a diagnosis of intracranial hemorrhage. Per H&P. pt w/ a past medical history of atrial fibrillation on Xarelto, coronary artery disease, diabetes, hypertension, frequent falls; who presented on 1/26/2025 as a transfer from [facility] with ICH. Patient had 2 ground-level falls, reported due to weakness, no loss of consciousness. For this episode he was found down by neighbors who activated EMS. Initial CT head was negative but repeat CT head due to anticoagulation showed ICH and patient was transferred to [facility].     At baseline, pt reports he was independent with ADLs, IADLs and functional mobility (no AD for household distances and SPC for community mobility). Spouse of 30+ years recently passed away. Pt resides in single story home in [place] w/ 0 SUZANNA (threshold only), [facility] w/ shower chair and  shower head. Pt reports his son from [place] plans to stay w/ him temporarily upon DC from rehab. At time of OT eval, pt presents below functional baseline w/ primary barriers being pain (sacral and R shoulder), decreased memory, limited standing balance, decreased endurance and impaired mobility. Pt will benefit from daily skilled OT to address above impairments to maximize functional safety and independence prior to DC home.     Plan  Recommend Occupational Therapy  minutes per day 5-7 days per week for 2 weeks for the following treatments:  OT Cognitive Skill Dev,  OT Community Reintegration, OT Manual Ther Technique, OT Neuro Re-Ed/Balance, OT Sensory Int Techniques, OT Therapeutic Activity, OT Evaluation, and OT Therapeutic Exercise.    Passport items to be completed:  Perform bathroom transfers, complete dressing, complete feeding, get ready for the day, prepare a simple meal, participate in household tasks, adapt home for safety needs, demonstrate home exercise program, complete caregiver training     Goals:  Long term and short term goals have been discussed with patient and they are in agreement.    Occupational Therapy Goals (Active)       Problem: Dressing       Dates: Start:  01/29/25         Goal: STG-Within one week, patient will dress LB w/ mod A and AE as needed       Dates: Start:  01/29/25               Problem: Functional Transfers       Dates: Start:  01/29/25         Goal: STG-Within one week, patient will transfer to toilet w/ CGA w/ LRAD       Dates: Start:  01/29/25            Goal: STG-Within one week, patient will transfer to step in shower w/ CGA w/ LRAD       Dates: Start:  01/29/25               Problem: OT Long Term Goals       Dates: Start:  01/29/25         Goal: LTG-By discharge, patient will complete basic self care tasks w/ mod I for UB ADLs and supervision for LB ADLs       Dates: Start:  01/29/25            Goal: LTG-By discharge, patient will perform bathroom transfers w/ supervision w/ LRAD       Dates: Start:  01/29/25            Goal: LTG-By discharge, patient will complete basic home management w/ supervision w/ LRAD        Dates: Start:  01/29/25

## 2025-01-29 NOTE — CARE PLAN
"  Problem: Fall Risk - Rehab  Goal: Patient will remain free from falls  Outcome: Progressing   Ximena Cosme Fall risk Assessment Score: 19    High fall risk Interventions   - Bed and strip alarm   - Yellow sign by the door   - Yellow wrist band \"Fall risk\"  - Room near to the nurse station  - Do not leave patient unattended in the bathroom  - Fall risk education provided  - Call light within reach   - Yellow  socks   - Belongings within reach   - Bed in the lowest position        Problem: Pain - Standard  Goal: Alleviation of pain or a reduction in pain to the patient’s comfort goal  Outcome: Progressing   Patient is able to rate pain on -10 scale. Declines pain interventions at this time. Will continue to monitor.   "

## 2025-01-29 NOTE — CARE PLAN
The patient is Watcher - Medium risk of patient condition declining or worsening    Shift Goals  Clinical Goals: Safety  Patient Goals: Rest    Progress made toward(s) clinical / shift goals:    Problem: Knowledge Deficit - Standard  Goal: Patient and family/care givers will demonstrate understanding of plan of care, disease process/condition, diagnostic tests and medications  Outcome: Progressing     Problem: Fall Risk - Rehab  Goal: Patient will remain free from falls  Note: Patient uses call light consistently and appropriately. Patient waits for assistance and does not attempt to self transfer this shift. Patient is able to verbalize needs.

## 2025-01-29 NOTE — PROGRESS NOTES
NEW ADMIT FALL PREVENTION EDUCATION                                    AND INTERVENTION       Fall Prevention Education Video watched: Yes  Strip Alarm in place: Yes  Alarming seatbelt No   Does patient need a posey bed?: No   Does patient have the right size non-skid sock?: Yes      Admitting RN:  Lise Dhillon

## 2025-01-29 NOTE — PROGRESS NOTES
1543 Pt arrived at [facility] from [facility] via transport. Dr. Chan to follow. Initial assessment initiated. Pt oriented to room and facility routine and safety measures; pt education binder provided and discussed. Pt A/O x 3, continent of bowel and bladder. Mod assist for transfers. All wounds photographed and documented; photos uploaded to . Pt denies pain or discomfort at this time. Pt positioned for comfort in bed. Call light within reach, safety measures in place.

## 2025-01-29 NOTE — DISCHARGE PLANNING
CASE MANAGEMENT INITIAL ASSESSMENT    Admit Date:  2025     I met with pt  to discuss role of case management / discharge planning / team conference.  Tc to pt's son Morgan Carr @ [phone] to obtain info for assessment.      Patient is a  79 y.o. male transferred from [facility] where he was hospitalized from  to .     Dc disposition:  Pending depending on pt's needs; lives alone in [place]; spouse  25; no steps to enter; 1 threshold  has fww/4ww/home o2-does not consistently use either; spouse was managing finances; pt had difficulty managing bills; [facility] followed in th past; +advanced directive; + trust; pt has 2 small dogs @ home; resources are not available for assisted living /prison home.     Other support- Son Gianluca in  [place]  [phone]  PCP:  Gela ZULETA   Urology:  Dr Bell    Diagnosis: Intracranial hemorrhage (HCC) [I62.9]    Co-morbidities:   Patient Active Problem List    Diagnosis Date Noted    B12 deficiency 2025    SDH (subdural hematoma) ([facility]) 2025    Trauma 2025    Sacral fracture ([facility]) 2025    Intracranial hemorrhage ([facility]) 2025    Declining functional status 2025    Falls frequently 2025    Grief reaction 2025    Edema of both legs 2025    Dryness of eyelid, right 2025    Non-insulin dependent type 2 diabetes mellitus ([facility]) 2025    Gram-positive bacteremia 2024    Septic shock ([facility]) 2024    Bacterial pneumonia 2024    KENDY (acute kidney injury) ([facility]) 2024    Leukocytosis 2024    Lactic acidosis 2024    Hypomagnesemia 2024    Peripheral neuropathy 2022    Chronic heart failure with preserved ejection fraction (HCC) 2021    History of MI (myocardial infarction) 2021    Obstructive sleep apnea syndrome 2021    MRI safe cardiac pacemaker in situ 2021    ACP (advance care planning) 2021    PAF (paroxysmal  atrial fibrillation) ([facility]) 02/02/2021    Cellulitis 01/26/2021    Type 2 diabetes mellitus with renal complication ([facility]) 01/25/2021    Body mass index (BMI)40.0-44.9, adult 01/25/2021    Chronic gastritis without bleeding 01/25/2021    Diaphragmatic hernia without obstruction and without gangrene 01/25/2021    Diastolic dysfunction 01/25/2021    Dyslipidemia 01/25/2021    Hypoxia 01/25/2021    Long term current use of oral hypoglycemic drug 01/25/2021    Moderate persistent asthma without complication 01/25/2021    Non-rheumatic mitral regurgitation 01/25/2021    Morbid obesity ([facility]) 01/25/2021    AV block, 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    Bradycardia 12/30/2020    Other emphysema ([facility]) 05/20/2020    Hypertension 05/20/2020    CAD in native artery 05/20/2020    Angina pectoris ([facility]) 05/20/2020    Gastroesophageal reflux disease with esophagitis 05/20/2020    Traumatic complete tear of right rotator cuff 05/20/2020    S/P lumbar fusion 06/05/2018    Difficulty walking 06/05/2018    Insomnia, unspecified 03/29/2018    Perez's esophagus with dysplasia, unspecified 03/25/2018    Gastro-esophageal reflux disease without esophagitis 03/25/2018    Hyperlipidemia, unspecified 03/25/2018    Anterior soft tissue impingement 03/25/2018    Unspecified osteoarthritis, unspecified site 03/25/2018    Fusion of spine, lumbar region 03/25/2018    Muscle weakness (generalized) 03/25/2018    Old myocardial infarction 03/25/2018    Other symbolic dysfunctions 03/25/2018    Postlaminectomy syndrome, not elsewhere classified 03/25/2018    Chronic bilateral low back pain 01/04/2018     Prior Living Situation:  Housing / Facility: 1 Story House  Lives with - Patient's Self Care Capacity: Alone and Able to Care For Self    Prior Level of Function:  Medication Management: Independent  Finances:  (pt reports spouse previously responsible for finance mgmt however she passed away earlier this month)  Home Management: Independent  Shopping:  Independent  Prior Level Of Mobility: Independent With Device in Community, Independent Without Device in Home (SPC for outdoor mobility)  Driving / Transportation: Driving Independent      Other Information:  Occupation (Pre-Hospital Vocational): Retired Due To Age (Retired )      Patient / Family Goal:   Hopefully return home if he is able to live alone;  pt's son and his wife are able to stay at pt's home [place] on a temporary basis.     Plan:  1. Continue to follow patient through hospitalization and provide discharge planning in collaboration with patient, family, physicians and ancillary services.     2. Utilize community resources to ensure a safe discharge.     3.  Home health; follow up with pcp/urology

## 2025-01-29 NOTE — THERAPY
"Speech Language Pathology   Initial Assessment     Patient Name: Vince Carr  AGE:  79 y.o., SEX:  male  Medical Record #: [id number]  Today's Date: 1/29/2025     Subjective    Patient agreeable to evaluation but declined to get OOB.   Patient seen at bedside.  Patient admited to acute hospital on 1/26/25 after [facility] with subsequent ICH.     Objective       01/29/25 1331   Evaluation Charges   Charges Yes   SLP Speech Language Evaluation Speech Sound Language Comprehension   SLP Total Time Spent   SLP Individual Total Time Spent (Mins) 60   Precautions   Precautions Fall Risk   Prior Living Situation   Prior Services None   Housing / Facility 1 [address] House   Lives with - Patient's Self Care Capacity Alone and Unable to Care For Self  (per history from pre-admission screening.)   Prior Level Of Function   Communication Within Functional Limits   Hearing Within Functional Limits for Evaluation   Vision Within Functional Limits for Evaluation   Patient's Primary Language English   Occupation (Pre-Hospital Vocational) Retired Due To Age   Comments retired    Written Language Expression   Dominant Hand Right   Cognition    Orientation Level Not Oriented to Year   Level of Consciousness Alert   Comments \" my memory is shot\"   Cognition - Detailed   Cognitive-Linguistic (WDL) X   Simple Attention Minimal (4)   Moderate Attention Moderate (3)   Prospective Memory Severe (2)   Functional Memory Activities Severe (2)   Functional Problem Solving Minimal (4)   ABS (Agitated Behavior Scale)   Agitated Behavior Scale Performed No   Functional Level of Assist   Comprehension Modified Independent   Comprehension Description Other (comment)  (extra time)   Expression Modified Independent   Expression Description Other (comment)  (Extra time)   Social Interaction Independent   Problem Solving Minimal Assist   Problem Solving Description Seat belt;Increased time;Bed/chair alarm;Supervision;Therapy schedule;Verbal " cueing   Memory Maximal Assist   Memory Description Seat belt;Increased time;Bed/chair alarm;Supervision;Therapy schedule;Verbal cueing   Outcome Measures   Outcome Measures Utilized SCCAN   SCCAN (Scales of Cognitive and Communicative Ability for Neurorehabilitation)   Oral Expression - Raw Score 17   Oral Expression - Scale Performance Score 89   Orientation - Raw Score 10   Orientation - Scale Performance Score 83   Memory - Raw Score 5   Memory - Scale Performance Score 26   Speech Comprehension - Raw Score 12   Speech Comprehension - Scale Performance Score 92   Reading Comprehension - Raw Score 8   Reading Comprehension - Scale Performance Score 67   Writing - Raw Score 7   Writing - Scale Performance Score 100   Attention - Raw Score 10   Attention - Scale Performance Score 63   Problem Solving - Raw Score 18   Problem Solving - Scale Performance Score 78   SCCAN Total Raw Score 67   SCCAN Degree of Severity Moderate Impairment   Problem List   Problem List Cognitive-Linguistic Deficits;Attention Deficit;Reading Comprehension Deficit;Memory Deficit   Current Discharge Plan   Current Discharge Plan Return to Prior Living Situation  (Pre-admission scree reported that family can provide 24/7 ? for how long.)   Benefit   Therapy Benefit Patient would benefit from Inpatient Rehab Speech-Language Pathology to address above identified deficits.   Strengths & Barriers   Strengths Able to follow instructions;Motivated for self care and independence;Pleasant and cooperative;Supportive family;Willingly participates in therapeutic activities   Barriers Confused;Generalized weakness;Impaired activity tolerance;Impaired carryover of learning;Impaired functional cognition;Pain  (Patient complains of back and left shoulder pain.)   Speech Language Pathologist Assigned   Assigned SLP / Treatment Time / Comments LM 60 cog         Assessment    Adapted from the PM&R Consult by Dr. Lino:   The patient is a 79 y.o. right hand  dominant male with a past medical history of atrial fibrillation on Xarelto, coronary artery disease, diabetes, hypertension, frequent falls;  who presented on 1/26/2025  3:10 PM as a transfer from [facility] with ICH.  Patient had 2 ground-level falls, reported due to weakness, no loss of consciousness but he does take Xarelto.  For this episode he was found down by neighbors who activated EMS.  Initial CT head was negative but repeat CT head due to anticoagulation showed ICH and patient was transferred to [facility].  CT head here on 1/25 found questionable ICH that was stable.  Additional factors influencing patient status/progress (ie: cognitive factors, co-morbidities, social support, etc): Patient is recently .  He was living alone with frequent falls.  Pre-admission screen indicated that patient lived alone and was unable to care for self and that family can help 24/7, but it is unclear for how long.     Patient was seen for cognitive linguistic evaluation.  SCCAN administered.  Patient achieved a total raw score of 67 characteristic of an overall moderate cognitive linguistic impairment.  Patient achieved the following percentage scores for given subtests:  Oral Expression 89, Orientation 83, Memory 26, Speech Comprehension 92, Reading Comprehension 67, Writing 100, Attention  63, Problem solving 78.  Patient displayed mild impairment of verbal problem solving and orientation.  He displayed moderate impairment of attention and reading comprehension ( likely due to impaired attention).  He displayed severe impairment of memory.    Reviewed results of SCCAN assessment with patient and discussed goals and role of ST.      Plan  Recommend Speech Therapy 30-60 minutes per day 5-6 days per week for 2-3 weeks for the following treatments:  SLP Self Care / ADL Training , SLP Cognitive Skill Development, and SLP Group Treatment.    Passport items to be completed:  Express basic needs, understand food/liquid  recommendations, consistently follow swallow precautions, manage finances, manage medications, arrive to therapy appointments on time, complete daily memory log entries, solve problems related to safety situations, review education related to hospitalization, complete caregiver training     Goals:  Long term and short term goals have been discussed with patient and they are in agreement.    Speech Therapy Problems (Active)       Problem: Memory STGs       Dates: Start:  01/29/25         Goal: STG-Within one week, patient will recall daily events and new training with 50% acc with external and internal memory aids and strategies, with mod to max cues.       Dates: Start:  01/29/25               Problem: Problem Solving STGs       Dates: Start:  01/29/25         Goal: STG-Within one week, patient will perform selective attention tasks with 75% acc with min A.       Dates: Start:  01/29/25               Problem: Speech/Swallowing LTGs       Dates: Start:  01/29/25         Goal: LTG-By discharge, patient will solve complex problem and recall safety training with 80% acc with set up or spv.       Dates: Start:  01/29/25

## 2025-01-29 NOTE — PROGRESS NOTES
NURSING DAILY NOTE    Name: Vince Carr   Date of Admission: 1/28/2025   Admitting Diagnosis: Intracranial hemorrhage (HCC)  Attending Physician: MAGALY RICHARDSON M.D.  Allergies: Iodine and Naproxen    Safety  Patient Assist  Mod  Patient Precautions     Precaution Comments     Bed Transfer Status     Toilet Transfer Status      Assistive Devices  Rails, Wheelchair  Oxygen  Nasal Cannula  Diet/Therapeutic Dining  Current Diet Order   Procedures    Diet Order Diet: Consistent CHO (Diabetic)     Pill Administration  whole  Agitated Behavioral Scale     ABS Level of Severity       Fall Risk  Has the patient had a fall this admission?   No  Ximena Aguiar Fall Risk Scoring  16, HIGH RISK  Fall Risk Safety Measures  bed alarm, chair alarm, poor balance, and low vision/ hearing    Vitals  Temperature: 36.5 °C (97.7 °F)  Temp src: Oral  Pulse: 73  Respiration: 18  Blood Pressure : 126/73  Blood Pressure MAP (Calculated): 91 MM HG  BP Location: Right, Upper Arm  Patient BP Position: De Leon's Position     Oxygen  Pulse Oximetry: 96 %  O2 (LPM): 2  O2 Delivery Device: Nasal Cannula    Bowel and Bladder  Last Bowel Movement   (Prior to arrival)  Stool Type     Bowel Device     Continent  Bladder: Did not void   Bowel: No movement  Bladder Function     Genitourinary Assessment   Bladder Assessment (WDL):  WDL Except    Skin  Thanh Score   17  Sensory Interventions   Bed Types: Standard/Trauma Mattress  Skin Preventative Measures: Pillows in Use for Support / Positioning  Moisture Interventions  Moisturizers/Barriers: Barrier Wipes      Pain  Pain Rating Scale  0 - No Pain  Pain Location  Arm, Shoulder  Pain Location Orientation  Right  Pain Interventions   Declines    ADLs    Bathing      Linen Change      Personal Hygiene     Chlorhexidine Bath      Oral Care     Teeth/Dentures     Shave     Nutrition Percentage Eaten     Environmental Precautions      Patient Turns/Positioning  Supine  Patient Turns Assistance/Tolerance     Bed Positions     Head of Bed Elevated         Psychosocial/Neurologic Assessment  Psychosocial Assessment  Psychosocial (WDL):  Within Defined Limits  Neurologic Assessment  Neuro (WDL): Exceptions to WDL  Level of Consciousness: Alert  Orientation Level: Oriented X4  Cognition: Appropriate safety awareness, Follows commands  Speech: Clear  Pupil Assesment: No  EENT (WDL):  WDL Except    Cardio/Pulmonary Assessment  Edema   RLE Edema: 3+  LLE Edema: 3+  Respiratory Breath Sounds  RUL Breath Sounds: Clear  RML Breath Sounds: Clear  RLL Breath Sounds: Clear  PRATIK Breath Sounds: Clear  LLL Breath Sounds: Clear  Cardiac Assessment   Cardiac (WDL):  WDL Except

## 2025-01-29 NOTE — FLOWSHEET NOTE
01/28/25 1620   Protocol Assessment   Initial Assessment Yes   Patient History   Pulmonary Diagnosis MARSHA/COPD   Procedures Relevant to Respiratory Status none   Home O2 Yes   Oxygen liter flow 2   Oxygen at night only No   Nocturnal CPAP Yes   Nocturnal CPAP Pressures 14/11   Nocturnal CPAP Oxygen liter flow 2   Home Treatments/Frequency   (Doesn't use his home meds)   Sleep Apnea Screening   Have you had a sleep study? Yes   Have you been diagnosed with sleep apnea? Yes   Do you use a CPAP/BIPAP/AUTOPAP? Yes   Machine Home Setting   (14/11)   COPD Risk Screening   Do you have a history of COPD? Yes   Do you have a Pulmonologist? Yes

## 2025-01-29 NOTE — THERAPY
"Physical Therapy   Initial Evaluation     Patient Name: Vince Carr  Age:  79 y.o., Sex:  male  Medical Record #: [id number]  Today's Date: 1/29/2025     Subjective    Pt reported 0/10 R shoulder and hip pain at rest, and 9-10 pain with movement. In regards to use of SPC, pt said, \"Without it, I'd fall forward\".      Objective       01/29/25 1001   PT Charge Group   PT Therapeutic Activities (Units) 1   PT Evaluation PT Evaluation Mod   PT Total Time Spent   PT Individual Total Time Spent (Mins) 60   Precautions   Precautions Fall Risk   Vitals   Pulse Oximetry 90 %   O2 (LPM) 2   Vitals Comments Pt requested to use 2L with activity   Pain 0 - 10 Group   Location Shoulder;Hip   Location Orientation Right   Therapist Pain Assessment 0;Prior to Activity;During Activity;9;10   Cognition    Orientation Level Not Oriented to Day   Comments Intermittent confusion noted regarding a lost hat and fall \"earlier today\".   Gait Functional Level of Assist    Gait Level Of Assist Total Assist X 2  (min A + wc follow, declined another bout)   Assistive Device Parallel Bars   Distance (Feet) 10   # of Times Distance was Traveled 1   Deviation Antalgic;Decreased Heel Strike;Decreased Toe Off;Other (Comment)  (excessive hip ER R > L)   Wheelchair Functional Level of Assist   Wheelchair Assist Supervised   Distance Wheelchair (Feet or Distance) 30   Wheelchair Description Limited by fatigue   Stairs Functional Level of Assist   Level of Assist with Stairs Unable to Participate  (fatigued over level ground, delcined further functional assessment)   Transfer Functional Level of Assist   Bed, Chair, Wheelchair Transfer Minimal Assist   Bed Chair Wheelchair Transfer Description Adaptive equipment  (reach pivot wc <> hospital bed)   Bed Mobility    Supine to Sit Standby Assist   Sit to Supine Standby Assist   Sit to Stand Minimal Assist   Scooting Minimal Assist   Rolling Standby Assist   Interdisciplinary Plan of Care Collaboration "   IDT Collaboration with  Family / Caregiver   Patient Position at End of Therapy In Bed;Family / Friend in Room;Call Light within Reach;Tray Table within Reach;Phone within Reach   Collaboration Comments Son present at end of session, POC   PT DME Recommendations   Assistive Device   (Pt has FWW, 4WW, SPT, QC)   Physical Therapist Assigned   Assigned PT / Treatment Time / Comments 60   Strengths & Barriers   Strengths Motivated for self care and independence;Pleasant and cooperative;Supportive family;Willingly participates in therapeutic activities   Barriers Decreased endurance;Fatigue;Impaired activity tolerance;Impaired balance;Impaired insight/denial of deficits;Impaired functional cognition;Limited mobility;Pain  (lives alone, hx of ~10 falls in past month)   Roll Left and Right   Assistance Needed Verbal cues;Adaptive equipment   CARE Score - Roll Left and Right 4   Sit to Lying   Assistance Needed Verbal cues;Adaptive equipment   CARE Score - Sit to Lying 4   Lying to Sitting on Side of Bed   Assistance Needed Verbal cues;Adaptive equipment   CARE Score - Lying to Sitting on Side of Bed 4   Sit to Stand   Assistance Needed Physical assistance;Adaptive equipment   Physical Assistance Level 25% or less   CARE Score - Sit to Stand 3   Chair/Bed-to-Chair Transfer   Assistance Needed Physical assistance;Adaptive equipment   Physical Assistance Level 25% or less   CARE Score - Chair/Bed-to-Chair Transfer 3   Car Transfer   Reason if not Attempted Safety concerns   CARE Score - Car Transfer 88   Walk 10 Feet   Assistance Needed Physical assistance  (Min A + wc follow)   Physical Assistance Level Total assistance   CARE Score - Walk 10 Feet 1   Walk 50 Feet with Two Turns   Reason if not Attempted Safety concerns   CARE Score - Walk 50 Feet with Two Turns 88   Walk 150 Feet   Reason if not Attempted Safety concerns   CARE Score - Walk 150 Feet 88   Walking 10 Feet on Uneven Surfaces   Reason if not Attempted Safety  concerns   CARE Score - Walking 10 Feet on Uneven Surfaces 88   1 Step (Curb)   Reason if not Attempted Safety concerns   CARE Score - 1 Step (Curb) 88   4 Steps   Reason if not Attempted Safety concerns   CARE Score - 4 Steps 88   12 Steps   Reason if not Attempted Safety concerns   CARE Score - 12 Steps 88   Picking Up Object   Reason if not Attempted Safety concerns   CARE Score - Picking Up Object 88   Wheel 50 Feet with Two Turns   Assistance Needed Adaptive equipment;Verbal cues  (limited by fatigue, anticipate household amb at D/C)   CARE Score - Wheel 50 Feet with Two Turns 4   Type of Wheelchair/Scooter Manual   Wheel 150 Feet   Reason if not Attempted Safety concerns   CARE Score - Wheel 150 Feet 88     Pt was educated on rehab expectations, Passport rationale, and POC.     Assessment  Patient is 79 y.o. male with a diagnosis of Intracranial hemorrhage/ SDH, with conservative management. CT of LBP showed S5 fracture, being managed conservatively.  Additional factors influencing patient status / progress (ie: cognitive factors, co-morbidities, social support, etc): past medical history of atrial fibrillation on Xarelto, coronary artery disease, diabetes, hypertension, frequent falls.  Pt lives alone, but reports that his son can stay with I'm temporarily. Pt demonstrated intermittent confusion.      Plan  Recommend Physical Therapy  minutes per day 5-7 days per week for 2 weeks for the following treatments:  PT Group Therapy, PT E Stim Attended, PT Orthotics Training, PT Gait Training, PT Therapeutic Exercises, PT TENS Application, PT Neuro Re-Ed/Balance, PT Aquatic Therapy, PT Therapeutic Activity, PT Manual Therapy, and PT Evaluation.    Passport items to be completed:  Get in/out of bed safely, in/out of a vehicle, safely use mobility device, walk or wheel around home/community, navigate up and down stairs, show how to get up/down from the ground, ensure home is accessible, demonstrate HEP,  "complete caregiver training    Goals:  Long term and short term goals have been discussed with patient and they are in agreement.    Physical Therapy Problems (Active)       Problem: Mobility       Dates: Start:  01/29/25         Goal: STG-Within one week, patient will ambulate household distance 25 ft x2 FWW CGA       Dates: Start:  01/29/25            Goal: STG-Within one week, patient will ambulate up/down a 2\" step in // bars min A       Dates: Start:  01/29/25               Problem: Mobility Transfers       Dates: Start:  01/29/25         Goal: STG-Within one week, patient will transfer bed to chair CGA SPT FWW       Dates: Start:  01/29/25               Problem: PT-Long Term Goals       Dates: Start:  01/29/25         Goal: LTG-By discharge, patient will ambulate 50 ft LRAD SPV/mod I indoors        Dates: Start:  01/29/25            Goal: LTG-By discharge, patient will transfer one surface to another SPV/mod I SPT LRAD       Dates: Start:  01/29/25            Goal: LTG-By discharge, patient will amb up/down threshold for SUZANNA home with LRAD SPV       Dates: Start:  01/29/25              "

## 2025-01-29 NOTE — PROGRESS NOTES
4 Eyes Skin Assessment Completed by SUZI Lezama and SUZI Aguila.    Head WDL  Ears WDL  Nose WDL  Mouth WDL  Neck WDL  Breast/Chest WDL  Shoulder Blades WDL  Spine WDL  (R) Arm/Elbow/Hand Scab  (L) Arm/Elbow/Hand WDL  Abdomen WDL  Groin Redness  Scrotum/Coccyx/Buttocks Redness and Blanching  (R) Leg WDL  (L) Leg Scab  (R) Heel/Foot/Toe Redness, Blanching, and Scab  (L) Heel/Foot/Toe Redness, Blanching, and Scab          Devices In Places Na      Interventions In Place Waffle Overlay    Possible Skin Injury Yes    Pictures Uploaded Into Epic Yes  Wound Consult Placed Yes  RN Wound Prevention Protocol Ordered Yes

## 2025-01-29 NOTE — PROGRESS NOTES
NURSING DAILY NOTE    Name: Vince Carr   Date of Admission: 1/28/2025   Admitting Diagnosis: Intracranial hemorrhage (HCC)  Attending Physician: MAGALY RICHARDSON M.D.  Allergies: Iodine and Naproxen    Safety  Patient Assist  mod  Patient Precautions     Precaution Comments     Bed Transfer Status     Toilet Transfer Status      Assistive Devices  Rails, Wheelchair  Oxygen  Nasal Cannula  Diet/Therapeutic Dining  Current Diet Order   Procedures    Diet Order Diet: Consistent CHO (Diabetic)     Pill Administration  whole  Agitated Behavioral Scale     ABS Level of Severity       Fall Risk  Has the patient had a fall this admission?   No  Ximena Aguiar Fall Risk Scoring  16, HIGH RISK  Fall Risk Safety Measures  bed alarm    Vitals  Temperature: 36.5 °C (97.7 °F)  Temp src: Oral  Pulse: 76  Respiration: 18  Blood Pressure : 108/69  Blood Pressure MAP (Calculated): 82 MM HG  BP Location: Right, Upper Arm  Patient BP Position: De Leon's Position     Oxygen  Pulse Oximetry: 96 %  O2 (LPM): 2  O2 Delivery Device: Nasal Cannula    Bowel and Bladder  Last Bowel Movement   (prior to 1/26 per report)  Stool Type     Bowel Device     Continent  Bladder: Did not void   Bowel: No movement  Bladder Function     Genitourinary Assessment   Bladder Assessment (WDL):  WDL Except    Skin  Thanh Score   17  Sensory Interventions   Bed Types: Standard/Trauma Mattress  Skin Preventative Measures: Pillows in Use for Support / Positioning  Moisture Interventions  Moisturizers/Barriers: Moisturizer       Pain  Pain Rating Scale  3 - Sometimes distracts me  Pain Location  Arm, Shoulder  Pain Location Orientation  Right  Pain Interventions   Declines    ADLs    Bathing      Linen Change      Personal Hygiene     Chlorhexidine Bath      Oral Care     Teeth/Dentures     Shave     Nutrition Percentage Eaten     Environmental Precautions     Patient Turns/Positioning      Patient Turns Assistance/Tolerance     Bed Positions     Head of Bed Elevated         Psychosocial/Neurologic Assessment  Psychosocial Assessment  Psychosocial (WDL):  Within Defined Limits  Neurologic Assessment  Neuro (WDL): Exceptions to WDL  Level of Consciousness: Alert  Orientation Level: Oriented X4 (confused at times per report)  Cognition: Follows commands  Speech: Clear  Pupil Assesment: No  EENT (WDL):  WDL Except    Cardio/Pulmonary Assessment  Edema   RLE Edema: 3+, 4+  LLE Edema: 3+, 4+  Respiratory Breath Sounds  RUL Breath Sounds: Clear  RML Breath Sounds: Clear  RLL Breath Sounds: Clear  PRATIK Breath Sounds: Clear  LLL Breath Sounds: Clear  Cardiac Assessment   Cardiac (WDL):  WDL Except (hx afib, cad, htn, mi with stent)

## 2025-01-30 ENCOUNTER — APPOINTMENT (OUTPATIENT)
Dept: PHYSICAL THERAPY | Facility: REHABILITATION | Age: 80
DRG: 949 | End: 2025-01-30
Attending: PHYSICAL MEDICINE & REHABILITATION
Payer: MEDICARE

## 2025-01-30 ENCOUNTER — APPOINTMENT (OUTPATIENT)
Dept: SPEECH THERAPY | Facility: REHABILITATION | Age: 80
DRG: 949 | End: 2025-01-30
Attending: PHYSICAL MEDICINE & REHABILITATION
Payer: MEDICARE

## 2025-01-30 ENCOUNTER — APPOINTMENT (OUTPATIENT)
Dept: RADIOLOGY | Facility: REHABILITATION | Age: 80
DRG: 949 | End: 2025-01-30
Attending: HOSPITALIST
Payer: MEDICARE

## 2025-01-30 ENCOUNTER — APPOINTMENT (OUTPATIENT)
Dept: OCCUPATIONAL THERAPY | Facility: REHABILITATION | Age: 80
DRG: 949 | End: 2025-01-30
Attending: PHYSICAL MEDICINE & REHABILITATION
Payer: MEDICARE

## 2025-01-30 PROBLEM — R07.9 CHEST PAIN: Status: ACTIVE | Noted: 2025-01-30

## 2025-01-30 LAB
EKG IMPRESSION: NORMAL
GLUCOSE BLD STRIP.AUTO-MCNC: 149 MG/DL (ref 65–99)
NT-PROBNP SERPL IA-MCNC: 815 PG/ML (ref 0–125)
TROPONIN T SERPL-MCNC: 23 NG/L (ref 6–19)

## 2025-01-30 PROCEDURE — 97116 GAIT TRAINING THERAPY: CPT

## 2025-01-30 PROCEDURE — 700102 HCHG RX REV CODE 250 W/ 637 OVERRIDE(OP): Performed by: PHYSICAL MEDICINE & REHABILITATION

## 2025-01-30 PROCEDURE — 97130 THER IVNTJ EA ADDL 15 MIN: CPT

## 2025-01-30 PROCEDURE — 93010 ELECTROCARDIOGRAM REPORT: CPT | Performed by: INTERNAL MEDICINE

## 2025-01-30 PROCEDURE — 97535 SELF CARE MNGMENT TRAINING: CPT

## 2025-01-30 PROCEDURE — 36415 COLL VENOUS BLD VENIPUNCTURE: CPT

## 2025-01-30 PROCEDURE — 99222 1ST HOSP IP/OBS MODERATE 55: CPT | Performed by: HOSPITALIST

## 2025-01-30 PROCEDURE — 82962 GLUCOSE BLOOD TEST: CPT

## 2025-01-30 PROCEDURE — 84484 ASSAY OF TROPONIN QUANT: CPT

## 2025-01-30 PROCEDURE — 83880 ASSAY OF NATRIURETIC PEPTIDE: CPT

## 2025-01-30 PROCEDURE — 94760 N-INVAS EAR/PLS OXIMETRY 1: CPT

## 2025-01-30 PROCEDURE — 700102 HCHG RX REV CODE 250 W/ 637 OVERRIDE(OP): Performed by: HOSPITALIST

## 2025-01-30 PROCEDURE — 770010 HCHG ROOM/CARE - REHAB SEMI PRIVAT*

## 2025-01-30 PROCEDURE — 94640 AIRWAY INHALATION TREATMENT: CPT

## 2025-01-30 PROCEDURE — 97530 THERAPEUTIC ACTIVITIES: CPT

## 2025-01-30 PROCEDURE — A9270 NON-COVERED ITEM OR SERVICE: HCPCS | Performed by: HOSPITALIST

## 2025-01-30 PROCEDURE — 700111 HCHG RX REV CODE 636 W/ 250 OVERRIDE (IP): Mod: JZ | Performed by: PHYSICAL MEDICINE & REHABILITATION

## 2025-01-30 PROCEDURE — A9270 NON-COVERED ITEM OR SERVICE: HCPCS | Performed by: PHYSICAL MEDICINE & REHABILITATION

## 2025-01-30 PROCEDURE — 99233 SBSQ HOSP IP/OBS HIGH 50: CPT | Performed by: PHYSICAL MEDICINE & REHABILITATION

## 2025-01-30 PROCEDURE — 97129 THER IVNTJ 1ST 15 MIN: CPT

## 2025-01-30 PROCEDURE — 71100 X-RAY EXAM RIBS UNI 2 VIEWS: CPT

## 2025-01-30 PROCEDURE — 93005 ELECTROCARDIOGRAM TRACING: CPT | Mod: TC | Performed by: HOSPITALIST

## 2025-01-30 RX ORDER — MONTELUKAST SODIUM 10 MG/1
10 TABLET ORAL NIGHTLY
Status: DISCONTINUED | OUTPATIENT
Start: 2025-01-30 | End: 2025-02-11 | Stop reason: HOSPADM

## 2025-01-30 RX ADMIN — Medication 100 MG: at 08:53

## 2025-01-30 RX ADMIN — ACETAMINOPHEN 650 MG: 325 TABLET ORAL at 15:13

## 2025-01-30 RX ADMIN — OMEPRAZOLE 20 MG: 20 CAPSULE, DELAYED RELEASE ORAL at 08:56

## 2025-01-30 RX ADMIN — BUMETANIDE 1 MG: 1 TABLET ORAL at 08:53

## 2025-01-30 RX ADMIN — FLUTICASONE FUROATE, UMECLIDINIUM BROMIDE AND VILANTEROL TRIFENATATE 1 PUFF: 200; 62.5; 25 POWDER RESPIRATORY (INHALATION) at 06:01

## 2025-01-30 RX ADMIN — TAMSULOSIN HYDROCHLORIDE 0.4 MG: 0.4 CAPSULE ORAL at 08:53

## 2025-01-30 RX ADMIN — ENOXAPARIN SODIUM 40 MG: 100 INJECTION SUBCUTANEOUS at 08:53

## 2025-01-30 RX ADMIN — ATORVASTATIN CALCIUM 40 MG: 40 TABLET, FILM COATED ORAL at 21:17

## 2025-01-30 RX ADMIN — QUETIAPINE FUMARATE 12.5 MG: 25 TABLET ORAL at 21:17

## 2025-01-30 RX ADMIN — OXYCODONE 5 MG: 5 TABLET ORAL at 10:40

## 2025-01-30 RX ADMIN — SENNOSIDES AND DOCUSATE SODIUM 2 TABLET: 50; 8.6 TABLET ORAL at 21:17

## 2025-01-30 RX ADMIN — CARVEDILOL 12.5 MG: 12.5 TABLET, FILM COATED ORAL at 08:53

## 2025-01-30 RX ADMIN — AMOXICILLIN AND CLAVULANATE POTASSIUM 1 TABLET: 875; 125 TABLET, FILM COATED ORAL at 21:19

## 2025-01-30 RX ADMIN — TRAZODONE HYDROCHLORIDE 100 MG: 100 TABLET ORAL at 21:17

## 2025-01-30 RX ADMIN — THERA TABS 1 TABLET: TAB at 08:53

## 2025-01-30 RX ADMIN — AMOXICILLIN AND CLAVULANATE POTASSIUM 1 TABLET: 875; 125 TABLET, FILM COATED ORAL at 12:33

## 2025-01-30 RX ADMIN — CYANOCOBALAMIN TAB 500 MCG 1000 MCG: 500 TAB at 08:53

## 2025-01-30 RX ADMIN — MONTELUKAST 10 MG: 10 TABLET, FILM COATED ORAL at 21:17

## 2025-01-30 ASSESSMENT — ENCOUNTER SYMPTOMS
BRUISES/BLEEDS EASILY: 0
EYES NEGATIVE: 1
FEVER: 0
PALPITATIONS: 0
SHORTNESS OF BREATH: 0
CHILLS: 0
VOMITING: 0
NAUSEA: 0
ABDOMINAL PAIN: 0
POLYDIPSIA: 0
COUGH: 0

## 2025-01-30 ASSESSMENT — GAIT ASSESSMENTS
DISTANCE (FEET): 85
DEVIATION: ANTALGIC;DECREASED HEEL STRIKE;DECREASED TOE OFF;OTHER (COMMENT)
ASSISTIVE DEVICE: FRONT WHEEL WALKER
GAIT LEVEL OF ASSIST: TOTAL ASSIST X 2

## 2025-01-30 ASSESSMENT — PAIN DESCRIPTION - PAIN TYPE
TYPE: ACUTE PAIN
TYPE: ACUTE PAIN

## 2025-01-30 ASSESSMENT — ACTIVITIES OF DAILY LIVING (ADL)
BED_CHAIR_WHEELCHAIR_TRANSFER_DESCRIPTION: VERBAL CUEING;SUPERVISION FOR SAFETY;SET-UP OF EQUIPMENT
BED_CHAIR_WHEELCHAIR_TRANSFER_DESCRIPTION: SET-UP OF EQUIPMENT;SUPERVISION FOR SAFETY;VERBAL CUEING

## 2025-01-30 NOTE — FLOWSHEET NOTE
01/30/25 0559   Events/Summary/Plan   Events/Summary/Plan mdi   Vital Signs   Pulse 61   Respiration 16   Pulse Oximetry 97 %   $ Pulse Oximetry (Spot Check) Yes   Respiratory Assessment   Level of Consciousness Alert   Chest Exam   Work Of Breathing / Effort Within Normal Limits   Breath Sounds   RUL Breath Sounds Clear   RML Breath Sounds Clear   RLL Breath Sounds Clear   PRATIK Breath Sounds Clear   LLL Breath Sounds Clear   Oxygen   O2 (LPM) 2   O2 Delivery Device Silicone Nasal Cannula

## 2025-01-30 NOTE — PROGRESS NOTES
NURSING DAILY NOTE    Name: Vince Carr   Date of Admission: 1/28/2025   Admitting Diagnosis: Intracranial hemorrhage (HCC)  Attending Physician: MAGALY RICHARDSON M.D.  Allergies: Iodine and Naproxen    Safety  Patient Assist  Mod Assist  Patient Precautions  Fall Risk  Precaution Comments     Bed Transfer Status  Minimal Assist  Toilet Transfer Status   Minimal Assist  Assistive Devices  Rails, Wheelchair  Oxygen  Nasal Cannula  Diet/Therapeutic Dining  Current Diet Order   Procedures    Diet Order Diet: Consistent CHO (Diabetic)     Pill Administration  whole  Agitated Behavioral Scale     ABS Level of Severity       Fall Risk  Has the patient had a fall this admission?   No  Ximena Aguiar Fall Risk Scoring  19, HIGH RISK  Fall Risk Safety Measures  bed alarm, chair alarm, poor balance, and low vision/ hearing      Vitals  Temperature: 36.5 °C (97.7 °F)  Temp src: Oral  Pulse: 64  Respiration: 18  Blood Pressure : 115/64  Blood Pressure MAP (Calculated): 81 MM HG  BP Location: Left, Upper Arm  Patient BP Position: Supine     Oxygen  Pulse Oximetry: 97 %  O2 (LPM): 2  O2 Delivery Device: Nasal Cannula    Bowel and Bladder  Last Bowel Movement  01/27/25  Stool Type     Bowel Device     Continent  Bladder: Did not void   Bowel: No movement  Bladder Function  Number of Times Voided: 1  Urine Color: Yellow  Straight Catheter: 600 ml  Genitourinary Assessment   Bladder Assessment (WDL):  WDL Except  Diaz Catheter: Not Applicable  Urine Color: Yellow  Number of Bladder Accidents: 1  Total Number of Bladder of Accidents in Last 7 Days: 1  Time Void: Yes  Bladder Scan: Unable To Void  $ Bladder Scan Results (mL): 280    Skin  Thanh Score   17  Sensory Interventions   Bed Types: Standard/Trauma Mattress  Skin Preventative Measures: Pillows in Use for Support / Positioning  Moisture Interventions  Moisturizers/Barriers: Barrier Wipes      Pain  Pain  Rating Scale  0 - No Pain  Pain Location  Shoulder, Hip  Pain Location Orientation  Right  Pain Interventions   Declines    ADLs    Bathing      Linen Change      Personal Hygiene     Chlorhexidine Bath      Oral Care     Teeth/Dentures     Shave     Nutrition Percentage Eaten  Lunch, Between % Consumed  Environmental Precautions     Patient Turns/Positioning  Supine  Patient Turns Assistance/Tolerance  Tolerates Poorly  Bed Positions  Bed Controls On, Bed Locked  Head of Bed Elevated  Self regulated      Psychosocial/Neurologic Assessment  Psychosocial Assessment  Psychosocial (WDL):  Within Defined Limits  Patient Behaviors: Forgetful  Neurologic Assessment  Neuro (WDL): Exceptions to WDL  Level of Consciousness: Alert  Orientation Level: Oriented X4  Cognition: Appropriate safety awareness, Follows commands  Speech: Clear  Pupil Assesment: No  EENT (WDL):  WDL Except    Cardio/Pulmonary Assessment  Edema   RLE Edema: 3+  LLE Edema: 3+  Respiratory Breath Sounds  RUL Breath Sounds: Clear  RML Breath Sounds: Clear  RLL Breath Sounds: Clear  PRATIK Breath Sounds: Clear  LLL Breath Sounds: Clear  Cardiac Assessment   Cardiac (WDL):  WDL Except

## 2025-01-30 NOTE — CARE PLAN
Problem: Dressing  Goal: STG-Within one week, patient will dress LB w/ mod A and AE as needed  Outcome: Not Met     Problem: Functional Transfers  Goal: STG-Within one week, patient will transfer to toilet w/ CGA w/ LRAD  Outcome: Not Met  Goal: STG-Within one week, patient will transfer to step in shower w/ CGA w/ LRAD  Outcome: Not Met

## 2025-01-30 NOTE — ASSESSMENT & PLAN NOTE
Echo 11/25/24 EF 50%  BNP elevated but stable  On Lipitor, Coreg, and Bumex  Xarelto presently on hold for ICH

## 2025-01-30 NOTE — FLOWSHEET NOTE
01/30/25 1134   Patient Events   Interdisciplinary Rounds Attendance at Rounds (30 Min)  (EKG)

## 2025-01-30 NOTE — CARE PLAN
ProblemProblem: Bladder / Voiding  Goal: Patient will establish and maintain regular urinary output  Outcome: Not Met      Problem: Diabetes Management  Goal: Patient's ability to maintain appropriate glucose levels will be maintained or improve  Note: Patient received diabetic diet education.          The patient is Stable - Low risk of patient condition declining or worsening    Shift Goals  Clinical Goals: Safety  Patient Goals: Rest

## 2025-01-30 NOTE — CONSULTS
HOSPITAL MEDICINE CONSULTATION    Requesting Physician:  Dr. Chan    Reason for Consult:  Chest Pain    History of Present Illness:  The patient is a 79-year-old  male with past medical history significant for coronary artery disease status post stent, atrial fibrillation status post permanent pacemaker on Xarelto, hypertension, non-insulin dependent diabetes mellitus, and chronic obstructive pulmonary disease on supplemental oxygen.  He was admitted to [facility] on 1/26/25 as a transfer from [facility] following a fall with subdural hematoma and sacral fracture.  His injuries were conservatively managed with recommendations for follow-up MRI in six weeks; anticoagulation remains on hold.  Due to his ongoing functional debility, the patient was transferred to [facility] on 1/28/25.  Hospital Medicine consultation is requested on 1/30/25 to assist in the management of this patient's right sided chest pain.  He is also noted to have leukocytosis.    Review of Systems:  Review of Systems   Constitutional:  Negative for chills and fever.   HENT: Negative.     Eyes: Negative.    Respiratory:  Negative for cough and shortness of breath.    Cardiovascular:  Negative for chest pain and palpitations.   Gastrointestinal:  Negative for abdominal pain, nausea and vomiting.   Musculoskeletal:         R hemithorax pain   Skin:  Negative for itching and rash.   Endo/Heme/Allergies:  Negative for polydipsia. Does not bruise/bleed easily.   All other systems reviewed and are negative.      Allergies:  Allergies   Allergen Reactions    Iodine Unspecified    Naproxen Unspecified       Medications:    Current Facility-Administered Medications:     amoxicillin-clavulanate (Augmentin) 875-125 MG per tablet 1 Tablet, 1 Tablet, Oral, Q12HRS, Elena Morales M.D., 1 Tablet at 01/30/25 1233    montelukast (Singulair) tablet 10 mg, 10 mg, Oral, Nightly, Elena Morales M.D.     enoxaparin (Lovenox) inj 40 mg, 40 mg, Subcutaneous, DAILY, Monserrat Chan M.D., 40 mg at 01/30/25 0853    fluticasone-umeclidinium-vilanterol (Trelegy Ellipta) 200-62.5-25 mcg/act inhaler 1 Puff, 1 Puff, Inhalation, QDAILY (RT), Monserrat Chan M.D., 1 Puff at 01/30/25 0601    Pharmacy Consult Request ...Pain Management Review 1 Each, 1 Each, Other, PHARMACY TO DOSE, Monserrat Chan M.D.    hydrALAZINE (Apresoline) tablet 25 mg, 25 mg, Oral, Q8HRS PRN, Monserrat Chan M.D.    acetaminophen (Tylenol) tablet 650 mg, 650 mg, Oral, Q4HRS PRN, Monserrat Chan M.D.    senna-docusate (Pericolace Or Senokot S) 8.6-50 MG per tablet 2 Tablet, 2 Tablet, Oral, Q EVENING, 2 Tablet at 01/29/25 2025 **AND** polyethylene glycol/lytes (Miralax) Packet 1 Packet, 1 Packet, Oral, QDAY PRN, Monserrat Chan M.D.    docusate sodium (Enemeez) enema 283 mg, 283 mg, Rectal, QDAY PRN, Monserrat Chan M.D.    magnesium hydroxide (Milk Of Magnesia) suspension 30 mL, 30 mL, Oral, QDAY PRN, Monserrat Chan M.D.    omeprazole (PriLOSEC) capsule 20 mg, 20 mg, Oral, DAILY, Monserrat Chan M.D., 20 mg at 01/30/25 0856    carboxymethylcellulose (Refresh Tears) 0.5 % ophthalmic drops 1 Drop, 1 Drop, Both Eyes, PRN, Monserrat Chan M.D.    benzocaine-menthol (Cepacol) lozenge 1 Lozenge, 1 Lozenge, Mouth/Throat, Q2HRS PRN, Monserrat Chan M.D.    mag hydrox-al hydrox-simeth (Maalox Plus Es Or Mylanta Ds) suspension 20 mL, 20 mL, Oral, Q2HRS PRN, Monserrat Chan M.D.    ondansetron (Zofran ODT) dispertab 4 mg, 4 mg, Oral, 4X/DAY PRN **OR** ondansetron (Zofran) syringe/vial injection 4 mg, 4 mg, Intramuscular, 4X/DAY PRN, Monserrat Chan M.D.    traZODone (Desyrel) tablet 50 mg, 50 mg, Oral, QHS PRN, Monserrat Chan M.D.    sodium chloride (Ocean) 0.65 % nasal spray 2 Spray, 2 Spray, Nasal, PRN, Monserrat Chan M.D.     oxyCODONE immediate-release (Roxicodone) tablet 5 mg, 5 mg, Oral, Q3HRS PRN, 5 mg at 01/30/25 1040 **OR** oxyCODONE immediate release (Roxicodone) tablet 10 mg, 10 mg, Oral, Q3HRS PRN, Monserrat Chan M.D.    midazolam (VERSED) 5 mg/mL (1 mL vial), 5 mg, Nasal, PRN, Monserrat Chan M.D.    atorvastatin (Lipitor) tablet 40 mg, 40 mg, Oral, QHS, Monserrat Chan M.D., 40 mg at 01/29/25 2029    bumetanide (Bumex) tablet 1 mg, 1 mg, Oral, BID, Monserrat hCan M.D., 1 mg at 01/30/25 0853    carvedilol (Coreg) tablet 12.5 mg, 12.5 mg, Oral, BID WITH MEALS, Monserrat Chan M.D., 12.5 mg at 01/30/25 0853    cyanocobalamin (Vitamin B-12) tablet 1,000 mcg, 1,000 mcg, Oral, DAILY, Monserrat Chan M.D., 1,000 mcg at 01/30/25 0853    multivitamin tablet 1 Tablet, 1 Tablet, Oral, DAILY, Monserrat Chan M.D., 1 Tablet at 01/30/25 0853    QUEtiapine (SEROquel) tablet 12.5 mg, 12.5 mg, Oral, Nightly, Monserrat Chan M.D., 12.5 mg at 01/29/25 2025    tamsulosin (Flomax) capsule 0.4 mg, 0.4 mg, Oral, AFTER BREAKFAST, Monserrat Chan M.D., 0.4 mg at 01/30/25 0853    thiamine (Vitamin B-1) tablet 100 mg, 100 mg, Oral, DAILY, Monserrat Chan M.D., 100 mg at 01/30/25 0853    traZODone (Desyrel) tablet 100 mg, 100 mg, Oral, Nightly, Monserrat Chan M.D., 100 mg at 01/29/25 2025    Past Medical/Surgical History:  Past Medical History:   Diagnosis Date    Arthritis     osteo    Perez's esophagus     COPD (chronic obstructive pulmonary disease) (HCC)     on O2 at night / uses inhalers daily    Dental disorder      pt has partials    Gastroesophageal reflux disease with esophagitis 5/20/2020    HTN (hypertension)     on Losartan    Hypertension     Indigestion     GERD     Myocardial infarct (HCC) 2011 and 2018    stent in LAD, stent in rad    Snoring      Past Surgical History:   Procedure Laterality Date    PB SHLDR ARTHROSCOP,SURG,W/ROTAT  CUFF REPB Right 2020    Procedure: RIGHT SHOULDER ARTHROSCOPY ROTATOR CUFF REPAIR, SUBACROMIAL DECOMPRESSION, DISTAL CLAVICLE RESECTION;  Surgeon: Jayant Gonzales M.D.;  Location: SURGERY [facility];  Service: Orthopedics    GASTROSCOPY-ENDO N/A 2015    Procedure: GASTROSCOPY-ENDO;  Surgeon: Torsten Tariq M.D.;  Location: SURGERY [facility];  Service:     COLONOSCOPY - ENDO  2014    Performed by Torsten Tariq M.D. at SURGERY [facility] CARDIAC CATH  2012    stent in LAD    ZZZ CARDIAC CATH      placement of stent in LAD    CHOLECYSTECTOMY      CATARACT EXTRACTION WITH IOL           COLONOSCOPY      benign polyps found    EGD ESOPHAGUS WITH ENDOSCOPIC US      Barretts esophagus found    SHOULDER ARTHROSCOPY W/ ROTATOR CUFF REPAIR  2008    R & L    KNEE ARTHROPLASTY TOTAL  2007    R & L    TOE FUSION  2006     left foot    ORIF, WRIST Right 1992    AMPUTATION, TOE      left foot 2nd toe    OTHER CARDIAC SURGERY      pace maker    TONSILLECTOMY         Social History:  Social History     Socioeconomic History    Marital status:      Spouse name: Not on file    Number of children: Not on file    Years of education: Not on file    Highest education level: 12th grade   Occupational History    Not on file   Tobacco Use    Smoking status: Former     Current packs/day: 0.00     Types: Cigarettes     Quit date: 1975     Years since quittin.5    Smokeless tobacco: Former     Quit date: 2007   Vaping Use    Vaping status: Never Used   Substance and Sexual Activity    Alcohol use: Yes     Comment: rare    Drug use: No    Sexual activity: Not on file   Other Topics Concern    Not on file   Social History Narrative    Not on file     Social Drivers of Health     Financial Resource Strain: Low Risk  (2022)    Overall Financial Resource Strain (CARDIA)     Difficulty of Paying Living Expenses: Not very hard   Food Insecurity: No  Food Insecurity (1/28/2025)    Hunger Vital Sign     Worried About Running Out of Food in the Last Year: Never true     Ran Out of Food in the Last Year: Never true   Transportation Needs: No Transportation Needs (1/27/2025)    PRAPARE - Transportation     Lack of Transportation (Medical): No     Lack of Transportation (Non-Medical): No   Physical Activity: Inactive (8/2/2022)    Exercise Vital Sign     Days of Exercise per Week: 0 days     Minutes of Exercise per Session: 0 min   Stress: No Stress Concern Present (8/2/2022)    [place] [employer] - Occupational Stress Questionnaire     Feeling of Stress : Not at all   Social Connections: Moderately Integrated (8/2/2022)    Social Connection and Isolation Panel [NHANES]     Frequency of Communication with Friends and Family: Never     Frequency of Social Gatherings with Friends and Family: Never     Attends Worship Services: More than 4 times per year     Active Member of Clubs or Organizations: Yes     Attends Club or Organization Meetings: 1 to 4 times per year     Marital Status:    Intimate Partner Violence: Not At Risk (1/28/2025)    Humiliation, Afraid, Rape, and Kick questionnaire     Fear of Current or Ex-Partner: No     Emotionally Abused: No     Physically Abused: No     Sexually Abused: No   Housing Stability: Low Risk  (1/28/2025)    Housing Stability Vital Sign     Unable to Pay for Housing in the Last Year: No     Number of Times Moved in the Last Year: 0     Homeless in the Last Year: No       Family History:  Family History   Problem Relation Age of Onset    Cancer Father 95        Prostate    Multiple Sclerosis Sister        Physical Examination:   Vitals:    01/30/25 0526 01/30/25 0559 01/30/25 0852 01/30/25 1017   BP: 115/64  121/60 109/56   Pulse: 64 61 73 70   Resp: 18 16  18   Temp: 36.5 °C (97.7 °F)   36.9 °C (98.4 °F)   TempSrc: Oral   Oral   SpO2: 97% 97%  98%   Weight:       Height:           Physical  Exam  Vitals reviewed.   Constitutional:       General: He is not in acute distress.     Appearance: Normal appearance. He is not ill-appearing.   HENT:      Head: Normocephalic and atraumatic.      Right Ear: External ear normal.      Left Ear: External ear normal.      Nose: Nose normal.      Mouth/Throat:      Pharynx: Oropharynx is clear.   Eyes:      General:         Right eye: No discharge.         Left eye: No discharge.      Extraocular Movements: Extraocular movements intact.      Conjunctiva/sclera: Conjunctivae normal.   Cardiovascular:      Rate and Rhythm: Normal rate and regular rhythm.   Pulmonary:      Effort: No respiratory distress.      Breath sounds: No wheezing.      Comments: Decreased BS  Abdominal:      General: Bowel sounds are normal. There is no distension.      Palpations: Abdomen is soft.      Tenderness: There is no abdominal tenderness.   Musculoskeletal:      Cervical back: Normal range of motion and neck supple.      Right lower leg: Edema present.      Left lower leg: Edema present.   Skin:     General: Skin is warm and dry.   Neurological:      Mental Status: He is alert and oriented to person, place, and time.         Laboratory Data:  Recent Labs     01/29/25  1134   WBC 13.2*   RBC 4.37*   HEMOGLOBIN 13.5*   HEMATOCRIT 40.5*   MCV 92.7   MCH 30.9   MCHC 33.3   RDW 49.8   PLATELETCT 274   MPV 9.8     Recent Labs     01/29/25  0630   SODIUM 135   POTASSIUM 4.0   CHLORIDE 101   CO2 22   GLUCOSE 147*   BUN 14   CREATININE 0.89   CALCIUM 8.4*       Imaging:      Impressions/Recommendations:  SDH (subdural hematoma) ([facility])  2/2 GLF  Non-surgical management  Needs F/U MRI 6 wks    Hypertension  On Coreg and Bumex  Monitor for orthostatic hypotension on Flomax    CAD in native artery  Echo 11/25/24 EF 50%  On Lipitor, Coreg, and Bumex  Xarelto presently on hold for ICH  Check BNP for chronic BLE edema    PAF (paroxysmal atrial fibrillation) ([facility])  H/O PPM  On Coreg for rate  control  Chronically anticoagulated on Xarelto  AC presently on hold for ICH    Non-insulin dependent type 2 diabetes mellitus (HCC)  HbA1c 6.9  Discontinue FSBS and SSI as not routinely needing insulin coverage  Continue diet control  Outpt meds include Metformin 1000 mg PO bid and Jardiance 10 mg PO qd    Sacral fracture (HCC)  2/2 GLF  Non-surgical management  Pain control per Physiatry    Chest pain  Check Trop and EKG  CXR small R pl eff, R basilar opacity (atx vs pneumonitis)  Request X-ray Ribs  Start empiric abx and IS  RT protocol    Leukocytosis  UA negative nitrites and leukocyte esterase  CXR small R pl eff, R basilar opacity (atx vs pneumonitis)  Check PCT and morning labs  Start abx as per other A/P    Other emphysema (HCC)  Chronically on supplemental O2 at 2 lpm via NC  Continue Trelegy and add Singulair  RT protocol    DNR    Discussed with patient, Charge RN (Joslyn), and Dr. Chan.  Thank you for the opportunity to assist in this patient's care.  We will continue to follow along with you.

## 2025-01-30 NOTE — THERAPY
Speech Language Pathology  Daily Treatment     Patient Name: Vince Carr  Age:  79 y.o., Sex:  male  Medical Record #: [id number]  Today's Date: 1/30/2025     Precautions  Precautions: Fall Risk  Comments: Multiple falls    Subjective    The pt was found in their room and was agreeable to ST session. Given the option the pt chose to stay in bed for session.     Objective       01/30/25 0931   Treatment Charges   Charges Yes   SLP Cognitive Skill Development First 15 Minutes 1   SLP Cognitive Skill Development Additional 15 Minutes 1   SLP Total Time Spent   SLP Individual Total Time Spent (Mins) 30   Cognition - Detailed   Simple Attention Within Functional Limits (6-7)   Moderate Attention Minimal (4)   Functional Memory Activities Minimal (4)   Interdisciplinary Plan of Care Collaboration   Patient Position at End of Therapy In Bed;Bed Alarm On;Call Light within Reach;Tray Table within Reach         Assessment    The pt completed a memory log and was independently able to recall previous events (breakfast, OT session) with 100% accuracy given min verbal cues (It looks like you did something in the bathroom earlier today.). Pt was informed that staff can help fill out log if his legibility of handwriting is difficult to read. He was instructed to fill out the rest of the days events and to continue this activity for the following day to improve memory/recall.     The pt completed a two component directions (concrete and abstract) to support selective attention. The pt achieved 14/16 (87%) independently and required min verbal cues (Take a look at that one again) to obtain 100%. He benefited from increased wait time and repetition of instructions.     Strengths: Able to follow instructions, Motivated for self care and independence, Pleasant and cooperative, Supportive family, Willingly participates in therapeutic activities  Barriers: Confused, Generalized weakness, Impaired activity tolerance, Impaired  carryover of learning, Impaired functional cognition, Pain (Patient complains of back and left shoulder pain.)    Plan    Continue to complete memory log.   Continue to target selective attention.      Speech Therapy Problems (Active)       Problem: Memory STGs       Dates: Start:  01/29/25         Goal: STG-Within one week, patient will recall daily events and new training with 50% acc with external and internal memory aids and strategies, with mod to max cues.       Dates: Start:  01/29/25         Goal Note filed on 01/30/25 0930 by Antonia Dsouza MS,CCC-SLP       Patient was evaluated yesterday 1/29/25 with first day of tx today.                   Problem: Problem Solving STGs       Dates: Start:  01/29/25         Goal: STG-Within one week, patient will perform selective attention tasks with 75% acc with min A.       Dates: Start:  01/29/25         Goal Note filed on 01/30/25 0930 by Antonia Dsouza MS,CCC-SLP       Patient was evaluated yesterday 1/29/25 with first day of tx today.                   Problem: Speech/Swallowing LTGs       Dates: Start:  01/29/25         Goal: LTG-By discharge, patient will solve complex problem and recall safety training with 80% acc with set up or spv.       Dates: Start:  01/29/25

## 2025-01-30 NOTE — CARE PLAN
"  Problem: Mobility  Goal: STG-Within one week, patient will ambulate household distance 25 ft x2 FWW CGA  Outcome: Not Met  Goal: STG-Within one week, patient will ambulate up/down a 2\" step in // bars min A  Outcome: Not Met     Problem: Mobility Transfers  Goal: STG-Within one week, patient will transfer bed to chair CGA SPT FWW  Outcome: Not Met     Problem: PT-Long Term Goals  Goal: LTG-By discharge, patient will ambulate 50 ft LRAD SPV/mod I indoors   Outcome: Not Met  Goal: LTG-By discharge, patient will transfer one surface to another SPV/mod I SPT LRAD  Outcome: Not Met  Goal: LTG-By discharge, patient will amb up/down threshold for SUZANNA home with LRAD SPV  Outcome: Not Met     "

## 2025-01-30 NOTE — PROGRESS NOTES
NURSING DAILY NOTE    Name: Vince Carr   Date of Admission: 1/28/2025   Admitting Diagnosis: Intracranial hemorrhage (HCC)  Attending Physician: MAGALY RICHARDSON M.D.  Allergies: Iodine and Naproxen    Safety  Patient Assist  Mod Assist  Patient Precautions  Fall Risk  Precaution Comments     Bed Transfer Status  Minimal Assist  Toilet Transfer Status   Minimal Assist  Assistive Devices  Rails, Wheelchair  Oxygen  Room air w/o2 available  Diet/Therapeutic Dining  Current Diet Order   Procedures    Diet Order Diet: Consistent CHO (Diabetic)     Pill Administration  whole  Agitated Behavioral Scale     ABS Level of Severity       Fall Risk  Has the patient had a fall this admission?   No  Ximena Aguiar Fall Risk Scoring  19, HIGH RISK  Fall Risk Safety Measures  bed alarm, chair alarm, poor balance, and low vision/ hearing    Vitals  Temperature: 36.6 °C (97.8 °F)  Temp src: Oral  Pulse: 72  Respiration: 18  Blood Pressure : 108/58  Blood Pressure MAP (Calculated): 75 MM HG  BP Location: Left, Upper Arm  Patient BP Position: Sitting     Oxygen  Pulse Oximetry: 98 %  O2 (LPM): 2  O2 Delivery Device: Room air w/o2 available    Bowel and Bladder  Last Bowel Movement  01/27/25 (per patient)  Stool Type     Bowel Device     Continent  Bladder: Retaining urine, straight catheter   Bowel: No movement  Bladder Function  Number of Times Voided: 1  Urine Color: Yellow  Straight Catheter: 400 ml  Genitourinary Assessment   Bladder Assessment (WDL):  WDL Except  Diaz Catheter: Not Applicable  Urine Color: Yellow  Bladder Scan: Unable To Void  $ Bladder Scan Results (mL): 419    Skin  Thanh Score   17  Sensory Interventions   Bed Types: Standard/Trauma Mattress  Skin Preventative Measures: Pillows in Use for Support / Positioning  Moisture Interventions  Moisturizers/Barriers: Barrier Wipes      Pain  Pain Rating Scale  0 - No Pain  Pain  Location  Shoulder, Hip  Pain Location Orientation  Right  Pain Interventions   Declines    ADLs    Bathing      Linen Change      Personal Hygiene     Chlorhexidine Bath      Oral Care     Teeth/Dentures     Shave     Nutrition Percentage Eaten  Lunch, Between % Consumed  Environmental Precautions     Patient Turns/Positioning  Supine  Patient Turns Assistance/Tolerance     Bed Positions  Bed Controls On, Bed Locked  Head of Bed Elevated  Self regulated      Psychosocial/Neurologic Assessment  Psychosocial Assessment  Psychosocial (WDL):  WDL Except  Patient Behaviors: Forgetful  Neurologic Assessment  Neuro (WDL): Exceptions to WDL  Level of Consciousness: Alert  Orientation Level: Oriented X4  Cognition: Appropriate safety awareness, Follows commands  Speech: Clear  Pupil Assesment: No  EENT (WDL):  WDL Except    Cardio/Pulmonary Assessment  Edema   RLE Edema: 3+  LLE Edema: 3+  Respiratory Breath Sounds  RUL Breath Sounds: Clear  RML Breath Sounds: Clear  RLL Breath Sounds: Clear  PRATIK Breath Sounds: Clear  LLL Breath Sounds: Clear  Cardiac Assessment   Cardiac (WDL):  WDL Except (Hx- Afib, CAD, HTN)

## 2025-01-30 NOTE — CARE PLAN
Problem: Problem Solving STGs  Goal: STG-Within one week, patient will perform selective attention tasks with 75% acc with min A.  Outcome: Not Met  Note: Patient was evaluated yesterday 1/29/25 with first day of tx today.       Problem: Memory STGs  Goal: STG-Within one week, patient will recall daily events and new training with 50% acc with external and internal memory aids and strategies, with mod to max cues.  Outcome: Not Met  Note: Patient was evaluated yesterday 1/29/25 with first day of tx today.

## 2025-01-30 NOTE — DISCHARGE PLANNING
Case Management/IDT follow up.   Projected dc date:  IDT continues to recommend IRF level of care as patient continue to make progress with all therapies.     DC needs  Home health:  PT/OT/SLP/RN/CNA  DME: HAS DME  Family training:     Follow up:   PCP: Gela ZULETA   Other:     I met with patient and family providing update from IDT and discussed plan of care.  They are in agreement w/ plan.     Plan:  Continue to follow

## 2025-01-30 NOTE — ASSESSMENT & PLAN NOTE
H/O PPM  On Coreg for rate control  Chronically anticoagulated on Xarelto  AC presently on hold for ICH

## 2025-01-30 NOTE — PROGRESS NOTES
Physical Medicine & Rehabilitation Progress Note    Encounter Date: 1/30/2025    Chief Complaint: Decreased mobility    Interval Events (Subjective):  Patient sitting up in room. He reports he is doing OK. Discussed about findings of CXR and possible rib fractures. Discussed hospitalist consult     _____________________________________  Interdisciplinary Team Conference   Most recent IDT on 1/30/2025    IMonserrat M.D./Ph.D., was present and led the interdisciplinary team conference on 1/30/2025.  I led the IDT conference and agree with the IDT conference documentation and plan of care as noted below.     Nursing:  Diet Current Diet Order   Procedures    Diet Order Diet: Consistent CHO (Diabetic)       Eating ADL Stand by Assist (set-up)      % of Last Meal  Oral Nutrition: Breakfast, Between % Consumed   Sleep    Bowel Last BM: 01/27/25   Bladder Continent   Barriers to Discharge Home:  Pain     Physical Therapy:  Bed Mobility    Transfers Contact Guard Assist  Set-up of equipment, Supervision for safety, Verbal cueing (FWW, cueing of hand placement and FWW position)   Mobility Total Assist X 2 (CGA with w/c follow)   Stairs    Barriers to Discharge Home:  Endurance  Poor balance    Occupational Therapy:  Grooming Standby Assist, Seated   Bathing Contact Guard Assist   UB Dressing Stand by Assist (set-up to don t-shirt while seated)   LB Dressing Moderate Assist   Toileting Minimal Assist   Shower & Transfer CGA   Barriers to Discharge Home:  Limited by sacral pain  Confusion  Fatigue    Speech-Language Pathology:  Comprehension:  Modified Independent  Comprehension Description:  Other (comment) (extra time)  Expression:  Modified Independent  Expression Description:  Other (comment) (Extra time)  Social Interaction:  Independent  Social Interaction Description:     Problem Solving:  Minimal Assist  Problem Solving Description:  Seat belt, Increased time, Bed/chair alarm, Supervision,  "Therapy schedule, Verbal cueing  Memory:  Maximal Assist  Memory Description:  Seat belt, Increased time, Bed/chair alarm, Supervision, Therapy schedule, Verbal cueing  Barriers to Discharge Home:  Severe memory, severe problem solving    Respiratory Therapy:  O2 (LPM): 2  O2 Delivery Device: Silicone Nasal Cannula    Case Management:  Continues to work on disposition and DME needs.      Discharge Date/Disposition:  2/11/25  _____________________________________      Objective:  VITAL SIGNS: /56   Pulse 70   Temp 36.9 °C (98.4 °F) (Oral)   Resp 18   Ht 1.803 m (5' 11\")   Wt 110 kg (243 lb 9.7 oz)   SpO2 98%   BMI 33.98 kg/m²   Gen: NAD  Psych: Mood and affect appropriate  CV: RRR, 0 edema  Resp: CTAB, no upper airway sounds  Abd: NTND  Neuro: AOx3, following commands    Laboratory Values:  Recent Results (from the past 72 hours)   POCT glucose device results    Collection Time: 01/27/25  1:16 PM   Result Value Ref Range    POC Glucose, Blood 166 (H) 65 - 99 mg/dL   POCT glucose device results    Collection Time: 01/27/25  6:01 PM   Result Value Ref Range    POC Glucose, Blood 144 (H) 65 - 99 mg/dL   POCT glucose device results    Collection Time: 01/27/25  9:37 PM   Result Value Ref Range    POC Glucose, Blood 162 (H) 65 - 99 mg/dL   POCT glucose device results    Collection Time: 01/28/25  8:11 AM   Result Value Ref Range    POC Glucose, Blood 149 (H) 65 - 99 mg/dL   POCT glucose device results    Collection Time: 01/28/25  1:21 PM   Result Value Ref Range    POC Glucose, Blood 203 (H) 65 - 99 mg/dL   POCT glucose device results    Collection Time: 01/28/25  5:18 PM   Result Value Ref Range    POC Glucose, Blood 147 (H) 65 - 99 mg/dL   POCT glucose device results    Collection Time: 01/28/25  8:08 PM   Result Value Ref Range    POC Glucose, Blood 136 (H) 65 - 99 mg/dL   Comp Metabolic Panel (CMP)    Collection Time: 01/29/25  6:30 AM   Result Value Ref Range    Sodium 135 135 - 145 mmol/L    Potassium " 4.0 3.6 - 5.5 mmol/L    Chloride 101 96 - 112 mmol/L    Co2 22 20 - 33 mmol/L    Anion Gap 12.0 7.0 - 16.0    Glucose 147 (H) 65 - 99 mg/dL    Bun 14 8 - 22 mg/dL    Creatinine 0.89 0.50 - 1.40 mg/dL    Calcium 8.4 (L) 8.5 - 10.5 mg/dL    Correct Calcium 9.2 8.5 - 10.5 mg/dL    AST(SGOT) 26 12 - 45 U/L    ALT(SGPT) 14 2 - 50 U/L    Alkaline Phosphatase 62 30 - 99 U/L    Total Bilirubin 1.0 0.1 - 1.5 mg/dL    Albumin 3.0 (L) 3.2 - 4.9 g/dL    Total Protein 6.2 6.0 - 8.2 g/dL    Globulin 3.2 1.9 - 3.5 g/dL    A-G Ratio 0.9 g/dL   TSH with Reflex to FT4    Collection Time: 01/29/25  6:30 AM   Result Value Ref Range    TSH 1.380 0.380 - 5.330 uIU/mL   Vitamin D, 25-hydroxy (blood)    Collection Time: 01/29/25  6:30 AM   Result Value Ref Range    25-Hydroxy   Vitamin D 25 32 30 - 100 ng/mL   ESTIMATED GFR    Collection Time: 01/29/25  6:30 AM   Result Value Ref Range    GFR (CKD-EPI) 87 >60 mL/min/1.73 m 2   POCT glucose device results    Collection Time: 01/29/25  8:17 AM   Result Value Ref Range    POC Glucose, Blood 160 (H) 65 - 99 mg/dL   POCT glucose device results    Collection Time: 01/29/25 11:09 AM   Result Value Ref Range    POC Glucose, Blood 166 (H) 65 - 99 mg/dL   CBC WITH DIFFERENTIAL    Collection Time: 01/29/25 11:34 AM   Result Value Ref Range    WBC 13.2 (H) 4.8 - 10.8 K/uL    RBC 4.37 (L) 4.70 - 6.10 M/uL    Hemoglobin 13.5 (L) 14.0 - 18.0 g/dL    Hematocrit 40.5 (L) 42.0 - 52.0 %    MCV 92.7 81.4 - 97.8 fL    MCH 30.9 27.0 - 33.0 pg    MCHC 33.3 32.3 - 36.5 g/dL    RDW 49.8 35.9 - 50.0 fL    Platelet Count 274 164 - 446 K/uL    MPV 9.8 9.0 - 12.9 fL    Neutrophils-Polys 79.20 (H) 44.00 - 72.00 %    Lymphocytes 7.20 (L) 22.00 - 41.00 %    Monocytes 12.50 0.00 - 13.40 %    Eosinophils 0.20 0.00 - 6.90 %    Basophils 0.20 0.00 - 1.80 %    Immature Granulocytes 0.70 0.00 - 0.90 %    Nucleated RBC 0.00 0.00 - 0.20 /100 WBC    Neutrophils (Absolute) 10.44 (H) 1.82 - 7.42 K/uL    Lymphs (Absolute) 0.95 (L)  1.00 - 4.80 K/uL    Monos (Absolute) 1.65 (H) 0.00 - 0.85 K/uL    Eos (Absolute) 0.03 0.00 - 0.51 K/uL    Baso (Absolute) 0.02 0.00 - 0.12 K/uL    Immature Granulocytes (abs) 0.09 0.00 - 0.11 K/uL    NRBC (Absolute) 0.00 K/uL   HEMOGLOBIN A1C    Collection Time: 25 11:34 AM   Result Value Ref Range    Glycohemoglobin 6.9 (H) 4.0 - 5.6 %    Est Avg Glucose 151 mg/dL   URINALYSIS    Collection Time: 25  4:25 PM    Specimen: Urine, Clean Catch   Result Value Ref Range    Color Dark Yellow     Character Clear     Specific Gravity 1.028 <1.035    Ph 6.0 5.0 - 8.0    Glucose >=1000 (A) Negative mg/dL    Ketones Trace (A) Negative mg/dL    Protein Negative Negative mg/dL    Bilirubin Negative Negative    Urobilinogen, Urine 1.0 <=1.0 EU/dL    Nitrite Negative Negative    Leukocyte Esterase Negative Negative    Occult Blood Negative Negative    Micro Urine Req see below    POCT glucose device results    Collection Time: 25  5:31 PM   Result Value Ref Range    POC Glucose, Blood 176 (H) 65 - 99 mg/dL   POCT glucose device results    Collection Time: 25  8:07 PM   Result Value Ref Range    POC Glucose, Blood 188 (H) 65 - 99 mg/dL   POCT glucose device results    Collection Time: 25  8:09 AM   Result Value Ref Range    POC Glucose, Blood 149 (H) 65 - 99 mg/dL   EKG    Collection Time: 25 11:34 AM   Result Value Ref Range    Report       [facility]    Test Date:  2025  Pt Name:    BERNIE MASSEY         Department: [facility]  MRN:        [id number]                      Room:       [facility]  Gender:     Male                         Technician: [id number]  :        [id number]                   Requested By:BASIL DU  Order #:    [id number]                    Rosario MD:    Measurements  Intervals                                Axis  Rate:       76                           P:          72  CA:         190                          QRS:        252  QRSD:       165                          T:           69  QT:         448  QTc:        504    Interpretive Statements  Sinus rhythm  Nonspecific IVCD with LAD  Probable inferior infarct, acute  Extensive anterior infarct, old  Lateral leads are also involved  No previous ECG available for comparison         Medications:  Scheduled Medications   Medication Dose Frequency    amoxicillin-clavulanate  1 Tablet Q12HRS    enoxaparin (LOVENOX) injection  40 mg DAILY    fluticasone-umeclidinium-vilanterol  1 Puff QDAILY (RT)    Pharmacy Consult Request  1 Each PHARMACY TO DOSE    senna-docusate  2 Tablet Q EVENING    omeprazole  20 mg DAILY    atorvastatin  40 mg QHS    bumetanide  1 mg BID    carvedilol  12.5 mg BID WITH MEALS    cyanocobalamin  1,000 mcg DAILY    multivitamin  1 Tablet DAILY    QUEtiapine  12.5 mg Nightly    tamsulosin  0.4 mg AFTER BREAKFAST    thiamine  100 mg DAILY    traZODone  100 mg Nightly     PRN medications: hydrALAZINE, acetaminophen, senna-docusate **AND** polyethylene glycol/lytes, docusate sodium, magnesium hydroxide, carboxymethylcellulose, benzocaine-menthol, mag hydrox-al hydrox-simeth, ondansetron **OR** ondansetron, traZODone, sodium chloride, oxyCODONE immediate-release **OR** oxyCODONE immediate-release, midazolam    Diet:  Current Diet Order   Procedures    Diet Order Diet: Consistent CHO (Diabetic)       Medical Decision Making and Plan:  SDH - Patient with fall on 1/26/25 with SDH with conservative management  -PT and OT for mobility and ADLs. Per guidelines, 15 hours per week between PT, OT and/or SLP.  -Follow-up NSG. On Seroquel 12.5 mg QHS     HTN/CAD/A fib - Patient on Bumex, Coreg 12.5 mg     S5 fracture - Conservative management. PRN Tylenol and Oxycodone      Chest pain - Right sided, probable rib fracture. CXR completed, consult hospitalist    Hyponatremia - Check AM CMP - 135, improving     Anemia - Check AM CBC - 13.5, will monitor     Leukocytosis - Check AM CBC - 13.2, increasing. UA negative CXR with possible  pneumonia. Consult hospitalist     DM2 with hyperglycemia - Patient on SSI on transfer     BPH - Patient on Tamsulosin 0.4 mg daily     Insomnia - Patient on Trazodone 100 mg daily     COPD - Patient on Trelegy daily     Pain - Patient on PRN Tylenol and Oxycodone      Skin - Patient at risk for skin breakdown due to debility in areas including sacrum, achilles, elbows and head in addition to other sites. Nursing to assess skin daily.      GI Ppx - Patient on Prilosec for GERD prophylaxis. Patient on Senna-docusate for constipation prophylaxis.      DVT Ppx - Patient Lovenox on transfer  ____________________________________    T. Car Chan MD/PhD  [facility] - Physical Medicine & Rehabilitation   [facility] - Brain Injury Medicine   ____________________________________    Total time:  50 minutes. Time spent included pre-rounding review of vitals and tests, unit/floor time, face-to-face time with the patient including physical examination, care coordination, counseling of patient and/or family, ordering medications/procedures/tests, discussion with CM, PT, OT, SLP and/or other healthcare providers, and documentation in the electronic medical record. Topics discussed included discharge planning, chest pain, possible pneumonia, consult hospitalist and start antibiotics. Patient's case was discussed face to face with Hospitalist on [facility] floor. Patient was discussed separately in IDT today; please see details above.

## 2025-01-30 NOTE — THERAPY
Speech Language Pathology  Daily Treatment     Patient Name: Vince Carr  Age:  79 y.o., Sex:  male  Medical Record #: [id number]  Today's Date: 1/30/2025     Precautions  Precautions: Fall Risk  Comments: Multiple falls    Subjective    Patient agreeable to therapy at bedside.  His son accompanied him to session and participated in initial family training.     Objective       01/30/25 1431   Treatment Charges   SLP Cognitive Skill Development First 15 Minutes 1   SLP Cognitive Skill Development Additional 15 Minutes 1   SLP Total Time Spent   SLP Individual Total Time Spent (Mins) 30   Cognition - Detailed   Moderate Attention Supervision (5)   Functional Memory Activities Moderate (3)   Interdisciplinary Plan of Care Collaboration   IDT Collaboration with  Family / Caregiver;Nursing   Patient Position at End of Therapy Seated;Call Light within Reach;Tray Table within Reach;Bed Alarm On;Family / Friend in Room;Other (Comments)   Collaboration Comments Patient's son present.  Initiated family training.  Discussed CLOF and educated both on use of memory log and compensatory memory strategies.  Recommended that patient will likely need assistance to initiate recording in his log.    Patient's son reported that patient has been more confused over the past several weeks spedifically after this fall.  Patient reported level 4 headache.  Notified nurse via SMS Assist.       Assessment    Patient's son present.  Initiated family training.  Discussed CLOF and educated both on use of memory log and compensatory memory strategies.  Recommended that patient will likely need assistance to initiate recording in his log.    Patient's son reported that patient has been more confused over the past several weeks spedifically after this fall.  Patient reported level 4 headache.  Notified nurse via "[employer]e.  Patient targeted simple attention in written directions with 100% acc.    Performed ranking of common 4 step written sequences  with 100% acc.  Patient was able to recall therapists name after 10 min delay.     Strengths: Able to follow instructions, Motivated for self care and independence, Pleasant and cooperative, Supportive family, Willingly participates in therapeutic activities  Barriers: Confused, Generalized weakness, Impaired activity tolerance, Impaired carryover of learning, Impaired functional cognition, Pain (Patient complains of back and left shoulder pain.)    Plan    Target attention, memory, safety planning and problem solving.    Passport items to be completed:  Express basic needs, understand food/liquid recommendations, consistently follow swallow precautions, manage finances, manage medications, arrive to therapy appointments on time, complete daily memory log entries, solve problems related to safety situations, review education related to hospitalization, complete caregiver training     Speech Therapy Problems (Active)       Problem: Memory STGs       Dates: Start:  01/29/25         Goal: STG-Within one week, patient will recall daily events and new training with 50% acc with external and internal memory aids and strategies, with mod to max cues.       Dates: Start:  01/29/25         Goal Note filed on 01/30/25 0930 by Antonia Dsouza MS,CCC-SLP       Patient was evaluated yesterday 1/29/25 with first day of tx today.                   Problem: Problem Solving STGs       Dates: Start:  01/29/25         Goal: STG-Within one week, patient will perform selective attention tasks with 75% acc with min A.       Dates: Start:  01/29/25         Goal Note filed on 01/30/25 0930 by Antonia Dsouza MS,CCC-SLP       Patient was evaluated yesterday 1/29/25 with first day of tx today.                   Problem: Speech/Swallowing LTGs       Dates: Start:  01/29/25         Goal: LTG-By discharge, patient will solve complex problem and recall safety training with 80% acc with set up or spv.       Dates: Start:  01/29/25

## 2025-01-30 NOTE — THERAPY
Occupational Therapy  Daily Treatment     Patient Name: Vince Carr  Age:  79 y.o., Sex:  male  Medical Record #: [id number]  Today's Date: 1/30/2025     Precautions  Precautions: (P) Fall Risk  Comments: (P) Multiple falls         Subjective    Pt asleep in bed, easily aroused, agreeable to OT session, reporting feeling very stiff from laying in bed (neck, ribs).      Objective       01/30/25 0701   OT Charge Group   OT Self Care / ADL (Units) 4   OT Total Time Spent   OT Individual Total Time Spent (Mins) 60   Precautions   Precautions Fall Risk   Comments Multiple falls   Pain   Intervention Declines   Pain 0 - 10 Group   Location Neck;Rib Cage   Location Orientation Mid;Right   Description   (Stiff)   Therapist Pain Assessment During Activity  (Declined pain management)   Functional Level of Assist   Grooming Standby Assist;Seated   Grooming Description Set-up of equipment  (Oral + denture care, hair care, washed face)   Bathing Contact Guard Assist   Bathing Description   (Sponge bath to periarea (front and back) standing at GB)   Lower Body Dressing Moderate Assist   Lower Body Dressing Description Reacher;Increased time;Set-up of equipment;Verbal cueing;Supervision for safety  (Assist w/ pants over hips initially, pt able to complete with Darrel during second trial, able to use reacher to thread LE into pants)   Bed, Chair, Wheelchair Transfer Contact Guard Assist   Bed Chair Wheelchair Transfer Description Verbal cueing;Supervision for safety;Set-up of equipment  (VC for hand placement, stand step with FWW)   Bed Mobility    Supine to Sit Standby Assist   Interdisciplinary Plan of Care Collaboration   IDT Collaboration with  Nursing   Patient Position at End of Therapy Seated;Chair Alarm On  (In dining room)   Collaboration Comments RN present at beginning of session     AE Education/Demo  - Pt issued sock aid, reacher, and LH sponge for LB dressing and bathing with education on sock aid/reacher are  rentals.  - Pt provided verbal education and demo of reacher and sock aid for doffing/donning socks, pants.  Pt declined to sekou socks, states he has sock aid at home.  - Pt provided verbal education for LH sponge to assist with back and BLE.      Assessment    Pt tolerated session fair with a focus on morning ADLs. Pt was limited by pain, refused any medication. Distraction provided, extra time required for transfers and pt participated in light stretching to alleviate stiffness. Pt improved on LB dressing (pants) with AE (max to mod).   Strengths: Willingly participates in therapeutic activities, Supportive family, Pleasant and cooperative, Motivated for self care and independence, Manages pain appropriately, Independent prior level of function  Barriers: Pain, Limited mobility, Impaired balance, Impaired activity tolerance, Impaired functional cognition, Generalized weakness, Fatigue    Plan    Pt would benefit from skilled OT services to address:  - ADLs/IADLs   - Strength/endurance/activity tolerance  - Functional mobility w/ LRD  - Standing balance (static and dynamic)  - Functional Cognition (memory)    DME  OT DME Recommendations  Bathroom Equipment:  (pt owns shower chair)  Additional Equipment:  (TBD; pt owns sock aid)    Passport items to be completed:  Perform bathroom transfers, complete dressing, get ready for the day, prepare a simple meal, participate in household tasks, adapt home for safety needs, demonstrate home exercise program, complete caregiver training     Occupational Therapy Goals (Active)       Problem: Dressing       Dates: Start:  01/29/25         Goal: STG-Within one week, patient will dress LB w/ mod A and AE as needed       Dates: Start:  01/29/25               Problem: Functional Transfers       Dates: Start:  01/29/25         Goal: STG-Within one week, patient will transfer to toilet w/ CGA w/ LRAD       Dates: Start:  01/29/25            Goal: STG-Within one week, patient will  transfer to step in shower w/ CGA w/ LRAD       Dates: Start:  01/29/25               Problem: OT Long Term Goals       Dates: Start:  01/29/25         Goal: LTG-By discharge, patient will complete basic self care tasks w/ mod I for UB ADLs and supervision for LB ADLs       Dates: Start:  01/29/25            Goal: LTG-By discharge, patient will perform bathroom transfers w/ supervision w/ LRAD       Dates: Start:  01/29/25            Goal: LTG-By discharge, patient will complete basic home management w/ supervision w/ LRAD        Dates: Start:  01/29/25

## 2025-01-30 NOTE — ASSESSMENT & PLAN NOTE
UA negative nitrites and leukocyte esterase  CXR small R pl eff, R basilar opacity (atx vs pneumonitis)  PCT 0.07 (but already on abx)  WBC resolved on Augmentin

## 2025-01-30 NOTE — THERAPY
Physical Therapy   Daily Treatment     Patient Name: Vince Carr  Age:  79 y.o., Sex:  male  Medical Record #: [id number]  Today's Date: 1/30/2025     Precautions  Precautions: Fall Risk  Comments: Multiple falls    Subjective    Pt was in his bed. Pt reported 6/10 pain on NPRS at his low back and R side ribs, and stiffness in his neck and R hip. Nurse gave him 5mg oxycodone at the beginning of session. Pt agreed to PT session.      Objective     01/30/25 1031   PT Charge Group   PT Gait Training (Units) 3   PT Therapeutic Activities (Units) 1   PT Total Time Spent   PT Individual Total Time Spent (Mins) 45   Gait Functional Level of Assist    Gait Level Of Assist Total Assist X 2  (CGA with w/c follow)   Assistive Device Front Wheel Walker   Distance (Feet) 85  (Room to stairs)   # of Times Distance was Traveled 1  (Plus additional 90, 55, 20)   Deviation Antalgic;Decreased Heel Strike;Decreased Toe Off;Other (Comment)  (Bilateral hip ER R>L)   Stairs Functional Level of Assist   Level of Assist with Stairs Contact Guard Assist   # of Stairs Climbed 6  (4in.)   Stairs Description Extra time;Hand rails;Limited by fatigue;Oxygen;Safety concerns;Supervision for safety;Verbal cueing  (Ascend with step through pattern, descend with step through and step-to pattern,)   Transfer Functional Level of Assist   Bed, Chair, Wheelchair Transfer Contact Guard Assist   Bed Chair Wheelchair Transfer Description Set-up of equipment;Supervision for safety;Verbal cueing  (FWW, cueing of hand placement and FWW position)   Bed Mobility    Supine to Sit Contact Guard Assist   Sit to Supine Minimal Assist  (Due to fatigue, assist L LE swing)   Sit to Stand Contact Guard Assist   Scooting Standby Assist   Interdisciplinary Plan of Care Collaboration   IDT Collaboration with  Respiratory Therapist;Nursing   Patient Position at End of Therapy In Bed;Call Light within Reach;Tray Table within Reach;Phone within Reach   Collaboration  Comments RT present in room after session to perform ECG. Nurse was notified of his bladder accident.   Car Transfer   Assistance Needed Set-up / clean-up;Supervision;Verbal cues;Incidental touching;Adaptive equipment   CARE Score - Car Transfer 4   Walk 50 Feet with Two Turns   Assistance Needed Physical assistance  (CGA, w/c follow)   Physical Assistance Level 25% or less   CARE Score - Walk 50 Feet with Two Turns 3   Walk 150 Feet   Assistance Needed Physical assistance  (CGA, w/c follow)   Physical Assistance Level Total assistance  (Needed seated rest breaks with w/c follow)   CARE Score - Walk 150 Feet 1   1 Step (Curb)   Assistance Needed Supervision;Verbal cues;Incidental touching;Adaptive equipment   CARE Score - 1 Step (Curb) 4   4 Steps   Assistance Needed Supervision;Verbal cues;Incidental touching;Adaptive equipment   CARE Score - 4 Steps 4   12 Steps   Assistance Needed Supervision;Verbal cues;Incidental touching;Adaptive equipment   CARE Score - 12 Steps 4       Car transfer: FWW, CGA, set-up, verbal cues, safety concern. Increased time to perform due to pain.    Assessment    Chilango tolerated the session well and was able to perform stairs and car transfer. Pt was CGA for the activities and verbal cues for sequencing. Pt demonstrated B hip ER during gait, R>L; pt stated it has been in the position for a long time. Pt was complaining of chest pain, he stated he broke his ribs during the latest fall that was the cause of admission to rehab, checked his O2 sat after performing the stairs, it read 90% with 2L O2 nasal cannula.     Strengths: Motivated for self care and independence, Pleasant and cooperative, Supportive family, Willingly participates in therapeutic activities  Barriers: Decreased endurance, Fatigue, Impaired activity tolerance, Impaired balance, Impaired insight/denial of deficits, Impaired functional cognition, Limited mobility, Pain (lives alone, hx of ~10 falls in past  "month)    Plan    Gait with LRAD  BLE and core strengthening  Static and dynamic balance (history of multiple falls)    DME  PT DME Recommendations  Assistive Device:  (Pt has FWW, 4WW, SPT, QC)  Additional Equipment:  (TBD; pt owns sock aid)    Passport items to be completed:  Get in/out of bed safely, in/out of a vehicle, safely use mobility device, walk or wheel around home/community, navigate up and down stairs, show how to get up/down from the ground, ensure home is accessible, demonstrate HEP, complete caregiver training    Physical Therapy Problems (Active)       Problem: Mobility       Dates: Start:  01/29/25         Goal: STG-Within one week, patient will ambulate household distance 25 ft x2 FWW CGA       Dates: Start:  01/29/25            Goal: STG-Within one week, patient will ambulate up/down a 2\" step in // bars min A       Dates: Start:  01/29/25               Problem: Mobility Transfers       Dates: Start:  01/29/25         Goal: STG-Within one week, patient will transfer bed to chair CGA SPT FWW       Dates: Start:  01/29/25               Problem: PT-Long Term Goals       Dates: Start:  01/29/25         Goal: LTG-By discharge, patient will ambulate 50 ft LRAD SPV/mod I indoors        Dates: Start:  01/29/25            Goal: LTG-By discharge, patient will transfer one surface to another SPV/mod I SPT LRAD       Dates: Start:  01/29/25            Goal: LTG-By discharge, patient will amb up/down threshold for SUZANNA home with LRAD SPV       Dates: Start:  01/29/25              "

## 2025-01-30 NOTE — ASSESSMENT & PLAN NOTE
HbA1c 6.9  Discontinued FSBS and SSI as not routinely needing insulin coverage  Continue diet control  Outpt meds include Metformin 1000 mg PO bid and Jardiance 10 mg PO qd

## 2025-01-30 NOTE — ASSESSMENT & PLAN NOTE
Trop 23 followed by 24  EKG 1/30/25 SR, LBBB  EKG 1/31/25 AFib  CXR small R pl eff, R basilar opacity (atx vs pneumonitis)  Rib X-ray no acute displaced rib fx  Leukocytosis resolved on abx  Continue Augmentin  IS and RT protocol

## 2025-01-31 ENCOUNTER — APPOINTMENT (OUTPATIENT)
Dept: OCCUPATIONAL THERAPY | Facility: REHABILITATION | Age: 80
DRG: 949 | End: 2025-01-31
Attending: PHYSICAL MEDICINE & REHABILITATION
Payer: MEDICARE

## 2025-01-31 ENCOUNTER — APPOINTMENT (OUTPATIENT)
Dept: PHYSICAL THERAPY | Facility: REHABILITATION | Age: 80
DRG: 949 | End: 2025-01-31
Attending: PHYSICAL MEDICINE & REHABILITATION
Payer: MEDICARE

## 2025-01-31 ENCOUNTER — APPOINTMENT (OUTPATIENT)
Dept: PHYSICAL MEDICINE AND REHAB | Facility: REHABILITATION | Age: 80
DRG: 949 | End: 2025-01-31
Attending: PHYSICAL MEDICINE & REHABILITATION
Payer: MEDICARE

## 2025-01-31 ENCOUNTER — APPOINTMENT (OUTPATIENT)
Dept: SPEECH THERAPY | Facility: REHABILITATION | Age: 80
DRG: 949 | End: 2025-01-31
Attending: PHYSICAL MEDICINE & REHABILITATION
Payer: MEDICARE

## 2025-01-31 VITALS
WEIGHT: 243.61 LBS | SYSTOLIC BLOOD PRESSURE: 122 MMHG | TEMPERATURE: 95.5 F | RESPIRATION RATE: 14 BRPM | HEIGHT: 71 IN | BODY MASS INDEX: 34.1 KG/M2 | HEART RATE: 67 BPM | DIASTOLIC BLOOD PRESSURE: 68 MMHG | OXYGEN SATURATION: 95 %

## 2025-01-31 LAB
ANION GAP SERPL CALC-SCNC: 9 MMOL/L (ref 7–16)
BUN SERPL-MCNC: 19 MG/DL (ref 8–22)
CALCIUM SERPL-MCNC: 8.4 MG/DL (ref 8.5–10.5)
CHLORIDE SERPL-SCNC: 100 MMOL/L (ref 96–112)
CO2 SERPL-SCNC: 26 MMOL/L (ref 20–33)
CREAT SERPL-MCNC: 0.76 MG/DL (ref 0.5–1.4)
EKG IMPRESSION: NORMAL
ERYTHROCYTE [DISTWIDTH] IN BLOOD BY AUTOMATED COUNT: 50 FL (ref 35.9–50)
GFR SERPLBLD CREATININE-BSD FMLA CKD-EPI: 91 ML/MIN/1.73 M 2
GLUCOSE SERPL-MCNC: 154 MG/DL (ref 65–99)
HCT VFR BLD AUTO: 38.4 % (ref 42–52)
HGB BLD-MCNC: 12.5 G/DL (ref 14–18)
MAGNESIUM SERPL-MCNC: 1.7 MG/DL (ref 1.5–2.5)
MCH RBC QN AUTO: 30.6 PG (ref 27–33)
MCHC RBC AUTO-ENTMCNC: 32.6 G/DL (ref 32.3–36.5)
MCV RBC AUTO: 93.9 FL (ref 81.4–97.8)
NT-PROBNP SERPL IA-MCNC: 1141 PG/ML (ref 0–125)
PHOSPHATE SERPL-MCNC: 2.6 MG/DL (ref 2.5–4.5)
PLATELET # BLD AUTO: 234 K/UL (ref 164–446)
PMV BLD AUTO: 9.7 FL (ref 9–12.9)
POTASSIUM SERPL-SCNC: 3.8 MMOL/L (ref 3.6–5.5)
PROCALCITONIN SERPL-MCNC: 0.07 NG/ML
RBC # BLD AUTO: 4.09 M/UL (ref 4.7–6.1)
SODIUM SERPL-SCNC: 135 MMOL/L (ref 135–145)
TROPONIN T SERPL-MCNC: 24 NG/L (ref 6–19)
WBC # BLD AUTO: 10.8 K/UL (ref 4.8–10.8)

## 2025-01-31 PROCEDURE — 97110 THERAPEUTIC EXERCISES: CPT

## 2025-01-31 PROCEDURE — 97130 THER IVNTJ EA ADDL 15 MIN: CPT

## 2025-01-31 PROCEDURE — 99232 SBSQ HOSP IP/OBS MODERATE 35: CPT | Performed by: PHYSICAL MEDICINE & REHABILITATION

## 2025-01-31 PROCEDURE — 93005 ELECTROCARDIOGRAM TRACING: CPT | Mod: TC | Performed by: HOSPITALIST

## 2025-01-31 PROCEDURE — 90791 PSYCH DIAGNOSTIC EVALUATION: CPT | Performed by: STUDENT IN AN ORGANIZED HEALTH CARE EDUCATION/TRAINING PROGRAM

## 2025-01-31 PROCEDURE — 97116 GAIT TRAINING THERAPY: CPT

## 2025-01-31 PROCEDURE — 97129 THER IVNTJ 1ST 15 MIN: CPT

## 2025-01-31 PROCEDURE — 80048 BASIC METABOLIC PNL TOTAL CA: CPT

## 2025-01-31 PROCEDURE — 84484 ASSAY OF TROPONIN QUANT: CPT

## 2025-01-31 PROCEDURE — 99232 SBSQ HOSP IP/OBS MODERATE 35: CPT | Performed by: HOSPITALIST

## 2025-01-31 PROCEDURE — 97530 THERAPEUTIC ACTIVITIES: CPT

## 2025-01-31 PROCEDURE — 83880 ASSAY OF NATRIURETIC PEPTIDE: CPT

## 2025-01-31 PROCEDURE — 85027 COMPLETE CBC AUTOMATED: CPT

## 2025-01-31 PROCEDURE — A9270 NON-COVERED ITEM OR SERVICE: HCPCS | Performed by: HOSPITALIST

## 2025-01-31 PROCEDURE — 83735 ASSAY OF MAGNESIUM: CPT

## 2025-01-31 PROCEDURE — 700111 HCHG RX REV CODE 636 W/ 250 OVERRIDE (IP): Mod: JZ | Performed by: PHYSICAL MEDICINE & REHABILITATION

## 2025-01-31 PROCEDURE — 93010 ELECTROCARDIOGRAM REPORT: CPT | Performed by: INTERNAL MEDICINE

## 2025-01-31 PROCEDURE — 700102 HCHG RX REV CODE 250 W/ 637 OVERRIDE(OP): Performed by: PHYSICAL MEDICINE & REHABILITATION

## 2025-01-31 PROCEDURE — 770010 HCHG ROOM/CARE - REHAB SEMI PRIVAT*

## 2025-01-31 PROCEDURE — A9270 NON-COVERED ITEM OR SERVICE: HCPCS | Performed by: PHYSICAL MEDICINE & REHABILITATION

## 2025-01-31 PROCEDURE — 97535 SELF CARE MNGMENT TRAINING: CPT

## 2025-01-31 PROCEDURE — 84100 ASSAY OF PHOSPHORUS: CPT

## 2025-01-31 PROCEDURE — 94760 N-INVAS EAR/PLS OXIMETRY 1: CPT

## 2025-01-31 PROCEDURE — 84145 PROCALCITONIN (PCT): CPT

## 2025-01-31 PROCEDURE — 94640 AIRWAY INHALATION TREATMENT: CPT

## 2025-01-31 PROCEDURE — 36415 COLL VENOUS BLD VENIPUNCTURE: CPT

## 2025-01-31 PROCEDURE — 700102 HCHG RX REV CODE 250 W/ 637 OVERRIDE(OP): Performed by: HOSPITALIST

## 2025-01-31 RX ADMIN — SENNOSIDES AND DOCUSATE SODIUM 2 TABLET: 50; 8.6 TABLET ORAL at 20:12

## 2025-01-31 RX ADMIN — QUETIAPINE FUMARATE 12.5 MG: 25 TABLET ORAL at 20:12

## 2025-01-31 RX ADMIN — FLUTICASONE FUROATE, UMECLIDINIUM BROMIDE AND VILANTEROL TRIFENATATE 1 PUFF: 200; 62.5; 25 POWDER RESPIRATORY (INHALATION) at 06:08

## 2025-01-31 RX ADMIN — THERA TABS 1 TABLET: TAB at 08:02

## 2025-01-31 RX ADMIN — MAGNESIUM HYDROXIDE 30 ML: 1200 LIQUID ORAL at 04:39

## 2025-01-31 RX ADMIN — AMOXICILLIN AND CLAVULANATE POTASSIUM 1 TABLET: 875; 125 TABLET, FILM COATED ORAL at 20:11

## 2025-01-31 RX ADMIN — CYANOCOBALAMIN TAB 500 MCG 1000 MCG: 500 TAB at 08:02

## 2025-01-31 RX ADMIN — Medication 100 MG: at 08:01

## 2025-01-31 RX ADMIN — ENOXAPARIN SODIUM 40 MG: 100 INJECTION SUBCUTANEOUS at 08:03

## 2025-01-31 RX ADMIN — TRAZODONE HYDROCHLORIDE 100 MG: 100 TABLET ORAL at 20:11

## 2025-01-31 RX ADMIN — OMEPRAZOLE 20 MG: 20 CAPSULE, DELAYED RELEASE ORAL at 08:01

## 2025-01-31 RX ADMIN — ATORVASTATIN CALCIUM 40 MG: 40 TABLET, FILM COATED ORAL at 20:12

## 2025-01-31 RX ADMIN — BUMETANIDE 1 MG: 1 TABLET ORAL at 20:12

## 2025-01-31 RX ADMIN — AMOXICILLIN AND CLAVULANATE POTASSIUM 1 TABLET: 875; 125 TABLET, FILM COATED ORAL at 08:00

## 2025-01-31 RX ADMIN — MONTELUKAST 10 MG: 10 TABLET, FILM COATED ORAL at 20:12

## 2025-01-31 RX ADMIN — TAMSULOSIN HYDROCHLORIDE 0.4 MG: 0.4 CAPSULE ORAL at 08:01

## 2025-01-31 RX ADMIN — ACETAMINOPHEN 650 MG: 325 TABLET ORAL at 09:43

## 2025-01-31 ASSESSMENT — ENCOUNTER SYMPTOMS
POLYDIPSIA: 0
EYES NEGATIVE: 1
ABDOMINAL PAIN: 0
COUGH: 0
BRUISES/BLEEDS EASILY: 0
NAUSEA: 0
PALPITATIONS: 0
SHORTNESS OF BREATH: 0
FEVER: 0
VOMITING: 0
CHILLS: 0

## 2025-01-31 ASSESSMENT — GAIT ASSESSMENTS
ASSISTIVE DEVICE: FRONT WHEEL WALKER
DISTANCE (FEET): 30
GAIT LEVEL OF ASSIST: CONTACT GUARD ASSIST
DEVIATION: ANTALGIC;SHUFFLED GAIT

## 2025-01-31 ASSESSMENT — ACTIVITIES OF DAILY LIVING (ADL)
TOILET_TRANSFER_DESCRIPTION: GRAB BAR;INCREASED TIME;INITIAL PREPARATION FOR TASK;SET-UP OF EQUIPMENT;SUPERVISION FOR SAFETY;VERBAL CUEING
TOILETING_LEVEL_OF_ASSIST_DESCRIPTION: ASSIST TO PULL PANTS UP;ASSIST FOR STANDING BALANCE;GRAB BAR;INCREASED TIME;INITIAL PREPARATION FOR TASK;SET-UP OF EQUIPMENT;SUPERVISION FOR SAFETY;VERBAL CUEING

## 2025-01-31 ASSESSMENT — PAIN DESCRIPTION - PAIN TYPE
TYPE: ACUTE PAIN
TYPE: ACUTE PAIN

## 2025-01-31 NOTE — THERAPY
Speech Language Pathology  Daily Treatment     Patient Name: Vince Carr  Age:  79 y.o., Sex:  male  Medical Record #: [id number]  Today's Date: 1/31/2025     Precautions  Precautions: Fall Risk  Comments: Multiple falls    Subjective    Patient agreeable to therapy.     Objective       01/31/25 0831   Treatment Charges   SLP Cognitive Skill Development First 15 Minutes 1   SLP Cognitive Skill Development Additional 15 Minutes 1   SLP Total Time Spent   SLP Individual Total Time Spent (Mins) 30   Cognition - Detailed   Prospective Memory Severe (2)   Functional Memory Activities Moderate (3)         Assessment    Patient worked to recall 5 unrelated words.  Was able to recall 2/5 after multiple reviews and use of RWAVS memory strategies after 2 min delay.  Patient was able to perform mental manipulation reversing order of 3 word lists with 10/10 correct.  However, was not able to recall 4 words in a list to reverse.    Strengths: Able to follow instructions, Motivated for self care and independence, Pleasant and cooperative, Supportive family, Willingly participates in therapeutic activities  Barriers: Confused, Generalized weakness, Impaired activity tolerance, Impaired carryover of learning, Impaired functional cognition, Pain (Patient complains of back and left shoulder pain.)    Plan    Target recall and selective attention, suggest calendar or med error tasks.    Passport items to be completed:  Express basic needs, understand food/liquid recommendations, consistently follow swallow precautions, manage finances, manage medications, arrive to therapy appointments on time, complete daily memory log entries, solve problems related to safety situations, review education related to hospitalization, complete caregiver training     Speech Therapy Problems (Active)       Problem: Memory STGs       Dates: Start:  01/29/25         Goal: STG-Within one week, patient will recall daily events and new training with 50%  acc with external and internal memory aids and strategies, with mod to max cues.       Dates: Start:  01/29/25         Goal Note filed on 01/30/25 0930 by Antonia Dsouza MS,CCC-SLP       Patient was evaluated yesterday 1/29/25 with first day of tx today.                   Problem: Problem Solving STGs       Dates: Start:  01/29/25         Goal: STG-Within one week, patient will perform selective attention tasks with 75% acc with min A.       Dates: Start:  01/29/25         Goal Note filed on 01/30/25 0930 by Antonia Dsouza, MS,CCC-SLP       Patient was evaluated yesterday 1/29/25 with first day of tx today.                   Problem: Speech/Swallowing LTGs       Dates: Start:  01/29/25         Goal: LTG-By discharge, patient will solve complex problem and recall safety training with 80% acc with set up or spv.       Dates: Start:  01/29/25

## 2025-01-31 NOTE — PROGRESS NOTES
"  Physical Medicine & Rehabilitation Progress Note    Encounter Date: 1/31/2025    Chief Complaint: Decreased mobility    Interval Events (Subjective):  Patient sitting up in room. He reports he is doing OK. Discussed about his discharge planning. He reports ongoing low back pain.     _____________________________________  Interdisciplinary Team Conference   Most recent IDT on 1/30/2025    Discharge Date/Disposition:  2/11/25  _____________________________________      Objective:  VITAL SIGNS: /63   Pulse 78   Temp 36.8 °C (98.3 °F) (Oral)   Resp 16   Ht 1.803 m (5' 11\")   Wt 110 kg (243 lb 9.7 oz)   SpO2 93%   BMI 33.98 kg/m²   Gen: NAD  Psych: Mood and affect appropriate  CV: RRR, 0 edema  Resp: CTAB, no upper airway sounds  Abd: NTND  Neuro: AOx3, following commands  Unchanged from 1/30/25    Laboratory Values:  Recent Results (from the past 72 hours)   POCT glucose device results    Collection Time: 01/28/25  1:21 PM   Result Value Ref Range    POC Glucose, Blood 203 (H) 65 - 99 mg/dL   POCT glucose device results    Collection Time: 01/28/25  5:18 PM   Result Value Ref Range    POC Glucose, Blood 147 (H) 65 - 99 mg/dL   POCT glucose device results    Collection Time: 01/28/25  8:08 PM   Result Value Ref Range    POC Glucose, Blood 136 (H) 65 - 99 mg/dL   Comp Metabolic Panel (CMP)    Collection Time: 01/29/25  6:30 AM   Result Value Ref Range    Sodium 135 135 - 145 mmol/L    Potassium 4.0 3.6 - 5.5 mmol/L    Chloride 101 96 - 112 mmol/L    Co2 22 20 - 33 mmol/L    Anion Gap 12.0 7.0 - 16.0    Glucose 147 (H) 65 - 99 mg/dL    Bun 14 8 - 22 mg/dL    Creatinine 0.89 0.50 - 1.40 mg/dL    Calcium 8.4 (L) 8.5 - 10.5 mg/dL    Correct Calcium 9.2 8.5 - 10.5 mg/dL    AST(SGOT) 26 12 - 45 U/L    ALT(SGPT) 14 2 - 50 U/L    Alkaline Phosphatase 62 30 - 99 U/L    Total Bilirubin 1.0 0.1 - 1.5 mg/dL    Albumin 3.0 (L) 3.2 - 4.9 g/dL    Total Protein 6.2 6.0 - 8.2 g/dL    Globulin 3.2 1.9 - 3.5 g/dL    A-G Ratio " 0.9 g/dL   TSH with Reflex to FT4    Collection Time: 01/29/25  6:30 AM   Result Value Ref Range    TSH 1.380 0.380 - 5.330 uIU/mL   Vitamin D, 25-hydroxy (blood)    Collection Time: 01/29/25  6:30 AM   Result Value Ref Range    25-Hydroxy   Vitamin D 25 32 30 - 100 ng/mL   ESTIMATED GFR    Collection Time: 01/29/25  6:30 AM   Result Value Ref Range    GFR (CKD-EPI) 87 >60 mL/min/1.73 m 2   POCT glucose device results    Collection Time: 01/29/25  8:17 AM   Result Value Ref Range    POC Glucose, Blood 160 (H) 65 - 99 mg/dL   POCT glucose device results    Collection Time: 01/29/25 11:09 AM   Result Value Ref Range    POC Glucose, Blood 166 (H) 65 - 99 mg/dL   CBC WITH DIFFERENTIAL    Collection Time: 01/29/25 11:34 AM   Result Value Ref Range    WBC 13.2 (H) 4.8 - 10.8 K/uL    RBC 4.37 (L) 4.70 - 6.10 M/uL    Hemoglobin 13.5 (L) 14.0 - 18.0 g/dL    Hematocrit 40.5 (L) 42.0 - 52.0 %    MCV 92.7 81.4 - 97.8 fL    MCH 30.9 27.0 - 33.0 pg    MCHC 33.3 32.3 - 36.5 g/dL    RDW 49.8 35.9 - 50.0 fL    Platelet Count 274 164 - 446 K/uL    MPV 9.8 9.0 - 12.9 fL    Neutrophils-Polys 79.20 (H) 44.00 - 72.00 %    Lymphocytes 7.20 (L) 22.00 - 41.00 %    Monocytes 12.50 0.00 - 13.40 %    Eosinophils 0.20 0.00 - 6.90 %    Basophils 0.20 0.00 - 1.80 %    Immature Granulocytes 0.70 0.00 - 0.90 %    Nucleated RBC 0.00 0.00 - 0.20 /100 WBC    Neutrophils (Absolute) 10.44 (H) 1.82 - 7.42 K/uL    Lymphs (Absolute) 0.95 (L) 1.00 - 4.80 K/uL    Monos (Absolute) 1.65 (H) 0.00 - 0.85 K/uL    Eos (Absolute) 0.03 0.00 - 0.51 K/uL    Baso (Absolute) 0.02 0.00 - 0.12 K/uL    Immature Granulocytes (abs) 0.09 0.00 - 0.11 K/uL    NRBC (Absolute) 0.00 K/uL   HEMOGLOBIN A1C    Collection Time: 01/29/25 11:34 AM   Result Value Ref Range    Glycohemoglobin 6.9 (H) 4.0 - 5.6 %    Est Avg Glucose 151 mg/dL   URINALYSIS    Collection Time: 01/29/25  4:25 PM    Specimen: Urine, Clean Catch   Result Value Ref Range    Color Dark Yellow     Character Clear      Specific Gravity 1.028 <1.035    Ph 6.0 5.0 - 8.0    Glucose >=1000 (A) Negative mg/dL    Ketones Trace (A) Negative mg/dL    Protein Negative Negative mg/dL    Bilirubin Negative Negative    Urobilinogen, Urine 1.0 <=1.0 EU/dL    Nitrite Negative Negative    Leukocyte Esterase Negative Negative    Occult Blood Negative Negative    Micro Urine Req see below    POCT glucose device results    Collection Time: 25  5:31 PM   Result Value Ref Range    POC Glucose, Blood 176 (H) 65 - 99 mg/dL   POCT glucose device results    Collection Time: 25  8:07 PM   Result Value Ref Range    POC Glucose, Blood 188 (H) 65 - 99 mg/dL   POCT glucose device results    Collection Time: 25  8:09 AM   Result Value Ref Range    POC Glucose, Blood 149 (H) 65 - 99 mg/dL   TROPONIN    Collection Time: 25 12:40 PM   Result Value Ref Range    Troponin T 23 (H) 6 - 19 ng/L   proBrain Natriuretic Peptide, NT    Collection Time: 25 12:40 PM   Result Value Ref Range    NT-proBNP 815 (H) 0 - 125 pg/mL   EKG    Collection Time: 25 11:35 PM   Result Value Ref Range    Report       [facility]    Test Date:  2025  Pt Name:    BERNIE MASSEY         Department: [facility]  MRN:        [id number]                      Room:       [facility]  Gender:     Male                         Technician: [id number]  :        1945                   Requested By:BASIL DU  Order #:    [id number]                    Reading MD: Henry Genao MD    Measurements  Intervals                                Axis  Rate:       76                           P:          72  CO:         190                          QRS:        252  QRSD:       165                          T:          69  QT:         448  QTc:        504    Interpretive Statements  Sinus rhythm  LEFT BUNDLE BRANCH BLOCK  Electronically Signed On 2025 23:35:36 PST by Henry Genao MD     CBC WITHOUT DIFFERENTIAL    Collection Time: 25  6:04 AM   Result  Value Ref Range    WBC 10.8 4.8 - 10.8 K/uL    RBC 4.09 (L) 4.70 - 6.10 M/uL    Hemoglobin 12.5 (L) 14.0 - 18.0 g/dL    Hematocrit 38.4 (L) 42.0 - 52.0 %    MCV 93.9 81.4 - 97.8 fL    MCH 30.6 27.0 - 33.0 pg    MCHC 32.6 32.3 - 36.5 g/dL    RDW 50.0 35.9 - 50.0 fL    Platelet Count 234 164 - 446 K/uL    MPV 9.7 9.0 - 12.9 fL   Basic Metabolic Panel    Collection Time: 25  6:04 AM   Result Value Ref Range    Sodium 135 135 - 145 mmol/L    Potassium 3.8 3.6 - 5.5 mmol/L    Chloride 100 96 - 112 mmol/L    Co2 26 20 - 33 mmol/L    Glucose 154 (H) 65 - 99 mg/dL    Bun 19 8 - 22 mg/dL    Creatinine 0.76 0.50 - 1.40 mg/dL    Calcium 8.4 (L) 8.5 - 10.5 mg/dL    Anion Gap 9.0 7.0 - 16.0   MAGNESIUM    Collection Time: 25  6:04 AM   Result Value Ref Range    Magnesium 1.7 1.5 - 2.5 mg/dL   PHOSPHORUS    Collection Time: 25  6:04 AM   Result Value Ref Range    Phosphorus 2.6 2.5 - 4.5 mg/dL   TROPONIN    Collection Time: 25  6:04 AM   Result Value Ref Range    Troponin T 24 (H) 6 - 19 ng/L   PROCALCITONIN    Collection Time: 25  6:04 AM   Result Value Ref Range    Procalcitonin 0.07 <0.25 ng/mL   proBrain Natriuretic Peptide, NT    Collection Time: 25  6:04 AM   Result Value Ref Range    NT-proBNP 1141 (H) 0 - 125 pg/mL   ESTIMATED GFR    Collection Time: 25  6:04 AM   Result Value Ref Range    GFR (CKD-EPI) 91 >60 mL/min/1.73 m 2   EKG (IP)    Collection Time: 25  6:33 AM   Result Value Ref Range    Report       [facility]    Test Date:  2025  Pt Name:    BERNIE MASSEY         Department: [facility]  MRN:        [id number]                      Room:       [facility]  Gender:     Male                         Technician: [id number]  :        1945                   Requested By:BASIL DU  Order #:    [id number]                    Reading MD:    Measurements  Intervals                                Axis  Rate:       68                           P:          0  TX:         0                             QRS:        -106  QRSD:       175                          T:          81  QT:         478  QTc:        509    Interpretive Statements  Atrial fibrillation  Right bundle branch block  Anterolateral infarct, age indeterminate  Compared to ECG 01/30/2025 11:34:30  Right bundle-branch block now present  Myocardial infarct finding now present  Sinus rhythm no longer present  Left bundle-branch block no longer present         Medications:  Scheduled Medications   Medication Dose Frequency    amoxicillin-clavulanate  1 Tablet Q12HRS    montelukast  10 mg Nightly    enoxaparin (LOVENOX) injection  40 mg DAILY    fluticasone-umeclidinium-vilanterol  1 Puff QDAILY (RT)    Pharmacy Consult Request  1 Each PHARMACY TO DOSE    senna-docusate  2 Tablet Q EVENING    omeprazole  20 mg DAILY    atorvastatin  40 mg QHS    bumetanide  1 mg BID    carvedilol  12.5 mg BID WITH MEALS    cyanocobalamin  1,000 mcg DAILY    multivitamin  1 Tablet DAILY    QUEtiapine  12.5 mg Nightly    tamsulosin  0.4 mg AFTER BREAKFAST    thiamine  100 mg DAILY    traZODone  100 mg Nightly     PRN medications: hydrALAZINE, acetaminophen, senna-docusate **AND** polyethylene glycol/lytes, docusate sodium, magnesium hydroxide, carboxymethylcellulose, benzocaine-menthol, mag hydrox-al hydrox-simeth, ondansetron **OR** ondansetron, traZODone, sodium chloride, oxyCODONE immediate-release **OR** oxyCODONE immediate-release, midazolam    Diet:  Current Diet Order   Procedures    Diet Order Diet: Consistent CHO (Diabetic)       Medical Decision Making and Plan:  SDH - Patient with fall on 1/26/25 with SDH with conservative management  -PT and OT for mobility and ADLs. Per guidelines, 15 hours per week between PT, OT and/or SLP.  -Follow-up NSG. On Seroquel 12.5 mg QHS     HTN/CAD/A fib - Patient on Bumex, Coreg 12.5 mg. Continue Bumex 1 mg BID and Coreg 12.5 mg     S5 fracture - Conservative management. PRN Tylenol and Oxycodone       Chest pain - Right sided, probable rib fracture. CXR completed, consult hospitalist. Concern for pneumonia, started on Augmentin. Continue Augmentin     Hyponatremia - Check AM CMP - 135, improving     Anemia - Check AM CBC - 13.5, will monitor     Leukocytosis - Check AM CBC - 13.2, increasing. UA negative CXR with possible pneumonia. Consult hospitalist     DM2 with hyperglycemia - Patient on SSI on transfer     BPH - Patient on Tamsulosin 0.4 mg daily     Insomnia - Patient on Trazodone 100 mg daily     COPD - Patient on Trelegy daily     Pain - Patient on PRN Tylenol and Oxycodone      Skin - Patient at risk for skin breakdown due to debility in areas including sacrum, achilles, elbows and head in addition to other sites. Nursing to assess skin daily.      GI Ppx - Patient on Prilosec for GERD prophylaxis. Patient on Senna-docusate for constipation prophylaxis.      DVT Ppx - Patient Lovenox on transfer. Limited mobility, continue Lovenox  ____________________________________    T. Car Chan MD/PhD  [facility] - Physical Medicine & Rehabilitation   [facility] - Brain Injury Medicine   ____________________________________

## 2025-01-31 NOTE — CONSULTS
"PSYCHOLOGY INITIAL EVALUATION    Psychiatric Evaluation Time (Face to Face): [id number] in pt's room in rehabilitation hospital with son, Morgan, present with pt's consent  Service Procedures: [id number]    Requesting Physician: Dustin  Reason for Request:  adjustment disorder    RELEVANT HISTORY    The following history was taken from available medical records unless otherwise indicated. Vince Carr is a 79 y.o., male, right-handed,  admitted on 1/28/2025 to [facility] for his continued acute medical management, nursing cares, and comprehensive therapies. Briefly, pt admitted on 1/26/25 to [facility] s/p multiple GLF at home, with recent mechanical GLF in which \"his knees gave out\" without headstrike or LOC. 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 Of note, pt was also presented to the ED on 1/14 s/p GLF without LOC. Hospitalization was complicated by S5 fx, HTN/CAD,Afib, BPH, T2DM, leukocytosis, COPD, pain, and anemia. Rehabilitation psychology was consulted to complete evaluation and testing for emotional and/or cognitive barriers interfering with Rehab and medical care.     Imaging/Testing Results:  CT Head 1/25: There is high attenuation lesion along the posterior aspect of the septum pellucidum slightly asymmetric to the left. Not seen on recent study of 1/14/2025. Therefore most likely represents hemorrhage of the septum pellucidum.   CT Head 1/26: What likely represents hemorrhage along the septum pellucidum is stable as previously described on study of 1/25/2025  MRI Brain 1/26: T1 shortening noted within the posterior septum pellucidum and adjacent bilateral fornices more prominent on the left side. This is an unusual location for a " bleed, however given the signal characteristics, this may represent subacute hemorrhage. Another alternative possibility is a long-standing lesion such as a lipoma that is partially calcified. There is no evidence of layering intraventricular hemorrhage or subarachnoid hemorrhage. Comparison with prior remote examinations of the brain would be helpful, if available. Recommend MRI brain with contrast follow-up in 6 weeks.    Psychotropics:  Trazodone 100mg 1/night  Trazodone 50mg 50mg QHS PRN    Pain Medications:  Acetaminophen 650mg q4PRN  Roxicodone 10mg q3PRN  Roxicodone 5mg q3PRN  Midazolam 5mg PRN    PMH/PSH:  Past Medical History:   Diagnosis Date    Arthritis     osteo    Perez's esophagus     COPD (chronic obstructive pulmonary disease) (HCC)     on O2 at night / uses inhalers daily    Dental disorder      pt has partials    Gastroesophageal reflux disease with esophagitis 5/20/2020    HTN (hypertension)     on Losartan    Hypertension     Indigestion     GERD     Myocardial infarct (HCC) 2011 and 2018    stent in LAD, stent in rad    Snoring      Past Surgical History:   Procedure Laterality Date    PB SHLDR ARTHROSCOP,SURG,W/ROTAT CUFF REPB Right 5/20/2020    Procedure: RIGHT SHOULDER ARTHROSCOPY ROTATOR CUFF REPAIR, SUBACROMIAL DECOMPRESSION, DISTAL CLAVICLE RESECTION;  Surgeon: Jayant Gonzales M.D.;  Location: SURGERY [facility];  Service: Orthopedics    GASTROSCOPY-ENDO N/A 6/22/2015    Procedure: GASTROSCOPY-ENDO;  Surgeon: Torsten Tariq M.D.;  Location: [facility];  Service:     COLONOSCOPY - ENDO  8/13/2014    Performed by Torsten Tariq M.D. 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 His son indicated living conditions in his home were remarkable for animal feces/urine, which is in the process of being cleaned up.  Pt indicated normal childhood development and reported 14 years as highest level of education. 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 Pt is an [employer]  and he was driving PTA; Prior Level of Functioning (PLOF) was Independent. Pt indicated his spouse had managed finances prior to her death. His son indicated that while in his father's home he found pill bottles in multiple places and was unsure of his father's consistency with taking medications more recently. His son also reported finding some errors in financial management. Pt has 2 sons that live in [place] and 2 grandchildren. He reported his neighbors and friends from [place] are local sources of support.    Mental Health/Substance Use History:  No significant mental health history was endorsed by pt to include psychotherapy, psychotropics, prior diagnosis, suicide attempt, SPED, LD, or mental health hospitalization.Per pt's son, he has had multiple exposures to traumas (Related to work). Pt quit smoking when he was 30 years old and chewed tobacco til 39yo; he reported alcohol use in the past with increased consumption reported by his son with no recent alcohol consumption. No hx of  illicit substance use endorsed.    SUBJECTIVE    Mood: Stated mood as low  and endorsed pervasive negative  "mood and thoughts of death since his wife's passing. He reported frequent thoughts of death most of the day; however, did not endorse active intent or plan.He also stated \"I won't do it, because I'm Scientology.\" He appeared open to discussing his mood with family.      Sleep: Sleep quality was rated as poor with difficulty staying asleep & reported history of sleep difficulty + sleep apnea.     Appetite: Self-reported as poor with recent WL of ~30# in January and some weight loss following hospitalization for sepsis in the fall.    Pain: Pain was reported as an 12/10 on eval in his neck. Pt stated, \"I don't take pain meds\" with concern for addictive properties to pain medications. He appeared open to use of Tylenol, heat, or ice.    Neuropsych Review of Systems: He reported changes in cognition in the areas of: expressive language--word finding, learning and short term memory, and attention and concentration. His son agreed with pt's report & reported that pt has had delirium behaviors while in the hospital in the nighttime, often calling at night asking where his dogs are as he cannot find them and not realizing he is in the hospital.    Goals for Rehab: \"get legs working/stamina\"    Personally Identified Strengths: family-oriented, caring, hard-working     Hobbies: [employer], watching television, Jew friends    ASSESSMENT    Behavioral Observations:  Pt appeared alert and was oriented to person, place, & situation. Pt appeared to be adequate historian with some discrepant information reported by his son. Receptive language was  WNL. Expressive speech was grossly normal. Thought processes were connected, logical, goal oriented, and negative for AH/VH/HI/delusions. Pt endorsed active thoughts of death without plan or intent and protective factors including family support, Quaker; risk Is felt to be low at this time. Non-verbal behaviors were remarkable for pain behaviors such as facial grimaces and " clenching and eye contact was within social norms. Mood and affect were congruent and depressed/sad.    Cognitive Assessment:  [facility] Mental Status Examination (UMS): 15/30; Performance Range: impaired;   Errors: orientation, math calculation, animal fluency, item recall, backward digit span, and story memory  Abstract Reasonin/3 items accurate (error for table-chair)    Mood Rating:  Geriatric Depression Scale-15 (GDS-15)= 6/15; mild depressive symptomology  Items Endorsed: not being satisfied with life, emptiness, preference to stay in, memory problems, that it is not wonderful to be alive now, and fatigue  Generalized Anxiety Disorder-7 Item (LIV-7)= 0/21; no significant anxious symptomology   Items Endorsed: no symptoms endorsed    IMPRESSION   Vince Carr was seen in context of recent IR[facility] hospitalization s/p GLF w/ findings concerning for SDH and recommendations for follow-up MRI. Mental health history remarkable for frequent exposure to trauma in occupation, but more recently unremarkable. At baseline he was taking sleeping aid (uncertain how often). His spouse recently passed who managed finances and pt has since independently been managing iADLs and driving.     Cognitively, pt is exhibiting impaired cognition on screening with report of night-time confusion/disorientation; primary weaknesses observed in memory, attention, generative fluency. Given timeline of decline reported/described by son and various cerebrovascular risk factors, some concerns for vascular dementia w/ mood state exacerbating cognitive weaknesses that were discussed with pt & son. Would promote orientation during the day/nighttime to reduce risk for hospital delirium. At this time return to driving or independently managing iADLs is not recommended; outpatient neuropsych eval recommended to aid in diagnostic evaluation and to update recommendations. From a mood perspective, pt exhibiting sx of adjustment  disorder w/ depressed mood in context of recent hospitalizations and passing of spouse. He is reporting active thoughts of death, but not endorsing plan or intent with [employer] beliefs and social support system being protective factors; will conduct ongoing risk assessments as indicated. Initiation of a psychotropic to treat mood sx is recommended.  Pt reporting pain concerns, but reluctant to use pain medications for concern of developing misuse/tolerance; would encourage ongoing education and offer additional pain management options. Pt appears to be highly motivated to participate in rehab therapies it is expected that cognition will be adequate to meet the demands of IPR.     DIAGNOSIS    Adjustment disorder w/ depressed mood  Insomnia  Pain w/ psychological factors  Impaired cognition (r/o Vascular dementia)    RECOMMENDATIONS     1. Pt & son was provided w/ feedback of eval results and psychoeducation regarding Psychology service; coping; cognition; stress management; pain;  adjustment reactions and possible emotional, cognitive, and behavioral changes. Intervention centered on: supportive/processing therapy; development of rapport;  and psychoeducation. Pt will be followed by this service for cognitive/emotional assessment, coping skills, emotional support, education, and supportive therapy.  2. Please contact if co-treatment would be beneficial.   3. At this time return to work  driving is not recommended. Pt will benefit from outpatient Neuropsychology follow-up in 3-6 months to assess readiness for return to work or driving from a cognitive perspective.  4.Pt may benefit from oversight or assistance with IADLs such as financial or medication management.  5. Pt is exhibiting changes in alertness/arousal during the night time and the following strategies are recommended to reduce risk for delirium:  -Provide consistent environmental reorientation, keeping blinds up/open during the day to assist in  establishing sleep/wake cycle, and limit overstimulation.  -Consider use of a calendar to be kept near the patient or simple orientation log to help pt maintain orientation.  -Provide reorientation throughout the day to reduce/lessen confusion and use external aids in environment (e.g., calendar, notebook)   -Maintain similar and predictable daytime routine/schedule with familiar staff to degree able   -Provide reality testing to develop discrepancy between real and imagined   -Consider use of a pharmacologic agent to promote sleep with procognitive effects  6. Pt may benefit from consideration of psychotropic to treat mood sx.  7. Non pharmacological interventions to optimize sleep such as keeping pt's door closed at night to lessen external distractions/noises, limiting overnight interruptions, maintaining regular sleep and wake time, providing/encouraging time to relax before bedtime,& limiting caffeine later in the day are encouraged.  8 Pt may benefit from incorporation of non-pharmacological pain management strategies such as graded activity, pacing, relaxation, muscle relaxation, heat and/or ice as able, etc. in addition  to pharmacological pain management.  9. Pt may benefit from:   -Focusing on one task at a time   -Breaking tasks into smaller steps   -Minimizing external distractions   -Asking one question at a time   -Emphasis on learning-by-doing and overpractice of new or novel techniques   -Corrective feedback   -Short breaks to reduce impact of fatigue to performance   -Being provided with handouts of information   -Emphasizing tasks pt is able to successfully complete independently  -Emphasizing over-learned tasks such as basic self-care   -Utilization of teachback methods for pt to speak back instructions/information to assess comprehension of information    Pt acknowledged information and willingness to try skills as needed or as prompted by staff.  The intent and utility of testing was relayed,  and pt agreed to participate as needed. The purpose and method of the Psychology service, as well as the limits of confidentiality and billing and fees, were described to the pt. The pt verbalized understanding and agreement to participate in the evaluation.    Pt demonstrated ability and willingness to work towards goals, receptivity to psychological support and plan of care.       Therapy goals/plans were reviewed and discussed with patient and he was in agreement.         Thank you for this consultation.    Rafia Wills, PhD, ABPP-Rp  Licensed Psychologist

## 2025-01-31 NOTE — PROGRESS NOTES
NURSING DAILY NOTE    Name: Vince Carr   Date of Admission: 1/28/2025   Admitting Diagnosis: Intracranial hemorrhage (HCC)  Attending Physician: Monserrat Chan M.d.  Allergies: Iodine and Naproxen    Safety  Patient Assist  Mod Assist  Patient Precautions  Fall Risk  Precaution Comments  Multiple falls  Bed Transfer Status  Contact Guard Assist  Toilet Transfer Status   Minimal Assist  Assistive Devices  Gait Belt, Hand held assist, Wheelchair  Oxygen  Silicone Nasal Cannula  Diet/Therapeutic Dining  Current Diet Order   Procedures    Diet Order Diet: Consistent CHO (Diabetic)     Pill Administration  whole  Agitated Behavioral Scale     ABS Level of Severity       Fall Risk  Has the patient had a fall this admission?   No  Ximena Aguiar Fall Risk Scoring  19, HIGH RISK  Fall Risk Safety Measures  bed alarm, chair alarm, poor balance, and low vision/ hearing    Vitals  Temperature: 36.6 °C (97.8 °F)  Temp src: Oral  Pulse: 70  Respiration: 18  Blood Pressure : 112/65  Blood Pressure MAP (Calculated): 81 MM HG  BP Location: Right, Upper Arm  Patient BP Position: De Leon's Position     Oxygen  Pulse Oximetry: 97 %  O2 (LPM): 2  O2 Delivery Device: Silicone Nasal Cannula    Bowel and Bladder  Last Bowel Movement  01/27/25  Stool Type     Bowel Device     Continent  Bladder: Did not void   Bowel: No movement  Bladder Function  Urine Void (mL): 350 ml  Number of Times Voided: 1  Urine Color: Yellow  Straight Catheter: 600 ml  Wet Diaper Count: 1  Genitourinary Assessment   Bladder Assessment (WDL):  WDL Except  Diaz Catheter: Not Applicable  Urine Color: Yellow  Number of Bladder Accidents: 1  Total Number of Bladder of Accidents in Last 7 Days: 1  Time Void: Yes  Bladder Scan: Post Void  $ Bladder Scan Results (mL): 177    Skin  Thanh Score   17  Sensory Interventions   Bed Types: Standard/Trauma Mattress  Skin Preventative Measures: Pillows in  Use for Support / Positioning  Moisture Interventions  Moisturizers/Barriers: Barrier Wipes      Pain  Pain Rating Scale  6 - Hard to ignore, avoid usual activities  Pain Location  Back, Shoulder  Pain Location Orientation  Right, Left  Pain Interventions   Medication (see MAR)    ADLs    Bathing   Shower, * * With Assistance from, Staff  Linen Change   Partial  Personal Hygiene     Chlorhexidine Bath      Oral Care     Teeth/Dentures     Shave     Nutrition Percentage Eaten  Breakfast, Between % Consumed  Environmental Precautions     Patient Turns/Positioning  Patient turns self independently side to side without assistance, to offload sacral area  Patient Turns Assistance/Tolerance  Tolerates Poorly  Bed Positions  Bed Controls On, Bed Locked  Head of Bed Elevated  Self regulated      Psychosocial/Neurologic Assessment  Psychosocial Assessment  Psychosocial (WDL):  WDL Except  Patient Behaviors: Forgetful  Neurologic Assessment  Neuro (WDL): Exceptions to WDL  Level of Consciousness: Alert  Orientation Level: Oriented X4  Cognition: Appropriate safety awareness, Follows commands  Speech: Clear  Pupil Assesment: No  EENT (WDL):  WDL Except    Cardio/Pulmonary Assessment  Edema   RLE Edema: 3+  LLE Edema: 3+  Respiratory Breath Sounds  RUL Breath Sounds: Clear  RML Breath Sounds: Clear  RLL Breath Sounds: Clear  PRATIK Breath Sounds: Clear  LLL Breath Sounds: Clear  Cardiac Assessment   Cardiac (WDL):  WDL Except

## 2025-01-31 NOTE — THERAPY
"Physical Therapy   Daily Treatment     Patient Name: Vince Carr  Age:  79 y.o., Sex:  male  Medical Record #: [id number]  Today's Date: 1/31/2025     Precautions  Precautions: Fall Risk  Comments: multiple falls    Subjective    Patient limited by urinary urgency with standing    Very emotional when explains wife and son have both passed away recently; reports he \"get ahead of myself\" and forward momentum is causing falls; anxious to go home but recognizes he needs balance training     Objective       01/31/25 1331   PT Charge Group   PT Gait Training (Units) 1   PT Therapeutic Exercise (Units) 1   PT Therapeutic Activities (Units) 2   PT Total Time Spent   PT Individual Total Time Spent (Mins) 60   Precautions   Precautions Fall Risk   Comments multiple falls   Gait Functional Level of Assist    Gait Level Of Assist Contact Guard Assist   Assistive Device Front Wheel Walker   Distance (Feet) 30   # of Times Distance was Traveled 1   Deviation Antalgic;Shuffled Gait   Supine Lower Body Exercise   Heel Slide 1 set of 10;Bilateral   Ankle Pumps 1 set of 10;Reciprocal;Bilateral   Gluteal Isometrics 1 set of 10;Bilateral   Quadriceps Isometrics 1 set of 10;Bilateral   Bed Mobility    Supine to Sit Minimal Assist   Sit to Supine Minimal Assist   Sit to Stand Contact Guard Assist   Interdisciplinary Plan of Care Collaboration   IDT Collaboration with  Family / Caregiver   Collaboration Comments son called, reports 4WW is at home         Assessment    Able to demonstrate STS with CGA and transfers with set-up; ambulation distance limited by LE weakness    Strengths: Motivated for self care and independence, Pleasant and cooperative, Supportive family, Willingly participates in therapeutic activities  Barriers: Decreased endurance, Fatigue, Impaired activity tolerance, Impaired balance, Impaired insight/denial of deficits, Impaired functional cognition, Limited mobility, Pain (lives alone, hx of ~10 falls in past " "month)    Plan    Gait with LRAD  BLE and core strengthening  Static and dynamic balance (history of multiple falls)     DME  PT DME Recommendations  Assistive Device:  (Pt has FWW, 4WW, SPT, QC)  Additional Equipment:  (TBD; pt owns sock aid)     Passport items to be completed:  Get in/out of bed safely, in/out of a vehicle, safely use mobility device, walk or wheel around home/community, navigate up and down stairs, show how to get up/down from the ground, ensure home is accessible, demonstrate HEP, complete caregiver training    Physical Therapy Problems (Active)       Problem: Mobility       Dates: Start:  01/29/25         Goal: STG-Within one week, patient will ambulate household distance 25 ft x2 FWW CGA       Dates: Start:  01/29/25            Goal: STG-Within one week, patient will ambulate up/down a 2\" step in // bars min A       Dates: Start:  01/29/25               Problem: Mobility Transfers       Dates: Start:  01/29/25         Goal: STG-Within one week, patient will transfer bed to chair CGA SPT FWW       Dates: Start:  01/29/25               Problem: PT-Long Term Goals       Dates: Start:  01/29/25         Goal: LTG-By discharge, patient will ambulate 50 ft LRAD SPV/mod I indoors        Dates: Start:  01/29/25            Goal: LTG-By discharge, patient will transfer one surface to another SPV/mod I SPT LRAD       Dates: Start:  01/29/25            Goal: LTG-By discharge, patient will amb up/down threshold for SUZANNA home with LRAD SPV       Dates: Start:  01/29/25              "

## 2025-01-31 NOTE — CARE PLAN
Problem: Infection  Goal: Patient will remain free from infection  Outcome: Not Met     Problem: Skin Integrity  Goal: Skin integrity is maintained or improved  Note: Patient's skin remains intact and free from new or accidental injury this shift.  Will continue to monitor.   The patient is Watcher - Medium risk of patient condition declining or worsening    Shift Goals  Clinical Goals: Safety  Patient Goals: rest

## 2025-01-31 NOTE — THERAPY
"Occupational Therapy  Daily Treatment     Patient Name: Vince Carr  Age:  79 y.o., Sex:  male  Medical Record #: [id number]  Today's Date: 1/31/2025     Precautions  Precautions: Fall Risk  Comments: Multiple falls         Subjective    \"My neck hurts from laying in bed, it's just very stiff.\" Pt with limited neck mobility at start of session. Pt not wanting anymore heat or medication at this time.      Objective       01/31/25 1031   OT Charge Group   OT Therapy Activity (Units) 1   OT Therapeutic Exercise (Units) 1   OT Total Time Spent   OT Individual Total Time Spent (Mins) 30   Pain   Intervention Emotional Support;Distraction;Repositioned   Pain 0 - 10 Group   Location Neck   Location Orientation Posterior;Right;Left   Pain Rating Scale (NPRS) 8   Description Aching;Throbbing   Comfort Goal Comfort with Movement;Perform Activity;Sleep Comfortably   Cognition    Level of Consciousness Alert   Interdisciplinary Plan of Care Collaboration   IDT Collaboration with  Family / Caregiver   Patient Position at End of Therapy Seated;Chair Alarm On;Self Releasing Lap Belt Applied;Call Light within Reach;Tray Table within Reach;Phone within Reach;Family / Friend in Room   Collaboration Comments son present throughout session     Pt c/o neck pain and stiffness from being in bed. Pt with limited ROM in all planes with neck   - educated pt on stretches for neck and postural strengthening exercises seated in w/c  - educated pt on sitting up tall and engaging core   - neck stretches holding x 10-15 seconds in all functional planes with some overpressure with hand for increased stretch   - exercises 1 set x 10 reps: shoulder shrugs, shoulder rolls back, scapular retraction     Assessment    Pt tolerated session fair. Pt c/o neck stiffness. Noted increased ability to turn head from start to end of session after stretches and postural strengthening exercises. Educated pt on the importance of maintaining good upright " posture and importance of exercises/stretching to maintain and increase ability to move more easily with less assist and less pain. Pt with Pneumonia- educated pt on importance of exercise and sitting up in w/c- OOB to increase lung function.     Strengths: Willingly participates in therapeutic activities, Supportive family, Pleasant and cooperative, Motivated for self care and independence, Manages pain appropriately, Independent prior level of function  Barriers: Pain, Limited mobility, Impaired balance, Impaired activity tolerance, Impaired functional cognition, Generalized weakness, Fatigue    Plan    Pt would benefit from skilled OT services to address:  - ADLs/IADLs   - Strength/endurance/activity tolerance  - Functional mobility w/ LRD  - Standing balance (static and dynamic)  - Functional Cognition (memory)    Occupational Therapy Goals (Active)       Problem: Dressing       Dates: Start:  01/29/25         Goal: STG-Within one week, patient will dress LB w/ mod A and AE as needed       Dates: Start:  01/29/25               Problem: Functional Transfers       Dates: Start:  01/29/25         Goal: STG-Within one week, patient will transfer to toilet w/ CGA w/ LRAD       Dates: Start:  01/29/25            Goal: STG-Within one week, patient will transfer to step in shower w/ CGA w/ LRAD       Dates: Start:  01/29/25               Problem: OT Long Term Goals       Dates: Start:  01/29/25         Goal: LTG-By discharge, patient will complete basic self care tasks w/ mod I for UB ADLs and supervision for LB ADLs       Dates: Start:  01/29/25            Goal: LTG-By discharge, patient will perform bathroom transfers w/ supervision w/ LRAD       Dates: Start:  01/29/25            Goal: LTG-By discharge, patient will complete basic home management w/ supervision w/ LRAD        Dates: Start:  01/29/25

## 2025-01-31 NOTE — FLOWSHEET NOTE
01/31/25 0607   Events/Summary/Plan   Events/Summary/Plan mdi   Vital Signs   Pulse 67   Respiration 18   Pulse Oximetry 93 %   $ Pulse Oximetry (Spot Check) Yes   Respiratory Assessment   Level of Consciousness Alert   Chest Exam   Work Of Breathing / Effort Within Normal Limits   Breath Sounds   RUL Breath Sounds Clear   RML Breath Sounds Clear   RLL Breath Sounds Clear   PRATIK Breath Sounds Clear   LLL Breath Sounds Clear   Oxygen   O2 (LPM) 2   O2 Delivery Device Silicone Nasal Cannula

## 2025-01-31 NOTE — PROGRESS NOTES
NURSING DAILY NOTE    Name: Vince Carr   Date of Admission: 1/28/2025   Admitting Diagnosis: Intracranial hemorrhage (HCC)  Attending Physician: MAGALY RICHARDSON M.D.  Allergies: Iodine and Naproxen    Safety  Patient Assist  mod assist  Patient Precautions  Fall Risk  Precaution Comments  Multiple falls  Bed Transfer Status  Contact Guard Assist  Toilet Transfer Status   Minimal Assist  Assistive Devices  Rails, Wheelchair  Oxygen  Nasal Cannula  Diet/Therapeutic Dining  Current Diet Order   Procedures    Diet Order Diet: Consistent CHO (Diabetic)     Pill Administration  whole  Agitated Behavioral Scale     ABS Level of Severity       Fall Risk  Has the patient had a fall this admission?   No  Ximena Aguiar Fall Risk Scoring  19, HIGH RISK  Fall Risk Safety Measures  bed alarm, chair alarm, poor balance, and low vision/ hearing    Vitals  Temperature: 36.6 °C (97.9 °F)  Temp src: Oral  Pulse: 65  Respiration: 20  Blood Pressure : 124/58  Blood Pressure MAP (Calculated): 80 MM HG  BP Location: Right, Upper Arm  Patient BP Position: Supine     Oxygen  Pulse Oximetry: 93 %  O2 (LPM): 2  O2 Delivery Device: Nasal Cannula    Bowel and Bladder  Last Bowel Movement  01/27/25 (MOM given this am)  Stool Type     Bowel Device     Continent  Bladder: Did not void   Bowel: No movement  Bladder Function  Urine Void (mL): 400 ml  Number of Times Voided: 1  Urine Color: Yellow  Number of Times Incontinent of Urine: 1  Straight Catheter: 600 ml  Wet Diaper Count: 1  Genitourinary Assessment   Bladder Assessment (WDL):  WDL Except  Diaz Catheter: Not Applicable  Urine Color: Yellow  Number of Bladder Accidents: 1  Total Number of Bladder of Accidents in Last 7 Days: 1  Number of Times Incontinent of Urine: 1  Bladder Device: Urinal  Time Void: Yes  Bladder Scan: Post Void  $ Bladder Scan Results (mL): 118    Skin  Thanh Score   17  Sensory Interventions    Bed Types: Standard/Trauma Mattress  Skin Preventative Measures: Pillows in Use for Support / Positioning, Pillows in Use to Float Heels  Moisture Interventions  Moisturizers/Barriers: Barrier Wipes      Pain  Pain Rating Scale  0 - No Pain  Pain Location  Back, Shoulder  Pain Location Orientation  Right, Left  Pain Interventions   Declines    ADLs    Bathing   Staff, Shower  Linen Change   Partial  Personal Hygiene     Chlorhexidine Bath      Oral Care     Teeth/Dentures     Shave     Nutrition Percentage Eaten  Breakfast, Between % Consumed  Environmental Precautions     Patient Turns/Positioning  Patient turns self independently side to side without assistance, to offload sacral area  Patient Turns Assistance/Tolerance  Assistance of Two or More  Bed Positions  Bed Controls On, Bed Locked  Head of Bed Elevated  Self regulated      Psychosocial/Neurologic Assessment  Psychosocial Assessment  Psychosocial (WDL):  WDL Except  Patient Behaviors: Confused  Neurologic Assessment  Neuro (WDL): Exceptions to WDL  Level of Consciousness: Alert  Orientation Level: Oriented X4  Cognition: Appropriate safety awareness, Follows commands  Speech: Clear  Pupil Assesment: No  EENT (WDL):  WDL Except    Cardio/Pulmonary Assessment  Edema   RLE Edema: 3+  LLE Edema: 3+  Respiratory Breath Sounds  RUL Breath Sounds: Clear  RML Breath Sounds: Clear  RLL Breath Sounds: Clear  PRATIK Breath Sounds: Clear  LLL Breath Sounds: Clear  Cardiac Assessment   Cardiac (WDL):  WDL Except

## 2025-01-31 NOTE — CARE PLAN
"The patient is Watcher - Medium risk of patient condition declining or worsening    Shift Goals  Clinical Goals: Safety  Patient Goals: Rest    Progress made toward(s) clinical / shift goals:      Problem: Respiratory  Goal: Patient will understand use and administration of respiratory medications to improve respiratory function  Outcome: Not Progressing  Note: Pt refused to wear CPAP at night. 2liter nasal cannula used instead but pt kept removing it. Reapplied each rounding. Will continue to monitor.     Problem: Pain - Standard  Goal: Alleviation of pain or a reduction in pain to the patient’s comfort goal  Outcome: Progressing  Note: Pt denies pain or discomfort tonight. Sleeps off and on despite scheduled trazodone. Will continue to monitor.     Problem: Fall Risk - Rehab  Goal: Patient will remain free from falls  Note: Ximena Aguiar Fall risk Assessment Score: 19    High fall risk Interventions   - Alarming seatbelt  - Bed and strip alarm   - Yellow sign by the door   - Yellow wrist band \"Fall risk\"  - Room near to the nurse station  - Do not leave patient unattended in the bathroom  - Fall risk education provided     Pt using call light appropriately when in need of assistance.          Patient is not progressing towards the following goals:    Problem: Respiratory  Goal: Patient will understand use and administration of respiratory medications to improve respiratory function  Outcome: Not Progressing  Note: Pt refused to wear CPAP at night. 2liter nasal cannula used instead but pt kept removing it. Reapplied each rounding. Will continue to monitor.     "

## 2025-01-31 NOTE — PROGRESS NOTES
Hospital Medicine Daily Progress Note      Chief Complaint  Chest Pain  Hypertension  Diabetes    Interval Problem Update  Pt reports right sided hemithorax pain better.  Laboratory and imaging results reviewed and discussed in detail w/ pt and son.    Code Status  DNAR/DNI    Disposition  Per Physiatry    Review of Systems  Review of Systems   Constitutional:  Negative for chills and fever.   HENT: Negative.     Eyes: Negative.    Respiratory:  Negative for cough and shortness of breath.    Cardiovascular:  Negative for chest pain and palpitations.   Gastrointestinal:  Negative for abdominal pain, nausea and vomiting.   Musculoskeletal:         Right sided hemithorax pain   Skin:  Negative for itching and rash.   Endo/Heme/Allergies:  Negative for polydipsia. Does not bruise/bleed easily.        Physical Exam  Temp:  [36.6 °C (97.8 °F)-36.8 °C (98.3 °F)] 36.8 °C (98.3 °F)  Pulse:  [65-78] 78  Resp:  [16-20] 16  BP: (108-124)/(52-65) 108/63  SpO2:  [93 %-97 %] 93 %    Physical Exam  Constitutional:       General: He is not in acute distress.     Appearance: Normal appearance. He is not ill-appearing.   HENT:      Head: Normocephalic and atraumatic.      Right Ear: External ear normal.      Left Ear: External ear normal.      Nose: Nose normal.      Mouth/Throat:      Pharynx: Oropharynx is clear.   Eyes:      General:         Right eye: No discharge.         Left eye: No discharge.      Extraocular Movements: Extraocular movements intact.      Conjunctiva/sclera: Conjunctivae normal.   Cardiovascular:      Rate and Rhythm: Rhythm irregular.   Pulmonary:      Effort: No respiratory distress.      Breath sounds: No wheezing.      Comments: Decreased BS  Abdominal:      General: Bowel sounds are normal. There is no distension.      Palpations: Abdomen is soft.      Tenderness: There is no abdominal tenderness.   Musculoskeletal:      Cervical back: Normal range of motion and neck supple.      Right lower leg: Edema  present.      Left lower leg: Edema present.   Skin:     General: Skin is warm and dry.   Neurological:      Mental Status: He is alert and oriented to person, place, and time.         Fluids    Intake/Output Summary (Last 24 hours) at 1/31/2025 1448  Last data filed at 1/31/2025 0800  Gross per 24 hour   Intake 480 ml   Output 1100 ml   Net -620 ml        Laboratory  Recent Labs     01/29/25  1134 01/31/25  0604   WBC 13.2* 10.8   RBC 4.37* 4.09*   HEMOGLOBIN 13.5* 12.5*   HEMATOCRIT 40.5* 38.4*   MCV 92.7 93.9   MCH 30.9 30.6   MCHC 33.3 32.6   RDW 49.8 50.0   PLATELETCT 274 234   MPV 9.8 9.7     Recent Labs     01/29/25  0630 01/31/25  0604   SODIUM 135 135   POTASSIUM 4.0 3.8   CHLORIDE 101 100   CO2 22 26   GLUCOSE 147* 154*   BUN 14 19   CREATININE 0.89 0.76   CALCIUM 8.4* 8.4*                 Assessment/Plan  Chest pain  Assessment & Plan  Trop 23 followed by 24  EKG 1/30/25 SR, LBBB  EKG 1/31/25 AFib  CXR small R pl eff, R basilar opacity (atx vs pneumonitis)  Rib X-ray no acute displaced rib fx  Leukocytosis resolved on abx  Continue Augmentin  IS and RT protocol    Sacral fracture (HCC)- (present on admission)  Assessment & Plan  2/2 GLF  Non-surgical management  Pain control per Physiatry    SDH (subdural hematoma) ([facility])- (present on admission)  Assessment & Plan  2/2 GLF  Non-surgical management  Needs F/U MRI 6 wks  Ongoing management per Physiatry    Non-insulin dependent type 2 diabetes mellitus (HCC)- (present on admission)  Assessment & Plan  HbA1c 6.9  Discontinued FSBS and SSI as not routinely needing insulin coverage  Continue diet control  Outpt meds include Metformin 1000 mg PO bid and Jardiance 10 mg PO qd    Leukocytosis- (present on admission)  Assessment & Plan  UA negative nitrites and leukocyte esterase  CXR small R pl eff, R basilar opacity (atx vs pneumonitis)  PCT 0.07 (but already on abx)  WBC resolved on Augmentin    PAF (paroxysmal atrial fibrillation) ([facility])- (present on  admission)  Assessment & Plan  H/O PPM  On Coreg for rate control  Chronically anticoagulated on Xarelto  AC presently on hold for ICH    CAD in native artery- (present on admission)  Assessment & Plan  Echo 11/25/24 EF 50%  BNP elevated but stable  On Lipitor, Coreg, and Bumex  Xarelto presently on hold for ICH    Hypertension- (present on admission)  Assessment & Plan  Obser blood pressure trends on Coreg and Bumex  Monitor for orthostatic hypotension on Flomax    Other emphysema (HCC)- (present on admission)  Assessment & Plan  Chronically on supplemental O2 at 2 lpm via NC  Continue Trelegy and Singulair  RT protocol         VTE prophylaxis:  Per Physiatry    Discussed in detail w/ pt and son

## 2025-01-31 NOTE — THERAPY
Occupational Therapy  Daily Treatment     Patient Name: Vince Carr  Age:  79 y.o., Sex:  male  Medical Record #: [id number]  Today's Date: 1/31/2025     Precautions  Precautions: Fall Risk  Comments: Multiple falls    Subjective    Pt pleasant and motivated to participate in OT     Objective     01/31/25 0931   OT Charge Group   Charges Yes   OT Self Care / ADL (Units) 4   OT Total Time Spent   OT Individual Total Time Spent (Mins) 60   Precautions   Precautions Fall Risk   Comments Multiple falls   Vitals   O2 Delivery Device None - Room Air   Pain   Intervention Medication (see MAR);Heat Applied   Pain 0 - 10 Group   Location Neck   Location Orientation Posterior   Cognition    Level of Consciousness Alert   ABS (Agitated Behavior Scale)   Agitated Behavior Scale Performed No   Sleep/Wake Cycle   Sleep & Rest Awake;Out of bed   Functional Level of Assist   Grooming Supervision;Seated   Grooming Description Increased time;Initial preparation for task;Seated in wheelchair at sink;Set-up of equipment;Supervision for safety  (Pt completed oral/denture care, washed his face, combed his hair, and washed hands after toileting)   Lower Body Dressing Moderate Assist   Lower Body Dressing Description Grab bar;Assist with threading into pant leg;Increased time;Initial preparation for task;Supervision for safety;Verbal cueing  (Pt able to don suspenders w/ v/c; required assist to thread bilat LEs and don pants/briefs over bottom)   Toileting Minimal Assist   Toileting Description Assist to pull pants up;Assist for standing balance;Grab bar;Increased time;Initial preparation for task;Set-up of equipment;Supervision for safety;Verbal cueing   Toilet Transfers Contact Guard Assist   Toilet Transfer Description Grab bar;Increased time;Initial preparation for task;Set-up of equipment;Supervision for safety;Verbal cueing   Interdisciplinary Plan of Care Collaboration   IDT Collaboration with  Family / Caregiver;Nursing    Patient Position at End of Therapy Seated;Chair Alarm On;Call Light within Reach;Tray Table within Reach;Phone within Reach;Family / Friend in Room   Collaboration Comments Pt's son present for tx; RN med pass   Strengths & Barriers   Strengths Willingly participates in therapeutic activities;Supportive family;Pleasant and cooperative;Motivated for self care and independence;Manages pain appropriately;Independent prior level of function   Barriers Pain;Limited mobility;Impaired balance;Impaired activity tolerance;Impaired functional cognition;Generalized weakness;Fatigue   Oral Hygiene   Assistance Needed Set-up / clean-up   Physical Assistance Level No physical assistance   CARE Score - Oral Hygiene 5   Lower Body Dressing   Assistance Needed Physical assistance   Physical Assistance Level 26%-50%   CARE Score - Lower Body Dressing 3   Toileting Hygiene   Assistance Needed Physical assistance   Physical Assistance Level 25% or less   CARE Score - Toileting Hygiene 3   Toilet Transfer   Assistance Needed Incidental touching   Physical Assistance Level No physical assistance   CARE Score - Toilet Transfer 4     Assessment    Pt seen for tx, addressing toileting, LBD, and grooming. Pt tolerated activity fair d/t neck pain; heat seemed to relieve a little bit of pain. Pt progressing towards goals. Continue OT POC.    Strengths: Willingly participates in therapeutic activities, Supportive family, Pleasant and cooperative, Motivated for self care and independence, Manages pain appropriately, Independent prior level of function    Barriers: Pain, Limited mobility, Impaired balance, Impaired activity tolerance, Impaired functional cognition, Generalized weakness, Fatigue    Plan    Pt would benefit from skilled OT services to address:  - ADLs/IADLs   - Strength/endurance/activity tolerance  - Functional mobility w/ LRD  - Standing balance (static and dynamic)  - Functional Cognition (memory)    DME  OT DME  Recommendations  Bathroom Equipment:  (pt owns shower chair)  Additional Equipment:  (TBD; pt owns sock aid)    Passport items to be completed:  Perform bathroom transfers, complete dressing, complete feeding, get ready for the day, prepare a simple meal, participate in household tasks, adapt home for safety needs, demonstrate home exercise program, complete caregiver training     Occupational Therapy Goals (Active)       Problem: Dressing       Dates: Start:  01/29/25         Goal: STG-Within one week, patient will dress LB w/ mod A and AE as needed       Dates: Start:  01/29/25               Problem: Functional Transfers       Dates: Start:  01/29/25         Goal: STG-Within one week, patient will transfer to toilet w/ CGA w/ LRAD       Dates: Start:  01/29/25            Goal: STG-Within one week, patient will transfer to step in shower w/ CGA w/ LRAD       Dates: Start:  01/29/25               Problem: OT Long Term Goals       Dates: Start:  01/29/25         Goal: LTG-By discharge, patient will complete basic self care tasks w/ mod I for UB ADLs and supervision for LB ADLs       Dates: Start:  01/29/25            Goal: LTG-By discharge, patient will perform bathroom transfers w/ supervision w/ LRAD       Dates: Start:  01/29/25            Goal: LTG-By discharge, patient will complete basic home management w/ supervision w/ LRAD        Dates: Start:  01/29/25

## 2025-02-01 ENCOUNTER — APPOINTMENT (OUTPATIENT)
Dept: PHYSICAL THERAPY | Facility: REHABILITATION | Age: 80
DRG: 949 | End: 2025-02-01
Attending: PHYSICAL MEDICINE & REHABILITATION
Payer: MEDICARE

## 2025-02-01 ENCOUNTER — APPOINTMENT (OUTPATIENT)
Dept: OCCUPATIONAL THERAPY | Facility: REHABILITATION | Age: 80
DRG: 949 | End: 2025-02-01
Attending: PHYSICAL MEDICINE & REHABILITATION
Payer: MEDICARE

## 2025-02-01 ENCOUNTER — APPOINTMENT (OUTPATIENT)
Dept: SPEECH THERAPY | Facility: REHABILITATION | Age: 80
DRG: 949 | End: 2025-02-01
Attending: PHYSICAL MEDICINE & REHABILITATION
Payer: MEDICARE

## 2025-02-01 PROCEDURE — 700102 HCHG RX REV CODE 250 W/ 637 OVERRIDE(OP): Performed by: PHYSICAL MEDICINE & REHABILITATION

## 2025-02-01 PROCEDURE — 97129 THER IVNTJ 1ST 15 MIN: CPT

## 2025-02-01 PROCEDURE — 99232 SBSQ HOSP IP/OBS MODERATE 35: CPT | Performed by: HOSPITALIST

## 2025-02-01 PROCEDURE — 770010 HCHG ROOM/CARE - REHAB SEMI PRIVAT*

## 2025-02-01 PROCEDURE — A9270 NON-COVERED ITEM OR SERVICE: HCPCS | Performed by: HOSPITALIST

## 2025-02-01 PROCEDURE — 700111 HCHG RX REV CODE 636 W/ 250 OVERRIDE (IP): Mod: JZ | Performed by: PHYSICAL MEDICINE & REHABILITATION

## 2025-02-01 PROCEDURE — 94760 N-INVAS EAR/PLS OXIMETRY 1: CPT

## 2025-02-01 PROCEDURE — 97110 THERAPEUTIC EXERCISES: CPT

## 2025-02-01 PROCEDURE — 97530 THERAPEUTIC ACTIVITIES: CPT

## 2025-02-01 PROCEDURE — A9270 NON-COVERED ITEM OR SERVICE: HCPCS | Performed by: PHYSICAL MEDICINE & REHABILITATION

## 2025-02-01 PROCEDURE — 97130 THER IVNTJ EA ADDL 15 MIN: CPT

## 2025-02-01 PROCEDURE — 94640 AIRWAY INHALATION TREATMENT: CPT

## 2025-02-01 PROCEDURE — 700102 HCHG RX REV CODE 250 W/ 637 OVERRIDE(OP): Performed by: HOSPITALIST

## 2025-02-01 PROCEDURE — 97535 SELF CARE MNGMENT TRAINING: CPT

## 2025-02-01 RX ADMIN — THERA TABS 1 TABLET: TAB at 09:04

## 2025-02-01 RX ADMIN — QUETIAPINE FUMARATE 12.5 MG: 25 TABLET ORAL at 20:24

## 2025-02-01 RX ADMIN — AMOXICILLIN AND CLAVULANATE POTASSIUM 1 TABLET: 875; 125 TABLET, FILM COATED ORAL at 20:24

## 2025-02-01 RX ADMIN — BUMETANIDE 1 MG: 1 TABLET ORAL at 20:23

## 2025-02-01 RX ADMIN — ACETAMINOPHEN 650 MG: 325 TABLET ORAL at 13:45

## 2025-02-01 RX ADMIN — CYANOCOBALAMIN TAB 500 MCG 1000 MCG: 500 TAB at 09:03

## 2025-02-01 RX ADMIN — BUMETANIDE 1 MG: 1 TABLET ORAL at 09:04

## 2025-02-01 RX ADMIN — AMOXICILLIN AND CLAVULANATE POTASSIUM 1 TABLET: 875; 125 TABLET, FILM COATED ORAL at 09:04

## 2025-02-01 RX ADMIN — FLUTICASONE FUROATE, UMECLIDINIUM BROMIDE AND VILANTEROL TRIFENATATE 1 PUFF: 200; 62.5; 25 POWDER RESPIRATORY (INHALATION) at 06:42

## 2025-02-01 RX ADMIN — OMEPRAZOLE 20 MG: 20 CAPSULE, DELAYED RELEASE ORAL at 09:04

## 2025-02-01 RX ADMIN — OXYCODONE 5 MG: 5 TABLET ORAL at 05:05

## 2025-02-01 RX ADMIN — CARVEDILOL 12.5 MG: 12.5 TABLET, FILM COATED ORAL at 09:03

## 2025-02-01 RX ADMIN — TRAZODONE HYDROCHLORIDE 100 MG: 100 TABLET ORAL at 20:24

## 2025-02-01 RX ADMIN — TAMSULOSIN HYDROCHLORIDE 0.4 MG: 0.4 CAPSULE ORAL at 09:03

## 2025-02-01 RX ADMIN — ENOXAPARIN SODIUM 40 MG: 100 INJECTION SUBCUTANEOUS at 09:03

## 2025-02-01 RX ADMIN — MONTELUKAST 10 MG: 10 TABLET, FILM COATED ORAL at 20:23

## 2025-02-01 RX ADMIN — ACETAMINOPHEN 650 MG: 325 TABLET ORAL at 09:12

## 2025-02-01 RX ADMIN — ATORVASTATIN CALCIUM 40 MG: 40 TABLET, FILM COATED ORAL at 20:23

## 2025-02-01 RX ADMIN — Medication 100 MG: at 09:04

## 2025-02-01 RX ADMIN — SENNOSIDES AND DOCUSATE SODIUM 2 TABLET: 50; 8.6 TABLET ORAL at 20:23

## 2025-02-01 ASSESSMENT — GAIT ASSESSMENTS
GAIT LEVEL OF ASSIST: CONTACT GUARD ASSIST
ASSISTIVE DEVICE: FRONT WHEEL WALKER
DISTANCE (FEET): 75

## 2025-02-01 ASSESSMENT — PAIN DESCRIPTION - PAIN TYPE
TYPE: ACUTE PAIN
TYPE: CHRONIC PAIN
TYPE: ACUTE PAIN
TYPE: ACUTE PAIN

## 2025-02-01 ASSESSMENT — ENCOUNTER SYMPTOMS
SHORTNESS OF BREATH: 0
POLYDIPSIA: 0
COUGH: 0
BRUISES/BLEEDS EASILY: 0
PALPITATIONS: 0
EYES NEGATIVE: 1
NAUSEA: 0
VOMITING: 0
ABDOMINAL PAIN: 0
CHILLS: 0
FEVER: 0

## 2025-02-01 ASSESSMENT — ACTIVITIES OF DAILY LIVING (ADL): TOILET_TRANSFER_DESCRIPTION: GRAB BAR;INCREASED TIME;SET-UP OF EQUIPMENT;SUPERVISION FOR SAFETY;VERBAL CUEING

## 2025-02-01 NOTE — THERAPY
Physical Therapy   Daily Treatment     Patient Name: Vince Carr  Age:  79 y.o., Sex:  male  Medical Record #: [id number]  Today's Date: 2/1/2025     Precautions  Precautions: Fall Risk, Other (See Comments)  Comments: supplemental O2, Multiple Falls    Subjective    Pt received up in wc in his room, ready for therapy session. States his neck is stiff, thinks it is from laying in one position all night.     Objective       02/01/25 0701   PT Charge Group   PT Therapeutic Exercise (Units) 3   PT Therapeutic Activities (Units) 1   PT Total Time Spent   PT Individual Total Time Spent (Mins) 60   Precautions   Precautions Fall Risk;Other (See Comments)   Comments supplemental O2, Multiple Falls   Vitals   Pulse 70   Pulse Oximetry 99 %   O2 (LPM) 3   O2 Delivery Device Nasal Cannula   Vitals Comments at rest (of note- pt on 3L O2 via nc on concentrator in room, states he is normally on 2L O2). Adjusted to 2L and SpO2 98-99% at rest, and 93% following ambulation. HR 86 post-ambulation   Pain 0 - 10 Group   Therapist Pain Assessment Prior to Activity;During Activity  (denies pain, confirms neck stiffness)   Cognition    Level of Consciousness Alert   Sleep/Wake Cycle   Sleep & Rest Awake;Out of bed   Gait Functional Level of Assist    Gait Level Of Assist Contact Guard Assist   Assistive Device Front Wheel Walker   Distance (Feet) 75   # of Times Distance was Traveled 3   Deviation   (slow speed, inc time in DLS, cues to inc. body proximity to FWW)   Sitting Lower Body Exercises   Sit to Stand   (2x5 reps wc<>FWW, circuit style training with ambulation with FWW)   Bed Mobility    Sit to Stand Contact Guard Assist  (multiple attempts)   Interdisciplinary Plan of Care Collaboration   Patient Position at End of Therapy Seated;Chair Alarm On;Self Releasing Lap Belt Applied;Other (Comments)   Collaboration Comments in dining room for breakfast     Adjusted FWW height to facilitate inc. Body proximity to FWW during  "ambulation.    Postural edu: pt stands with semi-crouched posture, cues for upright stance and to engage postural extensors.     Assessment    Pt with significantly increased ambulation distance this session with FWW and CGA: 3x75' with seated rest breaks between trials due to pt. Fatigue. Pt with slow speed and increased time in DLS, however no overt LOB or instability observed. He performed STSs with SBA to CGA, # reps limited by pt fatigue. Overall appears very deconditioned.    Strengths: Motivated for self care and independence, Pleasant and cooperative, Supportive family, Willingly participates in therapeutic activities  Barriers: Decreased endurance, Fatigue, Impaired activity tolerance, Impaired balance, Impaired insight/denial of deficits, Impaired functional cognition, Limited mobility, Pain (lives alone, hx of ~10 falls in past month)    Plan    Gait with LRAD  BLE and core strengthening  Static and dynamic balance (history of multiple falls)    DME  PT DME Recommendations  Assistive Device:  (Pt has FWW, 4WW, SPT, QC)  Additional Equipment:  (TBD; pt owns sock aid)      Physical Therapy Problems (Active)       Problem: Mobility       Dates: Start:  01/29/25         Goal: STG-Within one week, patient will ambulate household distance 25 ft x2 FWW CGA       Dates: Start:  01/29/25            Goal: STG-Within one week, patient will ambulate up/down a 2\" step in // bars min A       Dates: Start:  01/29/25               Problem: Mobility Transfers       Dates: Start:  01/29/25         Goal: STG-Within one week, patient will transfer bed to chair CGA SPT FWW       Dates: Start:  01/29/25               Problem: PT-Long Term Goals       Dates: Start:  01/29/25         Goal: LTG-By discharge, patient will ambulate 50 ft LRAD SPV/mod I indoors        Dates: Start:  01/29/25            Goal: LTG-By discharge, patient will transfer one surface to another SPV/mod I SPT LRAD       Dates: Start:  01/29/25            " Goal: LTG-By discharge, patient will amb up/down threshold for SUZANNA home with LRAD SPV       Dates: Start:  01/29/25

## 2025-02-01 NOTE — CARE PLAN
Problem: Bladder / Voiding  Goal: Patient will establish and maintain regular urinary output  Outcome: Progressing     Problem: Nutrition  Goal: Patient's nutritional and fluid intake will be adequate or improve  Outcome: Progressing   The patient is Stable - Low risk of patient condition declining or worsening    Shift Goals  Clinical Goals: Safety  Patient Goals: rest, gain strength  Family Goals: gain strength

## 2025-02-01 NOTE — THERAPY
Occupational Therapy  Daily Treatment     Patient Name: Vince Carr  Age:  79 y.o., Sex:  male  Medical Record #: [id number]  Today's Date: 2/1/2025     Precautions  Precautions: Fall Risk  Comments: Multiple Falls    Subjective    Pt pleasant and motivated to participate in OT. Pt continues to c/o neck pain.     Objective     02/01/25 1331   OT Charge Group   Charges Yes   OT Self Care / ADL (Units) 2   OT Therapeutic Exercise (Units) 2   OT Total Time Spent   OT Individual Total Time Spent (Mins) 60   Precautions   Precautions Fall Risk   Comments Multiple Falls   Vitals   O2 Delivery Device None - Room Air   Pain   Intervention Medication (see MAR)   Pain 0 - 10 Group   Location Neck   Cognition    Level of Consciousness Alert   ABS (Agitated Behavior Scale)   Agitated Behavior Scale Performed No   Sleep/Wake Cycle   Sleep & Rest Awake;Out of bed   Functional Level of Assist   Grooming Supervision;Seated   Grooming Description Increased time;Seated in wheelchair at sink;Supervision for safety   Toileting Minimal Assist   Toileting Description Assist to pull pants up;Assist for standing balance;Grab bar;Increased time;Initial preparation for task;Supervision for safety;Verbal cueing   Toilet Transfers Contact Guard Assist   Toilet Transfer Description Grab bar;Increased time;Set-up of equipment;Supervision for safety;Verbal cueing   Sitting Upper Body Exercises   Sitting Upper Body Exercises Yes   Chest Press 3 sets of 10;Bilateral;Weight (See Comments for lbs)  (4#)   Front Arm Raise 2 sets of 10;Bilateral;Weight (See Comments for lbs)  (4#)   Bilateral Row 3 sets of 10;Bilateral;Weight (See Comments for lbs)  (4#)   Bicep Curls 3 sets of 10;Bilateral;Weight (See Comments for lbs)  (4#)   IADL Treatments   IADL Treatments Home management   Home Management Pt tolerated standing laundry activity w/ min A for balance and managing machine controls.   Interdisciplinary Plan of Care Collaboration   IDT  Collaboration with  Nursing   Patient Position at End of Therapy Seated;Chair Alarm On;Self Releasing Lap Belt Applied;Call Light within Reach;Tray Table within Reach;Phone within Reach   Collaboration Comments RN med pass   Strengths & Barriers   Strengths Willingly participates in therapeutic activities;Supportive family;Pleasant and cooperative;Motivated for self care and independence;Manages pain appropriately;Independent prior level of function   Barriers Pain;Limited mobility;Impaired balance;Impaired activity tolerance;Impaired functional cognition;Generalized weakness;Fatigue   Toileting Hygiene   Assistance Needed Physical assistance   Physical Assistance Level 25% or less   CARE Score - Toileting Hygiene 3   Toilet Transfer   Assistance Needed Incidental touching   Physical Assistance Level No physical assistance   CARE Score - Toilet Transfer 4     Assessment    Pt seen for tx, addressing toileting, grooming, home management, and UE strengthening. Pt tolerated activity fair d/t neck pain. Continue OT POC.    Strengths: Willingly participates in therapeutic activities, Supportive family, Pleasant and cooperative, Motivated for self care and independence, Manages pain appropriately, Independent prior level of function    Barriers: Pain, Limited mobility, Impaired balance, Impaired activity tolerance, Impaired functional cognition, Generalized weakness, Fatigue    Plan    Pt would benefit from skilled OT services to address:  - ADLs/IADLs   - Strength/endurance/activity tolerance  - Functional mobility w/ LRD  - Standing balance (static and dynamic)  - Functional Cognition (memory)    DME  OT DME Recommendations  Bathroom Equipment:  (pt owns shower chair)  Additional Equipment:  (TBD; pt owns sock aid)    Passport items to be completed:  Perform bathroom transfers, complete dressing, complete feeding, get ready for the day, prepare a simple meal, participate in household tasks, adapt home for safety needs,  demonstrate home exercise program, complete caregiver training     Occupational Therapy Goals (Active)       Problem: Dressing       Dates: Start:  01/29/25         Goal: STG-Within one week, patient will dress LB w/ mod A and AE as needed       Dates: Start:  01/29/25               Problem: Functional Transfers       Dates: Start:  01/29/25         Goal: STG-Within one week, patient will transfer to toilet w/ CGA w/ LRAD       Dates: Start:  01/29/25            Goal: STG-Within one week, patient will transfer to step in shower w/ CGA w/ LRAD       Dates: Start:  01/29/25               Problem: OT Long Term Goals       Dates: Start:  01/29/25         Goal: LTG-By discharge, patient will complete basic self care tasks w/ mod I for UB ADLs and supervision for LB ADLs       Dates: Start:  01/29/25            Goal: LTG-By discharge, patient will perform bathroom transfers w/ supervision w/ LRAD       Dates: Start:  01/29/25            Goal: LTG-By discharge, patient will complete basic home management w/ supervision w/ LRAD        Dates: Start:  01/29/25

## 2025-02-01 NOTE — PROGRESS NOTES
NURSING DAILY NOTE    Name: Vince Carr   Date of Admission: 1/28/2025   Admitting Diagnosis: Intracranial hemorrhage (HCC)  Attending Physician: MAGALY RICHARDSON M.D.  Allergies: Iodine and Naproxen    Safety  Patient Assist  mod assist  Patient Precautions  Fall Risk  Precaution Comments  multiple falls  Bed Transfer Status  Contact Guard Assist  Toilet Transfer Status   Contact Guard Assist  Assistive Devices  Rails, Wheelchair  Oxygen  None - Room Air  Diet/Therapeutic Dining  Current Diet Order   Procedures    Diet Order Diet: Consistent CHO (Diabetic)     Pill Administration  whole  Agitated Behavioral Scale     ABS Level of Severity       Fall Risk  Has the patient had a fall this admission?   No  Ximena Aguiar Fall Risk Scoring  18, HIGH RISK  Fall Risk Safety Measures  bed alarm, chair alarm, poor balance, and low vision/ hearing    Vitals  Temperature: (!) 35.3 °C (95.5 °F)  Temp src: Oral  Pulse: 70  Respiration: 14  Blood Pressure : 116/65  Blood Pressure MAP (Calculated): 82 MM HG  BP Location: Right, Upper Arm  Patient BP Position: Sitting     Oxygen  Pulse Oximetry: 94 %  O2 (LPM): 2  O2 Delivery Device: None - Room Air    Bowel and Bladder  Last Bowel Movement  01/27/25  Stool Type     Bowel Device  Bathroom  Continent  Bladder: Did not void   Bowel: No movement  Bladder Function  Urine Void (mL): 400 ml  Number of Times Voided: 1  Urine Color: Unable To Evaluate  Number of Times Incontinent of Urine: 1  Straight Catheter: 600 ml  Wet Diaper Count: 1  Genitourinary Assessment   Bladder Assessment (WDL):  WDL Except  Diaz Catheter: Not Applicable  Urine Color: Unable To Evaluate  Number of Bladder Accidents: 1  Total Number of Bladder of Accidents in Last 7 Days: 1  Number of Times Incontinent of Urine: 1  Bladder Device: Urinal  Time Void: Yes  Bladder Scan: Post Void  $ Bladder Scan Results (mL): 118    Skin  Thanh Score    17  Sensory Interventions   Bed Types: Standard/Trauma Mattress  Skin Preventative Measures: Pillows in Use for Support / Positioning  Moisture Interventions  Moisturizers/Barriers: Barrier Wipes      Pain  Pain Rating Scale  8 - Awful, hard to do anything  Pain Location  Neck  Pain Location Orientation  Posterior, Right, Left  Pain Interventions   Emotional Support, Distraction, Repositioned    ADLs    Bathing   Staff, Shower  Linen Change   Partial  Personal Hygiene  Perineal Care, Moist Charley Wipes  Chlorhexidine Bath      Oral Care     Teeth/Dentures     Shave     Nutrition Percentage Eaten  Breakfast, Between 50-75% Consumed  Environmental Precautions     Patient Turns/Positioning  Patient turns self independently side to side without assistance, to offload sacral area  Patient Turns Assistance/Tolerance  Assistance of Two or More  Bed Positions  Bed Controls On, Bed Locked  Head of Bed Elevated  Self regulated      Psychosocial/Neurologic Assessment  Psychosocial Assessment  Psychosocial (WDL):  WDL Except  Patient Behaviors: Confused  Neurologic Assessment  Neuro (WDL): Exceptions to WDL  Level of Consciousness: Alert  Orientation Level: Oriented X4  Cognition: Appropriate safety awareness, Follows commands  Speech: Clear  Pupil Assesment: No  EENT (WDL):  WDL Except    Cardio/Pulmonary Assessment  Edema   RLE Edema: 3+  LLE Edema: 3+  Respiratory Breath Sounds  RUL Breath Sounds: Clear  RML Breath Sounds: Clear  RLL Breath Sounds: Diminished  PRATIK Breath Sounds: Clear  LLL Breath Sounds: Diminished  Cardiac Assessment   Cardiac (WDL):  WDL Except

## 2025-02-01 NOTE — PROGRESS NOTES
NURSING DAILY NOTE    Name: Vince Carr   Date of Admission: 1/28/2025   Admitting Diagnosis: Intracranial hemorrhage (HCC)  Attending Physician: MAGALY RICHARDSON M.D.  Allergies: Iodine and Naproxen    Safety  Patient Assist  mod assist  Patient Precautions  Fall Risk  Precaution Comments  multiple falls  Bed Transfer Status  Contact Guard Assist  Toilet Transfer Status   Contact Guard Assist  Assistive Devices  Rails, Wheelchair  Oxygen  None - Room Air  Diet/Therapeutic Dining  Current Diet Order   Procedures    Diet Order Diet: Consistent CHO (Diabetic)     Pill Administration  whole and one at a time   Agitated Behavioral Scale     ABS Level of Severity       Fall Risk  Has the patient had a fall this admission?   No  Ximena Aguiar Fall Risk Scoring  18, HIGH RISK  Fall Risk Safety Measures  bed alarm, chair alarm, poor balance, and low vision/ hearing    Vitals  Temperature: (!) 35.3 °C (95.5 °F)  Temp src: Oral  Pulse: 67  Respiration: 14  Blood Pressure : 122/68  Blood Pressure MAP (Calculated): 86 MM HG  BP Location: Right, Upper Arm  Patient BP Position: Supine     Oxygen  Pulse Oximetry: 95 %  O2 (LPM): 0  O2 Delivery Device: None - Room Air    Bowel and Bladder  Last Bowel Movement  01/31/25  Stool Type  Not observed (in therapy)  Bowel Device  Bathroom  Continent  Bladder: Did not void   Bowel: No movement  Bladder Function  Urine Void (mL):  (Large)  Number of Times Voided: 1  Urine Color: Unable To Evaluate  Number of Times Incontinent of Urine: 1  Straight Catheter: 600 ml  Wet Diaper Count: 1  Genitourinary Assessment   Bladder Assessment (WDL):  WDL Except  Diaz Catheter: Not Applicable  Urine Color: Unable To Evaluate  Number of Bladder Accidents: 1  Total Number of Bladder of Accidents in Last 7 Days: 1  Number of Times Incontinent of Urine: 1  Bladder Device: Urinal  Time Void: Yes  Bladder Scan: Post Void  $ Bladder Scan  Results (mL): 4    Skin  Thanh Score   17  Sensory Interventions   Bed Types: Standard/Trauma Mattress  Skin Preventative Measures: Pillows in Use to Float Heels, Pillows in Use for Support / Positioning  Moisture Interventions  Moisturizers/Barriers: Barrier Wipes      Pain  Pain Rating Scale  0 - No Pain  Pain Location  Neck  Pain Location Orientation  Posterior, Right, Left  Pain Interventions   Declines    ADLs    Bathing   Staff, Shower  Linen Change   Complete  Personal Hygiene  Perineal Care, Moist Charley Wipes  Chlorhexidine Bath      Oral Care     Teeth/Dentures     Shave     Nutrition Percentage Eaten  *  * Meal *  *, Dinner, Between % Consumed  Environmental Precautions     Patient Turns/Positioning  Patient turns self independently side to side without assistance, to offload sacral area  Patient Turns Assistance/Tolerance  Assistance of Two or More  Bed Positions  Bed Controls On, Bed Locked  Head of Bed Elevated  Self regulated      Psychosocial/Neurologic Assessment  Psychosocial Assessment  Psychosocial (WDL):  WDL Except  Patient Behaviors: Confused  Neurologic Assessment  Neuro (WDL): Exceptions to WDL  Level of Consciousness: Alert  Orientation Level: Oriented X4  Cognition: Appropriate safety awareness, Follows commands  Speech: Clear  Pupil Assesment: No  EENT (WDL):  WDL Except    Cardio/Pulmonary Assessment  Edema   RLE Edema: 3+  LLE Edema: 3+  Respiratory Breath Sounds  RUL Breath Sounds: Clear  RML Breath Sounds: Clear  RLL Breath Sounds: Diminished  PRATIK Breath Sounds: Clear  LLL Breath Sounds: Diminished  Cardiac Assessment   Cardiac (WDL):  WDL Except

## 2025-02-01 NOTE — CARE PLAN
The patient is Stable - Low risk of patient condition declining or worsening    Shift Goals  Clinical Goals: Safety  Patient Goals: rest    Progress made toward(s) clinical / shift goals:      Problem: Pain - Standard  Goal: Alleviation of pain or a reduction in pain to the patient’s comfort goal  Outcome: Progressing  Note: Pt with c/o neck pain. Offered him tylenol and/or heat pack but refused. Will continue to monitor.       Patient is not progressing towards the following goals:    Problem: Bladder / Voiding  Goal: Patient will establish and maintain regular urinary output  Outcome: Not Progressing  Note: Pt incontinent of bladder this shift. Cleaned and kept pt dry throughout the night. He denies dysuria.

## 2025-02-01 NOTE — THERAPY
Speech Language Pathology  Daily Treatment     Patient Name: Vince Carr  Age:  79 y.o., Sex:  male  Medical Record #: [id number]  Today's Date: 2/1/2025     Precautions  Precautions: Fall Risk, Other (See Comments)  Comments: supplemental O2, Multiple Falls    Subjective    Pt seen in ST office with pt's son present. Pt pleasant and cooperative during ST      Objective       02/01/25 0931   Treatment Charges   SLP Cognitive Skill Development First 15 Minutes 1   SLP Cognitive Skill Development Additional 15 Minutes 3   SLP Total Time Spent   SLP Individual Total Time Spent (Mins) 60   Interdisciplinary Plan of Care Collaboration   IDT Collaboration with  Family / Caregiver   Patient Position at End of Therapy Seated;Chair Alarm On;Call Light within Reach;Tray Table within Reach;Phone within Reach;Family / Friend in Room   Collaboration Comments Pt's son present harris ST session         Assessment    Pt participated in functional recall of daily activities, requiring MIN A from his son.   Completed medication chart of pt's current medications. Pt able to recall the purpose of 3/17 medications when provided with the names.   Reviewed pt's medication system at home- pt reports he keeps his medications in Tupperware in the pantry. Pt's son reports he is not sure of pt's current organizational system; however, states there needs to be one in place prior to d/c. Pt reports he is open to participating in functional mock medication sort and learning a new medication management system for safety and accuracy after d/c.     Strengths: Able to follow instructions, Motivated for self care and independence, Pleasant and cooperative, Supportive family, Willingly participates in therapeutic activities  Barriers: Confused, Generalized weakness, Impaired activity tolerance, Impaired carryover of learning, Impaired functional cognition, Pain (Patient complains of back and left shoulder pain.)    Plan    Continue functional  medication management     Passport items to be completed:  Express basic needs, understand food/liquid recommendations, consistently follow swallow precautions, manage finances, manage medications, arrive to therapy appointments on time, complete daily memory log entries, solve problems related to safety situations, review education related to hospitalization, complete caregiver training     Speech Therapy Problems (Active)       Problem: Memory STGs       Dates: Start:  01/29/25         Goal: STG-Within one week, patient will recall daily events and new training with 50% acc with external and internal memory aids and strategies, with mod to max cues.       Dates: Start:  01/29/25         Goal Note filed on 01/30/25 0930 by Antonia Dsouza MS,CCC-SLP       Patient was evaluated yesterday 1/29/25 with first day of tx today.                   Problem: Problem Solving STGs       Dates: Start:  01/29/25         Goal: STG-Within one week, patient will perform selective attention tasks with 75% acc with min A.       Dates: Start:  01/29/25         Goal Note filed on 01/30/25 0930 by Antonia Dsouza MS,CCC-SLP       Patient was evaluated yesterday 1/29/25 with first day of tx today.                   Problem: Speech/Swallowing LTGs       Dates: Start:  01/29/25         Goal: LTG-By discharge, patient will solve complex problem and recall safety training with 80% acc with set up or spv.       Dates: Start:  01/29/25

## 2025-02-01 NOTE — FLOWSHEET NOTE
02/01/25 0642   Events/Summary/Plan   Events/Summary/Plan mdi   Skin Integrity Intact   Protective Device Foam Pads   Location ears   Vital Signs   Pulse 69   Respiration 18   Pulse Oximetry 94 %   $ Pulse Oximetry (Spot Check) Yes   Respiratory Assessment   Respiratory Pattern Within Normal Limits   Level of Consciousness Alert   Chest Exam   Work Of Breathing / Effort Within Normal Limits   Breath Sounds   RUL Breath Sounds Clear   RML Breath Sounds Clear   RLL Breath Sounds Diminished   PRATIK Breath Sounds Clear   LLL Breath Sounds Diminished   Oxygen   O2 (LPM) 3   O2 Delivery Device Nasal Cannula

## 2025-02-01 NOTE — PROGRESS NOTES
Hospital Medicine Daily Progress Note      Chief Complaint  Chest Pain  Hypertension  Diabetes    Interval Problem Update  Pt denies new complaints.    Code Status  DNAR/DNI    Disposition  Per Physiatry    Review of Systems  Review of Systems   Constitutional:  Negative for chills and fever.   HENT: Negative.     Eyes: Negative.    Respiratory:  Negative for cough and shortness of breath.    Cardiovascular:  Negative for chest pain and palpitations.   Gastrointestinal:  Negative for abdominal pain, nausea and vomiting.   Musculoskeletal:         Right sided hemithorax pain   Skin:  Negative for itching and rash.   Endo/Heme/Allergies:  Negative for polydipsia. Does not bruise/bleed easily.        Physical Exam  Temp:  [35.3 °C (95.5 °F)-36.8 °C (98.2 °F)] 36.8 °C (98.2 °F)  Pulse:  [61-91] 76  Resp:  [14-20] 20  BP: (105-143)/(62-70) 105/70  SpO2:  [94 %-99 %] 96 %    Physical Exam  Constitutional:       General: He is not in acute distress.     Appearance: Normal appearance. He is not ill-appearing.   HENT:      Head: Normocephalic and atraumatic.      Right Ear: External ear normal.      Left Ear: External ear normal.      Nose: Nose normal.      Mouth/Throat:      Pharynx: Oropharynx is clear.   Eyes:      General:         Right eye: No discharge.         Left eye: No discharge.      Extraocular Movements: Extraocular movements intact.      Conjunctiva/sclera: Conjunctivae normal.   Cardiovascular:      Rate and Rhythm: Rhythm irregular.   Pulmonary:      Effort: No respiratory distress.      Breath sounds: No wheezing.      Comments: Decreased BS  Abdominal:      General: Bowel sounds are normal. There is no distension.      Palpations: Abdomen is soft.      Tenderness: There is no abdominal tenderness.   Musculoskeletal:      Cervical back: Normal range of motion and neck supple.      Right lower leg: Edema present.      Left lower leg: Edema present.   Skin:     General: Skin is warm and dry.   Neurological:       Mental Status: He is alert and oriented to person, place, and time.         Fluids    Intake/Output Summary (Last 24 hours) at 2/1/2025 1313  Last data filed at 2/1/2025 1203  Gross per 24 hour   Intake 1160 ml   Output 700 ml   Net 460 ml        Laboratory  Recent Labs     01/31/25  0604   WBC 10.8   RBC 4.09*   HEMOGLOBIN 12.5*   HEMATOCRIT 38.4*   MCV 93.9   MCH 30.6   MCHC 32.6   RDW 50.0   PLATELETCT 234   MPV 9.7     Recent Labs     01/31/25  0604   SODIUM 135   POTASSIUM 3.8   CHLORIDE 100   CO2 26   GLUCOSE 154*   BUN 19   CREATININE 0.76   CALCIUM 8.4*                 Assessment/Plan  Chest pain  Assessment & Plan  Trop 23 followed by 24  EKG 1/30/25 SR, LBBB  EKG 1/31/25 AFib  CXR small R pl eff, R basilar opacity (atx vs pneumonitis)  Rib X-ray no acute displaced rib fx  Leukocytosis resolved on abx  Continue Augmentin for PNA  IS and RT protocol  Symptoms improving  Check F/U labs in AM     Sacral fracture (HCC)- (present on admission)  Assessment & Plan  2/2 GLF  Non-surgical management  Pain control per Physiatry    SDH (subdural hematoma) (HCC)- (present on admission)  Assessment & Plan  2/2 GLF  Non-surgical management  Needs F/U MRI 6 wks  Ongoing management per Physiatry    Non-insulin dependent type 2 diabetes mellitus (HCC)- (present on admission)  Assessment & Plan  HbA1c 6.9  Discontinued FSBS and SSI as not routinely needing insulin coverage  Continue diet control  Outpt meds include Metformin 1000 mg PO bid and Jardiance 10 mg PO qd    PAF (paroxysmal atrial fibrillation) (HCC)- (present on admission)  Assessment & Plan  H/O PPM  On Coreg for rate control  Chronically anticoagulated on Xarelto  AC presently on hold for ICH    CAD in native artery- (present on admission)  Assessment & Plan  Echo 11/25/24 EF 50%  BNP elevated but stable  On Lipitor, Coreg, and Bumex  Xarelto presently on hold for ICH    Hypertension- (present on admission)  Assessment & Plan  Observe blood pressure trends  on Coreg and Bumex  Monitor for orthostatic hypotension on Flomax    Other emphysema (HCC)- (present on admission)  Assessment & Plan  Chronically on supplemental O2 at 2 lpm via NC  Continue Trelegy and Singulair  RT protocol         VTE prophylaxis:  Per Physiatry    Discussed w/ pt, son, and RN

## 2025-02-01 NOTE — FLOWSHEET NOTE
02/01/25 1417   Events/Summary/Plan   Events/Summary/Plan BIPAP check   Non-Invasive Ventilation [facility]   Nocturnal CPAP or BIPAP BIPAP - Home Unit   Cleanliness and Damage Inspection Performed Yes   Settings (If Known) 14/11   FiO2 or LPM 2     Patient did not wear BIPAP last night.

## 2025-02-02 LAB
ANION GAP SERPL CALC-SCNC: 8 MMOL/L (ref 7–16)
BUN SERPL-MCNC: 21 MG/DL (ref 8–22)
CALCIUM SERPL-MCNC: 8.8 MG/DL (ref 8.5–10.5)
CHLORIDE SERPL-SCNC: 101 MMOL/L (ref 96–112)
CO2 SERPL-SCNC: 28 MMOL/L (ref 20–33)
CREAT SERPL-MCNC: 0.8 MG/DL (ref 0.5–1.4)
ERYTHROCYTE [DISTWIDTH] IN BLOOD BY AUTOMATED COUNT: 49.3 FL (ref 35.9–50)
GFR SERPLBLD CREATININE-BSD FMLA CKD-EPI: 90 ML/MIN/1.73 M 2
GLUCOSE SERPL-MCNC: 152 MG/DL (ref 65–99)
HCT VFR BLD AUTO: 37.2 % (ref 42–52)
HGB BLD-MCNC: 12.4 G/DL (ref 14–18)
MCH RBC QN AUTO: 30.7 PG (ref 27–33)
MCHC RBC AUTO-ENTMCNC: 33.3 G/DL (ref 32.3–36.5)
MCV RBC AUTO: 92.1 FL (ref 81.4–97.8)
NT-PROBNP SERPL IA-MCNC: 723 PG/ML (ref 0–125)
PLATELET # BLD AUTO: 260 K/UL (ref 164–446)
PMV BLD AUTO: 9.8 FL (ref 9–12.9)
POTASSIUM SERPL-SCNC: 4.1 MMOL/L (ref 3.6–5.5)
RBC # BLD AUTO: 4.04 M/UL (ref 4.7–6.1)
SODIUM SERPL-SCNC: 137 MMOL/L (ref 135–145)
WBC # BLD AUTO: 7.6 K/UL (ref 4.8–10.8)

## 2025-02-02 PROCEDURE — 94760 N-INVAS EAR/PLS OXIMETRY 1: CPT

## 2025-02-02 PROCEDURE — 770010 HCHG ROOM/CARE - REHAB SEMI PRIVAT*

## 2025-02-02 PROCEDURE — A9270 NON-COVERED ITEM OR SERVICE: HCPCS | Performed by: PHYSICAL MEDICINE & REHABILITATION

## 2025-02-02 PROCEDURE — 700102 HCHG RX REV CODE 250 W/ 637 OVERRIDE(OP): Performed by: PHYSICAL MEDICINE & REHABILITATION

## 2025-02-02 PROCEDURE — 99232 SBSQ HOSP IP/OBS MODERATE 35: CPT | Performed by: HOSPITALIST

## 2025-02-02 PROCEDURE — 80048 BASIC METABOLIC PNL TOTAL CA: CPT

## 2025-02-02 PROCEDURE — A9270 NON-COVERED ITEM OR SERVICE: HCPCS | Performed by: HOSPITALIST

## 2025-02-02 PROCEDURE — 36415 COLL VENOUS BLD VENIPUNCTURE: CPT

## 2025-02-02 PROCEDURE — 94640 AIRWAY INHALATION TREATMENT: CPT

## 2025-02-02 PROCEDURE — 700102 HCHG RX REV CODE 250 W/ 637 OVERRIDE(OP): Performed by: HOSPITALIST

## 2025-02-02 PROCEDURE — 700111 HCHG RX REV CODE 636 W/ 250 OVERRIDE (IP): Mod: JZ | Performed by: PHYSICAL MEDICINE & REHABILITATION

## 2025-02-02 PROCEDURE — 83880 ASSAY OF NATRIURETIC PEPTIDE: CPT

## 2025-02-02 PROCEDURE — 85027 COMPLETE CBC AUTOMATED: CPT

## 2025-02-02 RX ORDER — CARVEDILOL 3.12 MG/1
6.25 TABLET ORAL 2 TIMES DAILY WITH MEALS
Status: DISCONTINUED | OUTPATIENT
Start: 2025-02-02 | End: 2025-02-11 | Stop reason: HOSPADM

## 2025-02-02 RX ADMIN — QUETIAPINE FUMARATE 12.5 MG: 25 TABLET ORAL at 21:13

## 2025-02-02 RX ADMIN — Medication 100 MG: at 08:09

## 2025-02-02 RX ADMIN — ENOXAPARIN SODIUM 40 MG: 100 INJECTION SUBCUTANEOUS at 08:08

## 2025-02-02 RX ADMIN — AMOXICILLIN AND CLAVULANATE POTASSIUM 1 TABLET: 875; 125 TABLET, FILM COATED ORAL at 08:09

## 2025-02-02 RX ADMIN — OMEPRAZOLE 20 MG: 20 CAPSULE, DELAYED RELEASE ORAL at 08:09

## 2025-02-02 RX ADMIN — CARVEDILOL 6.25 MG: 3.12 TABLET, FILM COATED ORAL at 17:03

## 2025-02-02 RX ADMIN — CYANOCOBALAMIN TAB 500 MCG 1000 MCG: 500 TAB at 08:09

## 2025-02-02 RX ADMIN — ACETAMINOPHEN 650 MG: 325 TABLET ORAL at 21:13

## 2025-02-02 RX ADMIN — BUMETANIDE 1 MG: 1 TABLET ORAL at 08:10

## 2025-02-02 RX ADMIN — MONTELUKAST 10 MG: 10 TABLET, FILM COATED ORAL at 21:12

## 2025-02-02 RX ADMIN — TRAZODONE HYDROCHLORIDE 100 MG: 100 TABLET ORAL at 21:14

## 2025-02-02 RX ADMIN — AMOXICILLIN AND CLAVULANATE POTASSIUM 1 TABLET: 875; 125 TABLET, FILM COATED ORAL at 21:12

## 2025-02-02 RX ADMIN — THERA TABS 1 TABLET: TAB at 08:10

## 2025-02-02 RX ADMIN — TAMSULOSIN HYDROCHLORIDE 0.4 MG: 0.4 CAPSULE ORAL at 08:10

## 2025-02-02 RX ADMIN — CARVEDILOL 12.5 MG: 12.5 TABLET, FILM COATED ORAL at 08:10

## 2025-02-02 RX ADMIN — SENNOSIDES AND DOCUSATE SODIUM 2 TABLET: 50; 8.6 TABLET ORAL at 21:12

## 2025-02-02 RX ADMIN — FLUTICASONE FUROATE, UMECLIDINIUM BROMIDE AND VILANTEROL TRIFENATATE 1 PUFF: 200; 62.5; 25 POWDER RESPIRATORY (INHALATION) at 10:36

## 2025-02-02 RX ADMIN — ATORVASTATIN CALCIUM 40 MG: 40 TABLET, FILM COATED ORAL at 21:12

## 2025-02-02 ASSESSMENT — FIBROSIS 4 INDEX: FIB4 SCORE: 2.11

## 2025-02-02 ASSESSMENT — ENCOUNTER SYMPTOMS
ABDOMINAL PAIN: 0
POLYDIPSIA: 0
NAUSEA: 0
EYES NEGATIVE: 1
COUGH: 0
VOMITING: 0
SHORTNESS OF BREATH: 0
FEVER: 0
BRUISES/BLEEDS EASILY: 0
PALPITATIONS: 0
CHILLS: 0

## 2025-02-02 ASSESSMENT — PAIN DESCRIPTION - PAIN TYPE
TYPE: ACUTE PAIN

## 2025-02-02 NOTE — FLOWSHEET NOTE
02/02/25 1037   Events/Summary/Plan   Events/Summary/Plan spot check done mdi given pt on room air at this time using bipap at noc   Vital Signs   Pulse 71   Respiration 18   Pulse Oximetry 94 %   $ Pulse Oximetry (Spot Check) Yes   Respiratory Assessment   Respiratory Pattern Within Normal Limits   Level of Consciousness Alert   Chest Exam   Work Of Breathing / Effort Within Normal Limits   Breath Sounds   RUL Breath Sounds Clear   RML Breath Sounds Clear   RLL Breath Sounds Clear   PRATIK Breath Sounds Clear   LLL Breath Sounds Clear   Oxygen   O2 (LPM) 0   FiO2% 21 %   O2 Delivery Device Room air w/o2 available   Non-Invasive Ventilation [facility]   Nocturnal CPAP or BIPAP BIPAP - Home Unit   Cleanliness and Damage Inspection Performed Yes   FiO2 or LPM 2

## 2025-02-02 NOTE — PROGRESS NOTES
Hospital Medicine Daily Progress Note      Chief Complaint  Chest Pain  Hypertension  Diabetes    Interval Problem Update  Heart rates and blood pressures trending down; pt asymptomatic.  Laboratory results reviewed; BNP decreasing.    Code Status  DNAR/DNI    Disposition  Per Physiatry    Review of Systems  Review of Systems   Constitutional:  Negative for chills and fever.   HENT: Negative.     Eyes: Negative.    Respiratory:  Negative for cough and shortness of breath.    Cardiovascular:  Negative for chest pain and palpitations.   Gastrointestinal:  Negative for abdominal pain, nausea and vomiting.   Musculoskeletal:         Right sided hemithorax pain   Skin:  Negative for itching and rash.   Endo/Heme/Allergies:  Negative for polydipsia. Does not bruise/bleed easily.        Physical Exam  Temp:  [36.2 °C (97.2 °F)-36.5 °C (97.7 °F)] 36.2 °C (97.2 °F)  Pulse:  [62-80] 71  Resp:  [16-20] 18  BP: (102-136)/(60-76) 107/64  SpO2:  [91 %-99 %] 94 %    Physical Exam  Constitutional:       General: He is not in acute distress.     Appearance: Normal appearance. He is not ill-appearing.   HENT:      Head: Normocephalic and atraumatic.      Right Ear: External ear normal.      Left Ear: External ear normal.      Nose: Nose normal.      Mouth/Throat:      Pharynx: Oropharynx is clear.   Eyes:      General:         Right eye: No discharge.         Left eye: No discharge.      Extraocular Movements: Extraocular movements intact.      Conjunctiva/sclera: Conjunctivae normal.   Cardiovascular:      Rate and Rhythm: Rhythm irregular.   Pulmonary:      Effort: No respiratory distress.      Breath sounds: No wheezing.      Comments: Decreased BS  Abdominal:      General: Bowel sounds are normal. There is no distension.      Palpations: Abdomen is soft.      Tenderness: There is no abdominal tenderness.   Musculoskeletal:      Cervical back: Normal range of motion and neck supple.      Right lower leg: Edema present.      Left  lower leg: Edema present.   Skin:     General: Skin is warm and dry.   Neurological:      Mental Status: He is alert and oriented to person, place, and time.         Fluids    Intake/Output Summary (Last 24 hours) at 2/2/2025 1137  Last data filed at 2/2/2025 1000  Gross per 24 hour   Intake 680 ml   Output 2210 ml   Net -1530 ml        Laboratory  Recent Labs     01/31/25  0604 02/02/25  0540   WBC 10.8 7.6   RBC 4.09* 4.04*   HEMOGLOBIN 12.5* 12.4*   HEMATOCRIT 38.4* 37.2*   MCV 93.9 92.1   MCH 30.6 30.7   MCHC 32.6 33.3   RDW 50.0 49.3   PLATELETCT 234 260   MPV 9.7 9.8     Recent Labs     01/31/25  0604 02/02/25  0540   SODIUM 135 137   POTASSIUM 3.8 4.1   CHLORIDE 100 101   CO2 26 28   GLUCOSE 154* 152*   BUN 19 21   CREATININE 0.76 0.80   CALCIUM 8.4* 8.8                 Assessment/Plan  Chest pain  Assessment & Plan  Trop 23 followed by 24  EKG 1/30/25 SR, LBBB  EKG 1/31/25 AFib  CXR small R pl eff, R basilar opacity (atx vs pneumonitis)  Rib X-ray no acute displaced rib fx  Leukocytosis resolved on abx  Continue Augmentin for PNA  IS and RT protocol  Symptoms improving    Sacral fracture (HCC)- (present on admission)  Assessment & Plan  2/2 GLF  Non-surgical management  Pain control per Physiatry    SDH (subdural hematoma) (HCC)- (present on admission)  Assessment & Plan  2/2 GLF  Non-surgical management  Needs F/U MRI 6 wks  Ongoing management per Physiatry    Non-insulin dependent type 2 diabetes mellitus (HCC)- (present on admission)  Assessment & Plan  HbA1c 6.9  Discontinued FSBS and SSI as not routinely needing insulin coverage  Continue diet control  Outpt meds include Metformin 1000 mg PO bid and Jardiance 10 mg PO qd    PAF (paroxysmal atrial fibrillation) (HCC)- (present on admission)  Assessment & Plan  H/O PPM  On Coreg for rate control  Chronically anticoagulated on Xarelto  AC presently on hold for ICH    CAD in native artery- (present on admission)  Assessment & Plan  Echo 11/25/24 EF 50%  BNP  improving  On Lipitor, Coreg, and Bumex  Xarelto presently on hold for ICH    Hypertension- (present on admission)  Assessment & Plan  On Coreg and Bumex  Will decrease Coreg for bradycardic and hypotensive trends  Monitor for orthostatic hypotension on Flomax    Other emphysema (HCC)- (present on admission)  Assessment & Plan  Chronically on supplemental O2 at 2 lpm via NC  Continue Trelegy and Singulair  RT protocol       VTE prophylaxis:  Per Physiatry

## 2025-02-02 NOTE — CARE PLAN
"The patient is Stable - Low risk of patient condition declining or worsening    Shift Goals  Clinical Goals: Safety  Patient Goals: rest, gain strength  Family Goals: gain strength    Progress made toward(s) clinical / shift goals:      Problem: Pain - Standard  Goal: Alleviation of pain or a reduction in pain to the patient’s comfort goal  Outcome: Progressing  Note: Pt with c/o neck pain but does not want to take any pain med for it. Sleeps good. Will continue to monitor.     Problem: Fall Risk - Rehab  Goal: Patient will remain free from falls  Outcome: Progressing  Note: Ximena Aguiar Fall risk Assessment Score: 17    High fall risk Interventions   - Alarming seatbelt  - Bed and strip alarm   - Yellow sign by the door   - Yellow wrist band \"Fall risk\"  - Room near to the nurse station  - Do not leave patient unattended in the bathroom  - Fall risk education provided     Pt using call light appropriately when in need of assistance.          Patient is not progressing towards the following goals:      "

## 2025-02-02 NOTE — PROGRESS NOTES
NURSING DAILY NOTE    Name: Vince Carr   Date of Admission: 1/28/2025   Admitting Diagnosis: Intracranial hemorrhage (HCC)  Attending Physician: MAGALY RICHARDSON M.D.  Allergies: Iodine and Naproxen    Safety  Patient Assist  mod assist  Patient Precautions  Fall Risk  Precaution Comments  Multiple Falls  Bed Transfer Status  Contact Guard Assist  Toilet Transfer Status   Contact Guard Assist  Assistive Devices  Gait Belt, Rails, Wheelchair  Oxygen  None - Room Air  Diet/Therapeutic Dining  Current Diet Order   Procedures    Diet Order Diet: Consistent CHO (Diabetic)     Pill Administration  whole  Agitated Behavioral Scale     ABS Level of Severity       Fall Risk  Has the patient had a fall this admission?   No  Ximena Aguiar Fall Risk Scoring  17, HIGH RISK  Fall Risk Safety Measures  bed alarm, chair alarm, poor balance, and low vision/ hearing    Vitals  Temperature: 36.3 °C (97.4 °F)  Temp src: Oral  Pulse: 72  Respiration: 20  Blood Pressure : 102/68  Blood Pressure MAP (Calculated): 79 MM HG  BP Location: Left, Upper Arm  Patient BP Position: Sitting     Oxygen  Pulse Oximetry: 91 %  O2 (LPM): 3  O2 Delivery Device: None - Room Air    Bowel and Bladder  Last Bowel Movement  01/31/25  Stool Type  Not observed (in therapy)  Bowel Device  Bathroom  Continent  Bladder: Did not void   Bowel: No movement  Bladder Function  Urine Void (mL): 700 ml  Number of Times Voided: 1  Urine Color: Unable To Evaluate  Number of Times Incontinent of Urine: 1  Straight Catheter: 600 ml  Wet Diaper Count: 1  Genitourinary Assessment   Bladder Assessment (WDL):  WDL Except  Diaz Catheter: Not Applicable  Urine Color: Unable To Evaluate  Number of Bladder Accidents: 1  Total Number of Bladder of Accidents in Last 7 Days: 1  Number of Times Incontinent of Urine: 1  Bladder Device: Urinal  Time Void: Yes  Bladder Scan: Post Void  $ Bladder Scan Results (mL):  1    Skin  Thanh Score   17  Sensory Interventions   Bed Types: Standard/Trauma Mattress  Skin Preventative Measures: Pillows in Use for Support / Positioning  Moisture Interventions  Moisturizers/Barriers: Barrier Wipes      Pain  Pain Rating Scale  8 - Awful, hard to do anything  Pain Location  Neck  Pain Location Orientation  Right, Posterior  Pain Interventions   Medication (see MAR)    ADLs    Bathing   Staff, Shower  Linen Change   Partial  Personal Hygiene  Moist Charley Wipes, Perineal Care  Chlorhexidine Bath      Oral Care     Teeth/Dentures     Shave     Nutrition Percentage Eaten  Free Water  Environmental Precautions     Patient Turns/Positioning  Patient turns self independently side to side without assistance, to offload sacral area  Patient Turns Assistance/Tolerance  Assistance of Two or More  Bed Positions  Bed Controls On, Bed Locked  Head of Bed Elevated  Self regulated      Psychosocial/Neurologic Assessment  Psychosocial Assessment  Psychosocial (WDL):  WDL Except  Patient Behaviors: Confused  Neurologic Assessment  Neuro (WDL): Exceptions to WDL  Level of Consciousness: Alert  Orientation Level: Oriented X4  Cognition: Appropriate safety awareness, Follows commands  Speech: Clear  Pupil Assesment: No  EENT (WDL):  WDL Except    Cardio/Pulmonary Assessment  Edema   RLE Edema: 3+  LLE Edema: 3+  Respiratory Breath Sounds  RUL Breath Sounds: Clear  RML Breath Sounds: Clear  RLL Breath Sounds: Diminished  PRATIK Breath Sounds: Clear  LLL Breath Sounds: Diminished  Cardiac Assessment   Cardiac (WDL):  WDL Except

## 2025-02-02 NOTE — CARE PLAN
Problem: Fall Risk - Rehab  Goal: Patient will remain free from falls  Note: Pt uses call light consistently and appropriately. Waits for assistance does not attempt self transfer this shift. Able to verbalize needs.     Problem: Infection  Goal: Patient will remain free from infection  Note: Patient remains free from s/s infection; afebrile.  Will continue to monitor.   The patient is Stable - Low risk of patient condition declining or worsening    Shift Goals  Clinical Goals: Safety  Patient Goals: gain strength, rest  Family Goals: no family present

## 2025-02-02 NOTE — PROGRESS NOTES
NURSING DAILY NOTE    Name: Vince Carr   Date of Admission: 1/28/2025   Admitting Diagnosis: Intracranial hemorrhage (HCC)  Attending Physician: MAGALY RICHARDSON M.D.  Allergies: Iodine and Naproxen    Safety  Patient Assist  mod assist  Patient Precautions  Fall Risk  Precaution Comments  Multiple Falls  Bed Transfer Status  Contact Guard Assist  Toilet Transfer Status   Contact Guard Assist  Assistive Devices  Rails, Wheelchair  Oxygen  Silicone Nasal Cannula  Diet/Therapeutic Dining  Current Diet Order   Procedures    Diet Order Diet: Consistent CHO (Diabetic)     Pill Administration  whole and one at a time   Agitated Behavioral Scale     ABS Level of Severity       Fall Risk  Has the patient had a fall this admission?   No  Ximena Aguiar Fall Risk Scoring  17, HIGH RISK  Fall Risk Safety Measures  bed alarm, chair alarm, poor balance, and low vision/ hearing    Vitals  Temperature: 36.3 °C (97.4 °F)  Temp src: Oral  Pulse: 62  Respiration: 20  Blood Pressure : 126/74  Blood Pressure MAP (Calculated): 91 MM HG  BP Location: Left, Upper Arm  Patient BP Position: Sitting     Oxygen  Pulse Oximetry: 99 %  O2 (LPM): 2  O2 Delivery Device: Silicone Nasal Cannula    Bowel and Bladder  Last Bowel Movement  02/01/25 (per pt)  Stool Type  Not observed (in therapy)  Bowel Device  Bathroom  Continent  Bladder: Did not void   Bowel: No movement  Bladder Function  Urine Void (mL): (P) 550 ml  Number of Times Voided: (P) 1  Urine Color: Unable To Evaluate  Number of Times Incontinent of Urine: 1  Straight Catheter: 600 ml  Wet Diaper Count: 1  Genitourinary Assessment   Bladder Assessment (WDL):  WDL Except  Diaz Catheter: Not Applicable  Urine Color: Unable To Evaluate  Number of Bladder Accidents: 1  Total Number of Bladder of Accidents in Last 7 Days: 1  Number of Times Incontinent of Urine: 1  Bladder Device: Urinal  Time Void: Yes  Bladder Scan:  Post Void  $ Bladder Scan Results (mL): 1    Skin  Thanh Score   17  Sensory Interventions   Bed Types: Standard/Trauma Mattress  Skin Preventative Measures: Pillows in Use to Float Heels, Pillows in Use for Support / Positioning  Moisture Interventions  Moisturizers/Barriers: Barrier Wipes      Pain  Pain Rating Scale  0 - No Pain  Pain Location  Neck  Pain Location Orientation  Right, Posterior  Pain Interventions   Declines    ADLs    Bathing   Staff, Shower  Linen Change   Partial  Personal Hygiene  Moist Charley Wipes, Perineal Care  Chlorhexidine Bath      Oral Care     Teeth/Dentures     Shave     Nutrition Percentage Eaten  Free Water  Environmental Precautions     Patient Turns/Positioning  Patient turns self independently side to side without assistance, to offload sacral area  Patient Turns Assistance/Tolerance  Assistance of Two or More  Bed Positions  Bed Controls On, Bed Locked  Head of Bed Elevated  Self regulated      Psychosocial/Neurologic Assessment  Psychosocial Assessment  Psychosocial (WDL):  WDL Except  Patient Behaviors: Confused, Fatigue  Neurologic Assessment  Neuro (WDL): Exceptions to WDL  Level of Consciousness: Alert  Orientation Level: Oriented X4  Cognition: Appropriate safety awareness, Follows commands  Speech: Clear  Pupil Assesment: No  EENT (WDL):  WDL Except    Cardio/Pulmonary Assessment  Edema   RLE Edema: 3+  LLE Edema: 3+  Respiratory Breath Sounds  RUL Breath Sounds: Clear  RML Breath Sounds: Clear  RLL Breath Sounds: Diminished  PRATIK Breath Sounds: Clear  LLL Breath Sounds: Diminished  Cardiac Assessment   Cardiac (WDL):  WDL Except

## 2025-02-03 ENCOUNTER — APPOINTMENT (OUTPATIENT)
Dept: OCCUPATIONAL THERAPY | Facility: REHABILITATION | Age: 80
DRG: 949 | End: 2025-02-03
Attending: PHYSICAL MEDICINE & REHABILITATION
Payer: MEDICARE

## 2025-02-03 ENCOUNTER — APPOINTMENT (OUTPATIENT)
Dept: PHYSICAL THERAPY | Facility: REHABILITATION | Age: 80
DRG: 949 | End: 2025-02-03
Attending: PHYSICAL MEDICINE & REHABILITATION
Payer: MEDICARE

## 2025-02-03 ENCOUNTER — APPOINTMENT (OUTPATIENT)
Dept: PHYSICAL MEDICINE AND REHAB | Facility: REHABILITATION | Age: 80
DRG: 949 | End: 2025-02-03
Attending: PHYSICAL MEDICINE & REHABILITATION
Payer: MEDICARE

## 2025-02-03 ENCOUNTER — APPOINTMENT (OUTPATIENT)
Dept: SPEECH THERAPY | Facility: REHABILITATION | Age: 80
DRG: 949 | End: 2025-02-03
Attending: PHYSICAL MEDICINE & REHABILITATION
Payer: MEDICARE

## 2025-02-03 PROCEDURE — 97110 THERAPEUTIC EXERCISES: CPT

## 2025-02-03 PROCEDURE — 97129 THER IVNTJ 1ST 15 MIN: CPT

## 2025-02-03 PROCEDURE — 97116 GAIT TRAINING THERAPY: CPT

## 2025-02-03 PROCEDURE — 97530 THERAPEUTIC ACTIVITIES: CPT

## 2025-02-03 PROCEDURE — 700111 HCHG RX REV CODE 636 W/ 250 OVERRIDE (IP): Mod: JZ | Performed by: PHYSICAL MEDICINE & REHABILITATION

## 2025-02-03 PROCEDURE — 700102 HCHG RX REV CODE 250 W/ 637 OVERRIDE(OP): Performed by: PHYSICAL MEDICINE & REHABILITATION

## 2025-02-03 PROCEDURE — A9270 NON-COVERED ITEM OR SERVICE: HCPCS | Performed by: PHYSICAL MEDICINE & REHABILITATION

## 2025-02-03 PROCEDURE — 700102 HCHG RX REV CODE 250 W/ 637 OVERRIDE(OP): Performed by: HOSPITALIST

## 2025-02-03 PROCEDURE — 770010 HCHG ROOM/CARE - REHAB SEMI PRIVAT*

## 2025-02-03 PROCEDURE — 97535 SELF CARE MNGMENT TRAINING: CPT

## 2025-02-03 PROCEDURE — 97112 NEUROMUSCULAR REEDUCATION: CPT

## 2025-02-03 PROCEDURE — 94760 N-INVAS EAR/PLS OXIMETRY 1: CPT

## 2025-02-03 PROCEDURE — A9270 NON-COVERED ITEM OR SERVICE: HCPCS | Performed by: HOSPITALIST

## 2025-02-03 PROCEDURE — 99231 SBSQ HOSP IP/OBS SF/LOW 25: CPT | Performed by: HOSPITALIST

## 2025-02-03 PROCEDURE — 99232 SBSQ HOSP IP/OBS MODERATE 35: CPT | Performed by: PHYSICAL MEDICINE & REHABILITATION

## 2025-02-03 PROCEDURE — 94640 AIRWAY INHALATION TREATMENT: CPT

## 2025-02-03 PROCEDURE — 97130 THER IVNTJ EA ADDL 15 MIN: CPT

## 2025-02-03 RX ORDER — ESCITALOPRAM OXALATE 10 MG/1
10 TABLET ORAL DAILY
Status: DISCONTINUED | OUTPATIENT
Start: 2025-02-04 | End: 2025-02-11 | Stop reason: HOSPADM

## 2025-02-03 RX ADMIN — THERA TABS 1 TABLET: TAB at 08:36

## 2025-02-03 RX ADMIN — QUETIAPINE FUMARATE 12.5 MG: 25 TABLET ORAL at 22:02

## 2025-02-03 RX ADMIN — OMEPRAZOLE 20 MG: 20 CAPSULE, DELAYED RELEASE ORAL at 08:36

## 2025-02-03 RX ADMIN — ATORVASTATIN CALCIUM 40 MG: 40 TABLET, FILM COATED ORAL at 22:01

## 2025-02-03 RX ADMIN — CARVEDILOL 6.25 MG: 3.12 TABLET, FILM COATED ORAL at 16:41

## 2025-02-03 RX ADMIN — TRAZODONE HYDROCHLORIDE 100 MG: 100 TABLET ORAL at 22:01

## 2025-02-03 RX ADMIN — ENOXAPARIN SODIUM 40 MG: 100 INJECTION SUBCUTANEOUS at 08:37

## 2025-02-03 RX ADMIN — MONTELUKAST 10 MG: 10 TABLET, FILM COATED ORAL at 22:01

## 2025-02-03 RX ADMIN — Medication 100 MG: at 08:37

## 2025-02-03 RX ADMIN — TAMSULOSIN HYDROCHLORIDE 0.4 MG: 0.4 CAPSULE ORAL at 08:36

## 2025-02-03 RX ADMIN — ACETAMINOPHEN 650 MG: 325 TABLET ORAL at 22:00

## 2025-02-03 RX ADMIN — FLUTICASONE FUROATE, UMECLIDINIUM BROMIDE AND VILANTEROL TRIFENATATE 1 PUFF: 200; 62.5; 25 POWDER RESPIRATORY (INHALATION) at 07:02

## 2025-02-03 RX ADMIN — CYANOCOBALAMIN TAB 500 MCG 1000 MCG: 500 TAB at 08:36

## 2025-02-03 RX ADMIN — AMOXICILLIN AND CLAVULANATE POTASSIUM 1 TABLET: 875; 125 TABLET, FILM COATED ORAL at 08:37

## 2025-02-03 RX ADMIN — AMOXICILLIN AND CLAVULANATE POTASSIUM 1 TABLET: 875; 125 TABLET, FILM COATED ORAL at 22:00

## 2025-02-03 RX ADMIN — SENNOSIDES AND DOCUSATE SODIUM 2 TABLET: 50; 8.6 TABLET ORAL at 22:00

## 2025-02-03 ASSESSMENT — ENCOUNTER SYMPTOMS
PALPITATIONS: 0
VOMITING: 0
SHORTNESS OF BREATH: 0
BRUISES/BLEEDS EASILY: 0
NAUSEA: 0
ABDOMINAL PAIN: 0
COUGH: 0
POLYDIPSIA: 0
FEVER: 0
CHILLS: 0
EYES NEGATIVE: 1

## 2025-02-03 ASSESSMENT — PAIN DESCRIPTION - PAIN TYPE
TYPE: ACUTE PAIN

## 2025-02-03 ASSESSMENT — GAIT ASSESSMENTS
GAIT LEVEL OF ASSIST: CONTACT GUARD ASSIST
ASSISTIVE DEVICE: FRONT WHEEL WALKER
DISTANCE (FEET): 100

## 2025-02-03 NOTE — THERAPY
Occupational Therapy  Daily Treatment     Patient Name: Vince Carr  Age:  79 y.o., Sex:  male  Medical Record #: [id number]  Today's Date: 2/3/2025     Precautions  Precautions: (P) Fall Risk  Comments: (P) Hx of multiple falls    Subjective    Pt asleep but easily arousable upon arrival for 7am session; agreeable to participate in OT.     Seated in w/c upon arrival for 930am session/agreeable to participate. Expressed he would like to change into sweatpants vs scrub bottoms.      Objective     02/03/25 0701 02/03/25 0931   OT Charge Group   OT Self Care / ADL (Units) 1 1   OT Neuromuscular Re-education / Balance (Units) 2 1   OT Therapeutic Exercise (Units) 1  --    OT Total Time Spent   OT Individual Total Time Spent (Mins) 60 30   Precautions   Precautions Fall Risk Fall Risk   Comments Hx of multiple falls Hx of multiple falls   Vitals   O2 Delivery Device None - Room Air  --    Sleep/Wake Cycle   Sleep & Rest Awake Awake   Functional Level of Assist   Lower Body Dressing Moderate Assist  (while incorporating EOB and standing w/ FWW for pants over hips pursuit (utilized reacher to assist w/ threading BLEs through scrub pants)) Minimal Assist  (to doff scrub bottoms and don sweatpants while incorporating sitting in w/c and standing w/ FWW for pants over hips pursuit; utilized reacher to faciliate independence)   Bed, Chair, Wheelchair Transfer Contact Guard Assist  (bed > w/c w/ FWW)  --    IADL Treatments   IADL Treatments  --  Home management   Home Management  --  Pt demo'd ability to place and remove 3 clothing items from washer while standing w/ FWW (CGA), Pt also able to fold 2/3 items while standing however standing tolerance limited to 3 mins thus folded last item @ w/c level   Sitting Lower Body Exercises   Nustep Resistance Level 3  (B UE/LEs x8 mins, 0.22 miles)  --    Interdisciplinary Plan of Care Collaboration   Patient Position at End of Therapy Seated;Self Releasing Lap Belt Applied  (in  dining room for breakfast) Seated;Self Releasing Lap Belt Applied     7am:  FWW mobility x 50', x 80' and ~150' w/ CGA w/ seated rest breaks btween walking bouts     BP readings:  97/55 following brief FWW mobility (Nustep>mat)  113/79 following 50' FWW mobility    Assessment    Pt demo's steady progress w/ ADL independence, general strengthening and standing endurance/balance; tolerated FWW mobility up to 150' during 7am session w/ CGA. Pt also progressed mod A to min A for LB dressing independence between two OT sessions and will benefit from blocked practice w/ AE to maximize carryover of strategies, safety and independence.   Strengths: Willingly participates in therapeutic activities, Supportive family, Pleasant and cooperative, Motivated for self care and independence, Manages pain appropriately, Independent prior level of function  Barriers: Pain, Limited mobility, Impaired balance, Impaired activity tolerance, Impaired functional cognition, Generalized weakness, Fatigue    Plan    Pt would benefit from skilled OT services to address:  - ADLs/IADLs   - Strength/endurance/activity tolerance  - Functional mobility w/ LRD  - Standing balance (static and dynamic)  - Functional Cognition (memory)    DME  OT DME Recommendations  Bathroom Equipment:  (pt owns shower chair)  Additional Equipment:  (TBD; pt owns sock aid)    Passport items to be completed:  Perform bathroom transfers, complete dressing, complete feeding, get ready for the day, prepare a simple meal, participate in household tasks, adapt home for safety needs, demonstrate home exercise program, complete caregiver training     Occupational Therapy Goals (Active)       Problem: Dressing       Dates: Start:  01/29/25         Goal: STG-Within one week, patient will dress LB w/ mod A and AE as needed       Dates: Start:  01/29/25               Problem: Functional Transfers       Dates: Start:  01/29/25         Goal: STG-Within one week, patient will  transfer to toilet w/ CGA w/ LRAD       Dates: Start:  01/29/25            Goal: STG-Within one week, patient will transfer to step in shower w/ CGA w/ LRAD       Dates: Start:  01/29/25               Problem: OT Long Term Goals       Dates: Start:  01/29/25         Goal: LTG-By discharge, patient will complete basic self care tasks w/ mod I for UB ADLs and supervision for LB ADLs       Dates: Start:  01/29/25            Goal: LTG-By discharge, patient will perform bathroom transfers w/ supervision w/ LRAD       Dates: Start:  01/29/25            Goal: LTG-By discharge, patient will complete basic home management w/ supervision w/ LRAD        Dates: Start:  01/29/25

## 2025-02-03 NOTE — THERAPY
Speech Language Pathology  Daily Treatment     Patient Name: Vince Carr  Age:  79 y.o., Sex:  male  Medical Record #: [id number]  Today's Date: 2/3/2025     Precautions  Precautions: Fall Risk  Comments: Hx of multiple falls    Subjective    Patient agreeable to therapy at bedside.     Objective       02/03/25 1301   Treatment Charges   SLP Cognitive Skill Development First 15 Minutes 1   SLP Cognitive Skill Development Additional 15 Minutes 1   SLP Total Time Spent   SLP Individual Total Time Spent (Mins) 30   Cognition - Detailed   Simple Attention Within Functional Limits (6-7)   Moderate Attention Minimal (4)   Medication Management  Minimal (4)         Assessment    Patient worked on locating medication errors pictured.  Patient displayed 100% with up to a Fo3,  Missed one on each set of 4 and one set of six pictured meds.  Min cues needed to recognize error and correct.    Strengths: Able to follow instructions, Motivated for self care and independence, Pleasant and cooperative, Supportive family, Willingly participates in therapeutic activities  Barriers: Confused, Generalized weakness, Impaired activity tolerance, Impaired carryover of learning, Impaired functional cognition, Pain (Patient complains of back and left shoulder pain.)    Plan    Target memory, attention, medication sorting.    Passport items to be completed:  Express basic needs, understand food/liquid recommendations, consistently follow swallow precautions, manage finances, manage medications, arrive to therapy appointments on time, complete daily memory log entries, solve problems related to safety situations, review education related to hospitalization, complete caregiver training     Speech Therapy Problems (Active)       Problem: Memory STGs       Dates: Start:  01/29/25         Goal: STG-Within one week, patient will recall daily events and new training with 50% acc with external and internal memory aids and strategies, with mod  to max cues.       Dates: Start:  01/29/25         Goal Note filed on 01/30/25 0930 by Antonia Dsouza MS,CCC-SLP       Patient was evaluated yesterday 1/29/25 with first day of tx today.                   Problem: Problem Solving STGs       Dates: Start:  01/29/25         Goal: STG-Within one week, patient will perform selective attention tasks with 75% acc with min A.       Dates: Start:  01/29/25         Goal Note filed on 01/30/25 0930 by Antonia Dsouza MS,CCC-SLP       Patient was evaluated yesterday 1/29/25 with first day of tx today.                   Problem: Speech/Swallowing LTGs       Dates: Start:  01/29/25         Goal: LTG-By discharge, patient will solve complex problem and recall safety training with 80% acc with set up or spv.       Dates: Start:  01/29/25

## 2025-02-03 NOTE — PROGRESS NOTES
NURSING DAILY NOTE    Name: Vince Carr   Date of Admission: 1/28/2025   Admitting Diagnosis: Intracranial hemorrhage (HCC)  Attending Physician: MAGALY RICHARDSON M.D.  Allergies: Iodine and Naproxen    Safety  Patient Assist  mod assist  Patient Precautions  Fall Risk  Precaution Comments  Multiple Falls  Bed Transfer Status  Contact Guard Assist  Toilet Transfer Status   Contact Guard Assist  Assistive Devices  Rails, Wheelchair  Oxygen  Room air w/o2 available  Diet/Therapeutic Dining  Current Diet Order   Procedures    Diet Order Diet: Consistent CHO (Diabetic)     Pill Administration  whole  Agitated Behavioral Scale     ABS Level of Severity       Fall Risk  Has the patient had a fall this admission?   No  Ximena Aguiar Fall Risk Scoring  17, HIGH RISK  Fall Risk Safety Measures  bed alarm, chair alarm, poor balance, and low vision/ hearing    Vitals  Temperature: 36.2 °C (97.2 °F)  Temp src: Oral  Pulse: 71  Respiration: 18  Blood Pressure : 107/64  Blood Pressure MAP (Calculated): 78 MM HG  BP Location: Left, Upper Arm  Patient BP Position: Sitting     Oxygen  Pulse Oximetry: 94 %  O2 (LPM): 0  FiO2%: 21 %  O2 Delivery Device: Room air w/o2 available    Bowel and Bladder  Last Bowel Movement  02/01/25  Stool Type  Not observed (in therapy)  Bowel Device  Bathroom  Continent  Bladder: Did not void   Bowel: No movement  Bladder Function  Urine Void (mL): 700 ml  Number of Times Voided: 1  Urine Color: Yellow  Number of Times Incontinent of Urine: 1  Straight Catheter: 600 ml  Wet Diaper Count: 1  Genitourinary Assessment   Bladder Assessment (WDL):  Within Defined Limits  Diaz Catheter: Not Applicable  Urine Color: Yellow  Number of Bladder Accidents: 1  Total Number of Bladder of Accidents in Last 7 Days: 1  Number of Times Incontinent of Urine: 1  Bladder Device: Urinal  Time Void: Yes  Bladder Scan: Post Void  $ Bladder Scan Results  (mL): 1    Skin  Thanh Score   17  Sensory Interventions   Bed Types: Standard/Trauma Mattress  Skin Preventative Measures: Pillows in Use for Support / Positioning  Moisture Interventions  Moisturizers/Barriers: Barrier Wipes      Pain  Pain Rating Scale  5 - Interrupts some activities  Pain Location  Neck  Pain Location Orientation  Posterior  Pain Interventions   Declines, Food, Rest    ADLs    Bathing   Staff, Shower  Linen Change   Partial  Personal Hygiene  Change Charley Pads, Perineal Care  Chlorhexidine Bath      Oral Care  Brushed Teeth  Teeth/Dentures     Shave     Nutrition Percentage Eaten  Lunch, Between 50-75% Consumed  Environmental Precautions     Patient Turns/Positioning  Patient turns self independently side to side without assistance, to offload sacral area  Patient Turns Assistance/Tolerance  Assistance of One  Bed Positions  Bed Controls On, Bed Locked  Head of Bed Elevated  Self regulated      Psychosocial/Neurologic Assessment  Psychosocial Assessment  Psychosocial (WDL):  WDL Except  Patient Behaviors: Fatigue  Neurologic Assessment  Neuro (WDL): Exceptions to WDL  Level of Consciousness: Alert  Orientation Level: Oriented X4  Cognition: Appropriate safety awareness, Follows commands  Speech: Clear  Pupil Assesment: No  EENT (WDL):  WDL Except    Cardio/Pulmonary Assessment  Edema   RLE Edema: 3+  LLE Edema: 3+  Respiratory Breath Sounds  RUL Breath Sounds: Clear  RML Breath Sounds: Clear  RLL Breath Sounds: Clear  PRATIK Breath Sounds: Clear  LLL Breath Sounds: Clear  Cardiac Assessment   Cardiac (WDL):  WDL Except

## 2025-02-03 NOTE — CARE PLAN
"  Problem: Knowledge Deficit - Standard  Goal: Patient and family/care givers will demonstrate understanding of plan of care, disease process/condition, diagnostic tests and medications  Outcome: Progressing  Note: Pt agrees with plan of care tonight regarding medications and safety.  Will continue to monitor patient.     Problem: Fall Risk - Rehab  Goal: Patient will remain free from falls  Outcome: Progressing  Note: Ximena Aguiar Fall risk Assessment Score: 17    High fall risk Interventions   - Alarming seatbelt  - Wander guard  - Bed and strip alarm   - Yellow sign by the door   - Yellow wrist band \"Fall risk\"  - Room near to the nurse station  - Do not leave patient unattended in the bathroom  - Fall risk education provided         The patient is Stable - Low risk of patient condition declining or worsening    Shift Goals  Clinical Goals: Safety  Patient Goals: Sleep well  Family Goals: no family present    Progress made toward(s) clinical / shift goals:  progressing        "

## 2025-02-03 NOTE — PROGRESS NOTES
Received bedside shift report from Praveen RODRIGUEZ RN regarding patient and assumed care. Patient awake, calm and stable, currently positioned in bed for comfort and safety; call light within reach. Denies pain or discomfort at this time. Will continue to monitor.

## 2025-02-03 NOTE — FLOWSHEET NOTE
02/03/25 0700   Events/Summary/Plan   Events/Summary/Plan MDI   Vital Signs   Pulse 65   Respiration 18   Pulse Oximetry 92 %   $ Pulse Oximetry (Spot Check) Yes   Respiratory Assessment   Respiratory Pattern Within Normal Limits   Level of Consciousness Alert   Chest Exam   Work Of Breathing / Effort Within Normal Limits   Breath Sounds   RUL Breath Sounds Clear   RML Breath Sounds Clear   RLL Breath Sounds Clear   PRATIK Breath Sounds Clear   LLL Breath Sounds Clear   Oxygen   O2 (LPM) 0   O2 Delivery Device None - Room Air

## 2025-02-03 NOTE — PROGRESS NOTES
NURSING DAILY NOTE    Name: Vince Carr   Date of Admission: 1/28/2025   Admitting Diagnosis: Intracranial hemorrhage (HCC)  Attending Physician: MAGALY RICHARDSON M.D.  Allergies: Iodine and Naproxen    Safety  Patient Assist  st by  Patient Precautions  Fall Risk  Precaution Comments  Multiple Falls  Bed Transfer Status  Contact Guard Assist  Toilet Transfer Status   Contact Guard Assist  Assistive Devices  Walker - front wheel, Wheelchair, Rails  Oxygen  None - Room Air  Diet/Therapeutic Dining  Current Diet Order   Procedures    Diet Order Diet: Consistent CHO (Diabetic)     Pill Administration  whole  Agitated Behavioral Scale     ABS Level of Severity       Fall Risk  Has the patient had a fall this admission?   No  Ximena Aguiar Fall Risk Scoring  17, HIGH RISK  Fall Risk Safety Measures  Bed strip alarm    Vitals  Temperature: 36.1 °C (96.9 °F)  Temp src: Temporal  Pulse: 75  Respiration: 18  Blood Pressure : 113/65  Blood Pressure MAP (Calculated): 81 MM HG  BP Location: Right, Left  Patient BP Position: Sitting     Oxygen  Pulse Oximetry: 92 %  O2 (LPM): 0  FiO2%: 21 %  O2 Delivery Device: None - Room Air    Bowel and Bladder  Last Bowel Movement  02/02/25  Stool Type  Type 1: Separate hard lumps (hard to pass)  Bowel Device  Bathroom, Other (Comment) (Bowel Meds)  Continent  Bladder: Did not void   Bowel: No movement  Bladder Function  Urine Void (mL): 700 ml  Number of Times Voided: 2  Urinary Options: Yes  Urine Color: Yellow  Number of Times Incontinent of Urine: 1  Straight Catheter: 600 ml  Wet Diaper Count: 1  Genitourinary Assessment   Bladder Assessment (WDL):  Within Defined Limits  Diaz Catheter: Not Applicable  Urine Color: Yellow  Number of Bladder Accidents: 1  Total Number of Bladder of Accidents in Last 7 Days: 1  Number of Times Incontinent of Urine: 1  Bladder Device: Bathroom, Diaper  Time Void: Yes  Bladder Scan:  Post Void  $ Bladder Scan Results (mL): 1  Bladder Medications: Yes    Skin  Thanh Score   17  Sensory Interventions   Bed Types: Standard/Trauma Mattress with Overlay  Skin Preventative Measures: Pillows in Use for Support / Positioning, Waffle Overlay  Moisture Interventions  Moisturizers/Barriers: Barrier Wipes      Pain  Pain Rating Scale  3 - Sometimes distracts me  Pain Location  Neck  Pain Location Orientation  Posterior  Pain Interventions   Rest    ADLs    Bathing   Patient Refused Bathing (offered shower, pt. said no)  Linen Change   Partial  Personal Hygiene  Change Charley Pads, Perineal Care  Chlorhexidine Bath      Oral Care  Brushed Teeth  Teeth/Dentures     Shave     Nutrition Percentage Eaten  Lunch, Between 50-75% Consumed  Environmental Precautions  Treaded Slipper Socks on Patient, Bed in Low Position  Patient Turns/Positioning  Patient turns self independently side to side without assistance, to offload sacral area  Patient Turns Assistance/Tolerance  Assistance of One, General Weakness  Bed Positions  Bed Controls On, Bed Locked  Head of Bed Elevated  Self regulated      Psychosocial/Neurologic Assessment  Psychosocial Assessment  Psychosocial (WDL):  WDL Except  Patient Behaviors: Fatigue  Neurologic Assessment  Neuro (WDL): Exceptions to WDL  Level of Consciousness: Alert  Orientation Level: Oriented X4  Cognition: Appropriate safety awareness, Follows commands  Speech: Clear  Pupil Assesment: No  EENT (WDL):  WDL Except    Cardio/Pulmonary Assessment  Edema   RLE Edema: 3+  LLE Edema: 3+  Respiratory Breath Sounds  RUL Breath Sounds: Clear  RML Breath Sounds: Clear  RLL Breath Sounds: Clear  PRATIK Breath Sounds: Clear  LLL Breath Sounds: Clear  Cardiac Assessment   Cardiac (WDL):  WDL Except (H/O HTN, Afib, CAD, MI.)

## 2025-02-03 NOTE — PROGRESS NOTES
"  Physical Medicine & Rehabilitation Progress Note    Encounter Date: 2/3/2025    Chief Complaint: Decreased mobility    Interval Events (Subjective):  Patient sitting up in room. He reports he is doing OK. Discussed with son who is working his finances as he is concerned about long term care for patient. Discussed would continue to evaluate patient about capacity to make decisions.     _____________________________________  Interdisciplinary Team Conference   Most recent IDT on 2025    Discharge Date/Disposition:  25  _____________________________________      Objective:  VITAL SIGNS: /68   Pulse 85   Temp 36.3 °C (97.4 °F) (Oral)   Resp 18   Ht 1.803 m (5' 11\")   Wt 111 kg (244 lb 13.1 oz)   SpO2 94%   BMI 34.15 kg/m²   Gen: NAD  Psych: Mood and affect appropriate  CV: RRR, 0 edema  Resp: CTAB, no upper airway sounds  Abd: NTND  Neuro: AOx4, following commands    Laboratory Values:  Recent Results (from the past 72 hours)   EKG (IP)    Collection Time: 25  4:40 PM   Result Value Ref Range    Report       [facility]    Test Date:  2025  Pt Name:    BERNIE MASSEY         Department: [facility]  MRN:        [id number]                      Room:       [facility]  Gender:     Male                         Technician: [id number]  :        1945                   Requested By:BASIL DU  Order #:    [id number]                    Reading MD: Jose Hernandez MD    Measurements  Intervals                                Axis  Rate:       68                           P:          0  VT:         205                          QRS:        -106  QRSD:       175                          T:          81  QT:         478  QTc:        509    Interpretive Statements  Sinus rhythm  FIRST DEGREE AV BLOCK  Right bundle branch block    Electronically Signed On 2025 16:40:12 PST by Jose Hernandez MD     CBC WITHOUT DIFFERENTIAL    Collection Time: 25  5:40 AM   Result Value Ref Range    WBC 7.6 " 4.8 - 10.8 K/uL    RBC 4.04 (L) 4.70 - 6.10 M/uL    Hemoglobin 12.4 (L) 14.0 - 18.0 g/dL    Hematocrit 37.2 (L) 42.0 - 52.0 %    MCV 92.1 81.4 - 97.8 fL    MCH 30.7 27.0 - 33.0 pg    MCHC 33.3 32.3 - 36.5 g/dL    RDW 49.3 35.9 - 50.0 fL    Platelet Count 260 164 - 446 K/uL    MPV 9.8 9.0 - 12.9 fL   Basic Metabolic Panel    Collection Time: 02/02/25  5:40 AM   Result Value Ref Range    Sodium 137 135 - 145 mmol/L    Potassium 4.1 3.6 - 5.5 mmol/L    Chloride 101 96 - 112 mmol/L    Co2 28 20 - 33 mmol/L    Glucose 152 (H) 65 - 99 mg/dL    Bun 21 8 - 22 mg/dL    Creatinine 0.80 0.50 - 1.40 mg/dL    Calcium 8.8 8.5 - 10.5 mg/dL    Anion Gap 8.0 7.0 - 16.0   proBrain Natriuretic Peptide, NT    Collection Time: 02/02/25  5:40 AM   Result Value Ref Range    NT-proBNP 723 (H) 0 - 125 pg/mL   ESTIMATED GFR    Collection Time: 02/02/25  5:40 AM   Result Value Ref Range    GFR (CKD-EPI) 90 >60 mL/min/1.73 m 2       Medications:  Scheduled Medications   Medication Dose Frequency    carvedilol  6.25 mg BID WITH MEALS    amoxicillin-clavulanate  1 Tablet Q12HRS    montelukast  10 mg Nightly    enoxaparin (LOVENOX) injection  40 mg DAILY    fluticasone-umeclidinium-vilanterol  1 Puff QDAILY (RT)    Pharmacy Consult Request  1 Each PHARMACY TO DOSE    senna-docusate  2 Tablet Q EVENING    omeprazole  20 mg DAILY    atorvastatin  40 mg QHS    bumetanide  1 mg BID    cyanocobalamin  1,000 mcg DAILY    multivitamin  1 Tablet DAILY    QUEtiapine  12.5 mg Nightly    tamsulosin  0.4 mg AFTER BREAKFAST    thiamine  100 mg DAILY    traZODone  100 mg Nightly     PRN medications: hydrALAZINE, acetaminophen, senna-docusate **AND** polyethylene glycol/lytes, docusate sodium, magnesium hydroxide, carboxymethylcellulose, benzocaine-menthol, mag hydrox-al hydrox-simeth, ondansetron **OR** ondansetron, traZODone, sodium chloride, oxyCODONE immediate-release **OR** oxyCODONE immediate-release, midazolam    Diet:  Current Diet Order   Procedures     Diet Order Diet: Consistent CHO (Diabetic)       Medical Decision Making and Plan:  SDH - Patient with fall on 1/26/25 with SDH with conservative management  -PT and OT for mobility and ADLs. Per guidelines, 15 hours per week between PT, OT and/or SLP.  -Follow-up NSG. On Seroquel 12.5 mg QHS     HTN/CAD/A fib - Patient on Bumex, Coreg 12.5 mg.   -Continue Bumex 0.1 mg and Coreg 12.5 mg BID     S5 fracture - Conservative management. PRN Tylenol and Oxycodone      Chest pain - Right sided, probable rib fracture. CXR completed, consult hospitalist. Concern for pneumonia, started on Augmentin. Continue Augmentin     Hyponatremia - Check AM CMP - 135, improving     Anemia - Check AM CBC - 13.5, will monitor     Leukocytosis - Check AM CBC - 13.2, increasing. UA negative CXR with possible pneumonia. Consult hospitalist     DM2 with hyperglycemia - Patient on SSI on transfer     BPH - Patient on Tamsulosin 0.4 mg daily     Insomnia - Patient on Trazodone 100 mg daily     COPD - Patient on Trelegy daily     Pain - Patient on PRN Tylenol and Oxycodone      Skin - Patient at risk for skin breakdown due to debility in areas including sacrum, achilles, elbows and head in addition to other sites. Nursing to assess skin daily.      GI Ppx - Patient on Prilosec for GERD prophylaxis. Patient on Senna-docusate for constipation prophylaxis.      DVT Ppx - Patient Lovenox on transfer. Limited mobility, continue Lovenox  ____________________________________    T. Car Chan MD/PhD  [facility] - Physical Medicine & Rehabilitation   [facility] - Brain Injury Medicine   ____________________________________

## 2025-02-03 NOTE — PROGRESS NOTES
"  PSYCHOLOGY PROGRESS NOTE     Treatment Time/Location: 6052-3509 in pt's room  Total Treatment Time: 27 Minutes  Service Type: 20185     Intervention: Vince Carr was seen in context of [facility] hospitalization for Psychology follow-up. Cognitive behavioral therapy (CBT), Motivational interviewing (MI), and Acceptance and Commitment Based Therapy (ACT) techniques applied in session with supportive and processing therapy was provided with normalization and validation of pt's experience emphasized.    Engaged in discussion with pt regarding hospitalization, adjustment, coping, and discharge planning. Pt also reported discussed grief/loss process s/p spouse's passing with narrative therapy & talking about spouse incorporated. Pt reported improvement in pain the past couple of days and some waking overnight. Pt reflected on progress in rehab therapies thus far, which were supported/reinforced. Reviewed and discussed findings from eval last week with highlight of changes in cognition and recommendations for support with iADLs. Pt reported not recalling aspects of delirium that were mentioned by son; reinforced orientation techniques/behaviors.    Behavioral Observations: Vince Carr was alert, oriented, and agreeable to encounter. Pt participated appropriately and cooperatively. Language functions, eye contact, and non-verbal behaviors were unremarkable. Thought processes were connected, logical, and goal oriented (negative for AH/VH/HI); mood and affect were congruent and euthymic.    Risk Assessment: Pt endorsed increased thoughts of death/dying with increased intensity over the weekend. He felt that grief/loss of spouse has been precipitant to thoughts of death/dying. He stated \"I'd never do it because of God,\" and did not endorse intent or plan at this time. Thus, risk is felt to be low at this time; will continue to provide ongoing re-assessment of risk and increased " therapeutic contact.    Plan:  Will continue to follow and assess cognition, mood, pain management, and coping.     Recommendations:  1) Previous recommendations remain needed as outlined in initial evaluation.  2) Pt initiated on 10mg Escitalopram on 2/3; referral to outpatient psychotherapy is also recommended.    Rafia Wills, PhD, [facility]-  Licensed Psychologist      The utility and purpose of this service was reviewed and pt agreed to participate.  Pt demonstrated ability and willingness to work towards goals, receptivity to psychological support and plan of care. Therapy goals/plans were reviewed and discussed with pt and pt was in agreement.

## 2025-02-04 ENCOUNTER — APPOINTMENT (OUTPATIENT)
Dept: PHYSICAL THERAPY | Facility: REHABILITATION | Age: 80
DRG: 949 | End: 2025-02-04
Attending: PHYSICAL MEDICINE & REHABILITATION
Payer: MEDICARE

## 2025-02-04 ENCOUNTER — APPOINTMENT (OUTPATIENT)
Dept: SPEECH THERAPY | Facility: REHABILITATION | Age: 80
DRG: 949 | End: 2025-02-04
Attending: PHYSICAL MEDICINE & REHABILITATION
Payer: MEDICARE

## 2025-02-04 ENCOUNTER — APPOINTMENT (OUTPATIENT)
Dept: OCCUPATIONAL THERAPY | Facility: REHABILITATION | Age: 80
DRG: 949 | End: 2025-02-04
Attending: PHYSICAL MEDICINE & REHABILITATION
Payer: MEDICARE

## 2025-02-04 LAB
GLUCOSE BLD STRIP.AUTO-MCNC: 147 MG/DL (ref 65–99)
GLUCOSE BLD STRIP.AUTO-MCNC: 178 MG/DL (ref 65–99)

## 2025-02-04 PROCEDURE — A9270 NON-COVERED ITEM OR SERVICE: HCPCS | Performed by: PHYSICAL MEDICINE & REHABILITATION

## 2025-02-04 PROCEDURE — A9270 NON-COVERED ITEM OR SERVICE: HCPCS | Performed by: HOSPITALIST

## 2025-02-04 PROCEDURE — 700111 HCHG RX REV CODE 636 W/ 250 OVERRIDE (IP): Mod: JZ | Performed by: PHYSICAL MEDICINE & REHABILITATION

## 2025-02-04 PROCEDURE — 770010 HCHG ROOM/CARE - REHAB SEMI PRIVAT*

## 2025-02-04 PROCEDURE — 700102 HCHG RX REV CODE 250 W/ 637 OVERRIDE(OP): Performed by: HOSPITALIST

## 2025-02-04 PROCEDURE — 99232 SBSQ HOSP IP/OBS MODERATE 35: CPT | Performed by: PHYSICAL MEDICINE & REHABILITATION

## 2025-02-04 PROCEDURE — 97129 THER IVNTJ 1ST 15 MIN: CPT

## 2025-02-04 PROCEDURE — 97112 NEUROMUSCULAR REEDUCATION: CPT

## 2025-02-04 PROCEDURE — 700102 HCHG RX REV CODE 250 W/ 637 OVERRIDE(OP): Performed by: PHYSICAL MEDICINE & REHABILITATION

## 2025-02-04 PROCEDURE — 97535 SELF CARE MNGMENT TRAINING: CPT

## 2025-02-04 PROCEDURE — 99232 SBSQ HOSP IP/OBS MODERATE 35: CPT | Performed by: HOSPITALIST

## 2025-02-04 PROCEDURE — 94760 N-INVAS EAR/PLS OXIMETRY 1: CPT

## 2025-02-04 PROCEDURE — 97530 THERAPEUTIC ACTIVITIES: CPT

## 2025-02-04 PROCEDURE — 97130 THER IVNTJ EA ADDL 15 MIN: CPT

## 2025-02-04 PROCEDURE — 82962 GLUCOSE BLOOD TEST: CPT | Mod: 91

## 2025-02-04 PROCEDURE — 97116 GAIT TRAINING THERAPY: CPT

## 2025-02-04 PROCEDURE — 94640 AIRWAY INHALATION TREATMENT: CPT

## 2025-02-04 RX ORDER — INSULIN LISPRO 100 [IU]/ML
2-12 INJECTION, SOLUTION INTRAVENOUS; SUBCUTANEOUS
Status: DISCONTINUED | OUTPATIENT
Start: 2025-02-04 | End: 2025-02-07

## 2025-02-04 RX ADMIN — FLUTICASONE FUROATE, UMECLIDINIUM BROMIDE AND VILANTEROL TRIFENATATE 1 PUFF: 200; 62.5; 25 POWDER RESPIRATORY (INHALATION) at 06:26

## 2025-02-04 RX ADMIN — MONTELUKAST 10 MG: 10 TABLET, FILM COATED ORAL at 21:26

## 2025-02-04 RX ADMIN — ACETAMINOPHEN 650 MG: 325 TABLET ORAL at 21:25

## 2025-02-04 RX ADMIN — INSULIN LISPRO 2 UNITS: 100 INJECTION, SOLUTION INTRAVENOUS; SUBCUTANEOUS at 21:30

## 2025-02-04 RX ADMIN — CARVEDILOL 6.25 MG: 3.12 TABLET, FILM COATED ORAL at 17:12

## 2025-02-04 RX ADMIN — BUMETANIDE 1 MG: 1 TABLET ORAL at 21:26

## 2025-02-04 RX ADMIN — ESCITALOPRAM OXALATE 10 MG: 10 TABLET ORAL at 08:26

## 2025-02-04 RX ADMIN — TAMSULOSIN HYDROCHLORIDE 0.4 MG: 0.4 CAPSULE ORAL at 08:26

## 2025-02-04 RX ADMIN — TRAZODONE HYDROCHLORIDE 100 MG: 100 TABLET ORAL at 21:25

## 2025-02-04 RX ADMIN — THERA TABS 1 TABLET: TAB at 08:26

## 2025-02-04 RX ADMIN — OMEPRAZOLE 20 MG: 20 CAPSULE, DELAYED RELEASE ORAL at 08:26

## 2025-02-04 RX ADMIN — QUETIAPINE FUMARATE 12.5 MG: 25 TABLET ORAL at 21:27

## 2025-02-04 RX ADMIN — SENNOSIDES AND DOCUSATE SODIUM 2 TABLET: 50; 8.6 TABLET ORAL at 21:25

## 2025-02-04 RX ADMIN — ENOXAPARIN SODIUM 40 MG: 100 INJECTION SUBCUTANEOUS at 08:28

## 2025-02-04 RX ADMIN — CYANOCOBALAMIN TAB 500 MCG 1000 MCG: 500 TAB at 08:26

## 2025-02-04 RX ADMIN — ATORVASTATIN CALCIUM 40 MG: 40 TABLET, FILM COATED ORAL at 21:26

## 2025-02-04 RX ADMIN — Medication 100 MG: at 08:26

## 2025-02-04 ASSESSMENT — ENCOUNTER SYMPTOMS
DIARRHEA: 0
NAUSEA: 0
VOMITING: 0
ABDOMINAL PAIN: 0
CHILLS: 0
FEVER: 0
NERVOUS/ANXIOUS: 0
SHORTNESS OF BREATH: 0

## 2025-02-04 ASSESSMENT — BALANCE ASSESSMENTS
SITTING UNSUPPORTED: 3
TRANSFERS: 4
STANDING UNSUPPORTED WITH FEET TOGETHER: 3
STANDING UNSUPPORTED ONE FOOT IN FRONT: 2
LOOK OVER LEFT AND RIGHT SHOULDERS WHILE STANDING: 1
SITTING TO STANDING: 3
TURN 360 DEGREES: 1
STANDING ON ONE LEG: 2
STANDING TO SITTING: 4
PLACE ALTERNATE FOOT ON STEP OR STOOL WHILE STANDING UNSUPPORTED: 1

## 2025-02-04 ASSESSMENT — GAIT ASSESSMENTS
ASSISTIVE DEVICE: FRONT WHEEL WALKER
GAIT LEVEL OF ASSIST: CONTACT GUARD ASSIST
DISTANCE (FEET): 80
ASSISTIVE DEVICE: FRONT WHEEL WALKER
GAIT LEVEL OF ASSIST: CONTACT GUARD ASSIST
DISTANCE (FEET): 80

## 2025-02-04 ASSESSMENT — PAIN DESCRIPTION - PAIN TYPE
TYPE: ACUTE PAIN

## 2025-02-04 NOTE — FLOWSHEET NOTE
02/04/25 0626   Events/Summary/Plan   Events/Summary/Plan MDI given   Vital Signs   Pulse 82   Respiration 16   Pulse Oximetry 94 %   $ Pulse Oximetry (Spot Check) Yes   Respiratory Assessment   Respiratory Pattern Within Normal Limits   Level of Consciousness Alert   Breath Sounds   RUL Breath Sounds Clear   RML Breath Sounds Clear   RLL Breath Sounds Clear   PRATIK Breath Sounds Clear   LLL Breath Sounds Clear   Secretions   Cough Non Productive   How Sputum Obtained Spontaneous Cough   Oxygen   O2 Delivery Device None - Room Air   Non-Invasive Ventilation [employer]   Nocturnal CPAP or BIPAP BIPAP - Home Unit  (pt said he didn't wear home bipap last night:)   Cleanliness and Damage Inspection Performed Yes   Settings (If Known) 14/11   FiO2 or LPM 2

## 2025-02-04 NOTE — THERAPY
Occupational Therapy  Daily Treatment     Patient Name: Vince Carr  Age:  79 y.o., Sex:  male  Medical Record #: [id number]  Today's Date: 2/4/2025     Precautions  Precautions: Fall Risk  Comments: Hx of multiple falls; wear shoes to protect feet    Subjective    Pt in bed upon arrival w/ son (Morgan) present, agreeable to participate in OT.      Objective     02/04/25 0831   OT Charge Group   OT Self Care / ADL (Units) 4   OT Total Time Spent   OT Individual Total Time Spent (Mins) 60   Precautions   Precautions Fall Risk   Comments Hx of multiple falls; wear shoes to protect feet   Functional Level of Assist   Bathing Contact Guard Assist  (while incorporating sitting and standing w/ UE support; used LH sponge to maximize independence w/ LB bathing tasks)   Upper Body Dressing Stand by Assist  (Set-up to don t-shirt while seated on fold down shower bench)   Lower Body Dressing Minimal Assist  (to don elastic waist pants; pt able to thread BLEs while seated on fold down shower bench and min A provided for pants over hips pursuit in standing (w/ unilateral UE support))   Tub / Shower Transfers Contact Guard Assist  (CGA for FWW mobility room <> bathroom)   Interdisciplinary Plan of Care Collaboration   IDT Collaboration with  Family / Caregiver   Patient Position at End of Therapy Seated;Self Releasing Lap Belt Applied;Family / Friend in Room   Collaboration Comments Son present for caregiver training     Caregiver training completed w/ son (Morgan); reviewed safety considerations and adaptive techniques for ADL routine including adaptive dressing strategies, AE, DME and fall prevention strategies.     Son provided the following photos of pt's home setup:                Assessment    Pt tolerated OT session well overall w/ focus on ADLs and caregiver training. Son receptive to all education and recommendations to promote ADL safety/independence @ home including installation of grab bars in shower, using commode  or toilet rails to maximize safety w/ toileting tasks/txfrs, non skid strips in shower for fall prevention, using urinal or BSC if needing to toilet @ night. Son plans to stay w/ pt temporarily following DC from rehab and looking into hiring caregivers going forward or potential [facility] placement. Son added that fall alert system already in place.     Strengths: Willingly participates in therapeutic activities, Supportive family, Pleasant and cooperative, Motivated for self care and independence, Manages pain appropriately, Independent prior level of function  Barriers: Pain, Limited mobility, Impaired balance, Impaired activity tolerance, Impaired functional cognition, Generalized weakness, Fatigue    Plan    Pt would benefit from skilled OT services to address:  - ADLs/IADLs   - Strength/endurance/activity tolerance  - Functional mobility w/ LRD  - Standing balance (static and dynamic)  - Functional Cognition (memory)    DME  OT DME Recommendations  Bathroom Equipment:  (pt owns shower chair)  Additional Equipment:  (TBD; pt owns sock aid), pt has fall alert system in place    Passport items to be completed:  Perform bathroom transfers, complete dressing, complete feeding, get ready for the day, prepare a simple meal, participate in household tasks, adapt home for safety needs, demonstrate home exercise program, complete caregiver training     Occupational Therapy Goals (Active)       Problem: Dressing       Dates: Start:  01/29/25         Goal: STG-Within one week, patient will dress LB w/ mod A and AE as needed       Dates: Start:  01/29/25               Problem: Functional Transfers       Dates: Start:  01/29/25         Goal: STG-Within one week, patient will transfer to toilet w/ CGA w/ LRAD       Dates: Start:  01/29/25            Goal: STG-Within one week, patient will transfer to step in shower w/ CGA w/ LRAD       Dates: Start:  01/29/25               Problem: OT Long Term Goals       Dates: Start:  01/29/25          Goal: LTG-By discharge, patient will complete basic self care tasks w/ mod I for UB ADLs and supervision for LB ADLs       Dates: Start:  01/29/25            Goal: LTG-By discharge, patient will perform bathroom transfers w/ supervision w/ LRAD       Dates: Start:  01/29/25            Goal: LTG-By discharge, patient will complete basic home management w/ supervision w/ LRAD        Dates: Start:  01/29/25

## 2025-02-04 NOTE — THERAPY
"Physical Therapy   Daily Treatment     Patient Name: Vince Carr  Age:  79 y.o., Sex:  male  Medical Record #: [id number]  Today's Date: 2/3/2025     Precautions  Precautions: Fall Risk  Comments: Hx of multiple falls    Subjective    Patient recognizes short term memory is \"awful\"; patient reports occasional \"wooziness\" with standing/walking; son reports 50-80falls in past year     Objective       02/03/25 1031   PT Charge Group   PT Gait Training (Units) 2   PT Therapeutic Exercise (Units) 1   PT Therapeutic Activities (Units) 1   PT Total Time Spent   PT Individual Total Time Spent (Mins) 60   Precautions   Precautions Fall Risk   Comments Hx of multiple falls   Vitals   Blood Pressure    (gegvjc=029/68, standing=94/54, after zlutdluqxn=639/75)   O2 (LPM) 2   O2 Delivery Device Nasal Cannula   Gait Functional Level of Assist    Gait Level Of Assist Contact Guard Assist   Assistive Device Front Wheel Walker   Distance (Feet) 100  (100ft, 150ft, 100ft FWW CGA, O2 management)   Deviation   (cues for FWW proximity)   Stairs Functional Level of Assist   Level of Assist with Stairs Contact Guard Assist   # of Stairs Climbed   (2in platform step with FWW CGA, O2 management)   Transfer Functional Level of Assist   Bed, Chair, Wheelchair Transfer Contact Guard Assist   Interdisciplinary Plan of Care Collaboration   IDT Collaboration with  Family / Caregiver   Collaboration Comments son present to help transition home, considering jail         Assessment    Patient more alert and better able to perform on 2L 02 compared to room air; symptoms of \"wooziness\" seem to be related to drop in O2 saturation; self-limited ambulation distance after 150ft based on right hip/LBP     Strengths: Motivated for self care and independence, Pleasant and cooperative, Supportive family, Willingly participates in therapeutic activities  Barriers: Decreased endurance, Fatigue, Impaired activity tolerance, Impaired balance, Impaired " "insight/denial of deficits, Impaired functional cognition, Limited mobility, Pain (lives alone, hx of ~10 falls in past month)    Plan    Gait with LRAD  BLE and core strengthening  Static and dynamic balance (history of multiple falls)     DME  PT DME Recommendations  Assistive Device:  (Pt has FWW, 4WW, SPT, QC)  Additional Equipment:  (TBD; pt owns sock aid)    DME  PT DME Recommendations  Assistive Device:  (Pt has FWW, 4WW, SPT, QC)  Additional Equipment:  (TBD; pt owns sock aid)    Passport items to be completed:  Get in/out of bed safely, in/out of a vehicle, safely use mobility device, walk or wheel around home/community, navigate up and down stairs, show how to get up/down from the ground, ensure home is accessible, demonstrate HEP, complete caregiver training    Physical Therapy Problems (Active)       Problem: Mobility       Dates: Start:  01/29/25         Goal: STG-Within one week, patient will ambulate household distance 25 ft x2 FWW CGA       Dates: Start:  01/29/25            Goal: STG-Within one week, patient will ambulate up/down a 2\" step in // bars min A       Dates: Start:  01/29/25               Problem: Mobility Transfers       Dates: Start:  01/29/25         Goal: STG-Within one week, patient will transfer bed to chair CGA SPT FWW       Dates: Start:  01/29/25               Problem: PT-Long Term Goals       Dates: Start:  01/29/25         Goal: LTG-By discharge, patient will ambulate 50 ft LRAD SPV/mod I indoors        Dates: Start:  01/29/25            Goal: LTG-By discharge, patient will transfer one surface to another SPV/mod I SPT LRAD       Dates: Start:  01/29/25            Goal: LTG-By discharge, patient will amb up/down threshold for SUZANNA home with LRAD SPV       Dates: Start:  01/29/25              "

## 2025-02-04 NOTE — PROGRESS NOTES
NURSING DAILY NOTE    Name: Vince Carr   Date of Admission: 1/28/2025   Admitting Diagnosis: Intracranial hemorrhage (HCC)  Attending Physician: MAGALY RICHARDSON M.D.  Allergies: Iodine and Naproxen    Safety  Patient Assist  Moderate Assistance x 1  Patient Precautions  Fall Risk  Precaution Comments  Hx of multiple falls  Bed Transfer Status  Contact Guard Assist  Toilet Transfer Status   Contact Guard Assist  Assistive Devices  Wheelchair, Rails  Oxygen  None - Room Air  Diet/Therapeutic Dining  Current Diet Order   Procedures    Diet Order Diet: Consistent CHO (Diabetic)     Pill Administration  whole  Agitated Behavioral Scale     ABS Level of Severity       Fall Risk  Has the patient had a fall this admission?   No  Ximena Aguiar Fall Risk Scoring  17, HIGH RISK  Fall Risk Safety Measures  Bed strip alarm    Vitals  Temperature: 36.4 °C (97.5 °F)  Temp src: Temporal  Pulse: 82  Respiration: 16  Blood Pressure : 111/66  Blood Pressure MAP (Calculated): 81 MM HG  BP Location: Right, Upper Arm  Patient BP Position: Supine     Oxygen  Pulse Oximetry: 94 %  O2 (LPM): 2  FiO2%: 21 %  O2 Delivery Device: None - Room Air    Bowel and Bladder  Last Bowel Movement  02/03/25  Stool Type  Type 4: Like a sausage or snake, smooth and soft  Bowel Device  Bathroom, Other (Comment) (Bowel Meds)  Continent  Bladder: Did not void   Bowel: No movement  Bladder Function  Urine Void (mL): 700 ml  Number of Times Voided: 1  Urinary Options: Yes  Urine Color: Yellow  Number of Times Incontinent of Urine: 1  Straight Catheter: 600 ml  Wet Diaper Count: 1  Genitourinary Assessment   Bladder Assessment (WDL):  Within Defined Limits  Diaz Catheter: Not Applicable  Urine Color: Yellow  Number of Bladder Accidents: 1  Total Number of Bladder of Accidents in Last 7 Days: 1  Number of Times Incontinent of Urine: 1  Bladder Device: Bathroom  Time Void: Yes  Bladder  Scan: Post Void  $ Bladder Scan Results (mL): 1  Bladder Medications: Yes    Skin  Thanh Score   17  Sensory Interventions   Bed Types: Standard/Trauma Mattress with Overlay  Skin Preventative Measures: Pillows in Use for Support / Positioning, Waffle Overlay  Moisture Interventions  Moisturizers/Barriers: Barrier Wipes      Pain  Pain Rating Scale  0 - No Pain  Pain Location  Neck, Generalized  Pain Location Orientation  Posterior  Pain Interventions   Declines    ADLs    Bathing   Patient Refused Bathing (offered shower, pt. said no)  Linen Change   Partial  Personal Hygiene  Change Charley Pads, Perineal Care  Chlorhexidine Bath      Oral Care  Brushed Teeth  Teeth/Dentures     Shave     Nutrition Percentage Eaten  Lunch, Between 50-75% Consumed  Environmental Precautions  Treaded Slipper Socks on Patient, Bed in Low Position  Patient Turns/Positioning  Patient turns self independently side to side without assistance, to offload sacral area  Patient Turns Assistance/Tolerance  Assistance of One, General Weakness  Bed Positions  Bed Controls On, Bed Locked  Head of Bed Elevated  Self regulated      Psychosocial/Neurologic Assessment  Psychosocial Assessment  Psychosocial (WDL):  WDL Except  Patient Behaviors: Fatigue  Neurologic Assessment  Neuro (WDL): Exceptions to WDL  Level of Consciousness: Alert  Orientation Level: Oriented X4  Cognition: Appropriate safety awareness, Follows commands  Speech: Clear  Pupil Assesment: No  EENT (WDL):  WDL Except    Cardio/Pulmonary Assessment  Edema   RLE Edema: 3+  LLE Edema: 3+  Respiratory Breath Sounds  RUL Breath Sounds: Clear  RML Breath Sounds: Clear  RLL Breath Sounds: Clear  PRATIK Breath Sounds: Clear  LLL Breath Sounds: Clear  Cardiac Assessment   Cardiac (WDL):  WDL Except (H/O HTN, Afib, CAD, MI.)

## 2025-02-04 NOTE — PROGRESS NOTES
Hospital Medicine Daily Progress Note      Chief Complaint:  Hypertension  Diabetes  CP    Interval History:  Discussed about his BS appear to be a little elevated and will restart the accuchecks for a few days for eval.    Review of Systems  Review of Systems   Constitutional:  Negative for chills and fever.   Respiratory:  Negative for shortness of breath.    Cardiovascular:  Negative for chest pain.   Gastrointestinal:  Negative for abdominal pain, diarrhea, nausea and vomiting.   Psychiatric/Behavioral:  The patient is not nervous/anxious.         Physical Exam  Temp:  [36.4 °C (97.5 °F)-36.7 °C (98.1 °F)] 36.7 °C (98.1 °F)  Pulse:  [68-86] 81  Resp:  [16-18] 18  BP: (108-131)/(66-71) 108/68  SpO2:  [93 %-97 %] 93 %    Physical Exam  Vitals and nursing note reviewed.   Constitutional:       Appearance: Normal appearance.   HENT:      Head: Atraumatic.   Eyes:      Conjunctiva/sclera: Conjunctivae normal.      Pupils: Pupils are equal, round, and reactive to light.   Cardiovascular:      Rate and Rhythm: Normal rate. Rhythm irregular.      Heart sounds: No murmur heard.  Pulmonary:      Effort: Pulmonary effort is normal.      Breath sounds: No stridor. No wheezing or rales.   Abdominal:      General: There is no distension.      Palpations: Abdomen is soft.      Tenderness: There is no abdominal tenderness.   Musculoskeletal:      Cervical back: Normal range of motion and neck supple.      Right lower leg: Edema present.      Left lower leg: Edema present.   Skin:     General: Skin is warm and dry.      Findings: No rash.   Neurological:      Mental Status: He is alert and oriented to person, place, and time.   Psychiatric:         Mood and Affect: Mood normal.         Behavior: Behavior normal.         Fluids    Intake/Output Summary (Last 24 hours) at 2/4/2025 1102  Last data filed at 2/4/2025 1000  Gross per 24 hour   Intake 840 ml   Output --   Net 840 ml        Laboratory  Recent Labs     02/02/25  6639    WBC 7.6   RBC 4.04*   HEMOGLOBIN 12.4*   HEMATOCRIT 37.2*   MCV 92.1   MCH 30.7   MCHC 33.3   RDW 49.3   PLATELETCT 260   MPV 9.8     Recent Labs     02/02/25  0540   SODIUM 137   POTASSIUM 4.1   CHLORIDE 101   CO2 28   GLUCOSE 152*   BUN 21   CREATININE 0.80   CALCIUM 8.8                 Assessment/Plan  Chest pain  Assessment & Plan  Resolved  Trop: 23 --> 24  EKG (1/30): shoed NSR, LBBB  EKG (1/31): showed RBBB, 1st degree AVB  CXR: small right pl eff, R basilar opacity (atx vs pneumonitis)  S/P Augmentin x 5 days (for PNA)  IS and RT protocol  Monitor    Sacral fracture (HCC)- (present on admission)  Assessment & Plan  2nd to GLF  Non-surgical management    SDH (subdural hematoma) (HCC)- (present on admission)  Assessment & Plan  2nd to GLF  Non-surgical management  Needs F/U MRI 6 wks    Non-insulin dependent type 2 diabetes mellitus (HCC)- (present on admission)  Assessment & Plan  HbAac: 6.9 (1/29)  Off accuchecks  BS look a little elevated from the labs  Will restart accuchecks.  Note: home meds include Metformin 1000 mg bid and Jardiance 10 mg qd  Cont to monitor    PAF (paroxysmal atrial fibrillation) (HCC)- (present on admission)  Assessment & Plan  HR ok  Has LE edema (has hx and is at baseline)  Cont Coreg  Cont Bumex  Hx of PPM  On Coreg for rate control  Xarelto presently on hold for ICH  Cont to monitor    CAD in native artery- (present on admission)  Assessment & Plan  Has chronic BLE edema  Echo 11/25/24 EF 50%  BNP:815 --> 1141 --> 723 (2/2)  Cont Lipitor  Cont Coreg  Cont Bumex  K+: 4.1 (4/2)  Note: Xarelto presently on hold for ICH  Cont to monitor    Hypertension- (present on admission)  Assessment & Plan  BP ok  Cont Coreg  Note: on Bumex and Flomax  Cont to monitor    Other emphysema (HCC)- (present on admission)  Assessment & Plan  Chronically on supplemental O2 at 2 lpm via NC  Continue Trelegy and Singulair  RT protocol

## 2025-02-04 NOTE — CARE PLAN
Problem: Pain - Standard  Goal: Alleviation of pain or a reduction in pain to the patient’s comfort goal  Outcome: Not Met  Note: Pt able to participate in therapies and activities this shift.     Problem: Skin Integrity  Goal: Skin integrity is maintained or improved  Note: Patient's skin is maintained and free from new or accidental injury this shift.  Will continue to monitor.   The patient is Watcher - Medium risk of patient condition declining or worsening    Shift Goals  Clinical Goals: Safety  Patient Goals: sleep well, pain control  Family Goals: no family present

## 2025-02-04 NOTE — PROGRESS NOTES
Hospital Medicine Daily Progress Note      Chief Complaint  Chest Pain  Hypertension  Diabetes    Interval Problem Update  No acute events overnight.    Code Status  DNAR/DNI    Disposition  Per Physiatry    Review of Systems  Review of Systems   Constitutional:  Negative for chills and fever.   HENT: Negative.     Eyes: Negative.    Respiratory:  Negative for cough and shortness of breath.    Cardiovascular:  Negative for chest pain and palpitations.   Gastrointestinal:  Negative for abdominal pain, nausea and vomiting.   Musculoskeletal:         Right sided hemithorax pain   Skin:  Negative for itching and rash.   Endo/Heme/Allergies:  Negative for polydipsia. Does not bruise/bleed easily.        Physical Exam  Temp:  [36.1 °C (96.9 °F)-36.4 °C (97.6 °F)] 36.4 °C (97.6 °F)  Pulse:  [65-85] 68  Resp:  [18] 18  BP: ()/(65-72) 131/68  SpO2:  [92 %-97 %] 97 %    Physical Exam  Constitutional:       General: He is not in acute distress.     Appearance: Normal appearance. He is not ill-appearing.   HENT:      Head: Normocephalic and atraumatic.      Right Ear: External ear normal.      Left Ear: External ear normal.      Nose: Nose normal.      Mouth/Throat:      Pharynx: Oropharynx is clear.   Eyes:      General:         Right eye: No discharge.         Left eye: No discharge.      Extraocular Movements: Extraocular movements intact.      Conjunctiva/sclera: Conjunctivae normal.   Cardiovascular:      Rate and Rhythm: Rhythm irregular.   Pulmonary:      Effort: No respiratory distress.      Breath sounds: No wheezing.      Comments: Decreased BS  Abdominal:      General: Bowel sounds are normal. There is no distension.      Palpations: Abdomen is soft.      Tenderness: There is no abdominal tenderness.   Musculoskeletal:      Cervical back: Normal range of motion and neck supple.      Right lower leg: Edema present.      Left lower leg: Edema present.   Skin:     General: Skin is warm and dry.   Neurological:       Mental Status: He is alert and oriented to person, place, and time.         Fluids    Intake/Output Summary (Last 24 hours) at 2/3/2025 1814  Last data filed at 2/3/2025 1600  Gross per 24 hour   Intake 760 ml   Output --   Net 760 ml        Laboratory  Recent Labs     02/02/25  0540   WBC 7.6   RBC 4.04*   HEMOGLOBIN 12.4*   HEMATOCRIT 37.2*   MCV 92.1   MCH 30.7   MCHC 33.3   RDW 49.3   PLATELETCT 260   MPV 9.8     Recent Labs     02/02/25  0540   SODIUM 137   POTASSIUM 4.1   CHLORIDE 101   CO2 28   GLUCOSE 152*   BUN 21   CREATININE 0.80   CALCIUM 8.8                 Assessment/Plan  Chest pain  Assessment & Plan  Trop 23 followed by 24  EKG 1/30/25 SR, LBBB  EKG 1/31/25 AFib  CXR small R pl eff, R basilar opacity (atx vs pneumonitis)  Rib X-ray no acute displaced rib fx  Leukocytosis resolved on abx  Continue Augmentin x 5 days for PNA  IS and RT protocol  Symptoms improving    Sacral fracture (HCC)- (present on admission)  Assessment & Plan  2/2 GLF  Non-surgical management  Pain control per Physiatry    SDH (subdural hematoma) (HCC)- (present on admission)  Assessment & Plan  2/2 GLF  Non-surgical management  Needs F/U MRI 6 wks  Ongoing management per Physiatry    Non-insulin dependent type 2 diabetes mellitus (HCC)- (present on admission)  Assessment & Plan  HbA1c 6.9  Discontinued FSBS and SSI as not routinely needing insulin coverage  Continue diet control  Outpt meds include Metformin 1000 mg PO bid and Jardiance 10 mg PO qd    PAF (paroxysmal atrial fibrillation) (HCC)- (present on admission)  Assessment & Plan  H/O PPM  On Coreg for rate control  Chronically anticoagulated on Xarelto  AC presently on hold for ICH    CAD in native artery- (present on admission)  Assessment & Plan  Has chronic BLE edema  Echo 11/25/24 EF 50%  BNP improving  On Lipitor, Coreg, and Bumex  Xarelto presently on hold for ICH    Hypertension- (present on admission)  Assessment & Plan  Observe blood pressure trends on Coreg and  Bumex  Monitor for orthostatic hypotension on Flomax    Other emphysema (HCC)- (present on admission)  Assessment & Plan  Chronically on supplemental O2 at 2 lpm via NC  Continue Trelegy and Singulair  RT protocol       VTE prophylaxis:  Per Physiatry    Patient seen/examined and chart reviewed; ROS and Assessment/Plan updated.  In review of yesterday's note, there are no changes except as documented above.

## 2025-02-04 NOTE — FLOWSHEET NOTE
02/04/25 0935   Events/Summary/Plan   Events/Summary/Plan Sp02 93% on room air (L ring finger)   Vital Signs   Pulse 72   Respiration 18   Pulse Oximetry 93 %   $ Pulse Oximetry (Spot Check) Yes   Respiratory Assessment   Level of Consciousness Alert   Chest Exam   Work Of Breathing / Effort Within Normal Limits   Oxygen   O2 (LPM) 0   O2 Delivery Device None - Room Air

## 2025-02-04 NOTE — PROGRESS NOTES
"  Physical Medicine & Rehabilitation Progress Note    Encounter Date: 2/4/2025    Chief Complaint: Decreased mobility    Interval Events (Subjective):  Patient sitting up in room. He reports he is doing better daily. He reports his son will be back to visit him later today. He reports neck now hurts more than low back.     _____________________________________  Interdisciplinary Team Conference   Most recent IDT on 1/30/2025    Discharge Date/Disposition:  2/11/25  _____________________________________      Objective:  VITAL SIGNS: /68   Pulse 81   Temp 36.7 °C (98.1 °F) (Oral)   Resp 18   Ht 1.803 m (5' 11\")   Wt 111 kg (244 lb 13.1 oz)   SpO2 93%   BMI 34.15 kg/m²   Gen: NAD  Psych: Mood and affect appropriate  CV: RRR, 0 edema  Resp: CTAB, no upper airway sounds  Abd: NTND  Neuro: AOx4, following commands  Unchanged from 2/3/25    Laboratory Values:  Recent Results (from the past 72 hours)   CBC WITHOUT DIFFERENTIAL    Collection Time: 02/02/25  5:40 AM   Result Value Ref Range    WBC 7.6 4.8 - 10.8 K/uL    RBC 4.04 (L) 4.70 - 6.10 M/uL    Hemoglobin 12.4 (L) 14.0 - 18.0 g/dL    Hematocrit 37.2 (L) 42.0 - 52.0 %    MCV 92.1 81.4 - 97.8 fL    MCH 30.7 27.0 - 33.0 pg    MCHC 33.3 32.3 - 36.5 g/dL    RDW 49.3 35.9 - 50.0 fL    Platelet Count 260 164 - 446 K/uL    MPV 9.8 9.0 - 12.9 fL   Basic Metabolic Panel    Collection Time: 02/02/25  5:40 AM   Result Value Ref Range    Sodium 137 135 - 145 mmol/L    Potassium 4.1 3.6 - 5.5 mmol/L    Chloride 101 96 - 112 mmol/L    Co2 28 20 - 33 mmol/L    Glucose 152 (H) 65 - 99 mg/dL    Bun 21 8 - 22 mg/dL    Creatinine 0.80 0.50 - 1.40 mg/dL    Calcium 8.8 8.5 - 10.5 mg/dL    Anion Gap 8.0 7.0 - 16.0   proBrain Natriuretic Peptide, NT    Collection Time: 02/02/25  5:40 AM   Result Value Ref Range    NT-proBNP 723 (H) 0 - 125 pg/mL   ESTIMATED GFR    Collection Time: 02/02/25  5:40 AM   Result Value Ref Range    GFR (CKD-EPI) 90 >60 mL/min/1.73 m 2 "       Medications:  Scheduled Medications   Medication Dose Frequency    escitalopram  10 mg DAILY    carvedilol  6.25 mg BID WITH MEALS    montelukast  10 mg Nightly    enoxaparin (LOVENOX) injection  40 mg DAILY    fluticasone-umeclidinium-vilanterol  1 Puff QDAILY (RT)    Pharmacy Consult Request  1 Each PHARMACY TO DOSE    senna-docusate  2 Tablet Q EVENING    omeprazole  20 mg DAILY    atorvastatin  40 mg QHS    bumetanide  1 mg BID    cyanocobalamin  1,000 mcg DAILY    multivitamin  1 Tablet DAILY    QUEtiapine  12.5 mg Nightly    tamsulosin  0.4 mg AFTER BREAKFAST    thiamine  100 mg DAILY    traZODone  100 mg Nightly     PRN medications: hydrALAZINE, acetaminophen, senna-docusate **AND** polyethylene glycol/lytes, docusate sodium, magnesium hydroxide, carboxymethylcellulose, benzocaine-menthol, mag hydrox-al hydrox-simeth, ondansetron **OR** ondansetron, traZODone, sodium chloride, oxyCODONE immediate-release **OR** oxyCODONE immediate-release, midazolam    Diet:  Current Diet Order   Procedures    Diet Order Diet: Consistent CHO (Diabetic)       Medical Decision Making and Plan:  SDH - Patient with fall on 1/26/25 with SDH with conservative management  -PT and OT for mobility and ADLs. Per guidelines, 15 hours per week between PT, OT and/or SLP.  -Follow-up NSG. On Seroquel 12.5 mg QHS     HTN/CAD/A fib - Patient on Bumex, Coreg 12.5 mg.   -, Bumex 1 mg BID and Coreg reduced to 6.25 mg BID     S5 fracture - Conservative management. PRN Tylenol and Oxycodone      Chest pain - Right sided, probable rib fracture. CXR completed, consult hospitalist. Concern for pneumonia, started on Augmentin. Improved cough, completed augmentin    Hyponatremia - Check AM CMP - 135, improving     Anemia - Check AM CBC - 13.5, will monitor     Leukocytosis - Check AM CBC - 13.2, increasing. UA negative CXR with possible pneumonia. Consult hospitalist     DM2 with hyperglycemia - Patient on SSI on transfer     BPH -  Patient on Tamsulosin 0.4 mg daily     Insomnia - Patient on Trazodone 100 mg daily     COPD - Patient on Trelegy daily     Pain - Patient on PRN Tylenol and Oxycodone      Skin - Patient at risk for skin breakdown due to debility in areas including sacrum, achilles, elbows and head in addition to other sites. Nursing to assess skin daily.      GI Ppx - Patient on Prilosec for GERD prophylaxis. Patient on Senna-docusate for constipation prophylaxis.      DVT Ppx - Patient Lovenox on transfer. Limited mobility, continue Lovenox  ____________________________________    T. Car Chan MD/PhD  [facility] - Physical Medicine & Rehabilitation   [facility] - Brain Injury Medicine   ____________________________________

## 2025-02-04 NOTE — CARE PLAN
Problem: Knowledge Deficit - Standard  Goal: Patient and family/care givers will demonstrate understanding of plan of care, disease process/condition, diagnostic tests and medications  Outcome: Progressing     Problem: Nutrition  Goal: Patient's nutritional and fluid intake will be adequate or improve  Outcome: Progressing   The patient is Stable - Low risk of patient condition declining or worsening    Shift Goals  Clinical Goals: Safety  Patient Goals: sleep well  Family Goals: no family present

## 2025-02-04 NOTE — PROGRESS NOTES
NURSING DAILY NOTE    Name: Vince Carr   Date of Admission: 1/28/2025   Admitting Diagnosis: Intracranial hemorrhage (HCC)  Attending Physician: MAGALY RICHARDSON M.D.  Allergies: Iodine and Naproxen    Safety  Patient Assist  st by  Patient Precautions  Fall Risk  Precaution Comments  Hx of multiple falls  Bed Transfer Status  Contact Guard Assist  Toilet Transfer Status   Contact Guard Assist  Assistive Devices  Rails, Wheelchair  Oxygen  Nasal Cannula  Diet/Therapeutic Dining  Current Diet Order   Procedures    Diet Order Diet: Consistent CHO (Diabetic)     Pill Administration  whole  Agitated Behavioral Scale     ABS Level of Severity       Fall Risk  Has the patient had a fall this admission?   No  Ximena Aguiar Fall Risk Scoring  17, HIGH RISK  Fall Risk Safety Measures  bed alarm, chair alarm, poor balance, and low vision/ hearing    Vitals  Temperature: 36.4 °C (97.6 °F)  Temp src: Oral  Pulse: 68  Respiration: 18  Blood Pressure : 131/68  Blood Pressure MAP (Calculated): 89 MM HG  BP Location: Right, Upper Arm  Patient BP Position: Supine     Oxygen  Pulse Oximetry: 97 %  O2 (LPM): 2  FiO2%: 21 %  O2 Delivery Device: Nasal Cannula    Bowel and Bladder  Last Bowel Movement  02/03/25  Stool Type  Type 4: Like a sausage or snake, smooth and soft  Bowel Device  Bathroom  Continent  Bladder: Did not void   Bowel: No movement  Bladder Function  Urine Void (mL): 700 ml  Number of Times Voided: 2  Urinary Options: Yes  Urine Color: Yellow  Number of Times Incontinent of Urine: 1  Straight Catheter: 600 ml  Wet Diaper Count: 1  Genitourinary Assessment   Bladder Assessment (WDL):  WDL Except  Diaz Catheter: Not Applicable  Urine Color: Yellow  Number of Bladder Accidents: 1  Total Number of Bladder of Accidents in Last 7 Days: 1  Number of Times Incontinent of Urine: 1  Bladder Device: Urinal  Time Void: Yes  Bladder Scan: Post Void  $ Bladder  Scan Results (mL): 1  Bladder Medications: Yes    Skin  Thanh Score   17  Sensory Interventions   Bed Types: Standard/Trauma Mattress with Overlay  Skin Preventative Measures: Pillows in Use for Support / Positioning, Waffle Overlay  Moisture Interventions  Moisturizers/Barriers: Barrier Wipes      Pain  Pain Rating Scale  6 - Hard to ignore, avoid usual activities  Pain Location  Neck, Hip  Pain Location Orientation  Posterior, Right  Pain Interventions   Declines    ADLs    Bathing   Patient Refused Bathing (offered shower, pt. said no)  Linen Change   Partial  Personal Hygiene  Change Charley Pads, Perineal Care  Chlorhexidine Bath      Oral Care  Brushed Teeth  Teeth/Dentures     Shave     Nutrition Percentage Eaten  Lunch, Between 50-75% Consumed  Environmental Precautions  Treaded Slipper Socks on Patient, Bed in Low Position  Patient Turns/Positioning  Patient turns self independently side to side without assistance, to offload sacral area  Patient Turns Assistance/Tolerance  Assistance of One  Bed Positions  Bed Controls On, Bed Locked  Head of Bed Elevated  Self regulated      Psychosocial/Neurologic Assessment  Psychosocial Assessment  Psychosocial (WDL):  WDL Except  Patient Behaviors: Fatigue  Neurologic Assessment  Neuro (WDL): Exceptions to WDL  Level of Consciousness: Alert  Orientation Level: Oriented X4  Cognition: Appropriate safety awareness, Follows commands  Speech: Clear  Pupil Assesment: No  EENT (WDL):  WDL Except    Cardio/Pulmonary Assessment  Edema   RLE Edema: 3+  LLE Edema: 3+  Respiratory Breath Sounds  RUL Breath Sounds: Clear  RML Breath Sounds: Clear  RLL Breath Sounds: Diminished  PRATIK Breath Sounds: Clear  LLL Breath Sounds: Diminished  Cardiac Assessment   Cardiac (WDL):  WDL Except

## 2025-02-05 ENCOUNTER — APPOINTMENT (OUTPATIENT)
Dept: RADIOLOGY | Facility: REHABILITATION | Age: 80
DRG: 949 | End: 2025-02-05
Attending: PHYSICAL MEDICINE & REHABILITATION
Payer: MEDICARE

## 2025-02-05 ENCOUNTER — APPOINTMENT (OUTPATIENT)
Dept: PHYSICAL THERAPY | Facility: REHABILITATION | Age: 80
DRG: 949 | End: 2025-02-05
Attending: PHYSICAL MEDICINE & REHABILITATION
Payer: MEDICARE

## 2025-02-05 ENCOUNTER — APPOINTMENT (OUTPATIENT)
Dept: OCCUPATIONAL THERAPY | Facility: REHABILITATION | Age: 80
DRG: 949 | End: 2025-02-05
Attending: PHYSICAL MEDICINE & REHABILITATION
Payer: MEDICARE

## 2025-02-05 LAB
GLUCOSE BLD STRIP.AUTO-MCNC: 120 MG/DL (ref 65–99)
GLUCOSE BLD STRIP.AUTO-MCNC: 156 MG/DL (ref 65–99)
GLUCOSE BLD STRIP.AUTO-MCNC: 160 MG/DL (ref 65–99)
GLUCOSE BLD STRIP.AUTO-MCNC: 173 MG/DL (ref 65–99)

## 2025-02-05 PROCEDURE — 94760 N-INVAS EAR/PLS OXIMETRY 1: CPT

## 2025-02-05 PROCEDURE — 700102 HCHG RX REV CODE 250 W/ 637 OVERRIDE(OP): Performed by: HOSPITALIST

## 2025-02-05 PROCEDURE — 97530 THERAPEUTIC ACTIVITIES: CPT

## 2025-02-05 PROCEDURE — 99232 SBSQ HOSP IP/OBS MODERATE 35: CPT | Performed by: HOSPITALIST

## 2025-02-05 PROCEDURE — 94640 AIRWAY INHALATION TREATMENT: CPT

## 2025-02-05 PROCEDURE — A9270 NON-COVERED ITEM OR SERVICE: HCPCS | Performed by: PHYSICAL MEDICINE & REHABILITATION

## 2025-02-05 PROCEDURE — 700111 HCHG RX REV CODE 636 W/ 250 OVERRIDE (IP): Mod: JZ | Performed by: PHYSICAL MEDICINE & REHABILITATION

## 2025-02-05 PROCEDURE — 82962 GLUCOSE BLOOD TEST: CPT | Mod: 91

## 2025-02-05 PROCEDURE — 97530 THERAPEUTIC ACTIVITIES: CPT | Mod: CO

## 2025-02-05 PROCEDURE — 97535 SELF CARE MNGMENT TRAINING: CPT

## 2025-02-05 PROCEDURE — 97110 THERAPEUTIC EXERCISES: CPT

## 2025-02-05 PROCEDURE — 71045 X-RAY EXAM CHEST 1 VIEW: CPT

## 2025-02-05 PROCEDURE — 99232 SBSQ HOSP IP/OBS MODERATE 35: CPT | Performed by: PHYSICAL MEDICINE & REHABILITATION

## 2025-02-05 PROCEDURE — 97116 GAIT TRAINING THERAPY: CPT

## 2025-02-05 PROCEDURE — A9270 NON-COVERED ITEM OR SERVICE: HCPCS | Performed by: HOSPITALIST

## 2025-02-05 PROCEDURE — 97112 NEUROMUSCULAR REEDUCATION: CPT

## 2025-02-05 PROCEDURE — 700102 HCHG RX REV CODE 250 W/ 637 OVERRIDE(OP): Performed by: PHYSICAL MEDICINE & REHABILITATION

## 2025-02-05 PROCEDURE — 770010 HCHG ROOM/CARE - REHAB SEMI PRIVAT*

## 2025-02-05 RX ADMIN — ESCITALOPRAM OXALATE 10 MG: 10 TABLET ORAL at 08:14

## 2025-02-05 RX ADMIN — TAMSULOSIN HYDROCHLORIDE 0.4 MG: 0.4 CAPSULE ORAL at 08:30

## 2025-02-05 RX ADMIN — FLUTICASONE FUROATE, UMECLIDINIUM BROMIDE AND VILANTEROL TRIFENATATE 1 PUFF: 200; 62.5; 25 POWDER RESPIRATORY (INHALATION) at 05:59

## 2025-02-05 RX ADMIN — METFORMIN HYDROCHLORIDE 250 MG: 500 TABLET ORAL at 17:01

## 2025-02-05 RX ADMIN — Medication 100 MG: at 08:14

## 2025-02-05 RX ADMIN — ATORVASTATIN CALCIUM 40 MG: 40 TABLET, FILM COATED ORAL at 20:42

## 2025-02-05 RX ADMIN — BUMETANIDE 1 MG: 1 TABLET ORAL at 08:14

## 2025-02-05 RX ADMIN — INSULIN LISPRO 2 UNITS: 100 INJECTION, SOLUTION INTRAVENOUS; SUBCUTANEOUS at 07:26

## 2025-02-05 RX ADMIN — TRAZODONE HYDROCHLORIDE 100 MG: 100 TABLET ORAL at 20:42

## 2025-02-05 RX ADMIN — MONTELUKAST 10 MG: 10 TABLET, FILM COATED ORAL at 20:42

## 2025-02-05 RX ADMIN — ENOXAPARIN SODIUM 40 MG: 100 INJECTION SUBCUTANEOUS at 08:15

## 2025-02-05 RX ADMIN — INSULIN LISPRO 2 UNITS: 100 INJECTION, SOLUTION INTRAVENOUS; SUBCUTANEOUS at 11:39

## 2025-02-05 RX ADMIN — THERA TABS 1 TABLET: TAB at 08:10

## 2025-02-05 RX ADMIN — INSULIN LISPRO 6 UNITS: 100 INJECTION, SOLUTION INTRAVENOUS; SUBCUTANEOUS at 20:44

## 2025-02-05 RX ADMIN — OMEPRAZOLE 20 MG: 20 CAPSULE, DELAYED RELEASE ORAL at 08:14

## 2025-02-05 RX ADMIN — BUMETANIDE 1 MG: 1 TABLET ORAL at 20:41

## 2025-02-05 RX ADMIN — QUETIAPINE FUMARATE 12.5 MG: 25 TABLET ORAL at 20:42

## 2025-02-05 RX ADMIN — CARVEDILOL 6.25 MG: 3.12 TABLET, FILM COATED ORAL at 08:10

## 2025-02-05 RX ADMIN — CYANOCOBALAMIN TAB 500 MCG 1000 MCG: 500 TAB at 08:14

## 2025-02-05 ASSESSMENT — ACTIVITIES OF DAILY LIVING (ADL): BED_CHAIR_WHEELCHAIR_TRANSFER_DESCRIPTION: SET-UP OF EQUIPMENT

## 2025-02-05 ASSESSMENT — PATIENT HEALTH QUESTIONNAIRE - PHQ9
2. FEELING DOWN, DEPRESSED, IRRITABLE, OR HOPELESS: NOT AT ALL
SUM OF ALL RESPONSES TO PHQ9 QUESTIONS 1 AND 2: 0
1. LITTLE INTEREST OR PLEASURE IN DOING THINGS: NOT AT ALL

## 2025-02-05 ASSESSMENT — ENCOUNTER SYMPTOMS
PALPITATIONS: 0
HALLUCINATIONS: 0
SHORTNESS OF BREATH: 0
HEADACHES: 0
DIZZINESS: 0
BLURRED VISION: 0
VOMITING: 0
FEVER: 0
NAUSEA: 0

## 2025-02-05 ASSESSMENT — GAIT ASSESSMENTS
GAIT LEVEL OF ASSIST: STANDBY ASSIST
ASSISTIVE DEVICE: FRONT WHEEL WALKER
DISTANCE (FEET): 80

## 2025-02-05 NOTE — PROGRESS NOTES
NURSING DAILY NOTE    Name: Vince Carr   Date of Admission: 1/28/2025   Admitting Diagnosis: No Principal Problem: There is no principal problem currently on the Problem List. Please update the Problem List and refresh.  Attending Physician: MAGALY RICHARDSON M.D.  Allergies: Iodine and Naproxen    Safety  Patient Assist  Moderate Assistance x 1  Patient Precautions  Fall Risk  Precaution Comments  Hx of multiple falls; wear shoes to protect feet  Bed Transfer Status  Contact Guard Assist  Toilet Transfer Status   Contact Guard Assist  Assistive Devices  Rails, Wheelchair  Oxygen  None - Room Air  Diet/Therapeutic Dining  Current Diet Order   Procedures    Diet Order Diet: Consistent CHO (Diabetic)     Pill Administration  whole  Agitated Behavioral Scale     ABS Level of Severity       Fall Risk  Has the patient had a fall this admission?   No  Ximena Aguiar Fall Risk Scoring  17, HIGH RISK  Fall Risk Safety Measures  bed alarm, chair alarm, poor balance, and low vision/ hearing    Vitals  Temperature: 36.7 °C (98.1 °F)  Temp src: Oral  Pulse: 81  Respiration: 18  Blood Pressure : 108/68  Blood Pressure MAP (Calculated): 81 MM HG  BP Location: Left, Upper Arm  Patient BP Position: Sitting     Oxygen  Pulse Oximetry: 93 %  O2 (LPM): 0  FiO2%: 21 %  O2 Delivery Device: None - Room Air    Bowel and Bladder  Last Bowel Movement  02/03/25  Stool Type  Type 4: Like a sausage or snake, smooth and soft  Bowel Device  Bathroom  Continent  Bladder: Did not void   Bowel: No movement  Bladder Function  Urine Void (mL): 700 ml  Number of Times Voided: 1  Urinary Options: Yes  Urine Color: Yellow  Number of Times Incontinent of Urine: 1  Straight Catheter: 600 ml  Wet Diaper Count: 1  Genitourinary Assessment   Bladder Assessment (WDL):  WDL Except  Diaz Catheter: Not Applicable  Urine Color: Yellow  Number of Bladder Accidents: 1  Total Number of Bladder  of Accidents in Last 7 Days: 1  Number of Times Incontinent of Urine: 1  Bladder Device: Urinal, Bathroom  Time Void: Yes  Bladder Scan: Post Void  $ Bladder Scan Results (mL): 1  Bladder Medications: Yes    Skin  Thanh Score   17  Sensory Interventions   Bed Types: Standard/Trauma Mattress with Overlay  Skin Preventative Measures: Pillows in Use for Support / Positioning, Waffle Overlay  Moisture Interventions  Moisturizers/Barriers: Barrier Wipes      Pain  Pain Rating Scale  0 - No Pain  Pain Location  Neck, Generalized  Pain Location Orientation  Posterior  Pain Interventions   Declines    ADLs    Bathing   Staff, Shower  Linen Change   Partial  Personal Hygiene  Change Charley Pads, Perineal Care  Chlorhexidine Bath      Oral Care  Brushed Teeth  Teeth/Dentures     Shave     Nutrition Percentage Eaten  *  * Meal *  *, Between % Consumed  Environmental Precautions  Treaded Slipper Socks on Patient, Personal Belongings, Wastebasket, Call Bell etc. in Easy Reach, Bed in Low Position  Patient Turns/Positioning  Patient turns self independently side to side without assistance, to offload sacral area  Patient Turns Assistance/Tolerance  Assistance of One  Bed Positions  Bed Controls On, Bed Locked  Head of Bed Elevated  Self regulated      Psychosocial/Neurologic Assessment  Psychosocial Assessment  Psychosocial (WDL):  WDL Except  Patient Behaviors: Fatigue  Neurologic Assessment  Neuro (WDL): Exceptions to WDL  Level of Consciousness: Alert  Orientation Level: Oriented X4  Cognition: Appropriate safety awareness, Follows commands  Speech: Clear  Pupil Assesment: No  EENT (WDL):  WDL Except    Cardio/Pulmonary Assessment  Edema   RLE Edema: 3+  LLE Edema: 3+  Respiratory Breath Sounds  RUL Breath Sounds: Clear  RML Breath Sounds: Clear  RLL Breath Sounds: Diminished  PRATIK Breath Sounds: Clear  LLL Breath Sounds: Diminished  Cardiac Assessment   Cardiac (WDL):  WDL Except

## 2025-02-05 NOTE — THERAPY
Occupational Therapy  Daily Treatment     Patient Name: Vince Carr  Age:  79 y.o., Sex:  male  Medical Record #: [id number]  Today's Date: 2/5/2025     Precautions  Precautions: (P) Fall Risk  Comments: (P) Hx of multiple falls; wear shoes to protect feet    Subjective    Pt seated in w/c upon arrival, agreeable to participate in OT.     Pt reports having diarrhea earlier today.      Objective     02/05/25 1331   OT Charge Group   OT Self Care / ADL (Units) 1   OT Neuromuscular Re-education / Balance (Units) 1   OT Therapeutic Exercise (Units) 2   OT Total Time Spent   OT Individual Total Time Spent (Mins) 60   Precautions   Precautions Fall Risk   Comments Hx of multiple falls; wear shoes to protect feet   Vitals   O2 Delivery Device Room air w/o2 available   Cognition    Level of Consciousness Alert   Sleep/Wake Cycle   Sleep & Rest Awake   Functional Level of Assist   Lower Body Dressing Minimal Assist   Lower Body Dressing Description Reacher;Increased time;Set-up of equipment;Supervision for safety;Verbal cueing   Sitting Upper Body Exercises   Bilateral Row 3 sets of 10;Bilateral;Weight (See Comments for lbs)  (1 set x 35#, 2 sets x 40# (Equalizer))   Tricep Press 3 sets of 10;Bilateral;Weight (See Comments for lbs)  (Rickshaw (fwd); 3 sets x 50#)   Interdisciplinary Plan of Care Collaboration   Patient Position at End of Therapy Seated;Self Releasing Lap Belt Applied;Call Light within Reach;Tray Table within Reach;Phone within Reach     Shuttle exercises for LE strengthening:  BLEs: 2 sets x 10 w/ 7 resistance bands, 1 set x 10 w/ 8 resistance bands   R/LLE: 3 sets x 10 w/ 6 resistance bands     Min A for txfr to/from shuttle machine    Rebounder in standing w/ FWW (w/ 4# weighted ball), 30-45 seconds x 2 bouts, min - mod A (for standing balance and safety)     Assessment    Pt tolerated OT session well w/ focus on progression w/ UB/LB strengthening. Pt cont to require min A for LB dressing w/ AE  however progressing toward CGA. Standing endurance limited to ~ 45 seconds at a time for dynamic balance tasks and min to mod A required to maintain standing balance. Mod cues required at end of session to complete memory log (due to impaired short term memory).   Strengths: Willingly participates in therapeutic activities, Supportive family, Pleasant and cooperative, Motivated for self care and independence, Manages pain appropriately, Independent prior level of function  Barriers: Pain, Limited mobility, Impaired balance, Impaired activity tolerance, Impaired functional cognition, Generalized weakness, Fatigue    Plan    Pt would benefit from skilled OT services to address:  - ADLs/IADLs   - Strength/endurance/activity tolerance  - Functional mobility w/ LRD  - Standing balance (static and dynamic)  - Functional Cognition (memory)    DME  OT DME Recommendations  Bathroom Equipment:  (pt owns shower chair)  Additional Equipment:  (TBD; pt owns sock aid)    Passport items to be completed:  Perform bathroom transfers, complete dressing, complete feeding, get ready for the day, prepare a simple meal, participate in household tasks, adapt home for safety needs, demonstrate home exercise program, complete caregiver training     Occupational Therapy Goals (Active)       Problem: Dressing       Dates: Start:  01/29/25         Goal: STG-Within one week, patient will dress LB w/ mod A and AE as needed       Dates: Start:  01/29/25               Problem: Functional Transfers       Dates: Start:  01/29/25         Goal: STG-Within one week, patient will transfer to toilet w/ CGA w/ LRAD       Dates: Start:  01/29/25            Goal: STG-Within one week, patient will transfer to step in shower w/ CGA w/ LRAD       Dates: Start:  01/29/25               Problem: OT Long Term Goals       Dates: Start:  01/29/25         Goal: LTG-By discharge, patient will complete basic self care tasks w/ mod I for UB ADLs and supervision for LB  ADLs       Dates: Start:  01/29/25            Goal: LTG-By discharge, patient will perform bathroom transfers w/ supervision w/ LRAD       Dates: Start:  01/29/25            Goal: LTG-By discharge, patient will complete basic home management w/ supervision w/ LRAD        Dates: Start:  01/29/25

## 2025-02-05 NOTE — FLOWSHEET NOTE
02/05/25 0558   Events/Summary/Plan   Events/Summary/Plan mdi   Vital Signs   Pulse 64   Respiration 18   Pulse Oximetry 95 %   $ Pulse Oximetry (Spot Check) Yes   Respiratory Assessment   Level of Consciousness Alert   Chest Exam   Work Of Breathing / Effort Within Normal Limits   Breath Sounds   RUL Breath Sounds Clear   RML Breath Sounds Clear   RLL Breath Sounds Clear   PRATIK Breath Sounds Clear   LLL Breath Sounds Clear   Oxygen   O2 Delivery Device Room air w/o2 available

## 2025-02-05 NOTE — PROGRESS NOTES
NURSING DAILY NOTE    Name: Vince Carr   Date of Admission: 1/28/2025   Admitting Diagnosis: No Principal Problem: There is no principal problem currently on the Problem List. Please update the Problem List and refresh.  Attending Physician: MAGALY RICHARDSON M.D.  Allergies: Iodine and Naproxen    Safety  Patient Assist  Mod Assist  Patient Precautions  Fall Risk  Precaution Comments  Hx of multiple falls; wear shoes to protect feet  Bed Transfer Status  Contact Guard Assist  Toilet Transfer Status   Contact Guard Assist  Assistive Devices  Rails, Wheelchair  Oxygen  None - Room Air  Diet/Therapeutic Dining  Current Diet Order   Procedures    Diet Order Diet: Consistent CHO (Diabetic)     Pill Administration  whole  Agitated Behavioral Scale     ABS Level of Severity       Fall Risk  Has the patient had a fall this admission?   No  Ximena Aguiar Fall Risk Scoring  17, HIGH RISK  Fall Risk Safety Measures  Bed strip alarm    Vitals  Temperature: 36.3 °C (97.3 °F)  Temp src: Temporal  Pulse: 63  Respiration: 18  Blood Pressure : 111/60  Blood Pressure MAP (Calculated): 77 MM HG  BP Location: Right, Upper Arm  Patient BP Position: Supine     Oxygen  Pulse Oximetry: 93 %  O2 (LPM): 0  FiO2%: 21 %  O2 Delivery Device: None - Room Air    Bowel and Bladder  Last Bowel Movement  02/03/25  Stool Type  Type 4: Like a sausage or snake, smooth and soft  Bowel Device  Bathroom, Other (Comment) (Bowel Meds)  Continent  Bladder: Did not void   Bowel: No movement  Bladder Function  Urine Void (mL): 500 ml  Number of Times Voided: 1  Urinary Options: Yes  Urine Color: Yellow  Number of Times Incontinent of Urine: 1  Straight Catheter: 600 ml  Wet Diaper Count: 1  Genitourinary Assessment   Bladder Assessment (WDL):  Within Defined Limits  Diaz Catheter: Not Applicable  Urine Color: Yellow  Number of Bladder Accidents: 1  Total Number of Bladder of Accidents  in Last 7 Days: 1  Number of Times Incontinent of Urine: 1  Bladder Device: Bathroom  Time Void: Yes  Bladder Scan: Post Void  $ Bladder Scan Results (mL): 1  Bladder Medications: Yes    Skin  Thanh Score   17  Sensory Interventions   Bed Types: Standard/Trauma Mattress with Overlay  Skin Preventative Measures: Pillows in Use for Support / Positioning, Waffle Overlay  Moisture Interventions  Moisturizers/Barriers: Barrier Wipes      Pain  Pain Rating Scale  3 - Sometimes distracts me  Pain Location  Neck, Generalized  Pain Location Orientation  Posterior  Pain Interventions   Rest    ADLs    Bathing   Staff, Shower  Linen Change   Partial  Personal Hygiene  Change Charley Pads, Perineal Care  Chlorhexidine Bath      Oral Care  Brushed Teeth  Teeth/Dentures     Shave     Nutrition Percentage Eaten  *  * Meal *  *, Between % Consumed  Environmental Precautions  Treaded Slipper Socks on Patient, Bed in Low Position  Patient Turns/Positioning  Patient turns self independently side to side without assistance, to offload sacral area  Patient Turns Assistance/Tolerance  Assistance of One, General Weakness  Bed Positions  Bed Controls On, Bed Locked  Head of Bed Elevated  Self regulated      Psychosocial/Neurologic Assessment  Psychosocial Assessment  Psychosocial (WDL):  WDL Except  Patient Behaviors: Fatigue  Neurologic Assessment  Neuro (WDL): Exceptions to WDL  Level of Consciousness: Alert  Orientation Level: Oriented X4  Cognition: Appropriate safety awareness, Follows commands  Speech: Clear  Pupil Assesment: No  EENT (WDL):  WDL Except    Cardio/Pulmonary Assessment  Edema   RLE Edema: 3+  LLE Edema: 3+  Respiratory Breath Sounds  RUL Breath Sounds: Clear  RML Breath Sounds: Clear  RLL Breath Sounds: Clear  PRATIK Breath Sounds: Clear  LLL Breath Sounds: Clear  Cardiac Assessment   Cardiac (WDL):  WDL Except (H/O HTN, Afib, CAD, MI.)

## 2025-02-05 NOTE — THERAPY
Physical Therapy   Daily Treatment     Patient Name: Vince Carr  Age:  79 y.o., Sex:  male  Medical Record #: [id number]  Today's Date: 2/4/2025     Precautions  Precautions: Fall Risk  Comments: Hx of multiple falls; wear shoes to protect feet    Subjective    Patient downplays falls at home, does not see any patterns to falling; decreased pain in LBP, right hip area today     Objective       02/04/25 1101 02/04/25 1431   PT Charge Group   PT Gait Training (Units) 1 2   PT Neuromuscular Re-Education / Balance (Units)  --  2   PT Therapeutic Activities (Units) 1  --    PT Total Time Spent   PT Individual Total Time Spent (Mins) 30 60   Precautions   Precautions Fall Risk Fall Risk   Comments Hx of multiple falls; wear shoes to protect feet Hx of multiple falls; wear shoes to protect feet   Gait Functional Level of Assist    Gait Level Of Assist Contact Guard Assist Contact Guard Assist   Assistive Device Front Wheel Walker Front Wheel Walker   Distance (Feet) 80 80   # of Times Distance was Traveled 2  (decreased cues for FWW proximity) 2   Stairs Functional Level of Assist   Level of Assist with Stairs Contact Guard Assist  --    # of Stairs Climbed   (2in platform step with FWW CGA)  --    Outcome Measures   Outcome Measures Utilized  --    (4 stage balance test 10sec each- able to complete feet together, semitandem, unable to complete tandem and single leg indicating HIGH fall risk)   Connolly Balance Scale   Sitting Unsupported (Score 0-4)  --  3   Change Of Positon: Sitting To Standing (Score 0-4)  --  3   Change Of Positon: Standing To Sitting (Score 0-4)  --  4   Transfers (Score 0-4)  --  4   Standing With Feet Together (Score 0-4)  --  3   Tandem Standing (Score 0-4)  --  2   Standing On One Leg (Score 0-4)  --  2   Turning Trunk (Feet Fixed) (Score 0-4)  --  1   Turning 360 Degrees (Score 0-4)  --  1   Stool Stepping (Score 0-4)  --  1   Interdisciplinary Plan of Care Collaboration   IDT Collaboration  "with  Family / Caregiver  --    Collaboration Comments son present at beginning of session  --      No supplemental O2 used today    Assessment    Balance testing indicates HIGH fall risk with 4 stage balance test and Connolly= 24/56    In structured environment patient able to use FWW for transfers and ambulation with SBA    Strengths: Motivated for self care and independence, Pleasant and cooperative, Supportive family, Willingly participates in therapeutic activities  Barriers: Decreased endurance, Fatigue, Impaired activity tolerance, Impaired balance, Impaired insight/denial of deficits, Impaired functional cognition, Limited mobility, Pain (lives alone, hx of ~10 falls in past month)    Plan    Balance training  transfers  Ambulation with FWW  LE strengthening    DME  PT DME Recommendations  Assistive Device:  (Pt has FWW, 4WW, SPT, QC)  Additional Equipment:  (TBD; pt owns sock aid)    Passport items to be completed:  Get in/out of bed safely, in/out of a vehicle, safely use mobility device, walk or wheel around home/community, navigate up and down stairs, show how to get up/down from the ground, ensure home is accessible, demonstrate HEP, complete caregiver training    Physical Therapy Problems (Active)       Problem: Mobility       Dates: Start:  01/29/25         Goal: STG-Within one week, patient will ambulate household distance 25 ft x2 FWW CGA       Dates: Start:  01/29/25            Goal: STG-Within one week, patient will ambulate up/down a 2\" step in // bars min A       Dates: Start:  01/29/25               Problem: Mobility Transfers       Dates: Start:  01/29/25         Goal: STG-Within one week, patient will transfer bed to chair CGA SPT FWW       Dates: Start:  01/29/25               Problem: PT-Long Term Goals       Dates: Start:  01/29/25         Goal: LTG-By discharge, patient will ambulate 50 ft LRAD SPV/mod I indoors        Dates: Start:  01/29/25            Goal: LTG-By discharge, patient will " transfer one surface to another SPV/mod I SPT LRAD       Dates: Start:  01/29/25            Goal: LTG-By discharge, patient will amb up/down threshold for SUZANNA home with LRAD SPV       Dates: Start:  01/29/25

## 2025-02-05 NOTE — THERAPY
"Physical Therapy   Daily Treatment     Patient Name: Vince Carr  Age:  79 y.o., Sex:  male  Medical Record #: [id number]  Today's Date: 2/5/2025     Precautions  Precautions: Fall Risk  Comments: Hx of multiple falls; wear shoes to protect feet    Subjective    Patient resistant to idea of paid caregivers and discharge to [facility], disregards high fall risk as \"it will be fine\"     Objective       02/05/25 0931   PT Charge Group   PT Gait Training (Units) 2   PT Neuromuscular Re-Education / Balance (Units) 1   PT Therapeutic Activities (Units) 1   PT Total Time Spent   PT Individual Total Time Spent (Mins) 60   Precautions   Precautions Fall Risk   Comments Hx of multiple falls; wear shoes to protect feet   Gait Functional Level of Assist    Gait Level Of Assist Standby Assist   Assistive Device Front Wheel Walker   Distance (Feet) 80   # of Times Distance was Traveled 4   Stairs Functional Level of Assist   Level of Assist with Stairs Contact Guard Assist   # of Stairs Climbed   (2in platform step with FWW CGA)   Transfer Functional Level of Assist   Bed, Chair, Wheelchair Transfer Standby Assist   Bed Chair Wheelchair Transfer Description Set-up of equipment   Toilet Transfers Contact Guard Assist   Toilet Transfer Description Set-up of equipment;Increased time;Grab bar  (cues for safety)   Sitting Lower Body Exercises   Sitting Lower Body Exercises   (used as warm-up)   Standing Lower Body Exercises   Comments end of bed for bilateral UE support- 10sec each stand with feet together, stand semi-tandem, tandem, SLS; tendency to lean left with balance activities   Interdisciplinary Plan of Care Collaboration   IDT Collaboration with  Family / Caregiver;Occupational Therapist;Physical Therapist   Collaboration Comments discharge planning- discussing private caregivers vs. jail plan; treatment/discharge planning       Assessment    Patient able to ambulate with FWW SBA, 2in platform with FWW CGA; able to reduce cues " "for FWW safety during structured task    Strengths: Motivated for self care and independence, Pleasant and cooperative, Supportive family, Willingly participates in therapeutic activities  Barriers: Decreased endurance, Fatigue, Impaired activity tolerance, Impaired balance, Impaired insight/denial of deficits, Impaired functional cognition, Limited mobility, Pain (lives alone, hx of ~10 falls in past month)    Plan    Balance training  transfers  Ambulation with FWW  LE strengthening     DME  PT DME Recommendations  Assistive Device:  (Pt has FWW, 4WW, SPT, QC)  Additional Equipment:  (TBD; pt owns sock aid)     Passport items to be completed:  Get in/out of bed safely, in/out of a vehicle, safely use mobility device, walk or wheel around home/community, navigate up and down stairs, show how to get up/down from the ground, ensure home is accessible, demonstrate HEP, complete caregiver training    Physical Therapy Problems (Active)       Problem: Mobility       Dates: Start:  01/29/25         Goal: STG-Within one week, patient will ambulate household distance 25 ft x2 FWW CGA       Dates: Start:  01/29/25            Goal: STG-Within one week, patient will ambulate up/down a 2\" step in // bars min A       Dates: Start:  01/29/25               Problem: Mobility Transfers       Dates: Start:  01/29/25         Goal: STG-Within one week, patient will transfer bed to chair CGA SPT FWW       Dates: Start:  01/29/25               Problem: PT-Long Term Goals       Dates: Start:  01/29/25         Goal: LTG-By discharge, patient will ambulate 50 ft LRAD SPV/mod I indoors        Dates: Start:  01/29/25            Goal: LTG-By discharge, patient will transfer one surface to another SPV/mod I SPT LRAD       Dates: Start:  01/29/25            Goal: LTG-By discharge, patient will amb up/down threshold for SUZANNA home with LRAD SPV       Dates: Start:  01/29/25              "

## 2025-02-05 NOTE — THERAPY
Physical Therapy   Daily Treatment     Patient Name: Vince Carr  Age:  79 y.o., Sex:  male  Medical Record #: [id number]  Today's Date: 2/5/2025     Precautions  Precautions: Fall Risk  Comments: Hx of multiple falls; wear shoes to protect feet    Subjective    Pt received from CNA following alona-care. Pt agreeable to session in room due to lack of pants; therapist was unable to find pants that would fit the pt.     Objective       02/05/25 1301   PT Charge Group   PT Therapeutic Exercise (Units) 1   PT Therapeutic Activities (Units) 1   PT Total Time Spent   PT Individual Total Time Spent (Mins) 30   Precautions   Precautions Fall Risk   Comments Hx of multiple falls; wear shoes to protect feet   Vitals   O2 Delivery Device Room air w/o2 available   Pain   Intervention Rest   Pain 0 - 10 Group   Location Knee   Location Orientation Right   Therapist Pain Assessment During Activity   Sitting Lower Body Exercises   Ankle Pumps 2 sets of 10;Bilateral   Hip Abduction 2 sets of 10;Bilateral;Heavy Resistance Theraband  (blue theraband)   Hip Adduction 2 sets of 10;Bilateral;Other Resistance (See Comments)  (1-3 second isometric hold c/ pillow)   Long Arc Quad 2 sets of 10;Right;Left   Marching Reciprocal;2 sets of 10   Hamstring Curl 2 sets of 10;Right;Left;Heavy Resistance Theraband  (blue theraband)   Interdisciplinary Plan of Care Collaboration   IDT Collaboration with  Certified Nursing Assistant;Occupational Therapist   Patient Position at End of Therapy Seated;Chair Alarm On;Call Light within Reach;Tray Table within Reach;Phone within Reach   Collaboration Comments CNA completing alona-care at beginning of session; OT notified of POC     Discussed d/c home c/ use of FWW.    Assessment    Pt tolerated seated therapeutic exercises. Pt was agreeable to discussion about use of FWW following d/c home.    Strengths: Motivated for self care and independence, Pleasant and cooperative, Supportive family, Willingly  "participates in therapeutic activities  Barriers: Decreased endurance, Fatigue, Impaired activity tolerance, Impaired balance, Impaired insight/denial of deficits, Impaired functional cognition, Limited mobility, Pain (lives alone, hx of ~10 falls in past month)    Plan    Balance training  transfers  Ambulation with FWW  LE strengthening    DME  PT DME Recommendations  Assistive Device:  (Pt has FWW, 4WW, SPT, QC)  Additional Equipment:  (TBD; pt owns sock aid)    Passport items to be completed:  Get in/out of bed safely, in/out of a vehicle, safely use mobility device, walk or wheel around home/community, navigate up and down stairs, show how to get up/down from the ground, ensure home is accessible, demonstrate HEP, complete caregiver training    Physical Therapy Problems (Active)       Problem: Mobility       Dates: Start:  01/29/25         Goal: STG-Within one week, patient will ambulate household distance 25 ft x2 FWW CGA       Dates: Start:  01/29/25            Goal: STG-Within one week, patient will ambulate up/down a 2\" step in // bars min A       Dates: Start:  01/29/25               Problem: Mobility Transfers       Dates: Start:  01/29/25         Goal: STG-Within one week, patient will transfer bed to chair CGA SPT FWW       Dates: Start:  01/29/25               Problem: PT-Long Term Goals       Dates: Start:  01/29/25         Goal: LTG-By discharge, patient will ambulate 50 ft LRAD SPV/mod I indoors        Dates: Start:  01/29/25            Goal: LTG-By discharge, patient will transfer one surface to another SPV/mod I SPT LRAD       Dates: Start:  01/29/25            Goal: LTG-By discharge, patient will amb up/down threshold for SUZANNA home with LRAD SPV       Dates: Start:  01/29/25              "

## 2025-02-05 NOTE — PROGRESS NOTES
"  Physical Medicine & Rehabilitation Progress Note    Encounter Date: 2/5/2025    Chief Complaint: Decreased mobility    Interval Events (Subjective):  Patient sitting up in room. He reports therapy is going well. He reports hip is hurting more than lungs and neck today. He thinks he injured everything in the fall and some pain is improving faster.     _____________________________________  Interdisciplinary Team Conference   Most recent IDT on 1/30/2025    Discharge Date/Disposition:  2/11/25  _____________________________________      Objective:  VITAL SIGNS: /78   Pulse 75   Temp 36.8 °C (98.2 °F) (Oral)   Resp 18   Ht 1.803 m (5' 11\")   Wt 111 kg (244 lb 13.1 oz)   SpO2 95%   BMI 34.15 kg/m²   Gen: NAD  Psych: Mood and affect appropriate  CV: RRR, 0 edema  Resp: CTAB, no upper airway sounds  Abd: NTND  Neuro: AOx4, following commands    Laboratory Values:  Recent Results (from the past 72 hours)   POCT glucose device results    Collection Time: 02/04/25  5:12 PM   Result Value Ref Range    POC Glucose, Blood 147 (H) 65 - 99 mg/dL   POCT glucose device results    Collection Time: 02/04/25  9:18 PM   Result Value Ref Range    POC Glucose, Blood 178 (H) 65 - 99 mg/dL   POCT glucose device results    Collection Time: 02/05/25  7:21 AM   Result Value Ref Range    POC Glucose, Blood 173 (H) 65 - 99 mg/dL   POCT glucose device results    Collection Time: 02/05/25 11:39 AM   Result Value Ref Range    POC Glucose, Blood 160 (H) 65 - 99 mg/dL       Medications:  Scheduled Medications   Medication Dose Frequency    metFORMIN  250 mg BID WITH MEALS    insulin lispro  2-12 Units 4X/DAY ACHS    escitalopram  10 mg DAILY    carvedilol  6.25 mg BID WITH MEALS    montelukast  10 mg Nightly    enoxaparin (LOVENOX) injection  40 mg DAILY    fluticasone-umeclidinium-vilanterol  1 Puff QDAILY (RT)    Pharmacy Consult Request  1 Each PHARMACY TO DOSE    senna-docusate  2 Tablet Q EVENING    omeprazole  20 mg DAILY    " atorvastatin  40 mg QHS    bumetanide  1 mg BID    cyanocobalamin  1,000 mcg DAILY    multivitamin  1 Tablet DAILY    QUEtiapine  12.5 mg Nightly    tamsulosin  0.4 mg AFTER BREAKFAST    thiamine  100 mg DAILY    traZODone  100 mg Nightly     PRN medications: hydrALAZINE, acetaminophen, senna-docusate **AND** polyethylene glycol/lytes, docusate sodium, magnesium hydroxide, carboxymethylcellulose, benzocaine-menthol, mag hydrox-al hydrox-simeth, ondansetron **OR** ondansetron, traZODone, sodium chloride, oxyCODONE immediate-release **OR** oxyCODONE immediate-release, midazolam    Diet:  Current Diet Order   Procedures    Diet Order Diet: Consistent CHO (Diabetic)       Medical Decision Making and Plan:  SDH - Patient with fall on 1/26/25 with SDH with conservative management  -PT and OT for mobility and ADLs. Per guidelines, 15 hours per week between PT, OT and/or SLP.  -Follow-up NSG. On Seroquel 12.5 mg QHS     HTN/CAD/A fib - Patient on Bumex, Coreg 12.5 mg. Coreg reduced  -SBP into 110s, continue Bumex 1 mg BID and Coreg 6.25 mg      S5 fracture - Conservative management. PRN Tylenol and Oxycodone      Chest pain - Right sided, probable rib fracture. CXR completed, consult hospitalist. Concern for pneumonia, started on Augmentin. Improved cough, completed augmentin    Hyponatremia - Check AM CMP - 135, improving     Anemia - Check AM CBC - 13.5, will monitor     Leukocytosis - Check AM CBC - 13.2, increasing. UA negative CXR with possible pneumonia. Consult hospitalist     DM2 with hyperglycemia - Patient on SSI on transfer. Started on Metformin. Continue Metformin 250 mg and SSI     BPH - Patient on Tamsulosin 0.4 mg daily     Insomnia - Patient on Trazodone 100 mg daily     COPD - Patient on Trelegy daily     Pain - Patient on PRN Tylenol and Oxycodone      Skin - Patient at risk for skin breakdown due to debility in areas including sacrum, achilles, elbows and head in addition to other sites. Nursing to assess  skin daily.      GI Ppx - Patient on Prilosec for GERD prophylaxis. Patient on Senna-docusate for constipation prophylaxis.      DVT Ppx - Patient Lovenox on transfer. Limited mobility, continue Lovenox  ____________________________________    T. Car Chan MD/PhD  [facility] - Physical Medicine & Rehabilitation   [facility] - Brain Injury Medicine   ____________________________________

## 2025-02-05 NOTE — PROGRESS NOTES
Hospital Medicine Daily Progress Note      Chief Complaint:  Hypertension  Diabetes  CP    Interval History:  Discussed about his BS a little elevated and have started Metformin.    Review of Systems  Review of Systems   Constitutional:  Negative for fever.   Eyes:  Negative for blurred vision.   Respiratory:  Negative for shortness of breath.    Cardiovascular:  Negative for palpitations.   Gastrointestinal:  Negative for nausea and vomiting.   Neurological:  Negative for dizziness and headaches.   Psychiatric/Behavioral:  Negative for hallucinations.         Physical Exam  Temp:  [36.3 °C (97.3 °F)-36.8 °C (98.2 °F)] 36.8 °C (98.2 °F)  Pulse:  [63-75] 75  Resp:  [18] 18  BP: (111-124)/(60-78) 116/78  SpO2:  [93 %-96 %] 95 %    Physical Exam  Vitals and nursing note reviewed.   Constitutional:       General: He is not in acute distress.  HENT:      Mouth/Throat:      Mouth: Mucous membranes are moist.      Pharynx: Oropharynx is clear.   Eyes:      General: No scleral icterus.  Cardiovascular:      Rate and Rhythm: Normal rate. Rhythm irregular.      Heart sounds: No murmur heard.  Pulmonary:      Effort: Pulmonary effort is normal.      Breath sounds: Normal breath sounds. No stridor.   Abdominal:      General: There is no distension.      Palpations: Abdomen is soft.      Tenderness: There is no abdominal tenderness.   Musculoskeletal:      Cervical back: No rigidity.      Right lower leg: Edema present.      Left lower leg: Edema present.   Skin:     General: Skin is warm and dry.      Findings: No rash.   Neurological:      Mental Status: He is alert and oriented to person, place, and time.   Psychiatric:         Mood and Affect: Mood normal.         Behavior: Behavior normal.         Fluids    Intake/Output Summary (Last 24 hours) at 2/5/2025 1047  Last data filed at 2/5/2025 1000  Gross per 24 hour   Intake 720 ml   Output 700 ml   Net 20 ml        Laboratory                          Assessment/Plan  Chest  pain  Assessment & Plan  Resolved  Trop: 23 --> 24  EKG (1/30): shoed NSR, LBBB  EKG (1/31): showed RBBB, 1st degree AVB  CXR: small right pl eff, R basilar opacity (atx vs pneumonitis)  S/P Augmentin x 5 days (for PNA)  IS and RT protocol  Monitor    Sacral fracture (HCC)- (present on admission)  Assessment & Plan  2nd to GLF  Non-surgical management    SDH (subdural hematoma) (HCC)- (present on admission)  Assessment & Plan  2nd to GLF  Non-surgical management  Needs F/U MRI 6 wks    Non-insulin dependent type 2 diabetes mellitus (HCC)- (present on admission)  Assessment & Plan  HbAac: 6.9 (1/29)  BS: 147-178  Will start Metformin: 250 mg bid  Note: home meds include Metformin 1000 mg bid and Jardiance 10 mg qd  Cont to monitor    PAF (paroxysmal atrial fibrillation) (HCC)- (present on admission)  Assessment & Plan  HR ok  Has LE edema (has hx and is at baseline)  Cont Coreg  Cont Bumex  Hx of PPM  On Coreg for rate control  Xarelto presently on hold for ICH  Cont to monitor    CAD in native artery- (present on admission)  Assessment & Plan  Has chronic BLE edema  Echo 11/25/24 EF 50%  BNP:815 --> 1141 --> 723 (2/2)  Cont Lipitor  Cont Coreg  Cont Bumex  K+: 4.1 (4/2)  Note: Xarelto presently on hold for ICH  Cont to monitor    Hypertension- (present on admission)  Assessment & Plan  BP ok  Cont Coreg  Note: on Bumex and Flomax  Cont to monitor    Other emphysema (HCC)- (present on admission)  Assessment & Plan  Chronically on supplemental O2 at 2 lpm via NC  Continue Trelegy and Singulair  RT protocol

## 2025-02-05 NOTE — THERAPY
Speech Language Pathology  Daily Treatment     Patient Name: Vince Carr  Age:  79 y.o., Sex:  male  Medical Record #: [id number]  Today's Date: 2/4/2025     Precautions  Precautions: Fall Risk  Comments: Hx of multiple falls; wear shoes to protect feet    Subjective    Patient agreeable to therapy.  His son accompanied him to session.     Objective       02/04/25 1001   Treatment Charges   SLP Cognitive Skill Development First 15 Minutes 1   SLP Cognitive Skill Development Additional 15 Minutes 1   SLP Total Time Spent   SLP Individual Total Time Spent (Mins) 30   Cognition - Detailed   Functional Memory Activities Moderate (3)   Interdisciplinary Plan of Care Collaboration   IDT Collaboration with  Family / Caregiver   Collaboration Comments Son present during session.  Education provided on recording tranfer sequencing in memory log and reviewing.         Assessment    Patient transferred out of bed and into wheel chair for therapy.  He needed mod A  for safety planning and problem solving for both supine to sit, and transfer from mattress to w/c.  Assisted patient to record in memory log.  Patient needed min to mod cues to recall after review.    Strengths: Able to follow instructions, Motivated for self care and independence, Pleasant and cooperative, Supportive family, Willingly participates in therapeutic activities  Barriers: Confused, Generalized weakness, Impaired activity tolerance, Impaired carryover of learning, Impaired functional cognition, Pain (Patient complains of back and left shoulder pain.)    Plan    Target med sorting task.    Passport items to be completed:  Express basic needs, understand food/liquid recommendations, consistently follow swallow precautions, manage finances, manage medications, arrive to therapy appointments on time, complete daily memory log entries, solve problems related to safety situations, review education related to hospitalization, complete caregiver training      Speech Therapy Problems (Active)       Problem: Memory STGs       Dates: Start:  01/29/25         Goal: STG-Within one week, patient will recall daily events and new training with 50% acc with external and internal memory aids and strategies, with mod to max cues.       Dates: Start:  01/29/25         Goal Note filed on 01/30/25 0930 by Antonia Dsouza MS,CCC-SLP       Patient was evaluated yesterday 1/29/25 with first day of tx today.                   Problem: Problem Solving STGs       Dates: Start:  01/29/25         Goal: STG-Within one week, patient will perform selective attention tasks with 75% acc with min A.       Dates: Start:  01/29/25         Goal Note filed on 01/30/25 0930 by Antonia Dsouza MS,CCC-SLP       Patient was evaluated yesterday 1/29/25 with first day of tx today.                   Problem: Speech/Swallowing LTGs       Dates: Start:  01/29/25         Goal: LTG-By discharge, patient will solve complex problem and recall safety training with 80% acc with set up or spv.       Dates: Start:  01/29/25

## 2025-02-05 NOTE — THERAPY
"Occupational Therapy  Daily Treatment     Patient Name: Vince Carr  Age:  79 y.o., Sex:  male  Medical Record #: [id number]  Today's Date: 2/5/2025     Precautions  Precautions: (P) Fall Risk  Comments: (P) Hx of multiple falls; wear shoes to protect feet         Subjective    \" Do I get to go home?\"  Reminded  [place]  6 more days or rehab are required and reiterated focus  of working on balance and safety to  maximize safe transition home      Objective       02/05/25 1101   OT Charge Group   OT Therapy Activity (Units) 2   OT Total Time Spent   OT Individual Total Time Spent (Mins) 30   Precautions   Precautions Fall Risk   Comments Hx of multiple falls; wear shoes to protect feet   Sitting Upper Body Exercises   Other Exercise Nustep  x 5 minutes  workload  3     Min assist transfer w/c <->  Nustep seat      cues for handplacement and technique required   Balance   Standing Balance (Dynamic) Fair   Comments in // bars  reciprocal step and toss    x 12 reps x 4   no LOB  noted  required initial cues for technique  but with good carry over.  kept on hand on bar for steady assist through out the task    sit <-> stands    with supervision   Interdisciplinary Plan of Care Collaboration   Patient Position at End of Therapy Seated;Chair Alarm On;Self Releasing Lap Belt Applied  (in dining room for lunch)         Assessment      Participates to the best of his ability this session        Continues with poor incite to balance and safety deficits   Strengths: Willingly participates in therapeutic activities, Supportive family, Pleasant and cooperative, Motivated for self care and independence, Manages pain appropriately, Independent prior level of function  Barriers: Pain, Limited mobility, Impaired balance, Impaired activity tolerance, Impaired functional cognition, Generalized weakness, Fatigue    Plan    Pt would benefit from skilled OT services to address:  - ADLs/IADLs   - Strength/endurance/activity tolerance  - " Functional mobility w/ LRD  - Standing balance (static and dynamic)  - Functional Cognition (memory)    DME  OT DME Recommendations  Bathroom Equipment:  (pt owns shower chair)  Additional Equipment:  (TBD; pt owns sock aid)      Occupational Therapy Goals (Active)       Problem: Dressing       Dates: Start:  01/29/25         Goal: STG-Within one week, patient will dress LB w/ mod A and AE as needed       Dates: Start:  01/29/25               Problem: Functional Transfers       Dates: Start:  01/29/25         Goal: STG-Within one week, patient will transfer to toilet w/ CGA w/ LRAD       Dates: Start:  01/29/25            Goal: STG-Within one week, patient will transfer to step in shower w/ CGA w/ LRAD       Dates: Start:  01/29/25               Problem: OT Long Term Goals       Dates: Start:  01/29/25         Goal: LTG-By discharge, patient will complete basic self care tasks w/ mod I for UB ADLs and supervision for LB ADLs       Dates: Start:  01/29/25            Goal: LTG-By discharge, patient will perform bathroom transfers w/ supervision w/ LRAD       Dates: Start:  01/29/25            Goal: LTG-By discharge, patient will complete basic home management w/ supervision w/ LRAD        Dates: Start:  01/29/25

## 2025-02-06 ENCOUNTER — APPOINTMENT (OUTPATIENT)
Dept: PHYSICAL THERAPY | Facility: REHABILITATION | Age: 80
DRG: 949 | End: 2025-02-06
Attending: PHYSICAL MEDICINE & REHABILITATION
Payer: MEDICARE

## 2025-02-06 ENCOUNTER — APPOINTMENT (OUTPATIENT)
Dept: SPEECH THERAPY | Facility: REHABILITATION | Age: 80
DRG: 949 | End: 2025-02-06
Attending: PHYSICAL MEDICINE & REHABILITATION
Payer: MEDICARE

## 2025-02-06 ENCOUNTER — APPOINTMENT (OUTPATIENT)
Dept: OCCUPATIONAL THERAPY | Facility: REHABILITATION | Age: 80
DRG: 949 | End: 2025-02-06
Attending: PHYSICAL MEDICINE & REHABILITATION
Payer: MEDICARE

## 2025-02-06 LAB
ANION GAP SERPL CALC-SCNC: 9 MMOL/L (ref 7–16)
APPEARANCE UR: CLEAR
BACTERIA #/AREA URNS HPF: NORMAL /HPF
BASOPHILS # BLD AUTO: 0.2 % (ref 0–1.8)
BASOPHILS # BLD: 0.02 K/UL (ref 0–0.12)
BILIRUB UR QL STRIP.AUTO: NEGATIVE
BUN SERPL-MCNC: 16 MG/DL (ref 8–22)
CALCIUM SERPL-MCNC: 8.8 MG/DL (ref 8.5–10.5)
CASTS URNS QL MICRO: NORMAL /LPF (ref 0–2)
CHLORIDE SERPL-SCNC: 103 MMOL/L (ref 96–112)
CO2 SERPL-SCNC: 29 MMOL/L (ref 20–33)
COLOR UR: YELLOW
CREAT SERPL-MCNC: 1.06 MG/DL (ref 0.5–1.4)
EOSINOPHIL # BLD AUTO: 0.24 K/UL (ref 0–0.51)
EOSINOPHIL NFR BLD: 3 % (ref 0–6.9)
EPITHELIAL CELLS 1715: NORMAL /HPF (ref 0–5)
ERYTHROCYTE [DISTWIDTH] IN BLOOD BY AUTOMATED COUNT: 48.6 FL (ref 35.9–50)
GFR SERPLBLD CREATININE-BSD FMLA CKD-EPI: 71 ML/MIN/1.73 M 2
GLUCOSE BLD STRIP.AUTO-MCNC: 117 MG/DL (ref 65–99)
GLUCOSE BLD STRIP.AUTO-MCNC: 125 MG/DL (ref 65–99)
GLUCOSE BLD STRIP.AUTO-MCNC: 128 MG/DL (ref 65–99)
GLUCOSE BLD STRIP.AUTO-MCNC: 140 MG/DL (ref 65–99)
GLUCOSE SERPL-MCNC: 122 MG/DL (ref 65–99)
GLUCOSE UR STRIP.AUTO-MCNC: NEGATIVE MG/DL
HCT VFR BLD AUTO: 38.4 % (ref 42–52)
HGB BLD-MCNC: 12.3 G/DL (ref 14–18)
IMM GRANULOCYTES # BLD AUTO: 0.08 K/UL (ref 0–0.11)
IMM GRANULOCYTES NFR BLD AUTO: 1 % (ref 0–0.9)
KETONES UR STRIP.AUTO-MCNC: NEGATIVE MG/DL
LEUKOCYTE ESTERASE UR QL STRIP.AUTO: ABNORMAL
LYMPHOCYTES # BLD AUTO: 1.82 K/UL (ref 1–4.8)
LYMPHOCYTES NFR BLD: 22.6 % (ref 22–41)
MAGNESIUM SERPL-MCNC: 1.8 MG/DL (ref 1.5–2.5)
MCH RBC QN AUTO: 30.3 PG (ref 27–33)
MCHC RBC AUTO-ENTMCNC: 32 G/DL (ref 32.3–36.5)
MCV RBC AUTO: 94.6 FL (ref 81.4–97.8)
MICRO URNS: ABNORMAL
MONOCYTES # BLD AUTO: 0.89 K/UL (ref 0–0.85)
MONOCYTES NFR BLD AUTO: 11.1 % (ref 0–13.4)
NEUTROPHILS # BLD AUTO: 5 K/UL (ref 1.82–7.42)
NEUTROPHILS NFR BLD: 62.1 % (ref 44–72)
NITRITE UR QL STRIP.AUTO: NEGATIVE
NRBC # BLD AUTO: 0 K/UL
NRBC BLD-RTO: 0 /100 WBC (ref 0–0.2)
PH UR STRIP.AUTO: 6.5 [PH] (ref 5–8)
PLATELET # BLD AUTO: 286 K/UL (ref 164–446)
PMV BLD AUTO: 9.6 FL (ref 9–12.9)
POTASSIUM SERPL-SCNC: 4.5 MMOL/L (ref 3.6–5.5)
PROCALCITONIN SERPL-MCNC: 0.06 NG/ML
PROT UR QL STRIP: NEGATIVE MG/DL
RBC # BLD AUTO: 4.06 M/UL (ref 4.7–6.1)
RBC # URNS HPF: NORMAL /HPF (ref 0–2)
RBC UR QL AUTO: NEGATIVE
SODIUM SERPL-SCNC: 141 MMOL/L (ref 135–145)
SP GR UR STRIP.AUTO: 1
UROBILINOGEN UR STRIP.AUTO-MCNC: 0.2 EU/DL
WBC # BLD AUTO: 8.1 K/UL (ref 4.8–10.8)
WBC #/AREA URNS HPF: NORMAL /HPF

## 2025-02-06 PROCEDURE — 84145 PROCALCITONIN (PCT): CPT

## 2025-02-06 PROCEDURE — 700102 HCHG RX REV CODE 250 W/ 637 OVERRIDE(OP): Performed by: HOSPITALIST

## 2025-02-06 PROCEDURE — 700102 HCHG RX REV CODE 250 W/ 637 OVERRIDE(OP): Performed by: PHYSICAL MEDICINE & REHABILITATION

## 2025-02-06 PROCEDURE — 94760 N-INVAS EAR/PLS OXIMETRY 1: CPT

## 2025-02-06 PROCEDURE — 94640 AIRWAY INHALATION TREATMENT: CPT

## 2025-02-06 PROCEDURE — 97130 THER IVNTJ EA ADDL 15 MIN: CPT | Performed by: SPEECH-LANGUAGE PATHOLOGIST

## 2025-02-06 PROCEDURE — A9270 NON-COVERED ITEM OR SERVICE: HCPCS | Performed by: HOSPITALIST

## 2025-02-06 PROCEDURE — 82962 GLUCOSE BLOOD TEST: CPT | Mod: 91

## 2025-02-06 PROCEDURE — 83735 ASSAY OF MAGNESIUM: CPT

## 2025-02-06 PROCEDURE — 80048 BASIC METABOLIC PNL TOTAL CA: CPT

## 2025-02-06 PROCEDURE — 36415 COLL VENOUS BLD VENIPUNCTURE: CPT

## 2025-02-06 PROCEDURE — A9270 NON-COVERED ITEM OR SERVICE: HCPCS | Performed by: PHYSICAL MEDICINE & REHABILITATION

## 2025-02-06 PROCEDURE — 81001 URINALYSIS AUTO W/SCOPE: CPT

## 2025-02-06 PROCEDURE — 97112 NEUROMUSCULAR REEDUCATION: CPT

## 2025-02-06 PROCEDURE — 99232 SBSQ HOSP IP/OBS MODERATE 35: CPT | Performed by: HOSPITALIST

## 2025-02-06 PROCEDURE — 97129 THER IVNTJ 1ST 15 MIN: CPT | Performed by: SPEECH-LANGUAGE PATHOLOGIST

## 2025-02-06 PROCEDURE — 700111 HCHG RX REV CODE 636 W/ 250 OVERRIDE (IP): Mod: JZ | Performed by: PHYSICAL MEDICINE & REHABILITATION

## 2025-02-06 PROCEDURE — 770010 HCHG ROOM/CARE - REHAB SEMI PRIVAT*

## 2025-02-06 PROCEDURE — 97530 THERAPEUTIC ACTIVITIES: CPT

## 2025-02-06 PROCEDURE — 85025 COMPLETE CBC W/AUTO DIFF WBC: CPT

## 2025-02-06 PROCEDURE — 97110 THERAPEUTIC EXERCISES: CPT

## 2025-02-06 PROCEDURE — 97116 GAIT TRAINING THERAPY: CPT

## 2025-02-06 PROCEDURE — 99232 SBSQ HOSP IP/OBS MODERATE 35: CPT | Performed by: PHYSICAL MEDICINE & REHABILITATION

## 2025-02-06 RX ORDER — QUETIAPINE FUMARATE 25 MG/1
50 TABLET, FILM COATED ORAL NIGHTLY
Status: DISCONTINUED | OUTPATIENT
Start: 2025-02-06 | End: 2025-02-11 | Stop reason: HOSPADM

## 2025-02-06 RX ADMIN — CYANOCOBALAMIN TAB 500 MCG 1000 MCG: 500 TAB at 08:29

## 2025-02-06 RX ADMIN — ATORVASTATIN CALCIUM 40 MG: 40 TABLET, FILM COATED ORAL at 21:23

## 2025-02-06 RX ADMIN — THERA TABS 1 TABLET: TAB at 08:29

## 2025-02-06 RX ADMIN — MONTELUKAST 10 MG: 10 TABLET, FILM COATED ORAL at 21:24

## 2025-02-06 RX ADMIN — QUETIAPINE FUMARATE 50 MG: 25 TABLET ORAL at 17:05

## 2025-02-06 RX ADMIN — ESCITALOPRAM OXALATE 10 MG: 10 TABLET ORAL at 08:29

## 2025-02-06 RX ADMIN — FLUTICASONE FUROATE, UMECLIDINIUM BROMIDE AND VILANTEROL TRIFENATATE 1 PUFF: 200; 62.5; 25 POWDER RESPIRATORY (INHALATION) at 06:02

## 2025-02-06 RX ADMIN — BUMETANIDE 1 MG: 1 TABLET ORAL at 21:26

## 2025-02-06 RX ADMIN — Medication 100 MG: at 08:29

## 2025-02-06 RX ADMIN — BUMETANIDE 1 MG: 1 TABLET ORAL at 08:29

## 2025-02-06 RX ADMIN — METFORMIN HYDROCHLORIDE 250 MG: 500 TABLET ORAL at 08:29

## 2025-02-06 RX ADMIN — TAMSULOSIN HYDROCHLORIDE 0.4 MG: 0.4 CAPSULE ORAL at 08:29

## 2025-02-06 RX ADMIN — ENOXAPARIN SODIUM 40 MG: 100 INJECTION SUBCUTANEOUS at 08:33

## 2025-02-06 RX ADMIN — METFORMIN HYDROCHLORIDE 250 MG: 500 TABLET ORAL at 17:05

## 2025-02-06 RX ADMIN — TRAZODONE HYDROCHLORIDE 100 MG: 100 TABLET ORAL at 21:24

## 2025-02-06 ASSESSMENT — GAIT ASSESSMENTS
DISTANCE (FEET): 80
DISTANCE (FEET): 40
DEVIATION: BRADYKINETIC;SHUFFLED GAIT;ANTALGIC
ASSISTIVE DEVICE: FRONT WHEEL WALKER
GAIT LEVEL OF ASSIST: STANDBY ASSIST
ASSISTIVE DEVICE: FRONT WHEEL WALKER
GAIT LEVEL OF ASSIST: STANDBY ASSIST

## 2025-02-06 ASSESSMENT — ENCOUNTER SYMPTOMS
DIARRHEA: 0
COUGH: 0
DIZZINESS: 0
BLURRED VISION: 0
NERVOUS/ANXIOUS: 0
FEVER: 0

## 2025-02-06 ASSESSMENT — PAIN DESCRIPTION - PAIN TYPE: TYPE: ACUTE PAIN

## 2025-02-06 NOTE — CARE PLAN
The patient is Stable - Low risk of patient condition declining or worsening    Shift Goals  Clinical Goals: Safety  Patient Goals: Safety  Family Goals: no family present    Progress made toward(s) clinical / shift goals:    Problem: Hemodynamics  Goal: Patient's hemodynamics, fluid balance and neurologic status will be stable or improve  Outcome: Progressing  Note:    Latest Reference Range & Units 02/06/25 05:56   WBC 4.8 - 10.8 K/uL 8.1   RBC 4.70 - 6.10 M/uL 4.06 (L)   Hemoglobin 14.0 - 18.0 g/dL 12.3 (L)   Hematocrit 42.0 - 52.0 % 38.4 (L)   MCV 81.4 - 97.8 fL 94.6   MCH 27.0 - 33.0 pg 30.3   MCHC 32.3 - 36.5 g/dL 32.0 (L)   RDW 35.9 - 50.0 fL 48.6   Platelet Count 164 - 446 K/uL 286   MPV 9.0 - 12.9 fL 9.6   Neutrophils-Polys 44.00 - 72.00 % 62.10   Neutrophils (Absolute) 1.82 - 7.42 K/uL 5.00   Lymphocytes 22.00 - 41.00 % 22.60   Lymphs (Absolute) 1.00 - 4.80 K/uL 1.82   Monocytes 0.00 - 13.40 % 11.10   Monos (Absolute) 0.00 - 0.85 K/uL 0.89 (H)   Eosinophils 0.00 - 6.90 % 3.00   Eos (Absolute) 0.00 - 0.51 K/uL 0.24   Basophils 0.00 - 1.80 % 0.20   Baso (Absolute) 0.00 - 0.12 K/uL 0.02   Immature Granulocytes 0.00 - 0.90 % 1.00 (H)   Immature Granulocytes (abs) 0.00 - 0.11 K/uL 0.08   Nucleated RBC 0.00 - 0.20 /100 WBC 0.00   NRBC (Absolute) K/uL 0.00   Sodium 135 - 145 mmol/L 141   Potassium 3.6 - 5.5 mmol/L 4.5   Chloride 96 - 112 mmol/L 103   Co2 20 - 33 mmol/L 29   Anion Gap 7.0 - 16.0  9.0   Glucose 65 - 99 mg/dL 122 (H)   Bun 8 - 22 mg/dL 16   Creatinine 0.50 - 1.40 mg/dL 1.06   GFR (CKD-EPI) >60 mL/min/1.73 m 2 71   Calcium 8.5 - 10.5 mg/dL 8.8   Magnesium 1.5 - 2.5 mg/dL 1.8   Procalcitonin <0.25 ng/mL 0.06   (L): Data is abnormally low  (H): Data is abnormally high       Patient is not progressing towards the following goals:

## 2025-02-06 NOTE — THERAPY
Physical Therapy   Daily Treatment     Patient Name: Vince Carr  Age:  79 y.o., Sex:  male  Medical Record #: [id number]  Today's Date: 2/6/2025     Precautions  Precautions: Fall Risk  Comments: Hx of multiple falls; wear shoes to protect feet    Subjective    Patient reports has been having bowel incontinence each morning after breakfast, reports he uses oxygen when he goes outside, does not use often in the home     Objective       02/06/25 0901   PT Charge Group   PT Gait Training (Units) 1   PT Therapeutic Activities (Units) 1   PT Total Time Spent   PT Individual Total Time Spent (Mins) 30   Precautions   Precautions Fall Risk   Comments Hx of multiple falls; wear shoes to protect feet   Gait Functional Level of Assist    Gait Level Of Assist Standby Assist   Assistive Device Front Wheel Walker   Distance (Feet) 40   # of Times Distance was Traveled 1   Deviation   (distance limited by bowel urgency)   Transfer Functional Level of Assist   Toilet Transfers Standby Assist   Toilet Transfer Description   (max assist for pants and brief change after bowel incontinence; able to perform wiping with set-up)   Interdisciplinary Plan of Care Collaboration   IDT Collaboration with  Occupational Therapist;Physical Therapist   Collaboration Comments treatment planning         Assessment    Able to maintain O2 saturation >90% with 40ft ambulation    Strengths: Motivated for self care and independence, Pleasant and cooperative, Supportive family, Willingly participates in therapeutic activities  Barriers: Decreased endurance, Fatigue, Impaired activity tolerance, Impaired balance, Impaired insight/denial of deficits, Impaired functional cognition, Limited mobility, Pain (lives alone, hx of ~10 falls in past month)    Plan    Balance training  transfers  Ambulation with FWW  LE strengthening        DME  PT DME Recommendations  Assistive Device:  (Pt has FWW, 4WW, SPT, QC)  Additional Equipment:  (TBD; pt owns sock  aid)     Passport items to be completed:  Get in/out of bed safely, in/out of a vehicle, safely use mobility device, walk or wheel around home/community, navigate up and down stairs, show how to get up/down from the ground, ensure home is accessible, demonstrate HEP, complete caregiver training    Physical Therapy Problems (Active)       Problem: PT-Long Term Goals       Dates: Start:  01/29/25         Goal: LTG-By discharge, patient will ambulate 50 ft LRAD SPV/mod I indoors        Dates: Start:  01/29/25            Goal: LTG-By discharge, patient will transfer one surface to another SPV/mod I SPT LRAD       Dates: Start:  01/29/25            Goal: LTG-By discharge, patient will amb up/down threshold for SUZANNA home with LRAD SPV       Dates: Start:  01/29/25

## 2025-02-06 NOTE — PROGRESS NOTES
Hospital Medicine Daily Progress Note      Chief Complaint:  Hypertension  Diabetes    Interval History:  Discussed about his BS doing good on the low dose Metformin.    Review of Systems  Review of Systems   Constitutional:  Negative for fever.   Eyes:  Negative for blurred vision.   Respiratory:  Negative for cough.    Cardiovascular:  Negative for chest pain.   Gastrointestinal:  Negative for diarrhea.   Musculoskeletal:  Negative for joint pain.   Neurological:  Negative for dizziness.   Psychiatric/Behavioral:  The patient is not nervous/anxious.         Physical Exam  Temp:  [36.5 °C (97.7 °F)-36.7 °C (98.1 °F)] 36.7 °C (98.1 °F)  Pulse:  [62-80] 80  Resp:  [17-18] 17  BP: ()/(59-62) 99/59  SpO2:  [95 %-96 %] 96 %    Physical Exam  Constitutional:       Appearance: He is not diaphoretic.   HENT:      Mouth/Throat:      Pharynx: No oropharyngeal exudate or posterior oropharyngeal erythema.   Eyes:      Extraocular Movements: Extraocular movements intact.   Neck:      Vascular: No carotid bruit.   Cardiovascular:      Rate and Rhythm: Normal rate. Rhythm irregular.      Heart sounds: No murmur heard.  Pulmonary:      Breath sounds: Normal breath sounds. No stridor.   Abdominal:      General: Bowel sounds are normal.      Palpations: Abdomen is soft.   Musculoskeletal:      Right lower leg: Edema present.      Left lower leg: Edema present.   Skin:     Findings: No rash.   Neurological:      Mental Status: He is oriented to person, place, and time.   Psychiatric:         Mood and Affect: Mood normal.         Behavior: Behavior normal.         Fluids    Intake/Output Summary (Last 24 hours) at 2/6/2025 1043  Last data filed at 2/6/2025 0814  Gross per 24 hour   Intake 600 ml   Output 1300 ml   Net -700 ml        Laboratory  Recent Labs     02/06/25  0556   WBC 8.1   RBC 4.06*   HEMOGLOBIN 12.3*   HEMATOCRIT 38.4*   MCV 94.6   MCH 30.3   MCHC 32.0*   RDW 48.6   PLATELETCT 286   MPV 9.6       Recent Labs      02/06/25  0556   SODIUM 141   POTASSIUM 4.5   CHLORIDE 103   CO2 29   GLUCOSE 122*   BUN 16   CREATININE 1.06   CALCIUM 8.8                   Assessment/Plan  Sacral fracture (HCC)- (present on admission)  Assessment & Plan  2nd to GLF  Non-surgical management    SDH (subdural hematoma) (HCC)- (present on admission)  Assessment & Plan  2nd to GLF  Non-surgical management  Needs F/U MRI 6 wks    Non-insulin dependent type 2 diabetes mellitus (HCC)- (present on admission)  Assessment & Plan  HbAac: 6.9 (1/29)  BS this am: 125  Cont Metformin: 250 mg bid  Note: home meds include Metformin 1000 mg bid and Jardiance 10 mg qd  Cont to monitor    PAF (paroxysmal atrial fibrillation) (HCC)- (present on admission)  Assessment & Plan  HR ok  Has LE edema (has hx and is at baseline)  Cont Coreg  Cont Bumex  Hx of PPM  On Coreg for rate control  Xarelto presently on hold for ICH  Cont to monitor    CAD in native artery- (present on admission)  Assessment & Plan  Has chronic BLE edema  Echo 11/25/24 EF 50%  BNP:815 --> 1141 --> 723 (2/2)  Cont Lipitor  Cont Coreg  Cont Bumex  K+: 4.5 (2/6)  Note: Xarelto presently on hold for ICH  Cont to monitor    Hypertension- (present on admission)  Assessment & Plan  BP ok  Cont Coreg  Note: on Bumex and Flomax  Cont to monitor    Other emphysema (HCC)- (present on admission)  Assessment & Plan  Chronically on supplemental O2 at 2 lpm via NC  Continue Trelegy and Singulair  RT protocol

## 2025-02-06 NOTE — CARE PLAN
"  Problem: Mobility  Goal: STG-Within one week, patient will ambulate household distance 25 ft x2 FWW CGA  Outcome: Met  Goal: STG-Within one week, patient will ambulate up/down a 2\" step in // bars min A  Outcome: Met     Problem: Mobility Transfers  Goal: STG-Within one week, patient will transfer bed to chair CGA SPT FWW  Outcome: Met     "

## 2025-02-06 NOTE — PROGRESS NOTES
NURSING DAILY NOTE    Name: Vince Carr   Date of Admission: 1/28/2025   Admitting Diagnosis: No Principal Problem: There is no principal problem currently on the Problem List. Please update the Problem List and refresh.  Attending Physician: MAGALY RICHARDSON M.D.  Allergies: Iodine and Naproxen    Safety  Patient Assist  Mod Assist  Patient Precautions  Fall Risk  Precaution Comments  Hx of multiple falls; wear shoes to protect feet  Bed Transfer Status  Standby Assist  Toilet Transfer Status   Contact Guard Assist  Assistive Devices  Rails, Wheelchair  Oxygen  Nasal Cannula  Diet/Therapeutic Dining  Current Diet Order   Procedures    Diet Order Diet: Consistent CHO (Diabetic)     Pill Administration  whole  Agitated Behavioral Scale     ABS Level of Severity       Fall Risk  Has the patient had a fall this admission?   No  Ximena Aguiar Fall Risk Scoring  17, HIGH RISK  Fall Risk Safety Measures  bed alarm, chair alarm, poor balance, and low vision/ hearing    Vitals  Temperature: 36.7 °C (98.1 °F)  Temp src: Oral  Pulse: 62  Respiration: 18  Blood Pressure : 128/60  Blood Pressure MAP (Calculated): 83 MM HG  BP Location: Right, Upper Arm  Patient BP Position: Supine     Oxygen  Pulse Oximetry: 96 %  O2 (LPM): 2 (notified RN Melissa)  FiO2%: 21 %  O2 Delivery Device: Nasal Cannula    Bowel and Bladder  Last Bowel Movement  02/05/25  Stool Type  Type 6: Fluffy pieces with ragged edges, a mushy stool  Bowel Device  Bathroom, Other (Comment) (Bowel Meds)  Continent  Bladder: Did not void   Bowel: No movement  Bladder Function  Urine Void (mL): 450 ml (urinal)  Number of Times Voided: 1  Urinary Options: Yes  Urine Color: Yellow  Number of Times Incontinent of Urine: 1  Straight Catheter: 600 ml  Wet Diaper Count: 1  Genitourinary Assessment   Bladder Assessment (WDL):  Within Defined Limits  Diaz Catheter: Not Applicable  Urine Color:  Yellow  Number of Bladder Accidents: 1  Total Number of Bladder of Accidents in Last 7 Days: 1  Number of Times Incontinent of Urine: 1  Bladder Device: Bathroom  Time Void: Yes  Bladder Scan: Post Void  $ Bladder Scan Results (mL): 78  Bladder Medications: Yes    Skin  Thanh Score   17  Sensory Interventions   Bed Types: Standard/Trauma Mattress  Skin Preventative Measures: Pillows in Use for Support / Positioning  Moisture Interventions  Moisturizers/Barriers: Barrier Wipes      Pain  Pain Rating Scale  0 - No Pain  Pain Location  Knee  Pain Location Orientation  Right  Pain Interventions   Rest    ADLs    Bathing   Staff, Shower  Linen Change   Partial  Personal Hygiene  Change Charley Pads, Perineal Care  Chlorhexidine Bath      Oral Care  Brushed Teeth  Teeth/Dentures     Shave     Nutrition Percentage Eaten  Lunch, Between 50-75% Consumed  Environmental Precautions  Treaded Slipper Socks on Patient, Personal Belongings, Wastebasket, Call Bell etc. in Easy Reach, Bed in Low Position  Patient Turns/Positioning  Patient turns self independently side to side without assistance, to offload sacral area  Patient Turns Assistance/Tolerance  Assistance of One  Bed Positions  Bed Controls On, Bed Locked  Head of Bed Elevated  Self regulated      Psychosocial/Neurologic Assessment  Psychosocial Assessment  Psychosocial (WDL):  WDL Except  Patient Behaviors: Fatigue  Neurologic Assessment  Neuro (WDL): Exceptions to WDL  Level of Consciousness: Alert  Orientation Level: Oriented X4  Cognition: Appropriate safety awareness, Follows commands  Speech: Clear  Pupil Assesment: No  EENT (WDL):  WDL Except    Cardio/Pulmonary Assessment  Edema   RLE Edema: 3+  LLE Edema: 3+  Respiratory Breath Sounds  RUL Breath Sounds: Clear  RML Breath Sounds: Clear  RLL Breath Sounds: Clear  PRATIK Breath Sounds: Clear  LLL Breath Sounds: Clear  Cardiac Assessment   Cardiac (WDL):  WDL Except (H/O HTN, Afib, CAD, MI.)

## 2025-02-06 NOTE — CARE PLAN
Problem: Skin Integrity  Goal: Skin integrity is maintained or improved  Outcome: Progressing   The patient is  Problem: Fall Risk - Rehab  Goal: Patient will remain free from falls  Outcome: Progressing    Stable - Low risk of patient condition declining or worsening    Shift Goals  Clinical Goals: Safety  Patient Goals: Sleep well  Family Goals: no family present    Progress made toward(s) clinical / shift goals:      Patient is not progressing towards the following goals:

## 2025-02-06 NOTE — FLOWSHEET NOTE
02/06/25 0600   Events/Summary/Plan   Events/Summary/Plan MDI given   Skin Integrity Intact   Protective Device Foam Pads   Location ears   Vital Signs   Pulse 80   Respiration 17   Pulse Oximetry 96 %   $ Pulse Oximetry (Spot Check) Yes   Respiratory Assessment   Level of Consciousness Alert   Chest Exam   Work Of Breathing / Effort Within Normal Limits   Breath Sounds   RUL Breath Sounds Clear   RML Breath Sounds Clear   RLL Breath Sounds Clear   PRATIK Breath Sounds Clear   LLL Breath Sounds Clear   Secretions   Cough Non Productive   Oxygen   O2 (LPM) 0   O2 Delivery Device Room air w/o2 available

## 2025-02-06 NOTE — CARE PLAN
Problem: Problem Solving STGs  Goal: STG-Within one week, patient will perform selective attention tasks with 75% acc with min A.  Outcome: Met  Note: Patient performs simple selective attention tasks with 75% with min A.     Problem: Memory STGs  Goal: STG-Within one week, patient will recall daily events and new training with 50% acc with external and internal memory aids and strategies, with mod to max cues.  Outcome: Met  Note: Mod to max cues needed for 50% acc.

## 2025-02-06 NOTE — THERAPY
Physical Therapy   Daily Treatment     Patient Name: Vince Carr  Age:  79 y.o., Sex:  male  Medical Record #: [id number]  Today's Date: 2/6/2025     Precautions  Precautions: Fall Risk  Comments: Hx of multiple falls; wear shoes to protect feet    Subjective    Pt reporting urinary incontinence due to failed attempt at using urinal. Agreeable to change into clean clothing and perform alona/groin cleaning with wet wipes     Objective       02/06/25 0730   PT Charge Group   PT Therapeutic Activities (Units) 2   PT Total Time Spent   PT Individual Total Time Spent (Mins) 30   Gait Functional Level of Assist    Gait Level Of Assist Standby Assist   Assistive Device Front Wheel Walker   Distance (Feet) 80   # of Times Distance was Traveled 1   Deviation Bradykinetic;Shuffled Gait;Antalgic   Bed Mobility    Supine to Sit Standby Assist   Sit to Stand Standby Assist   Interdisciplinary Plan of Care Collaboration   IDT Collaboration with  Nursing   Patient Position at End of Therapy Seated;Chair Alarm On;Self Releasing Lap Belt Applied  (in dining room)   Collaboration Comments AM med pass       Completed seated and standing dynamic balance with bimanual manipulation, functional weightshift, and min assist for UB/LB dressing    Educated pt in performing upper body dressing and set up for lower body dressing in sitting for increased safety      Assessment    Pt demonstrated fair static standing balance without UE support while performing standing dressing tasks including pants management and removal of soiled shirt. Ambulation distance self limited due to fatigue and right hip pain.   Strengths: Motivated for self care and independence, Pleasant and cooperative, Supportive family, Willingly participates in therapeutic activities  Barriers: Decreased endurance, Fatigue, Impaired activity tolerance, Impaired balance, Impaired insight/denial of deficits, Impaired functional cognition, Limited mobility, Pain (lives alone,  "hx of ~10 falls in past month)    Plan    Balance training  transfers  Ambulation with FWW  LE strengthening       DME  PT DME Recommendations  Assistive Device:  (Pt has FWW, 4WW, SPT, QC)  Additional Equipment:  (TBD; pt owns sock aid)    Passport items to be completed:  Get in/out of bed safely, in/out of a vehicle, safely use mobility device, walk or wheel around home/community, navigate up and down stairs, show how to get up/down from the ground, ensure home is accessible, demonstrate HEP, complete caregiver training    Physical Therapy Problems (Active)       Problem: Mobility       Dates: Start:  01/29/25         Goal: STG-Within one week, patient will ambulate household distance 25 ft x2 FWW CGA       Dates: Start:  01/29/25            Goal: STG-Within one week, patient will ambulate up/down a 2\" step in // bars min A       Dates: Start:  01/29/25               Problem: Mobility Transfers       Dates: Start:  01/29/25         Goal: STG-Within one week, patient will transfer bed to chair CGA SPT FWW       Dates: Start:  01/29/25               Problem: PT-Long Term Goals       Dates: Start:  01/29/25         Goal: LTG-By discharge, patient will ambulate 50 ft LRAD SPV/mod I indoors        Dates: Start:  01/29/25            Goal: LTG-By discharge, patient will transfer one surface to another SPV/mod I SPT LRAD       Dates: Start:  01/29/25            Goal: LTG-By discharge, patient will amb up/down threshold for SUZANNA home with LRAD SPV       Dates: Start:  01/29/25              "

## 2025-02-06 NOTE — PROGRESS NOTES
Physical Medicine & Rehabilitation Progress Note    Encounter Date: 2/6/2025    Chief Complaint: Decreased mobility    Interval Events (Subjective):  Patient sitting up in therapy gym. He reports he is doing OK. Denies pain.     _____________________________________  Interdisciplinary Team Conference   Most recent IDT on 2/6/2025    I, Monserrat Chan M.D./Ph.D., was present and led the interdisciplinary team conference on 2/6/2025.  I led the IDT conference and agree with the IDT conference documentation and plan of care as noted below.     Nursing:  Diet Current Diet Order   Procedures    Diet Order Diet: Consistent CHO (Diabetic)       Eating ADL Stand by Assist (set-up)      % of Last Meal  Oral Nutrition: *  * Meal *  *, Lunch, Between % Consumed   Sleep    Bowel Last BM: 02/05/25   Bladder Continent   Barriers to Discharge Home:  Confused variable    Physical Therapy:  Bed Mobility    Transfers Standby Assist  Set-up of equipment   Mobility Standby Assist   Stairs    Barriers to Discharge Home:  Limited balance  Severe neuropathy  Variable oxygen need  Caregivers needed    Occupational Therapy:  Grooming Supervision, Seated   Bathing Contact Guard Assist (while incorporating sitting and standing w/ UE support; used LH sponge to maximize independence w/ LB bathing tasks)   UB Dressing Stand by Assist (Set-up to don t-shirt while seated on fold down shower bench)   LB Dressing Minimal Assist   Toileting Minimal Assist   Shower & Transfer    Barriers to Discharge Home:  Neuropathy severe  Poor memory    Speech-Language Pathology:  Comprehension:  Modified Independent  Comprehension Description:   (Extra time.)  Expression:  Modified Independent  Expression Description:   (Extra time.)  Social Interaction:  Independent  Social Interaction Description:     Problem Solving:  Minimal Assist  Problem Solving Description:  Seat belt, Increased time, Supervision, Therapy schedule, Verbal cueing  Memory:  " Maximal Assist  Memory Description:  Seat belt, Increased time, Supervision, Therapy schedule, Verbal cueing  Barriers to Discharge Home:  Poor attention    Respiratory Therapy:  O2 (LPM): 2  O2 Delivery Device: Silicone Nasal Cannula    Case Management:  Continues to work on disposition and DME needs.      Discharge Date/Disposition:  2/11/25  _____________________________________      Objective:  VITAL SIGNS: BP 93/72   Pulse 80   Temp 36.5 °C (97.7 °F) (Oral)   Resp 18   Ht 1.803 m (5' 11\")   Wt 111 kg (244 lb 13.1 oz)   SpO2 96%   BMI 34.15 kg/m²   Gen: NAD  Psych: Mood and affect appropriate  CV: RRR, 0 edema  Resp: CTAB, no upper airway sounds  Abd: NTND  Neuro: AOx4, following commands  Unchanged from 2/5/25    Laboratory Values:  Recent Results (from the past 72 hours)   POCT glucose device results    Collection Time: 02/04/25  5:12 PM   Result Value Ref Range    POC Glucose, Blood 147 (H) 65 - 99 mg/dL   POCT glucose device results    Collection Time: 02/04/25  9:18 PM   Result Value Ref Range    POC Glucose, Blood 178 (H) 65 - 99 mg/dL   POCT glucose device results    Collection Time: 02/05/25  7:21 AM   Result Value Ref Range    POC Glucose, Blood 173 (H) 65 - 99 mg/dL   POCT glucose device results    Collection Time: 02/05/25 11:39 AM   Result Value Ref Range    POC Glucose, Blood 160 (H) 65 - 99 mg/dL   POCT glucose device results    Collection Time: 02/05/25  5:02 PM   Result Value Ref Range    POC Glucose, Blood 120 (H) 65 - 99 mg/dL   POCT glucose device results    Collection Time: 02/05/25  7:44 PM   Result Value Ref Range    POC Glucose, Blood 156 (H) 65 - 99 mg/dL   URINALYSIS    Collection Time: 02/06/25  1:26 AM    Specimen: Urine, Clean Catch   Result Value Ref Range    Color Yellow     Character Clear     Specific Gravity 1.005 <1.035    Ph 6.5 5.0 - 8.0    Glucose Negative Negative mg/dL    Ketones Negative Negative mg/dL    Protein Negative Negative mg/dL    Bilirubin Negative " Negative    Urobilinogen, Urine 0.2 <=1.0 EU/dL    Nitrite Negative Negative    Leukocyte Esterase Trace (A) Negative    Occult Blood Negative Negative    Micro Urine Req Microscopic    URINE MICROSCOPIC (W/UA)    Collection Time: 02/06/25  1:26 AM   Result Value Ref Range    WBC 0-2 /hpf    RBC 0-2 0 - 2 /hpf    Bacteria None Seen None /hpf    Epithelial Cells 0-2 0 - 5 /hpf    Urine Casts 0-2 0 - 2 /lpf   Basic Metabolic Panel    Collection Time: 02/06/25  5:56 AM   Result Value Ref Range    Sodium 141 135 - 145 mmol/L    Potassium 4.5 3.6 - 5.5 mmol/L    Chloride 103 96 - 112 mmol/L    Co2 29 20 - 33 mmol/L    Glucose 122 (H) 65 - 99 mg/dL    Bun 16 8 - 22 mg/dL    Creatinine 1.06 0.50 - 1.40 mg/dL    Calcium 8.8 8.5 - 10.5 mg/dL    Anion Gap 9.0 7.0 - 16.0   CBC WITH DIFFERENTIAL    Collection Time: 02/06/25  5:56 AM   Result Value Ref Range    WBC 8.1 4.8 - 10.8 K/uL    RBC 4.06 (L) 4.70 - 6.10 M/uL    Hemoglobin 12.3 (L) 14.0 - 18.0 g/dL    Hematocrit 38.4 (L) 42.0 - 52.0 %    MCV 94.6 81.4 - 97.8 fL    MCH 30.3 27.0 - 33.0 pg    MCHC 32.0 (L) 32.3 - 36.5 g/dL    RDW 48.6 35.9 - 50.0 fL    Platelet Count 286 164 - 446 K/uL    MPV 9.6 9.0 - 12.9 fL    Neutrophils-Polys 62.10 44.00 - 72.00 %    Lymphocytes 22.60 22.00 - 41.00 %    Monocytes 11.10 0.00 - 13.40 %    Eosinophils 3.00 0.00 - 6.90 %    Basophils 0.20 0.00 - 1.80 %    Immature Granulocytes 1.00 (H) 0.00 - 0.90 %    Nucleated RBC 0.00 0.00 - 0.20 /100 WBC    Neutrophils (Absolute) 5.00 1.82 - 7.42 K/uL    Lymphs (Absolute) 1.82 1.00 - 4.80 K/uL    Monos (Absolute) 0.89 (H) 0.00 - 0.85 K/uL    Eos (Absolute) 0.24 0.00 - 0.51 K/uL    Baso (Absolute) 0.02 0.00 - 0.12 K/uL    Immature Granulocytes (abs) 0.08 0.00 - 0.11 K/uL    NRBC (Absolute) 0.00 K/uL   MAGNESIUM    Collection Time: 02/06/25  5:56 AM   Result Value Ref Range    Magnesium 1.8 1.5 - 2.5 mg/dL   PROCALCITONIN    Collection Time: 02/06/25  5:56 AM   Result Value Ref Range    Procalcitonin  0.06 <0.25 ng/mL   ESTIMATED GFR    Collection Time: 02/06/25  5:56 AM   Result Value Ref Range    GFR (CKD-EPI) 71 >60 mL/min/1.73 m 2   POCT glucose device results    Collection Time: 02/06/25  7:21 AM   Result Value Ref Range    POC Glucose, Blood 125 (H) 65 - 99 mg/dL   POCT glucose device results    Collection Time: 02/06/25 11:09 AM   Result Value Ref Range    POC Glucose, Blood 128 (H) 65 - 99 mg/dL       Medications:  Scheduled Medications   Medication Dose Frequency    metFORMIN  250 mg BID WITH MEALS    insulin lispro  2-12 Units 4X/DAY ACHS    escitalopram  10 mg DAILY    carvedilol  6.25 mg BID WITH MEALS    montelukast  10 mg Nightly    enoxaparin (LOVENOX) injection  40 mg DAILY    fluticasone-umeclidinium-vilanterol  1 Puff QDAILY (RT)    Pharmacy Consult Request  1 Each PHARMACY TO DOSE    senna-docusate  2 Tablet Q EVENING    omeprazole  20 mg DAILY    atorvastatin  40 mg QHS    bumetanide  1 mg BID    cyanocobalamin  1,000 mcg DAILY    multivitamin  1 Tablet DAILY    QUEtiapine  12.5 mg Nightly    tamsulosin  0.4 mg AFTER BREAKFAST    thiamine  100 mg DAILY    traZODone  100 mg Nightly     PRN medications: hydrALAZINE, acetaminophen, senna-docusate **AND** polyethylene glycol/lytes, docusate sodium, magnesium hydroxide, carboxymethylcellulose, benzocaine-menthol, mag hydrox-al hydrox-simeth, ondansetron **OR** ondansetron, traZODone, sodium chloride, oxyCODONE immediate-release **OR** oxyCODONE immediate-release, midazolam    Diet:  Current Diet Order   Procedures    Diet Order Diet: Consistent CHO (Diabetic)       Medical Decision Making and Plan:  SDH - Patient with fall on 1/26/25 with SDH with conservative management  -PT and OT for mobility and ADLs. Per guidelines, 15 hours per week between PT, OT and/or SLP.  -Follow-up NSG. On Seroquel 12.5 mg QHS -> 25     HTN/CAD/A fib - Patient on Bumex, Coreg 12.5 mg. Coreg reduced  -SBP into 90s, continue Bumex 1 mg BID and Coreg 6.25 mg     S5  fracture - Conservative management. PRN Tylenol and Oxycodone      Chest pain - Right sided, probable rib fracture. CXR completed, consult hospitalist. Concern for pneumonia, started on Augmentin. Improved cough, completed augmentin    Hyponatremia - Check AM CMP - 135, improving     Anemia - Check AM CBC - 13.5, will monitor     Leukocytosis - Check AM CBC - 13.2, increasing. UA negative CXR with possible pneumonia. Consult hospitalist     DM2 with hyperglycemia - Patient on SSI on transfer. Started on Metformin. Continue Metformin 250 mg and SSI     BPH - Patient on Tamsulosin 0.4 mg daily     Insomnia - Patient on Trazodone 100 mg daily     COPD - Patient on Trelegy daily     Pain - Patient on PRN Tylenol and Oxycodone      Skin - Patient at risk for skin breakdown due to debility in areas including sacrum, achilles, elbows and head in addition to other sites. Nursing to assess skin daily.      GI Ppx - Patient on Prilosec for GERD prophylaxis. Patient on Senna-docusate for constipation prophylaxis.      DVT Ppx - Patient Lovenox on transfer. Limited mobility, continue Lovenox  ____________________________________    T. Car Chan MD/PhD  [facility] - Physical Medicine & Rehabilitation   [facility] - Brain Injury Medicine   ____________________________________

## 2025-02-06 NOTE — THERAPY
"Occupational Therapy  Daily Treatment     Patient Name: Vince Carr  Age:  79 y.o., Sex:  male  Medical Record #: [id number]  Today's Date: 2/6/2025     Precautions  Precautions: Fall Risk  Comments: Hx of multiple falls; wear shoes to protect feet    Subjective    Pt seated in w/c upon arrival, agreeable to participate in OT.     C/o dizziness following brief FWW mobility nustep > mat; vitals WFL (see below); symptoms resolved w/ seated rest break.      Objective     02/06/25 1001   OT Charge Group   OT Neuromuscular Re-education / Balance (Units) 2   OT Therapeutic Exercise (Units) 2   OT Total Time Spent   OT Individual Total Time Spent (Mins) 60   Vitals   Patient BP Position Sitting   Blood Pressure  120/69   Sitting Upper Body Exercises   Chest Press 2 sets of 10;Bilateral;Weight (See Comments for lbs)  (w/ 4# weighted ball)   Front Arm Raise 2 sets of 10;Bilateral;Weight (See Comments for lbs)  (w/ 3# dumbbell)   Shoulder Press 2 sets of 10;Bilateral;Weight (See Comments for lbs)  (w/ 4# weighted ball)   Comments Completed above Ue therex EOM   Sitting Lower Body Exercises   Nustep Resistance Level 5  (B UE/LEs, 0.2 miles)   Interdisciplinary Plan of Care Collaboration   Patient Position at End of Therapy Seated;Self Releasing Lap Belt Applied;Call Light within Reach;Tray Table within Reach;Phone within Reach     FWW mobility w/ CGA-' x 2 w/ seated rest break between walking bouts.    Assessment    Pt requiring CGA-SBA for standing balance during functional txfrs and FWW mobility w/ primary barriers being fatigue/decreased endurance and antalgia. Pt requiring increased time and mod cues for dual tasking and working memory (ie 1 minute to name 15 items within \"animal\" category and subtracting 7 from 100 during Nustep activity)  Strengths: Willingly participates in therapeutic activities, Supportive family, Pleasant and cooperative, Motivated for self care and independence, Manages pain " appropriately, Independent prior level of function  Barriers: Pain, Limited mobility, Impaired balance, Impaired activity tolerance, Impaired functional cognition, Generalized weakness, Fatigue    Plan    DC IRF [place] Monday 2/10 in anticipation of DC home w/ son's support Tuesday 2/11; HHOT f/u recommended     Pt would benefit from skilled OT services to address:  - ADLs/IADLs   - Strength/endurance/activity tolerance  - Functional mobility w/ LRD  - Standing balance (static and dynamic)  - Functional Cognition (memory)       DME  OT DME Recommendations  Bathroom Equipment:  (pt owns shower chair)  Additional Equipment:  (TBD; pt owns sock aid)    Passport items to be completed:  Perform bathroom transfers, complete dressing, complete feeding, get ready for the day, prepare a simple meal, participate in household tasks, adapt home for safety needs, demonstrate home exercise program, complete caregiver training     Occupational Therapy Goals (Active)       Problem: Dressing       Dates: Start:  01/29/25         Goal: STG-Within one week, patient will dress LB w/ mod A and AE as needed       Dates: Start:  01/29/25               Problem: Functional Transfers       Dates: Start:  01/29/25         Goal: STG-Within one week, patient will transfer to toilet w/ CGA w/ LRAD       Dates: Start:  01/29/25            Goal: STG-Within one week, patient will transfer to step in shower w/ CGA w/ LRAD       Dates: Start:  01/29/25               Problem: OT Long Term Goals       Dates: Start:  01/29/25         Goal: LTG-By discharge, patient will complete basic self care tasks w/ mod I for UB ADLs and supervision for LB ADLs       Dates: Start:  01/29/25            Goal: LTG-By discharge, patient will perform bathroom transfers w/ supervision w/ LRAD       Dates: Start:  01/29/25            Goal: LTG-By discharge, patient will complete basic home management w/ supervision w/ LRAD        Dates: Start:  01/29/25

## 2025-02-06 NOTE — CARE PLAN
Problem: Fall Risk - Rehab  Goal: Patient will remain free from falls  Outcome: Progressing     Problem: Diabetes Management  Goal: Patient's ability to maintain appropriate glucose levels will be maintained or improve  Outcome: Progressing   The patient is Stable - Low risk of patient condition declining or worsening    Shift Goals  Clinical Goals: Safety  Patient Goals: Rest  Family Goals: no family present

## 2025-02-07 ENCOUNTER — APPOINTMENT (OUTPATIENT)
Dept: PHYSICAL THERAPY | Facility: REHABILITATION | Age: 80
DRG: 949 | End: 2025-02-07
Attending: PHYSICAL MEDICINE & REHABILITATION
Payer: MEDICARE

## 2025-02-07 ENCOUNTER — APPOINTMENT (OUTPATIENT)
Dept: SPEECH THERAPY | Facility: REHABILITATION | Age: 80
DRG: 949 | End: 2025-02-07
Attending: PHYSICAL MEDICINE & REHABILITATION
Payer: MEDICARE

## 2025-02-07 ENCOUNTER — APPOINTMENT (OUTPATIENT)
Dept: OCCUPATIONAL THERAPY | Facility: REHABILITATION | Age: 80
DRG: 949 | End: 2025-02-07
Attending: PHYSICAL MEDICINE & REHABILITATION
Payer: MEDICARE

## 2025-02-07 ENCOUNTER — APPOINTMENT (OUTPATIENT)
Dept: PHYSICAL MEDICINE AND REHAB | Facility: REHABILITATION | Age: 80
DRG: 949 | End: 2025-02-07
Attending: PHYSICAL MEDICINE & REHABILITATION
Payer: MEDICARE

## 2025-02-07 LAB
GLUCOSE BLD STRIP.AUTO-MCNC: 135 MG/DL (ref 65–99)
GLUCOSE BLD STRIP.AUTO-MCNC: 143 MG/DL (ref 65–99)

## 2025-02-07 PROCEDURE — A9270 NON-COVERED ITEM OR SERVICE: HCPCS | Performed by: PHYSICAL MEDICINE & REHABILITATION

## 2025-02-07 PROCEDURE — 97530 THERAPEUTIC ACTIVITIES: CPT

## 2025-02-07 PROCEDURE — 700102 HCHG RX REV CODE 250 W/ 637 OVERRIDE(OP): Performed by: HOSPITALIST

## 2025-02-07 PROCEDURE — 97116 GAIT TRAINING THERAPY: CPT

## 2025-02-07 PROCEDURE — 82962 GLUCOSE BLOOD TEST: CPT

## 2025-02-07 PROCEDURE — 94640 AIRWAY INHALATION TREATMENT: CPT

## 2025-02-07 PROCEDURE — 94760 N-INVAS EAR/PLS OXIMETRY 1: CPT

## 2025-02-07 PROCEDURE — 97129 THER IVNTJ 1ST 15 MIN: CPT

## 2025-02-07 PROCEDURE — 97130 THER IVNTJ EA ADDL 15 MIN: CPT

## 2025-02-07 PROCEDURE — 770010 HCHG ROOM/CARE - REHAB SEMI PRIVAT*

## 2025-02-07 PROCEDURE — 99232 SBSQ HOSP IP/OBS MODERATE 35: CPT | Performed by: HOSPITALIST

## 2025-02-07 PROCEDURE — 97535 SELF CARE MNGMENT TRAINING: CPT

## 2025-02-07 PROCEDURE — 97110 THERAPEUTIC EXERCISES: CPT

## 2025-02-07 PROCEDURE — 700111 HCHG RX REV CODE 636 W/ 250 OVERRIDE (IP): Mod: JZ | Performed by: PHYSICAL MEDICINE & REHABILITATION

## 2025-02-07 PROCEDURE — 700102 HCHG RX REV CODE 250 W/ 637 OVERRIDE(OP): Performed by: PHYSICAL MEDICINE & REHABILITATION

## 2025-02-07 PROCEDURE — A9270 NON-COVERED ITEM OR SERVICE: HCPCS | Performed by: HOSPITALIST

## 2025-02-07 PROCEDURE — 97112 NEUROMUSCULAR REEDUCATION: CPT

## 2025-02-07 PROCEDURE — 99232 SBSQ HOSP IP/OBS MODERATE 35: CPT | Performed by: PHYSICAL MEDICINE & REHABILITATION

## 2025-02-07 RX ADMIN — ESCITALOPRAM OXALATE 10 MG: 10 TABLET ORAL at 08:27

## 2025-02-07 RX ADMIN — BUMETANIDE 1 MG: 1 TABLET ORAL at 20:15

## 2025-02-07 RX ADMIN — THERA TABS 1 TABLET: TAB at 08:26

## 2025-02-07 RX ADMIN — CYANOCOBALAMIN TAB 500 MCG 1000 MCG: 500 TAB at 08:27

## 2025-02-07 RX ADMIN — METFORMIN HYDROCHLORIDE 250 MG: 500 TABLET ORAL at 08:28

## 2025-02-07 RX ADMIN — ENOXAPARIN SODIUM 40 MG: 100 INJECTION SUBCUTANEOUS at 08:34

## 2025-02-07 RX ADMIN — ATORVASTATIN CALCIUM 40 MG: 40 TABLET, FILM COATED ORAL at 20:15

## 2025-02-07 RX ADMIN — FLUTICASONE FUROATE, UMECLIDINIUM BROMIDE AND VILANTEROL TRIFENATATE 1 PUFF: 200; 62.5; 25 POWDER RESPIRATORY (INHALATION) at 06:51

## 2025-02-07 RX ADMIN — SENNOSIDES AND DOCUSATE SODIUM 2 TABLET: 50; 8.6 TABLET ORAL at 20:15

## 2025-02-07 RX ADMIN — TAMSULOSIN HYDROCHLORIDE 0.4 MG: 0.4 CAPSULE ORAL at 08:25

## 2025-02-07 RX ADMIN — QUETIAPINE FUMARATE 50 MG: 25 TABLET ORAL at 16:14

## 2025-02-07 RX ADMIN — TRAZODONE HYDROCHLORIDE 100 MG: 100 TABLET ORAL at 20:15

## 2025-02-07 RX ADMIN — Medication 100 MG: at 08:27

## 2025-02-07 RX ADMIN — MONTELUKAST 10 MG: 10 TABLET, FILM COATED ORAL at 20:15

## 2025-02-07 RX ADMIN — METFORMIN HYDROCHLORIDE 250 MG: 500 TABLET ORAL at 16:15

## 2025-02-07 RX ADMIN — OMEPRAZOLE 20 MG: 20 CAPSULE, DELAYED RELEASE ORAL at 08:27

## 2025-02-07 RX ADMIN — BUMETANIDE 1 MG: 1 TABLET ORAL at 08:27

## 2025-02-07 ASSESSMENT — ENCOUNTER SYMPTOMS
ABDOMINAL PAIN: 0
DIARRHEA: 0
SHORTNESS OF BREATH: 0
FEVER: 0
NAUSEA: 0
VOMITING: 0
NERVOUS/ANXIOUS: 0
CHILLS: 0

## 2025-02-07 ASSESSMENT — ACTIVITIES OF DAILY LIVING (ADL): TOILET_TRANSFER_DESCRIPTION: GRAB BAR;INCREASED TIME;SET-UP OF EQUIPMENT;SUPERVISION FOR SAFETY;VERBAL CUEING

## 2025-02-07 ASSESSMENT — GAIT ASSESSMENTS
ASSISTIVE DEVICE: FRONT WHEEL WALKER
DEVIATION: BRADYKINETIC;SHUFFLED GAIT;ANTALGIC
GAIT LEVEL OF ASSIST: CONTACT GUARD ASSIST
GAIT LEVEL OF ASSIST: CONTACT GUARD ASSIST
DISTANCE (FEET): 102
ASSISTIVE DEVICE: FRONT WHEEL WALKER;OTHER (COMMENTS)

## 2025-02-07 NOTE — FLOWSHEET NOTE
02/07/25 0652   Events/Summary/Plan   Events/Summary/Plan MDI   Skin Integrity Intact   Protective Device Foam Pads   Location ears   Vital Signs   Pulse 66   Respiration 18   Pulse Oximetry 96 %   $ Pulse Oximetry (Spot Check) Yes   Respiratory Assessment   Respiratory Pattern Within Normal Limits   Level of Consciousness Alert   Chest Exam   Work Of Breathing / Effort Within Normal Limits   Breath Sounds   RUL Breath Sounds Clear   RML Breath Sounds Clear   RLL Breath Sounds Clear   PRATIK Breath Sounds Clear   LLL Breath Sounds Clear   Oxygen   O2 (LPM) 2   O2 Delivery Device Nasal Cannula

## 2025-02-07 NOTE — THERAPY
"Speech Language Pathology  Daily Treatment     Patient Name: Vince Carr  Age:  79 y.o., Sex:  male  Medical Record #: [id number]  Today's Date: 2/6/2025     Precautions  Precautions: Fall Risk  Comments: Hx of multiple falls; wear shoes to protect feet    Subjective    Pt pleasant and cooperative. \"I'm going to ride my motorcycle right away on Tuesday when I get out of here.\"     Objective       02/06/25 1501   Treatment Charges   SLP Cognitive Skill Development First 15 Minutes 1   SLP Cognitive Skill Development Additional 15 Minutes 3   SLP Total Time Spent   SLP Individual Total Time Spent (Mins) 60   Cognition - Detailed   Cognitive-Linguistic (WDL) X   Safety Awareness Severe (2)   Medication Management  Supervision (5)   Interdisciplinary Plan of Care Collaboration   IDT Collaboration with  Physical Therapist   Patient Position at End of Therapy Seated;Self Releasing Lap Belt Applied;Chair Alarm On;Call Light within Reach;Tray Table within Reach;Phone within Reach   Collaboration Comments Pt has HW to ask PT about balance and reaction time for riding motorcycle         Assessment    Pt completed medication sort w/ one initial cue to complete for the whole week, then completed w/ 100% acc using self talk to ensure that he was completing accurately. Alternating attention task 'Who has more?' Completed with MIN verbal cues.    Pt req'd MAX-MOD cues to problem solve safety awareness for returning to riding his motorcycle- pt reports he is as capable of riding as he was premorbid. Pt req'd direct verbal cues to determine physical and cognitive skills required and given HW to ask pt about reaction time and balance.     Strengths: Able to follow instructions, Motivated for self care and independence, Pleasant and cooperative, Supportive family, Willingly participates in therapeutic activities  Barriers: Confused, Generalized weakness, Impaired activity tolerance, Impaired carryover of learning, Impaired " functional cognition, Pain (Patient complains of back and left shoulder pain.)    Plan    Continue to address safety awareness, problem solving, and planning.     Passport items to be completed:  Express basic needs, understand food/liquid recommendations, consistently follow swallow precautions, manage finances, manage medications, arrive to therapy appointments on time, complete daily memory log entries, solve problems related to safety situations, review education related to hospitalization, complete caregiver training     Speech Therapy Problems (Active)       Problem: Memory STGs       Dates: Start:  02/06/25         Goal: STG-Within one week, patient will recall daily events and new training with 75% acc with external and internal memory aids and strategies, with mod cues       Dates: Start:  02/06/25               Problem: Problem Solving STGs       Dates: Start:  02/06/25         Goal: STG-Within one week, patient will perform simple alternating attention tasks with 80% acc with min cues.       Dates: Start:  02/06/25               Problem: Speech/Swallowing LTGs       Dates: Start:  01/29/25         Goal: LTG-By discharge, patient will solve complex problem and recall safety training with 80% acc with set up or spv.       Dates: Start:  01/29/25

## 2025-02-07 NOTE — PROGRESS NOTES
NURSING DAILY NOTE    Name: Vince Carr   Date of Admission: 1/28/2025   Admitting Diagnosis: No Principal Problem: There is no principal problem currently on the Problem List. Please update the Problem List and refresh.  Attending Physician: MAGALY RICHARDSON M.D.  Allergies: Iodine and Naproxen    Safety  Patient Assist  Mod Assist  Patient Precautions  Fall Risk  Precaution Comments  Hx of multiple falls; wear shoes to protect feet  Bed Transfer Status  Standby Assist  Toilet Transfer Status   Standby Assist  Assistive Devices  Rails, Wheelchair  Oxygen  Nasal Cannula  Diet/Therapeutic Dining  Current Diet Order   Procedures    Diet Order Diet: Consistent CHO (Diabetic)     Pill Administration  whole  Agitated Behavioral Scale     ABS Level of Severity       Fall Risk  Has the patient had a fall this admission?   No  Ximena Aguiar Fall Risk Scoring  17, HIGH RISK  Fall Risk Safety Measures  bed alarm and chair alarm    Vitals  Temperature: 36.5 °C (97.7 °F)  Temp src: Temporal  Pulse: 74  Respiration: 18  Blood Pressure : 111/72  Blood Pressure MAP (Calculated): 85 MM HG  BP Location: Right, Upper Arm  Patient BP Position: Supine     Oxygen  Pulse Oximetry: 94 %  O2 (LPM): 2  FiO2%: 21 %  O2 Delivery Device: Nasal Cannula    Bowel and Bladder  Last Bowel Movement  02/05/25  Stool Type  Type 6: Fluffy pieces with ragged edges, a mushy stool  Bowel Device  Bathroom, Other (Comment) (Bowel Meds)  Continent  Bladder: Did not void   Bowel: No movement  Bladder Function  Urine Void (mL): 550 ml  Number of Times Voided: 1  Urinary Options: Yes  Urine Color: Yellow  Number of Times Incontinent of Urine: 1  Straight Catheter: 600 ml  Wet Diaper Count: 1  Genitourinary Assessment   Bladder Assessment (WDL):  Within Defined Limits  Diaz Catheter: Not Applicable  Urine Color: Yellow  Number of Bladder Accidents: 1  Total Number of Bladder of Accidents  in Last 7 Days: 1  Number of Times Incontinent of Urine: 1  Bladder Device: Urinal  Time Void: Yes  Bladder Scan: Post Void  $ Bladder Scan Results (mL): 95  Bladder Medications: Yes    Skin  Thanh Score   17  Sensory Interventions   Bed Types: Standard/Trauma Mattress  Skin Preventative Measures: Pillows in Use for Support / Positioning  Moisture Interventions  Moisturizers/Barriers: Barrier Wipes      Pain  Pain Rating Scale  0 - No Pain  Pain Location  Knee  Pain Location Orientation  Right  Pain Interventions   Declines    ADLs    Bathing   Staff, Shower  Linen Change   Partial  Personal Hygiene  Change Charley Pads, Perineal Care  Chlorhexidine Bath      Oral Care  Brushed Teeth  Teeth/Dentures     Shave     Nutrition Percentage Eaten  *  * Meal *  *, Lunch, Between % Consumed  Environmental Precautions  Treaded Slipper Socks on Patient, Personal Belongings, Wastebasket, Call Bell etc. in Easy Reach, Bed in Low Position  Patient Turns/Positioning  Patient turns self independently side to side without assistance, to offload sacral area  Patient Turns Assistance/Tolerance  Assistance of One  Bed Positions  Bed Controls On, Bed Locked  Head of Bed Elevated  Self regulated      Psychosocial/Neurologic Assessment  Psychosocial Assessment  Psychosocial (WDL):  WDL Except  Patient Behaviors: Forgetful  Neurologic Assessment  Neuro (WDL): Exceptions to WDL  Level of Consciousness: Alert  Orientation Level: Oriented to place, Oriented to situation, Oriented to person  Cognition: Appropriate safety awareness, Follows commands  Speech: Clear  Pupil Assesment: No  EENT (WDL):  WDL Except    Cardio/Pulmonary Assessment  Edema   RLE Edema: 1+  LLE Edema: 1+  Respiratory Breath Sounds  RUL Breath Sounds: Clear  RML Breath Sounds: Clear  RLL Breath Sounds: Clear  PRATIK Breath Sounds: Clear  LLL Breath Sounds: Clear  Cardiac Assessment   Cardiac (WDL):  WDL Except (h/o HTN, AFib, CAD, MI)

## 2025-02-07 NOTE — PROGRESS NOTES
"  Physical Medicine & Rehabilitation Progress Note    Encounter Date: 2/7/2025    Chief Complaint: Decreased mobility    Interval Events (Subjective):  Patient sitting up in room. He reports he is doing OK. He reports therapy is going well. Denies pain at rest. He has hip and back pain with movement.     _____________________________________  Interdisciplinary Team Conference   Most recent IDT on 2/6/2025    Discharge Date/Disposition:  2/11/25  _____________________________________      Objective:  VITAL SIGNS: /64   Pulse 74   Temp 36.9 °C (98.4 °F) (Oral)   Resp 20   Ht 1.803 m (5' 11\")   Wt 111 kg (244 lb 13.1 oz)   SpO2 97%   BMI 34.15 kg/m²   Gen: NAD  Psych: Mood and affect appropriate  CV: RRR, 0 edema  Resp: CTAB, no upper airway sounds  Abd: NTND  Neuro: AOx4, following commands    Laboratory Values:  Recent Results (from the past 72 hours)   POCT glucose device results    Collection Time: 02/04/25  5:12 PM   Result Value Ref Range    POC Glucose, Blood 147 (H) 65 - 99 mg/dL   POCT glucose device results    Collection Time: 02/04/25  9:18 PM   Result Value Ref Range    POC Glucose, Blood 178 (H) 65 - 99 mg/dL   POCT glucose device results    Collection Time: 02/05/25  7:21 AM   Result Value Ref Range    POC Glucose, Blood 173 (H) 65 - 99 mg/dL   POCT glucose device results    Collection Time: 02/05/25 11:39 AM   Result Value Ref Range    POC Glucose, Blood 160 (H) 65 - 99 mg/dL   POCT glucose device results    Collection Time: 02/05/25  5:02 PM   Result Value Ref Range    POC Glucose, Blood 120 (H) 65 - 99 mg/dL   POCT glucose device results    Collection Time: 02/05/25  7:44 PM   Result Value Ref Range    POC Glucose, Blood 156 (H) 65 - 99 mg/dL   URINALYSIS    Collection Time: 02/06/25  1:26 AM    Specimen: Urine, Clean Catch   Result Value Ref Range    Color Yellow     Character Clear     Specific Gravity 1.005 <1.035    Ph 6.5 5.0 - 8.0    Glucose Negative Negative mg/dL    Ketones " Negative Negative mg/dL    Protein Negative Negative mg/dL    Bilirubin Negative Negative    Urobilinogen, Urine 0.2 <=1.0 EU/dL    Nitrite Negative Negative    Leukocyte Esterase Trace (A) Negative    Occult Blood Negative Negative    Micro Urine Req Microscopic    URINE MICROSCOPIC (W/UA)    Collection Time: 02/06/25  1:26 AM   Result Value Ref Range    WBC 0-2 /hpf    RBC 0-2 0 - 2 /hpf    Bacteria None Seen None /hpf    Epithelial Cells 0-2 0 - 5 /hpf    Urine Casts 0-2 0 - 2 /lpf   Basic Metabolic Panel    Collection Time: 02/06/25  5:56 AM   Result Value Ref Range    Sodium 141 135 - 145 mmol/L    Potassium 4.5 3.6 - 5.5 mmol/L    Chloride 103 96 - 112 mmol/L    Co2 29 20 - 33 mmol/L    Glucose 122 (H) 65 - 99 mg/dL    Bun 16 8 - 22 mg/dL    Creatinine 1.06 0.50 - 1.40 mg/dL    Calcium 8.8 8.5 - 10.5 mg/dL    Anion Gap 9.0 7.0 - 16.0   CBC WITH DIFFERENTIAL    Collection Time: 02/06/25  5:56 AM   Result Value Ref Range    WBC 8.1 4.8 - 10.8 K/uL    RBC 4.06 (L) 4.70 - 6.10 M/uL    Hemoglobin 12.3 (L) 14.0 - 18.0 g/dL    Hematocrit 38.4 (L) 42.0 - 52.0 %    MCV 94.6 81.4 - 97.8 fL    MCH 30.3 27.0 - 33.0 pg    MCHC 32.0 (L) 32.3 - 36.5 g/dL    RDW 48.6 35.9 - 50.0 fL    Platelet Count 286 164 - 446 K/uL    MPV 9.6 9.0 - 12.9 fL    Neutrophils-Polys 62.10 44.00 - 72.00 %    Lymphocytes 22.60 22.00 - 41.00 %    Monocytes 11.10 0.00 - 13.40 %    Eosinophils 3.00 0.00 - 6.90 %    Basophils 0.20 0.00 - 1.80 %    Immature Granulocytes 1.00 (H) 0.00 - 0.90 %    Nucleated RBC 0.00 0.00 - 0.20 /100 WBC    Neutrophils (Absolute) 5.00 1.82 - 7.42 K/uL    Lymphs (Absolute) 1.82 1.00 - 4.80 K/uL    Monos (Absolute) 0.89 (H) 0.00 - 0.85 K/uL    Eos (Absolute) 0.24 0.00 - 0.51 K/uL    Baso (Absolute) 0.02 0.00 - 0.12 K/uL    Immature Granulocytes (abs) 0.08 0.00 - 0.11 K/uL    NRBC (Absolute) 0.00 K/uL   MAGNESIUM    Collection Time: 02/06/25  5:56 AM   Result Value Ref Range    Magnesium 1.8 1.5 - 2.5 mg/dL   PROCALCITONIN     Collection Time: 02/06/25  5:56 AM   Result Value Ref Range    Procalcitonin 0.06 <0.25 ng/mL   ESTIMATED GFR    Collection Time: 02/06/25  5:56 AM   Result Value Ref Range    GFR (CKD-EPI) 71 >60 mL/min/1.73 m 2   POCT glucose device results    Collection Time: 02/06/25  7:21 AM   Result Value Ref Range    POC Glucose, Blood 125 (H) 65 - 99 mg/dL   POCT glucose device results    Collection Time: 02/06/25 11:09 AM   Result Value Ref Range    POC Glucose, Blood 128 (H) 65 - 99 mg/dL   POCT glucose device results    Collection Time: 02/06/25  4:59 PM   Result Value Ref Range    POC Glucose, Blood 117 (H) 65 - 99 mg/dL   POCT glucose device results    Collection Time: 02/06/25  9:25 PM   Result Value Ref Range    POC Glucose, Blood 140 (H) 65 - 99 mg/dL   POCT glucose device results    Collection Time: 02/07/25  7:20 AM   Result Value Ref Range    POC Glucose, Blood 143 (H) 65 - 99 mg/dL   POCT glucose device results    Collection Time: 02/07/25 11:04 AM   Result Value Ref Range    POC Glucose, Blood 135 (H) 65 - 99 mg/dL       Medications:  Scheduled Medications   Medication Dose Frequency    QUEtiapine  50 mg Nightly    metFORMIN  250 mg BID WITH MEALS    escitalopram  10 mg DAILY    carvedilol  6.25 mg BID WITH MEALS    montelukast  10 mg Nightly    enoxaparin (LOVENOX) injection  40 mg DAILY    fluticasone-umeclidinium-vilanterol  1 Puff QDAILY (RT)    senna-docusate  2 Tablet Q EVENING    omeprazole  20 mg DAILY    atorvastatin  40 mg QHS    bumetanide  1 mg BID    cyanocobalamin  1,000 mcg DAILY    multivitamin  1 Tablet DAILY    tamsulosin  0.4 mg AFTER BREAKFAST    thiamine  100 mg DAILY    traZODone  100 mg Nightly     PRN medications: hydrALAZINE, acetaminophen, senna-docusate **AND** polyethylene glycol/lytes, docusate sodium, magnesium hydroxide, carboxymethylcellulose, benzocaine-menthol, mag hydrox-al hydrox-simeth, ondansetron **OR** ondansetron, traZODone, sodium chloride, oxyCODONE immediate-release  **OR** oxyCODONE immediate-release, midazolam    Diet:  Current Diet Order   Procedures    Diet Order Diet: Consistent CHO (Diabetic)       Medical Decision Making and Plan:  SDH - Patient with fall on 1/26/25 with SDH with conservative management  -PT and OT for mobility and ADLs. Per guidelines, 15 hours per week between PT, OT and/or SLP.  -Follow-up NSG. On Seroquel 12.5 mg QHS -> 25. Increased to 50 mg due to confusion at night. Continue 50 mg QHS     HTN/CAD/A fib - Patient on Bumex, Coreg 12.5 mg. Coreg reduced  -SBP into 110s, continue Bumex 1 mg and Coreg 6.25 mg      S5 fracture - Conservative management. PRN Tylenol and Oxycodone      Chest pain - Right sided, probable rib fracture. CXR completed, consult hospitalist. Concern for pneumonia, started on Augmentin. Improved cough, completed augmentin    Hyponatremia - Check AM CMP - 135, improving     Anemia - Check AM CBC - 13.5, will monitor     Leukocytosis - Check AM CBC - 13.2, increasing. UA negative CXR with possible pneumonia. Consult hospitalist     DM2 with hyperglycemia - Patient on SSI on transfer. Started on Metformin. Continue Metformin 250 mg and SSI     BPH - Patient on Tamsulosin 0.4 mg daily     Insomnia - Patient on Trazodone 100 mg daily     COPD - Patient on Trelegy daily     Pain - Patient on PRN Tylenol and Oxycodone      Skin - Patient at risk for skin breakdown due to debility in areas including sacrum, achilles, elbows and head in addition to other sites. Nursing to assess skin daily.      GI Ppx - Patient on Prilosec for GERD prophylaxis. Patient on Senna-docusate for constipation prophylaxis.      DVT Ppx - Patient Lovenox on transfer. Limited mobility, continue Lovenox  ____________________________________    T. Car Chan MD/PhD  [facility] - Physical Medicine & Rehabilitation   [facility] - Brain Injury Medicine   ____________________________________

## 2025-02-07 NOTE — PROGRESS NOTES
"  PSYCHOLOGY PROGRESS NOTE     Treatment Time/Location:  in pt's room in hospital with son present from 915 on with pt's consent (spoke with son [phone] w/ CM)   Total Treatment Time: 31 Minutes  Service Type: 55022     Intervention: Vince Carr was seen in context of [facility] hospitalization for Psychology follow-up. Cognitive behavioral therapy (CBT) techniques applied in session with supportive and processing therapy was provided with normalization and validation of pt's experience emphasized.    Engaged in discussion with pt regarding hospitalization, adjustment, coping, and discharge planning. Pt reported good progress in his rehab therapies and that things have been going \"well.\" Pt described ongoing processing of grief/loss of spouse, Lil, which was normalized and validated. In discussing reasons for hospitalization, he reported \"Fall\" and \"my wife ;\" when queried about cognition and cognitive abilities s/p fall & SDH, pt did not appreciate/express changes to cognition. When asked about recommendations by team for support at discharge or recommendations for not return to driving, pt indicated he was uncertain of recommendations in place. Reviewed with pt & his son recommendations for not returning to driving, oversight of iADLs, & consistent supervision at this time. Education related to cognition and factors that influence cognition such as organic processes, grief/loss, medical or metabolic factors, fatigue, etc.; family/relational changes with parental aging. Discussed recommendation for updated screening by PCP in 2-3 months to determine appropriateness of neuropsychological evaluation. Discussed some communication and orientation strategies for confusion late in the day time. Provided overview of concern for self-neglect for pt if/when he returns home alone, to which pt stated, \"I'd have to take care of myself.\"    Behavioral Observations: Vince" "Bruno was alert, oriented, and agreeable to encounter (not oriented to year). Pt participated appropriately and cooperatively. Language functions, eye contact, and non-verbal behaviors were unremarkable. Thought processes were connected, logical, and goal oriented (negative for AH/VH/HI); mood and affect were congruent and euthymic.    Risk Assessment: Pt endorsed continued thoughts of death/dying with lessening intensity over the past few days with active desire for death. He again stated that grief/loss of spouse has been precipitant to thoughts of death/dying. He stated \"I'd never do it,\" and did not endorse intent or plan. Thus, risk is felt to be low at this time; will continue to provide ongoing re-assessment of risk and increased therapeutic contact. Ongoing education related to self-neglect and passive suicidal ideation encouraged with continued risk assessment as outpatient.     Plan:  Will continue to follow and assess cognition, mood, pain management, and coping.     Recommendations:  1) Previous recommendations remain needed as outlined in initial evaluation.  2) PCP for cognitive evaluation in 2-3 months to determine appropriateness of neuropsychological evaluation.  3) Return to driving or independently managing iADLs is not recommended at this time; pt may exhibit disorientation later in the day time for which reorientation is recommended.    Rafia Wills, PhD, ABPP-Rp  Licensed Psychologist      The utility and purpose of this service was reviewed and pt agreed to participate.  Pt demonstrated ability and willingness to work towards goals, receptivity to psychological support and plan of care. Therapy goals/plans were reviewed and discussed with pt and pt was in agreement.   "

## 2025-02-07 NOTE — THERAPY
Physical Therapy   Daily Treatment     Patient Name: Vince Carr  Age:  79 y.o., Sex:  male  Medical Record #: [id number]  Today's Date: 2/7/2025     Precautions  Precautions: Fall Risk  Comments: Hx of multiple falls; wear shoes to protect feet    Subjective    Pt unable to remember topic ST requested that he discuss with PT today. Believes he is safe to return home and safe to ride his motorcycle despite acknowledging balance and reaction deficits at this     Objective       02/07/25 1101   PT Charge Group   PT Gait Training (Units) 1   PT Therapeutic Exercise (Units) 1   PT Total Time Spent   PT Individual Total Time Spent (Mins) 30   Vitals   O2 (LPM) 2   O2 Delivery Device Nasal Cannula   Gait Functional Level of Assist    Gait Level Of Assist Contact Guard Assist   Assistive Device Front Wheel Walker   Distance (Feet) 102   # of Times Distance was Traveled 1  (as well as 50-80ftx3)   Deviation Bradykinetic;Shuffled Gait;Antalgic   Sitting Lower Body Exercises   Sit to Stand 1 set of 10  (from elevated mat no UE support, x2 reps from 18H chair encouraging decr use of UE support)   Bed Mobility    Sit to Stand Contact Guard Assist   Interdisciplinary Plan of Care Collaboration   IDT Collaboration with  Family / Caregiver;Physician   Patient Position at End of Therapy Seated;Chair Alarm On;Self Releasing Lap Belt Applied  (in dining room for lunch)   Collaboration Comments discussion of POC         Extra time discussing POC with pt and his son, Morgan. Primary impairments include decreased safety awareness, poor memory, decreased insight into impairments, and history of frequent falls    Assessment    Pt is making slow but steady progress with regard to his endurance and dynamic balance with upper extremity support on walker. He is limited by impaired cognition and significantly impaired insight into current deficits and safety awareness.   Strengths: Motivated for self care and independence, Pleasant and  cooperative, Supportive family, Willingly participates in therapeutic activities  Barriers: Decreased endurance, Fatigue, Impaired activity tolerance, Impaired balance, Impaired insight/denial of deficits, Impaired functional cognition, Limited mobility, Pain (lives alone, hx of ~10 falls in past month)    Plan    Balance training  transfers  Ambulation with FWW  LE strengthening    DME  PT DME Recommendations  Assistive Device:  (Pt has FWW, 4WW, SPT, QC)  Additional Equipment:  (TBD; pt owns sock aid)    Passport items to be completed:  Get in/out of bed safely, in/out of a vehicle, safely use mobility device, walk or wheel around home/community, navigate up and down stairs, show how to get up/down from the ground, ensure home is accessible, demonstrate HEP, complete caregiver training    Physical Therapy Problems (Active)       Problem: PT-Long Term Goals       Dates: Start:  01/29/25         Goal: LTG-By discharge, patient will ambulate 50 ft LRAD SPV/mod I indoors        Dates: Start:  01/29/25            Goal: LTG-By discharge, patient will transfer one surface to another SPV/mod I SPT LRAD       Dates: Start:  01/29/25            Goal: LTG-By discharge, patient will amb up/down threshold for SUZANNA home with LRAD SPV       Dates: Start:  01/29/25

## 2025-02-07 NOTE — PROGRESS NOTES
Nursing requested discontinuation of blood sugar finger stick due to consistent low readings. Dr. Ramirez to review chart in the morning.

## 2025-02-07 NOTE — IDT DISCHARGE PLANNING
Case Management/IDT follow up.   Projected dc date: 2.11  IDT continues to recommend IRF level of care as patient continue to make progress with all therapies.     DC needs  Ultimate goal is for pt to transition to [facility]; patient may need SNF inititially and then go to [facility]; I met w/ pt yesterday and spoke w/ his son Morgan today who is very supportive and involved. I provided him w/ info on Aid and Attendence also.      Will need referrals to: pcp; neurology/ neuro psych;         Plan:  Continue to assist w/ dc planning

## 2025-02-07 NOTE — THERAPY
"Speech Language Pathology  Daily Treatment     Patient Name: Vince Carr  Age:  79 y.o., Sex:  male  Medical Record #: [id number]  Today's Date: 2/7/2025     Precautions  Precautions: Fall Risk  Comments: Hx of multiple falls; wear shoes to protect feet    Subjective    Pt awake in bed and watching TV upon arrival. Pt agreeable to session this AM.     RN present taking BS which pt expressed distaste for process. Denied monitoring at home. Reported he would not monitor at home once d/c. Discussed the dangers of unmonitored/uncontrolled BS to which pt stated \"how much BS is needed to die.\" Pt denied any active wish to harm himself or plan.      Objective       02/07/25 0701   Treatment Charges   SLP Cognitive Skill Development First 15 Minutes 1   SLP Cognitive Skill Development Additional 15 Minutes 3   SLP Total Time Spent   SLP Individual Total Time Spent (Mins) 60   Cognition - Detailed   Complex Attention Moderate (3)   Safety Awareness Severe (2)   Interdisciplinary Plan of Care Collaboration   IDT Collaboration with  Nursing   Patient Position at End of Therapy Seated;Chair Alarm On;Other (Comments)  (in cafeteria awaiting AM tray)   Collaboration Comments RN taking BS         Assessment    Cognition  Purpose: to approve attention and focus and other: safety awareness  Interventions:   Attention: Alternating  Safety Awareness  Description of Intervention(s): Safety awareness through BS/med mngt discussion. Pt would continue to benefit given increased education. Trial making with multiple errors- unable to complete without MAX A. Online scavenger hunt MOD-MAX A with attention in task between objects and navigation of phone. 100% IND in simple alternating task of calendar with related questions.       Strengths: Able to follow instructions, Motivated for self care and independence, Pleasant and cooperative, Supportive family, Willingly participates in therapeutic activities  Barriers: Confused, Generalized " weakness, Impaired activity tolerance, Impaired carryover of learning, Impaired functional cognition, Pain (Patient complains of back and left shoulder pain.)    Plan    Safety awareness, med mngt regarding BS, alternating attention.     Passport items to be completed:  Express basic needs, understand food/liquid recommendations, consistently follow swallow precautions, manage finances, manage medications, arrive to therapy appointments on time, complete daily memory log entries, solve problems related to safety situations, review education related to hospitalization, complete caregiver training     Speech Therapy Problems (Active)       Problem: Memory STGs       Dates: Start:  02/06/25         Goal: STG-Within one week, patient will recall daily events and new training with 75% acc with external and internal memory aids and strategies, with mod cues       Dates: Start:  02/06/25               Problem: Problem Solving STGs       Dates: Start:  02/06/25         Goal: STG-Within one week, patient will perform simple alternating attention tasks with 80% acc with min cues.       Dates: Start:  02/06/25               Problem: Speech/Swallowing LTGs       Dates: Start:  01/29/25         Goal: LTG-By discharge, patient will solve complex problem and recall safety training with 80% acc with set up or spv.       Dates: Start:  01/29/25

## 2025-02-07 NOTE — THERAPY
Occupational Therapy  Daily Treatment     Patient Name: Vince Carr  Age:  79 y.o., Sex:  male  Medical Record #: [id number]  Today's Date: 2/7/2025     Precautions  Precautions: Fall Risk  Comments: Hx of multiple falls; wear shoes to protect feet    Subjective    Pt pleasant and motivated to participate in OT     Objective     02/07/25 0931   OT Charge Group   Charges Yes   OT Self Care / ADL (Units) 2   OT Neuromuscular Re-education / Balance (Units) 2   OT Total Time Spent   OT Individual Total Time Spent (Mins) 60   Precautions   Precautions Fall Risk   Comments Hx of multiple falls; wear shoes to protect feet   Vitals   O2 (LPM) 2   O2 Delivery Device Nasal Cannula   Pain   Intervention Declines   Cognition    Level of Consciousness Alert   ABS (Agitated Behavior Scale)   Agitated Behavior Scale Performed No   Sleep/Wake Cycle   Sleep & Rest Awake;Out of bed   Functional Level of Assist   Grooming Supervision;Seated   Grooming Description Increased time;Supervision for safety   Lower Body Dressing Moderate Assist   Lower Body Dressing Description Assist with closures;Increased time;Initial preparation for task;Supervision for safety;Verbal cueing  (Provided assist to don suspenders)   Toileting Minimal Assist   Toileting Description Assist to pull pants up;Grab bar;Increased time;Initial preparation for task;Supervision for safety;Verbal cueing   Toilet Transfers Standby Assist   Toilet Transfer Description Grab bar;Increased time;Set-up of equipment;Supervision for safety;Verbal cueing   Balance   Comments Pt tolerated x2 pipe tree activities w/ SBA, in order to improve activity tolerance and standing balance. Provided SBA for STS tranfers from w/c. Pt required x5 rest breaks d/t fatigue.   Interdisciplinary Plan of Care Collaboration   IDT Collaboration with  Family / Caregiver;Physician   Patient Position at End of Therapy Seated;Chair Alarm On;Call Light within Reach;Tray Table within Reach;Phone  within Reach   Collaboration Comments Pt's son present for tx; Dr. Elma wang   Strengths & Barriers   Strengths Willingly participates in therapeutic activities;Supportive family;Pleasant and cooperative;Motivated for self care and independence;Manages pain appropriately;Independent prior level of function   Barriers Pain;Limited mobility;Impaired balance;Impaired activity tolerance;Impaired functional cognition;Generalized weakness;Fatigue   Lower Body Dressing   Assistance Needed Physical assistance   Physical Assistance Level 26%-50%   CARE Score - Lower Body Dressing 3   Toileting Hygiene   Assistance Needed Physical assistance   Physical Assistance Level 25% or less   CARE Score - Toileting Hygiene 3   Toilet Transfer   Assistance Needed Verbal cues   Physical Assistance Level No physical assistance   CARE Score - Toilet Transfer 4     Assessment    Pt seen for tx, addressing toileting, LBD, and standing balance/tolerance. Pt tolerated activity fair d/t fatigue. Continue OT POC.    Strengths: Willingly participates in therapeutic activities, Supportive family, Pleasant and cooperative, Motivated for self care and independence, Manages pain appropriately, Independent prior level of function    Barriers: Pain, Limited mobility, Impaired balance, Impaired activity tolerance, Impaired functional cognition, Generalized weakness, Fatigue    Plan    DC IRF [facility] Monday 2/10 in anticipation of DC home w/ son's support Tuesday 2/11; HHOT f/u recommended      Pt would benefit from skilled OT services to address:  - ADLs/IADLs   - Strength/endurance/activity tolerance  - Functional mobility w/ LRD  - Standing balance (static and dynamic)  - Functional Cognition (memory)    DME  OT DME Recommendations  Bathroom Equipment:  (pt owns shower chair)  Additional Equipment:  (TBD; pt owns sock aid)    Passport items to be completed:  Perform bathroom transfers, complete dressing, complete feeding, get ready for the day, prepare  a simple meal, participate in household tasks, adapt home for safety needs, demonstrate home exercise program, complete caregiver training     Occupational Therapy Goals (Active)       Problem: OT Long Term Goals       Dates: Start:  01/29/25         Goal: LTG-By discharge, patient will complete basic self care tasks w/ mod I for UB ADLs and supervision for LB ADLs       Dates: Start:  01/29/25            Goal: LTG-By discharge, patient will perform bathroom transfers w/ supervision w/ LRAD       Dates: Start:  01/29/25            Goal: LTG-By discharge, patient will complete basic home management w/ supervision w/ LRAD        Dates: Start:  01/29/25

## 2025-02-07 NOTE — CARE PLAN
The patient is Stable - Low risk of patient condition declining or worsening      Problem: Pain - Standard  Goal: Alleviation of pain or a reduction in pain to the patient’s comfort goal  Outcome: Progressing     Problem: Diabetes Management  Goal: Patient's ability to maintain appropriate glucose levels will be maintained or improve  Outcome: Progressing     Problem: Fall Risk - Rehab  Goal: Patient will remain free from falls  Outcome: Progressing

## 2025-02-07 NOTE — PROGRESS NOTES
Hospital Medicine Daily Progress Note      Chief Complaint:  Hypertension  Diabetes    Interval History:  Discussed about his BS doing good and have stopped his accuchecks.    Review of Systems  Review of Systems   Constitutional:  Negative for chills and fever.   Respiratory:  Negative for shortness of breath.    Cardiovascular:  Negative for chest pain.   Gastrointestinal:  Negative for abdominal pain, diarrhea, nausea and vomiting.   Psychiatric/Behavioral:  The patient is not nervous/anxious.         Physical Exam  Temp:  [36.5 °C (97.7 °F)-36.9 °C (98.4 °F)] 36.9 °C (98.4 °F)  Pulse:  [65-74] 74  Resp:  [18-20] 20  BP: ()/(60-76) 110/64  SpO2:  [92 %-97 %] 97 %    Physical Exam  Vitals and nursing note reviewed.   Constitutional:       Appearance: Normal appearance.   HENT:      Head: Atraumatic.   Eyes:      Conjunctiva/sclera: Conjunctivae normal.      Pupils: Pupils are equal, round, and reactive to light.   Cardiovascular:      Rate and Rhythm: Normal rate. Rhythm irregular.   Pulmonary:      Effort: Pulmonary effort is normal.      Breath sounds: Normal breath sounds.   Abdominal:      General: Bowel sounds are normal.      Palpations: Abdomen is soft.   Musculoskeletal:      Cervical back: Normal range of motion and neck supple.      Right lower leg: Edema present.      Left lower leg: Edema present.   Skin:     General: Skin is warm and dry.   Neurological:      Mental Status: He is alert and oriented to person, place, and time.   Psychiatric:         Mood and Affect: Mood normal.         Behavior: Behavior normal.         Fluids    Intake/Output Summary (Last 24 hours) at 2/7/2025 1102  Last data filed at 2/7/2025 0823  Gross per 24 hour   Intake 560 ml   Output 1800 ml   Net -1240 ml        Laboratory  Recent Labs     02/06/25  0556   WBC 8.1   RBC 4.06*   HEMOGLOBIN 12.3*   HEMATOCRIT 38.4*   MCV 94.6   MCH 30.3   MCHC 32.0*   RDW 48.6   PLATELETCT 286   MPV 9.6       Recent Labs      02/06/25  0556   SODIUM 141   POTASSIUM 4.5   CHLORIDE 103   CO2 29   GLUCOSE 122*   BUN 16   CREATININE 1.06   CALCIUM 8.8                   Assessment/Plan  Sacral fracture (HCC)- (present on admission)  Assessment & Plan  2nd to GLF  Non-surgical management    SDH (subdural hematoma) (HCC)- (present on admission)  Assessment & Plan  2nd to GLF  Non-surgical management  Needs F/U MRI 6 wks    Non-insulin dependent type 2 diabetes mellitus (HCC)- (present on admission)  Assessment & Plan  Hba1c: 6.9 (1/29)  BS have been good since Metformin started  Cont Metformin: 250 mg bid  Will stop accuchecks  Note: home meds include Metformin 1000 mg bid and Jardiance 10 mg qd  Cont to monitor    PAF (paroxysmal atrial fibrillation) (HCC)- (present on admission)  Assessment & Plan  HR ok  Has LE edema (has hx and is at baseline)  Cont Coreg  Cont Bumex  Hx of PPM  On Coreg for rate control  Xarelto presently on hold for ICH  Cont to monitor    CAD in native artery- (present on admission)  Assessment & Plan  Has chronic BLE edema  Echo 11/25/24 EF 50%  BNP:815 --> 1141 --> 723 (2/2)  Cont Lipitor  Cont Coreg  Cont Bumex  K+: 4.5 (2/6)  Note: Xarelto presently on hold for ICH  Cont to monitor    Hypertension- (present on admission)  Assessment & Plan  BP ok  Cont Coreg  Note: on Bumex and Flomax  Cont to monitor    Other emphysema (HCC)- (present on admission)  Assessment & Plan  Chronically on supplemental O2 at 2 lpm via NC  Continue Trelegy and Singulair  RT protocol

## 2025-02-08 PROCEDURE — A9270 NON-COVERED ITEM OR SERVICE: HCPCS | Performed by: PHYSICAL MEDICINE & REHABILITATION

## 2025-02-08 PROCEDURE — A9270 NON-COVERED ITEM OR SERVICE: HCPCS | Performed by: HOSPITALIST

## 2025-02-08 PROCEDURE — 94760 N-INVAS EAR/PLS OXIMETRY 1: CPT

## 2025-02-08 PROCEDURE — 700111 HCHG RX REV CODE 636 W/ 250 OVERRIDE (IP): Mod: JZ | Performed by: PHYSICAL MEDICINE & REHABILITATION

## 2025-02-08 PROCEDURE — 700102 HCHG RX REV CODE 250 W/ 637 OVERRIDE(OP): Performed by: HOSPITALIST

## 2025-02-08 PROCEDURE — 700102 HCHG RX REV CODE 250 W/ 637 OVERRIDE(OP): Performed by: PHYSICAL MEDICINE & REHABILITATION

## 2025-02-08 PROCEDURE — 770010 HCHG ROOM/CARE - REHAB SEMI PRIVAT*

## 2025-02-08 PROCEDURE — 99232 SBSQ HOSP IP/OBS MODERATE 35: CPT | Performed by: HOSPITALIST

## 2025-02-08 PROCEDURE — 94640 AIRWAY INHALATION TREATMENT: CPT

## 2025-02-08 RX ADMIN — POLYETHYLENE GLYCOL 3350 1 PACKET: 17 POWDER, FOR SOLUTION ORAL at 05:38

## 2025-02-08 RX ADMIN — MONTELUKAST 10 MG: 10 TABLET, FILM COATED ORAL at 20:48

## 2025-02-08 RX ADMIN — ESCITALOPRAM OXALATE 10 MG: 10 TABLET ORAL at 09:18

## 2025-02-08 RX ADMIN — ATORVASTATIN CALCIUM 40 MG: 40 TABLET, FILM COATED ORAL at 20:48

## 2025-02-08 RX ADMIN — METFORMIN HYDROCHLORIDE 250 MG: 500 TABLET ORAL at 17:45

## 2025-02-08 RX ADMIN — FLUTICASONE FUROATE, UMECLIDINIUM BROMIDE AND VILANTEROL TRIFENATATE 1 PUFF: 200; 62.5; 25 POWDER RESPIRATORY (INHALATION) at 06:53

## 2025-02-08 RX ADMIN — THERA TABS 1 TABLET: TAB at 09:19

## 2025-02-08 RX ADMIN — CARVEDILOL 6.25 MG: 3.12 TABLET, FILM COATED ORAL at 09:25

## 2025-02-08 RX ADMIN — QUETIAPINE FUMARATE 50 MG: 25 TABLET ORAL at 17:44

## 2025-02-08 RX ADMIN — OMEPRAZOLE 20 MG: 20 CAPSULE, DELAYED RELEASE ORAL at 09:19

## 2025-02-08 RX ADMIN — SENNOSIDES AND DOCUSATE SODIUM 2 TABLET: 50; 8.6 TABLET ORAL at 20:48

## 2025-02-08 RX ADMIN — Medication 100 MG: at 09:19

## 2025-02-08 RX ADMIN — ENOXAPARIN SODIUM 40 MG: 100 INJECTION SUBCUTANEOUS at 09:17

## 2025-02-08 RX ADMIN — BUMETANIDE 1 MG: 1 TABLET ORAL at 09:19

## 2025-02-08 RX ADMIN — TAMSULOSIN HYDROCHLORIDE 0.4 MG: 0.4 CAPSULE ORAL at 09:18

## 2025-02-08 RX ADMIN — METFORMIN HYDROCHLORIDE 250 MG: 500 TABLET ORAL at 09:25

## 2025-02-08 RX ADMIN — TRAZODONE HYDROCHLORIDE 100 MG: 100 TABLET ORAL at 20:48

## 2025-02-08 RX ADMIN — CYANOCOBALAMIN TAB 500 MCG 1000 MCG: 500 TAB at 09:19

## 2025-02-08 ASSESSMENT — ENCOUNTER SYMPTOMS
VOMITING: 0
DIZZINESS: 0
FEVER: 0
SHORTNESS OF BREATH: 0
BLURRED VISION: 0
PALPITATIONS: 0
NAUSEA: 0
HALLUCINATIONS: 0
HEADACHES: 0

## 2025-02-08 NOTE — THERAPY
"Physical Therapy   Daily Treatment     Patient Name: Vince Carr  Age:  79 y.o., Sex:  male  Medical Record #: [id number]  Today's Date: 2/7/2025     Precautions  Precautions: Fall Risk  Comments: Hx of multiple falls; wear shoes to protect feet    Subjective    Patient reports he is discouraged by updated plan to discharge to SNF; refuses to accept HIGH fall risk status, says \"I don't want to talk about this anymore!\"     Objective       02/07/25 1301   PT Charge Group   PT Gait Training (Units) 1   PT Therapeutic Activities (Units) 1   PT Total Time Spent   PT Individual Total Time Spent (Mins) 30   Precautions   Precautions Fall Risk   Comments Hx of multiple falls; wear shoes to protect feet   Gait Functional Level of Assist    Gait Level Of Assist Contact Guard Assist   Assistive Device Front Wheel Walker;Other (Comments)  (40ft x 2 FWW CGA/SBA, 40ft x 2 bariatric FWW CGA/SBA)   Transfer Functional Level of Assist   Bed, Chair, Wheelchair Transfer Standby Assist   Sitting Lower Body Exercises   Sitting Lower Body Exercises   (used as warm-up)   Ankle Pumps 1 set of 10;Bilateral   Long Arc Quad 1 set of 10;Bilateral   Marching 1 set of 10;Reciprocal   Interdisciplinary Plan of Care Collaboration   IDT Collaboration with  Occupational Therapist;Physical Therapist   Collaboration Comments treatment planning         Assessment    Able to maintain O2 saturation on room air with short distance (<50ft) ambulation bouts with FWW; requuires 2L O2 for distances >50ft to maintain O2 saturation    Strengths: Motivated for self care and independence, Pleasant and cooperative, Supportive family, Willingly participates in therapeutic activities  Barriers: Decreased endurance, Fatigue, Impaired activity tolerance, Impaired balance, Impaired insight/denial of deficits, Impaired functional cognition, Limited mobility, Pain (lives alone, hx of ~10 falls in past month)    Plan    Balance training  transfers  Ambulation with " FWW  LE strengthening     DME  PT DME Recommendations  Assistive Device:  (Pt has FWW, 4WW, SPT, QC)  Additional Equipment:  (TBD; pt owns sock aid)     Passport items to be completed:  Get in/out of bed safely, in/out of a vehicle, safely use mobility device, walk or wheel around home/community, navigate up and down stairs, show how to get up/down from the ground, ensure home is accessible, demonstrate HEP, complete caregiver training    Physical Therapy Problems (Active)       Problem: PT-Long Term Goals       Dates: Start:  01/29/25         Goal: LTG-By discharge, patient will ambulate 50 ft LRAD SPV/mod I indoors        Dates: Start:  01/29/25            Goal: LTG-By discharge, patient will transfer one surface to another SPV/mod I SPT LRAD       Dates: Start:  01/29/25            Goal: LTG-By discharge, patient will amb up/down threshold for SUZANNA home with LRAD SPV       Dates: Start:  01/29/25

## 2025-02-08 NOTE — FLOWSHEET NOTE
02/08/25 0655   Events/Summary/Plan   Events/Summary/Plan MDI   Vital Signs   Pulse 70   Respiration 16   Pulse Oximetry 95 %   $ Pulse Oximetry (Spot Check) Yes   Respiratory Assessment   Respiratory Pattern Within Normal Limits   Level of Consciousness Alert   Chest Exam   Work Of Breathing / Effort Within Normal Limits   Breath Sounds   RUL Breath Sounds Clear   RML Breath Sounds Clear   RLL Breath Sounds Clear   PRATIK Breath Sounds Clear   LLL Breath Sounds Clear   Oxygen   O2 (LPM) 0   O2 Delivery Device None - Room Air

## 2025-02-08 NOTE — PROGRESS NOTES
Hospital Medicine Daily Progress Note      Chief Complaint:  Hypertension  Diabetes    Interval History:  No complaints.  Doing ok.    Review of Systems  Review of Systems   Constitutional:  Negative for fever.   Eyes:  Negative for blurred vision.   Respiratory:  Negative for shortness of breath.    Cardiovascular:  Negative for palpitations.   Gastrointestinal:  Negative for nausea and vomiting.   Neurological:  Negative for dizziness and headaches.   Psychiatric/Behavioral:  Negative for hallucinations.         Physical Exam  Temp:  [36.4 °C (97.5 °F)-36.8 °C (98.2 °F)] 36.4 °C (97.5 °F)  Pulse:  [63-70] 69  Resp:  [16-20] 20  BP: (103-133)/(64-74) 103/64  SpO2:  [95 %-98 %] 97 %    Physical Exam  Vitals and nursing note reviewed.   Constitutional:       General: He is not in acute distress.  HENT:      Mouth/Throat:      Mouth: Mucous membranes are moist.      Pharynx: Oropharynx is clear.   Eyes:      General: No scleral icterus.  Cardiovascular:      Rate and Rhythm: Normal rate. Rhythm irregular.   Pulmonary:      Effort: Pulmonary effort is normal.      Breath sounds: No wheezing or rales.   Abdominal:      General: Bowel sounds are normal.      Palpations: Abdomen is soft.   Musculoskeletal:      Cervical back: Normal range of motion. No rigidity.      Right lower leg: Edema present.      Left lower leg: Edema present.   Skin:     General: Skin is warm and dry.   Neurological:      Mental Status: He is alert and oriented to person, place, and time.   Psychiatric:         Mood and Affect: Mood normal.         Behavior: Behavior normal.         Fluids    Intake/Output Summary (Last 24 hours) at 2/8/2025 1121  Last data filed at 2/8/2025 0800  Gross per 24 hour   Intake 960 ml   Output 350 ml   Net 610 ml        Laboratory  Recent Labs     02/06/25  0556   WBC 8.1   RBC 4.06*   HEMOGLOBIN 12.3*   HEMATOCRIT 38.4*   MCV 94.6   MCH 30.3   MCHC 32.0*   RDW 48.6   PLATELETCT 286   MPV 9.6       Recent Labs      02/06/25  0556   SODIUM 141   POTASSIUM 4.5   CHLORIDE 103   CO2 29   GLUCOSE 122*   BUN 16   CREATININE 1.06   CALCIUM 8.8                   Assessment/Plan  Sacral fracture (HCC)- (present on admission)  Assessment & Plan  2nd to GLF  Non-surgical management    SDH (subdural hematoma) (HCC)- (present on admission)  Assessment & Plan  2nd to GLF  Non-surgical management  Needs F/U MRI 6 wks    Non-insulin dependent type 2 diabetes mellitus (HCC)- (present on admission)  Assessment & Plan  Hba1c: 6.9 (1/29)  BS have been good since Metformin started  Cont Metformin: 250 mg bid  Off accuchecks  Note: home meds include Metformin 1000 mg bid and Jardiance 10 mg qd  Cont to monitor    PAF (paroxysmal atrial fibrillation) (HCC)- (present on admission)  Assessment & Plan  HR ok  Has LE edema (has hx and is at baseline)  Cont Coreg  Cont Bumex  Hx of PPM  On Coreg for rate control  Xarelto presently on hold for ICH  Cont to monitor    CAD in native artery- (present on admission)  Assessment & Plan  Has chronic BLE edema  Echo 11/25/24 EF 50%  BNP:815 --> 1141 --> 723 (2/2)  Cont Lipitor  Cont Coreg  Cont Bumex  K+: 4.5 (2/6)  Note: Xarelto presently on hold for ICH  Cont to monitor    Hypertension- (present on admission)  Assessment & Plan  BP ok  Cont Coreg  Note: on Bumex and Flomax  Cont to monitor    Other emphysema (HCC)- (present on admission)  Assessment & Plan  Chronically on supplemental O2 at 2 lpm via NC  Continue Trelegy and Singulair  RT protocol

## 2025-02-08 NOTE — PROGRESS NOTES
NURSING DAILY NOTE    Name: Vince Carr   Date of Admission: 1/28/2025   Admitting Diagnosis: No Principal Problem: There is no principal problem currently on the Problem List. Please update the Problem List and refresh.  Attending Physician: MAGALY RICHARDSON M.D.  Allergies: Iodine and Naproxen    Safety  Patient Assist  MOD / MIN  Patient Precautions  Fall Risk  Precaution Comments  Hx of multiple falls; wear shoes to protect feet  Bed Transfer Status  Standby Assist  Toilet Transfer Status   Standby Assist  Assistive Devices  Rails, Wheelchair  Oxygen  Silicone Nasal Cannula  Diet/Therapeutic Dining  Current Diet Order   Procedures    Diet Order Diet: Consistent CHO (Diabetic)     Pill Administration  whole  Agitated Behavioral Scale     ABS Level of Severity       Fall Risk  Has the patient had a fall this admission?   No  Ximena Aguiar Fall Risk Scoring  20, HIGH RISK  Fall Risk Safety Measures  bed alarm and chair alarm    Vitals  Temperature: 36.4 °C (97.6 °F)  Temp src: Oral  Pulse: 66  Respiration: 17  Blood Pressure : 117/68  Blood Pressure MAP (Calculated): 84 MM HG  BP Location: Right, Upper Arm  Patient BP Position: Supine     Oxygen  Pulse Oximetry: 97 %  O2 (LPM): 2  FiO2%: 21 %  O2 Delivery Device: Silicone Nasal Cannula    Bowel and Bladder  Last Bowel Movement  02/05/25  Stool Type  Type 6: Fluffy pieces with ragged edges, a mushy stool  Bowel Device  Bathroom  Continent  Bladder: Did not void   Bowel: No movement  Bladder Function  Urine Void (mL): 350 ml  Number of Times Voided: 1  Urinary Options: Yes  Urine Color: Yellow  Number of Times Incontinent of Urine: 1  Straight Catheter: 600 ml  Wet Diaper Count: 1  Genitourinary Assessment   Bladder Assessment (WDL):  Within Defined Limits  Diaz Catheter: Not Applicable  Urine Color: Yellow  Number of Bladder Accidents: 1  Total Number of Bladder of Accidents in Last 7 Days:  1  Number of Times Incontinent of Urine: 1  Bladder Device: Urinal  Time Void: Yes  Bladder Scan: Post Void  $ Bladder Scan Results (mL): 102  Bladder Medications: Yes    Skin  Thanh Score   17  Sensory Interventions   Bed Types: Standard/Trauma Mattress  Skin Preventative Measures: Waffle Overlay  Moisture Interventions  Moisturizers/Barriers: Barrier Wipes      Pain  Pain Rating Scale  0 - No Pain  Pain Location  Knee  Pain Location Orientation  Right  Pain Interventions   Declines    ADLs    Bathing   Staff, Shower  Linen Change   Partial  Personal Hygiene  Change Charley Pads, Perineal Care  Chlorhexidine Bath      Oral Care  Brushed Teeth  Teeth/Dentures     Shave     Nutrition Percentage Eaten  Snack, Between % Consumed  Environmental Precautions  Treaded Slipper Socks on Patient, Personal Belongings, Wastebasket, Call Bell etc. in Easy Reach, Bed in Low Position  Patient Turns/Positioning  Patient turns self independently side to side without assistance, to offload sacral area  Patient Turns Assistance/Tolerance  Assistance of One, Tolerates Well, General Weakness  Bed Positions  Bed Controls On, Bed Locked  Head of Bed Elevated  Self regulated      Psychosocial/Neurologic Assessment  Psychosocial Assessment  Psychosocial (WDL):  WDL Except  Patient Behaviors: Forgetful  Neurologic Assessment  Neuro (WDL): Exceptions to WDL  Level of Consciousness: Alert  Orientation Level: Oriented to place, Oriented to situation, Oriented to person  Cognition: Appropriate safety awareness, Follows commands  Speech: Clear  Pupil Assesment: No  EENT (WDL):  WDL Except    Cardio/Pulmonary Assessment  Edema   RLE Edema: 1+  LLE Edema: 1+  Respiratory Breath Sounds  RUL Breath Sounds: Clear  RML Breath Sounds: Clear  RLL Breath Sounds: Clear  PRATIK Breath Sounds: Clear  LLL Breath Sounds: Clear  Cardiac Assessment   Cardiac (WDL):  WDL Except (H/O HTN, Afib, CAD, MI.)

## 2025-02-08 NOTE — CARE PLAN
"The patient is Stable - Low risk of patient condition declining or worsening    Shift Goals  Clinical Goals: safety  Patient Goals: safety, sleep  Family Goals: no family present    Problem: Fall Risk - Rehab  Goal: Patient will remain free from falls  Outcome: Progressing  Note: Ximena Aguiar Fall risk Assessment Score: 20    High fall risk Interventions   - Alarming seatbelt  - Bed and strip alarm   - Yellow sign by the door   - Yellow wrist band \"Fall risk\"  - Room near to the nurse station  - Do not leave patient unattended in the bathroom  - Fall risk education provided       Pt using call light appropriately when in need of assistance.          Problem: Skin Integrity  Goal: Skin integrity is maintained or improved  Outcome: Progressing     Problem: Diabetes Management  Goal: Patient's ability to maintain appropriate glucose levels will be maintained or improve  Outcome: Progressing     Problem: Pain - Standard  Goal: Alleviation of pain or a reduction in pain to the patient’s comfort goal  Outcome: Progressing     "

## 2025-02-09 PROCEDURE — 700102 HCHG RX REV CODE 250 W/ 637 OVERRIDE(OP): Performed by: PHYSICAL MEDICINE & REHABILITATION

## 2025-02-09 PROCEDURE — 770010 HCHG ROOM/CARE - REHAB SEMI PRIVAT*

## 2025-02-09 PROCEDURE — A9270 NON-COVERED ITEM OR SERVICE: HCPCS | Performed by: HOSPITALIST

## 2025-02-09 PROCEDURE — 99232 SBSQ HOSP IP/OBS MODERATE 35: CPT | Performed by: HOSPITALIST

## 2025-02-09 PROCEDURE — 94640 AIRWAY INHALATION TREATMENT: CPT

## 2025-02-09 PROCEDURE — 94760 N-INVAS EAR/PLS OXIMETRY 1: CPT

## 2025-02-09 PROCEDURE — 700102 HCHG RX REV CODE 250 W/ 637 OVERRIDE(OP): Performed by: HOSPITALIST

## 2025-02-09 PROCEDURE — 700111 HCHG RX REV CODE 636 W/ 250 OVERRIDE (IP): Mod: JZ | Performed by: PHYSICAL MEDICINE & REHABILITATION

## 2025-02-09 PROCEDURE — A9270 NON-COVERED ITEM OR SERVICE: HCPCS | Performed by: PHYSICAL MEDICINE & REHABILITATION

## 2025-02-09 RX ADMIN — CARVEDILOL 6.25 MG: 3.12 TABLET, FILM COATED ORAL at 08:41

## 2025-02-09 RX ADMIN — CYANOCOBALAMIN TAB 500 MCG 1000 MCG: 500 TAB at 08:41

## 2025-02-09 RX ADMIN — TRAZODONE HYDROCHLORIDE 100 MG: 100 TABLET ORAL at 20:41

## 2025-02-09 RX ADMIN — THERA TABS 1 TABLET: TAB at 08:42

## 2025-02-09 RX ADMIN — ESCITALOPRAM OXALATE 10 MG: 10 TABLET ORAL at 08:42

## 2025-02-09 RX ADMIN — ENOXAPARIN SODIUM 40 MG: 100 INJECTION SUBCUTANEOUS at 08:42

## 2025-02-09 RX ADMIN — MONTELUKAST 10 MG: 10 TABLET, FILM COATED ORAL at 20:41

## 2025-02-09 RX ADMIN — OMEPRAZOLE 20 MG: 20 CAPSULE, DELAYED RELEASE ORAL at 08:41

## 2025-02-09 RX ADMIN — METFORMIN HYDROCHLORIDE 250 MG: 500 TABLET ORAL at 17:51

## 2025-02-09 RX ADMIN — FLUTICASONE FUROATE, UMECLIDINIUM BROMIDE AND VILANTEROL TRIFENATATE 1 PUFF: 200; 62.5; 25 POWDER RESPIRATORY (INHALATION) at 06:52

## 2025-02-09 RX ADMIN — MAGNESIUM HYDROXIDE 30 ML: 1200 LIQUID ORAL at 05:14

## 2025-02-09 RX ADMIN — ATORVASTATIN CALCIUM 40 MG: 40 TABLET, FILM COATED ORAL at 20:41

## 2025-02-09 RX ADMIN — TAMSULOSIN HYDROCHLORIDE 0.4 MG: 0.4 CAPSULE ORAL at 08:42

## 2025-02-09 RX ADMIN — METFORMIN HYDROCHLORIDE 250 MG: 500 TABLET ORAL at 08:42

## 2025-02-09 RX ADMIN — Medication 100 MG: at 08:42

## 2025-02-09 RX ADMIN — QUETIAPINE FUMARATE 50 MG: 25 TABLET ORAL at 17:51

## 2025-02-09 RX ADMIN — SENNOSIDES AND DOCUSATE SODIUM 2 TABLET: 50; 8.6 TABLET ORAL at 20:40

## 2025-02-09 ASSESSMENT — ENCOUNTER SYMPTOMS
DIARRHEA: 0
COUGH: 0
NERVOUS/ANXIOUS: 0
FEVER: 0
DIZZINESS: 0
BLURRED VISION: 0

## 2025-02-09 ASSESSMENT — FIBROSIS 4 INDEX: FIB4 SCORE: 1.92

## 2025-02-09 NOTE — FLOWSHEET NOTE
02/09/25 0652   Events/Summary/Plan   Events/Summary/Plan MDI   Skin Integrity Intact   Protective Device Foam Pads   Location ears   Vital Signs   Pulse 62   Respiration 16   Pulse Oximetry 96 %   $ Pulse Oximetry (Spot Check) Yes   Respiratory Assessment   Respiratory Pattern Within Normal Limits   Level of Consciousness Alert   Chest Exam   Work Of Breathing / Effort Within Normal Limits   Breath Sounds   RUL Breath Sounds Clear   RML Breath Sounds Clear   RLL Breath Sounds Clear   PRATIK Breath Sounds Clear   LLL Breath Sounds Clear   Oxygen   O2 (LPM) 2   O2 Delivery Device Nasal Cannula

## 2025-02-09 NOTE — PROGRESS NOTES
NURSING DAILY NOTE    Name: Vince Carr   Date of Admission: 1/28/2025   Admitting Diagnosis: No Principal Problem: There is no principal problem currently on the Problem List. Please update the Problem List and refresh.  Attending Physician: MAGALY RICHARDSON M.D.  Allergies: Iodine and Naproxen    Safety  Patient Assist  MIN/ MOD  Patient Precautions  Fall Risk  Precaution Comments  Hx of multiple falls; wear shoes to protect feet  Bed Transfer Status  Standby Assist  Toilet Transfer Status   Standby Assist  Assistive Devices  Rails, Wheelchair  Oxygen  None - Room Air  Diet/Therapeutic Dining  Current Diet Order   Procedures    Diet Order Diet: Consistent CHO (Diabetic)     Pill Administration  whole  Agitated Behavioral Scale     ABS Level of Severity       Fall Risk  Has the patient had a fall this admission?   No  Ximena Aguiar Fall Risk Scoring  20, HIGH RISK  Fall Risk Safety Measures  bed alarm and chair alarm    Vitals  Temperature: 36.6 °C (97.8 °F)  Temp src: Oral  Pulse: 69  Respiration: 17  Blood Pressure : 102/66  Blood Pressure MAP (Calculated): 78 MM HG  BP Location: Right, Upper Arm  Patient BP Position: Supine     Oxygen  Pulse Oximetry: 97 %  O2 (LPM): 0  FiO2%: 21 %  O2 Delivery Device: None - Room Air    Bowel and Bladder  Last Bowel Movement  02/05/25  Stool Type  Type 6: Fluffy pieces with ragged edges, a mushy stool  Bowel Device  Bathroom  Continent  Bladder: Did not void   Bowel: No movement  Bladder Function  Urine Void (mL): 100 ml  Number of Times Voided: 1  Urinary Options: Yes  Urine Color: Yellow  Number of Times Incontinent of Urine: 1  Straight Catheter: 600 ml  Wet Diaper Count: 1  Genitourinary Assessment   Bladder Assessment (WDL):  Within Defined Limits  Diaz Catheter: Not Applicable  Urine Color: Yellow  Number of Bladder Accidents: 1  Total Number of Bladder of Accidents in Last 7 Days: 1  Number of  Times Incontinent of Urine: 1  Bladder Device: Urinal  Time Void: Yes  Bladder Scan: Post Void  $ Bladder Scan Results (mL): 102  Bladder Medications: Yes    Skin  Thanh Score   16  Sensory Interventions   Bed Types: Standard/Trauma Mattress  Skin Preventative Measures: Waffle Overlay  Moisture Interventions  Moisturizers/Barriers: Barrier Wipes      Pain  Pain Rating Scale  0 - No Pain  Pain Location  Knee  Pain Location Orientation  Right  Pain Interventions   Declines    ADLs    Bathing   Patient Refused Bathing  Linen Change   Partial  Personal Hygiene  Change Charley Pads, Moist Charley Wipes, Perineal Care  Chlorhexidine Bath      Oral Care  Brushed Teeth  Teeth/Dentures     Shave     Nutrition Percentage Eaten  *  * Meal *  *, Dinner, Between % Consumed  Environmental Precautions  Treaded Slipper Socks on Patient, Bed in Low Position  Patient Turns/Positioning  Patient turns self independently side to side without assistance, to offload sacral area  Patient Turns Assistance/Tolerance  Assistance of One, Tolerates Well, General Weakness  Bed Positions  Bed Controls On, Bed Locked  Head of Bed Elevated  Self regulated      Psychosocial/Neurologic Assessment  Psychosocial Assessment  Psychosocial (WDL):  WDL Except  Patient Behaviors: Forgetful  Neurologic Assessment  Neuro (WDL): Exceptions to WDL  Level of Consciousness: Alert  Orientation Level: Oriented to place, Oriented to situation, Oriented to person  Cognition: Appropriate safety awareness, Follows commands  Speech: Clear  Pupil Assesment: No  EENT (WDL):  WDL Except    Cardio/Pulmonary Assessment  Edema   RLE Edema: 1+  LLE Edema: 1+  Respiratory Breath Sounds  RUL Breath Sounds: Clear  RML Breath Sounds: Clear  RLL Breath Sounds: Clear  PRATIK Breath Sounds: Clear  LLL Breath Sounds: Clear  Cardiac Assessment   Cardiac (WDL):  WDL Except (H/O HTN, Afib, CAD, MI.)

## 2025-02-09 NOTE — CARE PLAN
"The patient is Stable - Low risk of patient condition declining or worsening    Shift Goals  Clinical Goals: safety  Patient Goals: safety, sleep  Family Goals: no family present    Problem: Fall Risk - Rehab  Goal: Patient will remain free from falls  Outcome: Progressing  Note: Ximena Aguiar Fall risk Assessment Score: 20    High fall risk Interventions   - Alarming seatbelt  - Bed and strip alarm   - [place] sign by the door   - Yellow wrist band \"Fall risk\"  - Room near to the nurse station  - Do not leave patient unattended in the bathroom  - Fall risk education provided     Pt using call light appropriately when in need of assistance.          Problem: Skin Integrity  Goal: Skin integrity is maintained or improved  Outcome: Progressing     Problem: Pain - Standard  Goal: Alleviation of pain or a reduction in pain to the patient’s comfort goal  Outcome: Progressing     "

## 2025-02-09 NOTE — PROGRESS NOTES
NURSING DAILY NOTE    Name: Vince Carr   Date of Admission: 1/28/2025   Admitting Diagnosis: No Principal Problem: There is no principal problem currently on the Problem List. Please update the Problem List and refresh.  Attending Physician: MAGALY RICHARDSON M.D.  Allergies: Iodine and Naproxen    Safety  Patient Assist  MIN/ MOD  Patient Precautions  Fall Risk  Precaution Comments  Hx of multiple falls; wear shoes to protect feet  Bed Transfer Status  Standby Assist  Toilet Transfer Status   Standby Assist  Assistive Devices  Rails, Wheelchair  Oxygen  None - Room Air  Diet/Therapeutic Dining  Current Diet Order   Procedures    Diet Order Diet: Consistent CHO (Diabetic)     Pill Administration  whole  Agitated Behavioral Scale     ABS Level of Severity       Fall Risk  Has the patient had a fall this admission?   No  Ximena Aguiar Fall Risk Scoring  20, HIGH RISK  Fall Risk Safety Measures  bed alarm, chair alarm, poor balance, and low vision/ hearing    Vitals  Temperature: 36.7 °C (98.1 °F)  Temp src: Oral  Pulse: 60  Respiration: 20  Blood Pressure : 117/69  Blood Pressure MAP (Calculated): 85 MM HG  BP Location: Right, Upper Arm  Patient BP Position: De Leon's Position     Oxygen  Pulse Oximetry: 96 %  O2 (LPM): 0  FiO2%: 21 %  O2 Delivery Device: None - Room Air    Bowel and Bladder  Last Bowel Movement  02/05/25  Stool Type  Type 6: Fluffy pieces with ragged edges, a mushy stool  Bowel Device  Bathroom  Continent  Bladder: Did not void   Bowel: No movement  Bladder Function  Urine Void (mL): 500 ml  Number of Times Voided: 1  Urinary Options: Yes  Urine Color: Pale  Number of Times Incontinent of Urine: 1  Straight Catheter: 600 ml  Wet Diaper Count: 1  Genitourinary Assessment   Bladder Assessment (WDL):  Within Defined Limits  Diaz Catheter: Not Applicable  Urine Color: Pale  Number of Bladder Accidents: 1  Total Number of Bladder of  Accidents in Last 7 Days: 1  Number of Times Incontinent of Urine: 1  Bladder Device: Urinal  Time Void: Yes  Bladder Scan: Post Void  $ Bladder Scan Results (mL): 102  Bladder Medications: Yes    Skin  Thanh Score   17  Sensory Interventions   Bed Types: Standard/Trauma Mattress with Overlay  Skin Preventative Measures: Pillows in Use for Support / Positioning, Seat Cushion in Use on Chair when Out of Bed  Moisture Interventions  Moisturizers/Barriers: Barrier Wipes      Pain  Pain Rating Scale  0 - No Pain  Pain Location  Knee  Pain Location Orientation  Right  Pain Interventions   Declines    ADLs    Bathing   Staff, Shower  Linen Change   Partial  Personal Hygiene  Change Charley Pads, Moist Charley Wipes, Perineal Care  Chlorhexidine Bath      Oral Care  Brushed Teeth  Teeth/Dentures     Shave     Nutrition Percentage Eaten  *  * Meal *  *, Dinner, Between % Consumed  Environmental Precautions  Treaded Slipper Socks on Patient, Bed in Low Position  Patient Turns/Positioning  Patient turns self independently side to side without assistance, to offload sacral area  Patient Turns Assistance/Tolerance  Assistance of One, Tolerates Well, General Weakness  Bed Positions  Bed Controls On, Bed Locked  Head of Bed Elevated  Self regulated      Psychosocial/Neurologic Assessment  Psychosocial Assessment  Psychosocial (WDL):  WDL Except  Patient Behaviors: Forgetful  Neurologic Assessment  Neuro (WDL): Exceptions to WDL  Level of Consciousness: Alert  Orientation Level: Oriented to place, Oriented to situation, Oriented to person  Cognition: Appropriate safety awareness, Follows commands  Speech: Clear  Pupil Assesment: No  EENT (WDL):  WDL Except    Cardio/Pulmonary Assessment  Edema   RLE Edema: 1+  LLE Edema: 1+  Respiratory Breath Sounds  RUL Breath Sounds: Clear  RML Breath Sounds: Clear  RLL Breath Sounds: Clear  PRATIK Breath Sounds: Clear  LLL Breath Sounds: Clear  Cardiac Assessment   Cardiac (WDL):  WDL Except  (H/O HTN, Afib, CAD, MI.)

## 2025-02-09 NOTE — PROGRESS NOTES
Hospital Medicine Daily Progress Note      Chief Complaint:  Hypertension  Diabetes    Interval History:  No significant events since last visit.    Review of Systems  Review of Systems   Constitutional:  Negative for fever.   Eyes:  Negative for blurred vision.   Respiratory:  Negative for cough.    Cardiovascular:  Negative for chest pain.   Gastrointestinal:  Negative for diarrhea.   Musculoskeletal:  Negative for joint pain.   Neurological:  Negative for dizziness.   Psychiatric/Behavioral:  The patient is not nervous/anxious.         Physical Exam  Temp:  [36.6 °C (97.8 °F)-36.7 °C (98.1 °F)] 36.6 °C (97.8 °F)  Pulse:  [60-69] 63  Resp:  [16-20] 18  BP: (102-117)/(54-69) 113/63  SpO2:  [96 %-97 %] 96 %    Physical Exam  Vitals and nursing note reviewed.   Constitutional:       Appearance: He is not diaphoretic.   HENT:      Mouth/Throat:      Pharynx: No oropharyngeal exudate or posterior oropharyngeal erythema.   Eyes:      Extraocular Movements: Extraocular movements intact.   Neck:      Vascular: No carotid bruit.   Cardiovascular:      Rate and Rhythm: Normal rate. Rhythm irregular.   Pulmonary:      Effort: Pulmonary effort is normal.      Breath sounds: No wheezing or rales.   Abdominal:      General: There is no distension.      Palpations: Abdomen is soft.      Tenderness: There is no abdominal tenderness.   Musculoskeletal:      Right lower leg: Edema present.      Left lower leg: Edema present.   Skin:     General: Skin is warm and dry.   Neurological:      Mental Status: He is alert and oriented to person, place, and time.   Psychiatric:         Mood and Affect: Mood normal.         Behavior: Behavior normal.         Fluids    Intake/Output Summary (Last 24 hours) at 2/9/2025 1139  Last data filed at 2/9/2025 0800  Gross per 24 hour   Intake 1460 ml   Output 950 ml   Net 510 ml        Laboratory                              Assessment/Plan  Sacral fracture (HCC)- (present on admission)  Assessment &  Plan  2nd to GLF  Non-surgical management    SDH (subdural hematoma) (HCC)- (present on admission)  Assessment & Plan  2nd to GLF  Non-surgical management  Needs F/U MRI 6 wks    Non-insulin dependent type 2 diabetes mellitus (HCC)- (present on admission)  Assessment & Plan  Hba1c: 6.9 (1/29)  BS have been good since Metformin started  Cont Metformin: 250 mg bid  Off accuchecks  Note: home meds include Metformin 1000 mg bid and Jardiance 10 mg qd  Cont to monitor    PAF (paroxysmal atrial fibrillation) (HCC)- (present on admission)  Assessment & Plan  HR ok  Has LE edema (has hx and is at baseline)  Cont Coreg  Cont Bumex  Hx of PPM  On Coreg for rate control  Xarelto presently on hold for ICH  Cont to monitor    CAD in native artery- (present on admission)  Assessment & Plan  Has chronic BLE edema  Echo 11/25/24 EF 50%  BNP:815 --> 1141 --> 723 (2/2)  Cont Lipitor  Cont Coreg  Cont Bumex  K+: 4.5 (2/6)  Note: Xarelto presently on hold for ICH  Cont to monitor    Hypertension- (present on admission)  Assessment & Plan  BP ok  Cont Coreg  Note: on Bumex and Flomax  Cont to monitor    Other emphysema (HCC)- (present on admission)  Assessment & Plan  Chronically on supplemental O2 at 2 lpm via NC  Continue Trelegy and Singulair  RT protocol

## 2025-02-09 NOTE — CARE PLAN
The patient is Stable - Low risk of patient condition declining or worsening  Problem: Fall Risk - Rehab  Goal: Patient will remain free from falls  Outcome: Progressing  Note: Pt uses call light consistently and appropriately. Waits for assistance does not attempt self transfer this shift. Able to verbalize needs.     Problem: Hemodynamics  Goal: Patient's hemodynamics, fluid balance and neurologic status will be stable or improve  Outcome: Progressing  Note: Patient free from s/s dehydration.  Oral and buccal mucosa pink and moist; conjunctiva moist; skin turgor good.  PO intake adequate.  Will continue to monitor.

## 2025-02-10 ENCOUNTER — APPOINTMENT (OUTPATIENT)
Dept: OCCUPATIONAL THERAPY | Facility: REHABILITATION | Age: 80
DRG: 949 | End: 2025-02-10
Attending: PHYSICAL MEDICINE & REHABILITATION
Payer: MEDICARE

## 2025-02-10 ENCOUNTER — APPOINTMENT (OUTPATIENT)
Dept: SPEECH THERAPY | Facility: REHABILITATION | Age: 80
DRG: 949 | End: 2025-02-10
Attending: PHYSICAL MEDICINE & REHABILITATION
Payer: MEDICARE

## 2025-02-10 ENCOUNTER — APPOINTMENT (OUTPATIENT)
Dept: PHYSICAL THERAPY | Facility: REHABILITATION | Age: 80
DRG: 949 | End: 2025-02-10
Attending: PHYSICAL MEDICINE & REHABILITATION
Payer: MEDICARE

## 2025-02-10 LAB
ANION GAP SERPL CALC-SCNC: 11 MMOL/L (ref 7–16)
APPEARANCE UR: CLEAR
BACTERIA #/AREA URNS HPF: ABNORMAL /HPF
BASOPHILS # BLD AUTO: 0.3 % (ref 0–1.8)
BASOPHILS # BLD: 0.03 K/UL (ref 0–0.12)
BILIRUB UR QL STRIP.AUTO: NEGATIVE
BUN SERPL-MCNC: 16 MG/DL (ref 8–22)
CALCIUM SERPL-MCNC: 8.3 MG/DL (ref 8.5–10.5)
CASTS URNS QL MICRO: ABNORMAL /LPF (ref 0–2)
CHLORIDE SERPL-SCNC: 103 MMOL/L (ref 96–112)
CO2 SERPL-SCNC: 25 MMOL/L (ref 20–33)
COLOR UR: YELLOW
CREAT SERPL-MCNC: 0.96 MG/DL (ref 0.5–1.4)
EOSINOPHIL # BLD AUTO: 0.25 K/UL (ref 0–0.51)
EOSINOPHIL NFR BLD: 2.3 % (ref 0–6.9)
EPITHELIAL CELLS 1715: ABNORMAL /HPF (ref 0–5)
ERYTHROCYTE [DISTWIDTH] IN BLOOD BY AUTOMATED COUNT: 49.9 FL (ref 35.9–50)
GFR SERPLBLD CREATININE-BSD FMLA CKD-EPI: 80 ML/MIN/1.73 M 2
GLUCOSE SERPL-MCNC: 136 MG/DL (ref 65–99)
GLUCOSE UR STRIP.AUTO-MCNC: NEGATIVE MG/DL
HCT VFR BLD AUTO: 38.6 % (ref 42–52)
HGB BLD-MCNC: 12.5 G/DL (ref 14–18)
IMM GRANULOCYTES # BLD AUTO: 0.14 K/UL (ref 0–0.11)
IMM GRANULOCYTES NFR BLD AUTO: 1.3 % (ref 0–0.9)
KETONES UR STRIP.AUTO-MCNC: NEGATIVE MG/DL
LEUKOCYTE ESTERASE UR QL STRIP.AUTO: ABNORMAL
LYMPHOCYTES # BLD AUTO: 2.09 K/UL (ref 1–4.8)
LYMPHOCYTES NFR BLD: 19.1 % (ref 22–41)
MAGNESIUM SERPL-MCNC: 1.8 MG/DL (ref 1.5–2.5)
MCH RBC QN AUTO: 30.6 PG (ref 27–33)
MCHC RBC AUTO-ENTMCNC: 32.4 G/DL (ref 32.3–36.5)
MCV RBC AUTO: 94.6 FL (ref 81.4–97.8)
MICRO URNS: ABNORMAL
MONOCYTES # BLD AUTO: 1.19 K/UL (ref 0–0.85)
MONOCYTES NFR BLD AUTO: 10.9 % (ref 0–13.4)
NEUTROPHILS # BLD AUTO: 7.26 K/UL (ref 1.82–7.42)
NEUTROPHILS NFR BLD: 66.1 % (ref 44–72)
NITRITE UR QL STRIP.AUTO: NEGATIVE
NRBC # BLD AUTO: 0 K/UL
NRBC BLD-RTO: 0 /100 WBC (ref 0–0.2)
PH UR STRIP.AUTO: 6 [PH] (ref 5–8)
PLATELET # BLD AUTO: 259 K/UL (ref 164–446)
PMV BLD AUTO: 9.9 FL (ref 9–12.9)
POTASSIUM SERPL-SCNC: 3.9 MMOL/L (ref 3.6–5.5)
PROT UR QL STRIP: NEGATIVE MG/DL
RBC # BLD AUTO: 4.08 M/UL (ref 4.7–6.1)
RBC # URNS HPF: ABNORMAL /HPF (ref 0–2)
RBC UR QL AUTO: NEGATIVE
SODIUM SERPL-SCNC: 139 MMOL/L (ref 135–145)
SP GR UR STRIP.AUTO: 1.01
UROBILINOGEN UR STRIP.AUTO-MCNC: 0.2 EU/DL
WBC # BLD AUTO: 11 K/UL (ref 4.8–10.8)
WBC #/AREA URNS HPF: ABNORMAL /HPF

## 2025-02-10 PROCEDURE — 81001 URINALYSIS AUTO W/SCOPE: CPT

## 2025-02-10 PROCEDURE — 80048 BASIC METABOLIC PNL TOTAL CA: CPT

## 2025-02-10 PROCEDURE — 700102 HCHG RX REV CODE 250 W/ 637 OVERRIDE(OP): Performed by: PHYSICAL MEDICINE & REHABILITATION

## 2025-02-10 PROCEDURE — 36415 COLL VENOUS BLD VENIPUNCTURE: CPT

## 2025-02-10 PROCEDURE — 97530 THERAPEUTIC ACTIVITIES: CPT

## 2025-02-10 PROCEDURE — A9270 NON-COVERED ITEM OR SERVICE: HCPCS | Performed by: HOSPITALIST

## 2025-02-10 PROCEDURE — 99232 SBSQ HOSP IP/OBS MODERATE 35: CPT | Performed by: HOSPITALIST

## 2025-02-10 PROCEDURE — 94760 N-INVAS EAR/PLS OXIMETRY 1: CPT

## 2025-02-10 PROCEDURE — 700111 HCHG RX REV CODE 636 W/ 250 OVERRIDE (IP): Mod: JZ | Performed by: PHYSICAL MEDICINE & REHABILITATION

## 2025-02-10 PROCEDURE — 83735 ASSAY OF MAGNESIUM: CPT

## 2025-02-10 PROCEDURE — A9270 NON-COVERED ITEM OR SERVICE: HCPCS | Performed by: PHYSICAL MEDICINE & REHABILITATION

## 2025-02-10 PROCEDURE — 97116 GAIT TRAINING THERAPY: CPT

## 2025-02-10 PROCEDURE — 85025 COMPLETE CBC W/AUTO DIFF WBC: CPT

## 2025-02-10 PROCEDURE — 770010 HCHG ROOM/CARE - REHAB SEMI PRIVAT*

## 2025-02-10 PROCEDURE — 94640 AIRWAY INHALATION TREATMENT: CPT

## 2025-02-10 PROCEDURE — 97110 THERAPEUTIC EXERCISES: CPT

## 2025-02-10 PROCEDURE — 97130 THER IVNTJ EA ADDL 15 MIN: CPT

## 2025-02-10 PROCEDURE — 97129 THER IVNTJ 1ST 15 MIN: CPT

## 2025-02-10 PROCEDURE — 700102 HCHG RX REV CODE 250 W/ 637 OVERRIDE(OP): Performed by: HOSPITALIST

## 2025-02-10 PROCEDURE — 97535 SELF CARE MNGMENT TRAINING: CPT

## 2025-02-10 PROCEDURE — 99232 SBSQ HOSP IP/OBS MODERATE 35: CPT | Performed by: PHYSICAL MEDICINE & REHABILITATION

## 2025-02-10 RX ORDER — QUETIAPINE FUMARATE 25 MG/1
25 TABLET, FILM COATED ORAL NIGHTLY
Status: SHIPPED
Start: 2025-02-10

## 2025-02-10 RX ORDER — ESCITALOPRAM OXALATE 10 MG/1
10 TABLET ORAL DAILY
Status: SHIPPED
Start: 2025-02-11

## 2025-02-10 RX ORDER — CARVEDILOL 12.5 MG/1
6.25 TABLET ORAL 2 TIMES DAILY
Status: SHIPPED
Start: 2025-02-10

## 2025-02-10 RX ORDER — MONTELUKAST SODIUM 10 MG/1
10 TABLET ORAL NIGHTLY
Status: SHIPPED
Start: 2025-02-10

## 2025-02-10 RX ADMIN — METFORMIN HYDROCHLORIDE 250 MG: 500 TABLET ORAL at 16:56

## 2025-02-10 RX ADMIN — METFORMIN HYDROCHLORIDE 250 MG: 500 TABLET ORAL at 08:44

## 2025-02-10 RX ADMIN — Medication 100 MG: at 08:44

## 2025-02-10 RX ADMIN — ESCITALOPRAM OXALATE 10 MG: 10 TABLET ORAL at 08:45

## 2025-02-10 RX ADMIN — ATORVASTATIN CALCIUM 40 MG: 40 TABLET, FILM COATED ORAL at 20:26

## 2025-02-10 RX ADMIN — SENNOSIDES AND DOCUSATE SODIUM 2 TABLET: 50; 8.6 TABLET ORAL at 20:26

## 2025-02-10 RX ADMIN — BUMETANIDE 1 MG: 1 TABLET ORAL at 08:45

## 2025-02-10 RX ADMIN — BUMETANIDE 1 MG: 1 TABLET ORAL at 20:26

## 2025-02-10 RX ADMIN — TAMSULOSIN HYDROCHLORIDE 0.4 MG: 0.4 CAPSULE ORAL at 08:50

## 2025-02-10 RX ADMIN — OMEPRAZOLE 20 MG: 20 CAPSULE, DELAYED RELEASE ORAL at 08:45

## 2025-02-10 RX ADMIN — THERA TABS 1 TABLET: TAB at 08:45

## 2025-02-10 RX ADMIN — ENOXAPARIN SODIUM 40 MG: 100 INJECTION SUBCUTANEOUS at 08:44

## 2025-02-10 RX ADMIN — CYANOCOBALAMIN TAB 500 MCG 1000 MCG: 500 TAB at 08:45

## 2025-02-10 RX ADMIN — CARVEDILOL 6.25 MG: 3.12 TABLET, FILM COATED ORAL at 16:56

## 2025-02-10 RX ADMIN — TRAZODONE HYDROCHLORIDE 100 MG: 100 TABLET ORAL at 20:26

## 2025-02-10 RX ADMIN — QUETIAPINE FUMARATE 50 MG: 25 TABLET ORAL at 16:56

## 2025-02-10 RX ADMIN — MONTELUKAST 10 MG: 10 TABLET, FILM COATED ORAL at 20:26

## 2025-02-10 RX ADMIN — FLUTICASONE FUROATE, UMECLIDINIUM BROMIDE AND VILANTEROL TRIFENATATE 1 PUFF: 200; 62.5; 25 POWDER RESPIRATORY (INHALATION) at 07:01

## 2025-02-10 ASSESSMENT — ENCOUNTER SYMPTOMS
ABDOMINAL PAIN: 0
DIARRHEA: 0
SHORTNESS OF BREATH: 0
NAUSEA: 0
VOMITING: 0
CHILLS: 0
FEVER: 0
NERVOUS/ANXIOUS: 0

## 2025-02-10 ASSESSMENT — BRIEF INTERVIEW FOR MENTAL STATUS (BIMS)
WHAT DAY OF THE WEEK IS IT: CORRECT
BIMS SUMMARY SCORE: 9
INITIAL REPETITION OF BED BLUE SOCK - FIRST ATTEMPT: 3
ASKED TO RECALL BED: NO, COULD NOT RECALL
ASKED TO RECALL BLUE: YES, AFTER CUEING (A COLOR")"
WHAT YEAR IS IT: MISSED BY 1 YEAR
WHAT MONTH IS IT: ACCURATE WITHIN 5 DAYS
ASKED TO RECALL SOCK: NO, COULD NOT RECALL

## 2025-02-10 ASSESSMENT — GAIT ASSESSMENTS
DEVIATION: BRADYKINETIC;SHUFFLED GAIT
DISTANCE (FEET): 40
GAIT LEVEL OF ASSIST: STANDBY ASSIST
ASSISTIVE DEVICE: FRONT WHEEL WALKER

## 2025-02-10 NOTE — PROGRESS NOTES
Hospital Medicine Daily Progress Note      Chief Complaint:  Hypertension  Diabetes    Interval History:  DIscussed about his wbc's being a little elevated and will check a U/A.    Review of Systems  Review of Systems   Constitutional:  Negative for chills and fever.   Respiratory:  Negative for shortness of breath.    Cardiovascular:  Negative for chest pain.   Gastrointestinal:  Negative for abdominal pain, diarrhea, nausea and vomiting.   Psychiatric/Behavioral:  The patient is not nervous/anxious.         Physical Exam  Temp:  [36.5 °C (97.7 °F)-37.1 °C (98.8 °F)] 36.5 °C (97.7 °F)  Pulse:  [62-71] 65  Resp:  [16-20] 20  BP: ()/(54-60) 126/60  SpO2:  [92 %-95 %] 93 %    Physical Exam  Vitals and nursing note reviewed.   Constitutional:       Appearance: Normal appearance.   HENT:      Head: Atraumatic.   Eyes:      Conjunctiva/sclera: Conjunctivae normal.      Pupils: Pupils are equal, round, and reactive to light.   Cardiovascular:      Rate and Rhythm: Normal rate. Rhythm irregular.      Heart sounds: No murmur heard.  Pulmonary:      Effort: Pulmonary effort is normal.      Breath sounds: No stridor. No wheezing or rales.   Abdominal:      General: There is no distension.      Palpations: Abdomen is soft.      Tenderness: There is no abdominal tenderness.   Musculoskeletal:      Cervical back: Normal range of motion and neck supple.      Right lower leg: Edema present.      Left lower leg: Edema present.   Skin:     General: Skin is warm and dry.      Findings: No rash.   Neurological:      Mental Status: He is alert and oriented to person, place, and time.   Psychiatric:         Mood and Affect: Mood normal.         Behavior: Behavior normal.         Fluids    Intake/Output Summary (Last 24 hours) at 2/10/2025 1153  Last data filed at 2/10/2025 1013  Gross per 24 hour   Intake 420 ml   Output 600 ml   Net -180 ml        Laboratory  Recent Labs     02/10/25  0603   WBC 11.0*   RBC 4.08*   HEMOGLOBIN  12.5*   HEMATOCRIT 38.6*   MCV 94.6   MCH 30.6   MCHC 32.4   RDW 49.9   PLATELETCT 259   MPV 9.9         Recent Labs     02/10/25  0603   SODIUM 139   POTASSIUM 3.9   CHLORIDE 103   CO2 25   GLUCOSE 136*   BUN 16   CREATININE 0.96   CALCIUM 8.3*                     Assessment/Plan  Sacral fracture (HCC)- (present on admission)  Assessment & Plan  2nd to GLF  Non-surgical management    SDH (subdural hematoma) (HCC)- (present on admission)  Assessment & Plan  2nd to GLF  Non-surgical management  Needs F/U MRI 6 wks    Non-insulin dependent type 2 diabetes mellitus (HCC)- (present on admission)  Assessment & Plan  Hba1c: 6.9 (1/29)  BS have been good since Metformin started  Cont Metformin: 250 mg bid  Off accuchecks  Note: home meds include Metformin 1000 mg bid and Jardiance 10 mg qd  Cont to monitor    Leukocytosis- (present on admission)  Assessment & Plan  WBC's: 8.1 --> 11.0  Has been afebrile  Will check U/A  Will repeat CBC in am    PAF (paroxysmal atrial fibrillation) (HCC)- (present on admission)  Assessment & Plan  HR ok  Has LE edema (has hx and is at baseline)  Cont Coreg  Cont Bumex  Hx of PPM  Xarelto presently on hold for ICH  Cont to monitor    CAD in native artery- (present on admission)  Assessment & Plan  Has chronic BLE edema  Echo 11/25/24 EF 50%  BNP:815 --> 1141 --> 723 (2/2)  Cont Lipitor  Cont Coreg  Cont Bumex  K+: 4.5 (2/6)  Note: Xarelto presently on hold for ICH  Cont to monitor    Hypertension- (present on admission)  Assessment & Plan  BP ok  Cont Coreg  Note: on Bumex and Flomax  Cont to monitor    Other emphysema (HCC)- (present on admission)  Assessment & Plan  Chronically on supplemental O2 at 2 lpm via NC  Continue Trelegy and Singulair  RT protocol

## 2025-02-10 NOTE — THERAPY
Physical Therapy   Daily Treatment     Patient Name: Vince Carr  Age:  79 y.o., Sex:  male  Medical Record #: [id number]  Today's Date: 2/10/2025     Precautions  Precautions: Fall Risk  Comments: Hx of multiple falls; wear shoes to protect feet    Subjective    Patient is still under impression he is going home despite multiple attempts to remind him of SNF discharge plan     Objective       02/10/25 1301   PT Charge Group   PT Gait Training (Units) 2   PT Therapeutic Exercise (Units) 1   PT Therapeutic Activities (Units) 1   PT Total Time Spent   PT Individual Total Time Spent (Mins) 60   Precautions   Precautions Fall Risk   Comments Hx of multiple falls; wear shoes to protect feet   Gait Functional Level of Assist    Gait Level Of Assist Standby Assist   Assistive Device Front Wheel Walker   Distance (Feet) 40   # of Times Distance was Traveled 4   Deviation Bradykinetic;Shuffled Gait   Supine Lower Body Exercise   Supine Lower Body Exercises   (hand-out given, transition to HEP)   Hip Abduction 1 set of 10;Bilateral   Hip Adduction  1 set of 10;Bilateral   Straight Leg Raises Front;1 set of 10;Bilateral   Heel Slide 1 set of 10;Bilateral   Ankle Pumps 1 set of 10;Reciprocal   Gluteal Isometrics 1 set of 10;Bilateral   Quadriceps Isometrics 1 set of 10;Bilateral   Sitting Lower Body Exercises   Sitting Lower Body Exercises   (hand-out given; transition to HEP)   Ankle Pumps 1 set of 10;Bilateral   Hip Abduction 1 set of 10;Bilateral;Medium Resistance Theraband   Hip Adduction 1 set of 10;Bilateral   Long Arc Quad 1 set of 10;Bilateral   Marching 1 set of 10;Reciprocal   Hamstring Curl 1 set of 10;Bilateral;Medium Resistance Theraband   Outcome Measures   Outcome Measures Utilized   (TUG with FWW in 40.12sec)   Interdisciplinary Plan of Care Collaboration   IDT Collaboration with  Occupational Therapist   Collaboration Comments discharge planning   Roll Left and Right   Assistance Needed Independent   CARE  Score - Roll Left and Right 6   Sit to Lying   Assistance Needed Independent   CARE Score - Sit to Lying 6   Lying to Sitting on Side of Bed   Assistance Needed Independent   CARE Score - Lying to Sitting on Side of Bed 6   Sit to Stand   Assistance Needed Adaptive equipment;Independent   CARE Score - Sit to Stand 6   Chair/Bed-to-Chair Transfer   Assistance Needed Adaptive equipment;Set-up / clean-up;Supervision   CARE Score - Chair/Bed-to-Chair Transfer 4   Car Transfer   Assistance Needed Incidental touching;Verbal cues;Adaptive equipment;Supervision   CARE Score - Car Transfer 4   Walk 10 Feet   Assistance Needed Supervision;Set-up / clean-up;Adaptive equipment;Verbal cues   CARE Score - Walk 10 Feet 4   Walk 50 Feet with Two Turns   Assistance Needed Set-up / clean-up;Adaptive equipment;Verbal cues;Incidental touching   CARE Score - Walk 50 Feet with Two Turns 4   Walk 150 Feet   Assistance Needed Set-up / clean-up;Adaptive equipment;Verbal cues;Incidental touching   CARE Score - Walk 150 Feet 4   Walking 10 Feet on Uneven Surfaces   Assistance Needed Incidental touching;Verbal cues;Set-up / clean-up;Adaptive equipment   CARE Score - Walking 10 Feet on Uneven Surfaces 4   1 Step (Curb)   Assistance Needed Supervision;Set-up / clean-up;Adaptive equipment;Verbal cues   CARE Score - 1 Step (Curb) 4   4 Steps   Assistance Needed Verbal cues;Set-up / clean-up;Adaptive equipment;Physical assistance   Physical Assistance Level 25% or less   CARE Score - 4 Steps 3   12 Steps   Reason if not Attempted Activity not applicable   CARE Score - 12 Steps 9   Picking Up Object   Assistance Needed Physical assistance   Physical Assistance Level 25% or less   CARE Score - Picking Up Object 3   Wheel 50 Feet with Two Turns   Reason if not Attempted Activity not applicable   CARE Score - Wheel 50 Feet with Two Turns 9   Type of Wheelchair/Scooter Manual   Wheel 150 Feet   Reason if not Attempted Activity not applicable   CARE  Score - Wheel 150 Feet 9   Type of Wheelchair/Scooter Manual       Assessment    Patient requires frequent cues and SBA/CGA for safe sequencing with FWW use and transfers; requires occasional CGA with FWW over thresholds    remains HIGH fall risk on outcome assessment (TUG=40.12sec)    Strengths: Motivated for self care and independence, Pleasant and cooperative, Supportive family, Willingly participates in therapeutic activities  Barriers: Decreased endurance, Fatigue, Impaired activity tolerance, Impaired balance, Impaired insight/denial of deficits, Impaired functional cognition, Limited mobility, Pain (lives alone, hx of ~10 falls in past month)    Plan    Discharge to SNF    DME  PT DME Recommendations  Assistive Device:  (Pt has FWW, 4WW, SPT, QC)  Additional Equipment:  (TBD; pt owns sock aid)    Passport items to be completed:  ALL COMPLETE    Physical Therapy Problems (Active)       Problem: PT-Long Term Goals       Dates: Start:  01/29/25         Goal: LTG-By discharge, patient will ambulate 50 ft LRAD SPV/mod I indoors        Dates: Start:  01/29/25            Goal: LTG-By discharge, patient will transfer one surface to another SPV/mod I SPT LRAD       Dates: Start:  01/29/25            Goal: LTG-By discharge, patient will amb up/down threshold for SUZANNA home with LRAD SPV       Dates: Start:  01/29/25

## 2025-02-10 NOTE — CARE PLAN
The patient is Watcher - Medium risk of patient condition declining or worsening    Shift Goals  Clinical Goals: safety  Patient Goals: safety, sleep  Family Goals: no family present    Progress made toward(s) clinical / shift goals:      Problem: Knowledge Deficit - Standard  Goal: Patient and family/care givers will demonstrate understanding of plan of care, disease process/condition, diagnostic tests and medications  Outcome: Progressing  Patient denied any pain during shift.      Problem: Fall Risk - Rehab  Goal: Patient will remain free from falls  Outcome: Progressing  Patient use call light for assistance but not consistently. Encouraged to call for assistance. Room is close to nurses station.        Patient is not progressing towards the following goals:

## 2025-02-10 NOTE — THERAPY
"Occupational Therapy  Daily Treatment     Patient Name: Vince Carr  Age:  79 y.o., Sex:  male  Medical Record #: [id number]  Today's Date: 2/10/2025     Precautions  Precautions: Fall Risk  Comments: Hx of multiple falls; wear shoes to protect feet         Subjective    Pt seated in w/c upon arrival, pleasant and cooperative, agreeable to therapy and shower. Son present initially during session. Pt reports that he misses home and his dog    Objective       02/10/25 1031   OT Charge Group   OT Self Care / ADL (Units) 3   OT Therapy Activity (Units) 1   OT Total Time Spent   OT Individual Total Time Spent (Mins) 60   Functional Level of Assist   Grooming Modified Independent;Seated   Bathing Supervision   Upper Body Dressing Independent   Lower Body Dressing Minimal Assist  (to place socks/pants on toes - help \"get him started\")   Tub / Shower Transfers Standby Assist  (w/c<>shower bench with GB stand pivot)   Interdisciplinary Plan of Care Collaboration   Patient Position at End of Therapy Seated;Tray Table within Reach;Call Light within Reach;Chair Alarm On;Self Releasing Lap Belt Applied     Functional mobility at w/c level: Min A room<>bathroom, w/c used for energy conservation    Reviewed 'Rehab Passport' OT topics which includes: self-feeding, bathroom transfers, dressing, grooming, IADL tasks (laundry, cleaning, meal prep), home/bathroom modifications, HEP, caregiver training. Completed topics were signed off.     Assessment    Pt completed shower routine at Min A - SBA for ADL's and bathroom transfers overall using w/c, shower bench, hand-held shower head, GB's. Pt's functional performance was impacted by impaired standing balance, activity tolerance, and cognition. Pt adamant that he will d/c to home tomorrow, despite son wanting him to go to a nearby assisting facility. Pt reports that son will stay with him for a few weeks at his home to assist with d/c transition. Pt feels confident on returning to " his everyday tasks at FWW level in home    Strengths: Willingly participates in therapeutic activities, Supportive family, Pleasant and cooperative, Motivated for self care and independence, Manages pain appropriately, Independent prior level of function  Barriers: Pain, Limited mobility, Impaired balance, Impaired activity tolerance, Impaired functional cognition, Generalized weakness, Fatigue    Plan    D/c tomorrow     Occupational Therapy Goals (Active)       Problem: OT Long Term Goals       Dates: Start:  01/29/25         Goal: LTG-By discharge, patient will complete basic self care tasks w/ mod I for UB ADLs and supervision for LB ADLs       Dates: Start:  01/29/25            Goal: LTG-By discharge, patient will perform bathroom transfers w/ supervision w/ LRAD       Dates: Start:  01/29/25            Goal: LTG-By discharge, patient will complete basic home management w/ supervision w/ LRAD        Dates: Start:  01/29/25

## 2025-02-10 NOTE — THERAPY
Speech Language Pathology  Daily Treatment     Patient Name: Vince Carr  Age:  79 y.o., Sex:  male  Medical Record #: [id number]  Today's Date: 2/10/2025     Precautions  Precautions: Fall Risk  Comments: Hx of multiple falls; wear shoes to protect feet    Subjective    Patient participated in ST. Son present for session.      Objective       02/10/25 0902   Treatment Charges   SLP Cognitive Skill Development First 15 Minutes 1   SLP Cognitive Skill Development Additional 15 Minutes 3   SLP Total Time Spent   SLP Individual Total Time Spent (Mins) 60   Outcome Measures   Outcome Measures Utilized SCCAN   SCCAN (Scales of Cognitive and Communicative Ability for Neurorehabilitation)   Oral Expression - Raw Score 18   Oral Expression - Scale Performance Score 95   Orientation - Raw Score 11   Orientation - Scale Performance Score 92   Memory - Raw Score 9   Memory - Scale Performance Score 47   Speech Comprehension - Raw Score 12   Speech Comprehension - Scale Performance Score 92   Reading Comprehension - Raw Score 12   Reading Comprehension - Scale Performance Score 100   Writing - Raw Score 7   Writing - Scale Performance Score 100   Attention - Raw Score 15   Attention - Scale Performance Score 94   Problem Solving - Raw Score 20   Problem Solving - Scale Performance Score 87   SCCAN Total Raw Score 78   SCCAN Degree of Severity Mild Impairment   Interdisciplinary Plan of Care Collaboration   Patient Position at End of Therapy Seated;Chair Alarm On;Call Light within Reach;Tray Table within Reach;Family / Friend in Room         Assessment    Final outcome assessment was completed. Patient participated in SCCAN with a final raw score of 78 indicating mild cognitive deficits (improving from original raw score of 67-Moderate). Patient demonstrated improvements across all cognitive domains.       Strengths: Able to follow instructions, Motivated for self care and independence, Pleasant and cooperative, Supportive  family, Willingly participates in therapeutic activities  Barriers: Confused, Generalized weakness, Impaired activity tolerance, Impaired carryover of learning, Impaired functional cognition, Pain (Patient complains of back and left shoulder pain.)    Plan    Discharge planning.         Speech Therapy Problems (Active)       Problem: Memory STGs       Dates: Start:  02/06/25         Goal: STG-Within one week, patient will recall daily events and new training with 75% acc with external and internal memory aids and strategies, with mod cues       Dates: Start:  02/06/25               Problem: Problem Solving STGs       Dates: Start:  02/06/25         Goal: STG-Within one week, patient will perform simple alternating attention tasks with 80% acc with min cues.       Dates: Start:  02/06/25               Problem: Speech/Swallowing LTGs       Dates: Start:  01/29/25         Goal: LTG-By discharge, patient will solve complex problem and recall safety training with 80% acc with set up or spv.       Dates: Start:  01/29/25

## 2025-02-10 NOTE — PROGRESS NOTES
PSYCHOLOGY PROGRESS NOTE     Evaluation Time/Location: [id number] in pt's room; [phone] via phone with sonMorgan  Evaluation Services (Scoring, Record Review, Interpretation, Report): 7924-1149, [phone]  Total Treatment Time: 94 Minutes  Service Type: 36232, [id number]     BACKGROUND  Vince Carr was seen in context of [facility] hospitalization for Psychology follow-up. Pt was seen to assess independent living decision-making capacity.     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 Throughout hospital course pt has exhibited disorientation/confusion later in the day and impaired cognition in areas of memory and attention. Additionally, pt's hospitalization has been influenced by psychosocial stressors including the recent death of his wife, Migue, with suicidal ideation (without intent or plan & Baptist beliefs as a protective factor), sleep disturbance, fatigue, and pain. Pt was seen by evaluator on 2/7 with his sonMorgan, present w/ discussion of recommendations set forth by treatment team and at that time had expressed desire to return home. Per review of record, pt felt discouraged later that day about updated plan to discharge to SNF & did not accept fall risk status by PT. In therapy sessions, he has been limited by limited awareness/understanding of CLOF, impaired balance, activity tolerance, and cognition and has at times expressed that he is safe to return home and ride his motorcycle despite acknowledging weakness and changes in reaction times/abilities. He has been a stand-by assist with transfers out of bed and contact-guard assist with gait and a high fall risk. With occupational therapy he has been standby-assist for some tasks of ADL, but mod-assist with some tasks such as grooming. In medication management tasks with SLP, he has been mod to max  "assist.      SUBJECTIVE    Patient:   Pt reported that the weekend went \"okay.\" When discussing his length of stay, he expressed surprise that he had been hospitalized for 2 weeks.     Informant Report (Morgan): Pt's son reported that he had discussed the plan for SNF & prison this morning with his dad, which he expressed openness and agreeability to at that time. He reported that he has observed a significant effect of fatigue to his cognition, reporting that cognitively he saw some improvements with rest over the weekend. Son expressed concern for pt's premorbid cognitive functioning reporting that he found evidence of his father being scammed out of a significant amount of money.       ASSESSMENT     Behavioral Observations:  Vince Carr was alert, oriented to person and situation (disoriented to year), and agreeable to encounter. Pt participated appropriately and cooperatively. Language functions, eye contact, and non-verbal behaviors were unremarkable. Thought processes were connected, logical, and goal oriented (negative for AH/VH//HI); mood and affect were congruent and euthymic.    Risk Assessment: Pt endorsed improvement in thoughts of death when interviewed stating \"Those thoughts haven't occurred.\" He expressed these thoughts were relative to grief/loss of spouse and stated \"Not something I'd ever do.\" Risk is felt to be low at this time as pt is not endorsing plan or intent and has expressed improvement in intensity of thoughts with protective factors including Jainism beliefs, openness to treatment, and family support.    Cognitive Screening:  [facility] Mental Status Examination (UMS): 11/30; Performance Range: impaired Errors: orientation, math calculation, animal fluency, item recall, backward digit span, clock drawing, and story memory  Compared to initial screening completed on 1/31 pt's score has not changed, but worsened from 1/31 (SLUMS=15/30 at that time).     Decision-Making " "Interview:  In an interview with Mr. Carr related to discharge planning, the interview focused on understanding of discharge recommendations, progress, and CLOF. When asked what was being recommended by various disciplines for ongoing support or his perception of how he was doing, he stated, \"All tests are good... blood tests, breathing tests, OT, PT, ST.\" With probing he described concerns of falls and memory as things his treatment team may be concerned about, but also stated \"people are full of beans.\" Pt indicated he was unable to recall conversation with writer on 2/7 & son and recommendations related to iADLs and driving to which he reported, \"I could do my medications tomorrow\" and \"because of reflex time\" for reasons driving was not recommended. Pt reported he could see the benefit to having support, such as at a SNF, but stated \"I don't need it.\"    In describing what was his preferred choice, he expressed preference to return home and said, \"I want to get back to a normal way of life.\" In exploring potential risks or concerns about going home he indicated, risks include \"timing and eating,\" without further description. When asked about what safety looked like at home he stated, \"I can get around, I can feed my dogs, cook for myself, and ride my [place].\"  He indicated his son was someone that could help provide functional support and was open to this in the short-term.    IMPRESSION    Vince Carr seen in context of IR[facility] hospitalization for evaluation related to decision-making abilities about return to independent living. Based on evaluation, review of record, and interview, it is felt that at this time Mr. Carr does not have the capacity to communicate and make decisions about return to independent living. In making housing/living decisions, however, eliciting his preferences, values, and participation is encouraged & recommended.    At this time, he has fluctuating understanding " "about his CLOF & recommendations set forth by his treatment team and limited appreciation for the risks or harms of this decision often stating \"I'd be fine at home on his own\" despite recommendations and findings shared by his treatment team. Further, his current cognitive status is impaired and may fluctuate based on factors such as mood, fatigue, and pain could result in him putting himself or others at risk for harm. Findings were discussed with pt's son & physician on 2/10.    DIAGNOSIS     Adjustment disorder w/ depressed mood  Insomnia  Pain w/ psychological factors  Impaired cognition (r/o Vascular dementia)     RECOMMENDATIONS    1) Will continue to follow and assess cognition, mood, pain management, and coping.  2) Previous recommendations remain needed.        Rafia Wills, PhD, [facility] [employer]-  Licensed Psychologist     The utility and purpose of this service was reviewed and pt agreed to participate.  Screening feedback provided; pt offered opportunity to discuss findings and ask questions. Pt demonstrated ability and willingness to work towards goals, receptivity to psychological support and plan of care. Therapy goals/plans were reviewed and discussed with pt and pt was in agreement.   "

## 2025-02-10 NOTE — PROGRESS NOTES
NURSING DAILY NOTE    Name: Vince Carr   Date of Admission: 1/28/2025   Admitting Diagnosis: No Principal Problem: There is no principal problem currently on the Problem List. Please update the Problem List and refresh.  Attending Physician: MAGALY RICHARDSON M.D.  Allergies: Iodine and Naproxen    Safety  Patient Assist  MIN/ MOD  Patient Precautions  Fall Risk  Precaution Comments  Hx of multiple falls; wear shoes to protect feet  Bed Transfer Status  Standby Assist  Toilet Transfer Status   Standby Assist  Assistive Devices  Rails, Wheelchair  Oxygen  None - Room Air  Diet/Therapeutic Dining  Current Diet Order   Procedures    Diet Order Diet: Consistent CHO (Diabetic)     Pill Administration  whole  Agitated Behavioral Scale     ABS Level of Severity       Fall Risk  Has the patient had a fall this admission?   No  Ximena Aguiar Fall Risk Scoring  33, HIGH RISK  Fall Risk Safety Measures  bed alarm and chair alarm    Vitals  Temperature: 36.6 °C (97.9 °F)  Temp src: Temporal  Pulse: 64  Respiration: 18  Blood Pressure : 110/60  Blood Pressure MAP (Calculated): 77 MM HG  BP Location: Right, Upper Arm  Patient BP Position: Supine     Oxygen  Pulse Oximetry: 92 %  O2 (LPM): 0  FiO2%: 21 %  O2 Delivery Device: None - Room Air    Bowel and Bladder  Last Bowel Movement  02/09/25  Stool Type  Type 7: Watery, no solid pieces-entirely liquid  Bowel Device  Bathroom  Continent  Bladder: Did not void   Bowel: No movement  Bladder Function  Urine Void (mL):  (large)  Number of Times Voided: 1  Urinary Options: Yes  Urine Color: Yellow  Number of Times Incontinent of Urine: 1  Straight Catheter: 600 ml  Wet Diaper Count: 1  Genitourinary Assessment   Bladder Assessment (WDL):  Within Defined Limits  Diaz Catheter: Not Applicable  Urine Color: Yellow  Number of Bladder Accidents: 1  Total Number of Bladder of Accidents in Last 7 Days: 1  Number of Times  Incontinent of Urine: 1  Bladder Device: Urinal  Time Void: Yes  Bladder Scan: Post Void  $ Bladder Scan Results (mL): 119  Bladder Medications: Yes    Skin  Thanh Score   16  Sensory Interventions   Bed Types: Standard/Trauma Mattress  Skin Preventative Measures: Waffle Overlay  Moisture Interventions  Moisturizers/Barriers: Barrier Wipes      Pain  Pain Rating Scale  0 - No Pain  Pain Location  Knee  Pain Location Orientation  Right  Pain Interventions   Declines    ADLs    Bathing   Patient Refused Bathing  Linen Change   Partial  Personal Hygiene  Change Charley Pads, Moist Charley Wipes, Perineal Care  Chlorhexidine Bath      Oral Care  Brushed Teeth  Teeth/Dentures     Shave     Nutrition Percentage Eaten  Free Water  Environmental Precautions  Treaded Slipper Socks on Patient, Bed in Low Position  Patient Turns/Positioning  Patient turns self independently side to side without assistance, to offload sacral area  Patient Turns Assistance/Tolerance  Assistance of One  Bed Positions  Bed Controls On, Bed Locked  Head of Bed Elevated  Self regulated      Psychosocial/Neurologic Assessment  Psychosocial Assessment  Psychosocial (WDL):  WDL Except  Patient Behaviors: Forgetful  Neurologic Assessment  Neuro (WDL): Exceptions to WDL  Level of Consciousness: Alert  Orientation Level: Oriented to place, Oriented to situation, Oriented to person  Cognition: Appropriate safety awareness, Follows commands  Speech: Clear  Pupil Assesment: No  EENT (WDL):  WDL Except    Cardio/Pulmonary Assessment  Edema   RLE Edema: 1+  LLE Edema: 1+  Respiratory Breath Sounds  RUL Breath Sounds: Clear  RML Breath Sounds: Clear  RLL Breath Sounds: Clear  PRATIK Breath Sounds: Clear  LLL Breath Sounds: Clear  Cardiac Assessment   Cardiac (WDL):  WDL Except (H/O HTN, Afib, CAD, MI.)

## 2025-02-10 NOTE — PROGRESS NOTES
NURSING DAILY NOTE    Name: Vince Carr   Date of Admission: 1/28/2025   Admitting Diagnosis: No Principal Problem: There is no principal problem currently on the Problem List. Please update the Problem List and refresh.  Attending Physician: MAGALY RICHARDSON M.D.  Allergies: Iodine and Naproxen    Safety  Patient Assist  MIN/ MOD  Patient Precautions  Fall Risk  Precaution Comments  Hx of multiple falls; wear shoes to protect feet  Bed Transfer Status  Standby Assist  Toilet Transfer Status   Standby Assist  Assistive Devices  Rails, Wheelchair  Oxygen  None - Room Air  Diet/Therapeutic Dining  Current Diet Order   Procedures    Diet Order Diet: Consistent CHO (Diabetic)     Pill Administration  whole  Agitated Behavioral Scale     ABS Level of Severity       Fall Risk  Has the patient had a fall this admission?   No  Ximena Aguiar Fall Risk Scoring  20, HIGH RISK  Fall Risk Safety Measures  bed alarm and chair alarm    Vitals  Temperature: 36.6 °C (97.9 °F)  Temp src: Oral  Pulse: 62  Respiration: 18  Blood Pressure : 94/55  Blood Pressure MAP (Calculated): 68 MM HG  BP Location: Right, Upper Arm  Patient BP Position: Sitting     Oxygen  Pulse Oximetry: 95 %  O2 (LPM): 2  FiO2%: 21 %  O2 Delivery Device: None - Room Air    Bowel and Bladder  Last Bowel Movement  02/09/25  Stool Type  Type 7: Watery, no solid pieces-entirely liquid  Bowel Device  Bathroom  Continent  Bladder: Did not void   Bowel: No movement  Bladder Function  Urine Void (mL): 350 ml  Number of Times Voided: 1  Urinary Options: Yes  Urine Color: Yellow  Number of Times Incontinent of Urine: 1  Straight Catheter: 600 ml  Wet Diaper Count: 1  Genitourinary Assessment   Bladder Assessment (WDL):  Within Defined Limits  Diaz Catheter: Not Applicable  Urine Color: Yellow  Number of Bladder Accidents: 1  Total Number of Bladder of Accidents in Last 7 Days: 1  Number of Times  Incontinent of Urine: 1  Bladder Device: Urinal  Time Void: Yes  Bladder Scan: Post Void  $ Bladder Scan Results (mL): 152  Bladder Medications: Yes    Skin  Thanh Score   17  Sensory Interventions   Bed Types: Standard/Trauma Mattress  Skin Preventative Measures: Waffle Overlay  Moisture Interventions  Moisturizers/Barriers: Barrier Wipes      Pain  Pain Rating Scale  0 - No Pain  Pain Location  Knee  Pain Location Orientation  Right  Pain Interventions   Declines    ADLs    Bathing   Patient Refused Bathing  Linen Change   Partial  Personal Hygiene  Change Charley Pads, Moist Charley Wipes, Perineal Care  Chlorhexidine Bath      Oral Care  Brushed Teeth  Teeth/Dentures     Shave     Nutrition Percentage Eaten  Free Water  Environmental Precautions  Treaded Slipper Socks on Patient, Bed in Low Position  Patient Turns/Positioning  Sitting up in wheelchair  Patient Turns Assistance/Tolerance  Assistance of One  Bed Positions  Bed Controls On, Bed Locked  Head of Bed Elevated  Self regulated      Psychosocial/Neurologic Assessment  Psychosocial Assessment  Psychosocial (WDL):  WDL Except  Patient Behaviors: Forgetful  Neurologic Assessment  Neuro (WDL): Exceptions to WDL  Level of Consciousness: Alert  Orientation Level: Oriented to place, Oriented to situation, Oriented to person  Cognition: Appropriate safety awareness, Follows commands  Speech: Clear  Pupil Assesment: No  EENT (WDL):  WDL Except    Cardio/Pulmonary Assessment  Edema   RLE Edema: 2+  LLE Edema: 2+  Respiratory Breath Sounds  RUL Breath Sounds: Clear  RML Breath Sounds: Clear  RLL Breath Sounds: Clear  PRATIK Breath Sounds: Clear  LLL Breath Sounds: Clear  Cardiac Assessment   Cardiac (WDL):  WDL Except

## 2025-02-10 NOTE — DISCHARGE SUMMARY
Physical Medicine & Rehabilitation Discharge Summary    Admission Date: 1/28/2025    Discharge Date: 2/11/2025    Attending Provider: Monserrat Chan MD/PhD    Admission Diagnosis:   Active Hospital Problems    Diagnosis     SDH (subdural hematoma) (HCC)     Sacral fracture (HCC)     Non-insulin dependent type 2 diabetes mellitus (HCC)     Leukocytosis     PAF (paroxysmal atrial fibrillation) (HCC)     Hypertension     CAD in native artery     Other emphysema (HCC)        Discharge Diagnosis:  Active Hospital Problems    Diagnosis     SDH (subdural hematoma) (HCC)     Sacral fracture (HCC)     Non-insulin dependent type 2 diabetes mellitus (HCC)     Leukocytosis     PAF (paroxysmal atrial fibrillation) (HCC)     Hypertension     CAD in native artery     Other emphysema (HCC)        HPI per Admission History & Physical:  The patient is a 79 y.o. right hand dominant male with a past medical history of atrial fibrillation on Xarelto, coronary artery disease, diabetes, hypertension, frequent falls; who presented on 1/26/2025 3:10 PM as a transfer from [facility] with ICH. Patient had 2 ground-level falls, reported due to weakness, no loss of consciousness but he does take Xarelto. For this episode he was found down by neighbors who activated EMS. Initial CT head was negative but repeat CT head due to anticoagulation showed ICH and patient was transferred to [facility]. CT head here on 1/25 found questionable ICH that was stable     Patient was admitted to [facility] on 1/28/2025.     Hospital Course by Problem List:  SDH - Patient with fall on 1/26/25 with SDH with conservative management  -PT and OT for mobility and ADLs. Per guidelines, 15 hours per week between PT, OT and/or SLP.  -Follow-up NSG. On Seroquel 12.5 mg QHS -> 25. Increased to 50 mg due to confusion at night. Continue 50 mg QHS     HTN/CAD/A fib - Patient on Bumex, Coreg 12.5 mg. Coreg reduced  -SBP into 120, continue Bumex 1  "mg and Coreg 6.25 mg     S5 fracture - Conservative management. PRN Tylenol and Oxycodone      Chest pain - Right sided, probable rib fracture. CXR completed, consult hospitalist. Concern for pneumonia, started on Augmentin. Improved cough, completed augmentin     Hyponatremia - Check AM CMP - 135, improving     Anemia - Check AM CBC - 13.5, will monitor     Leukocytosis - Check AM CBC - 13.2, increasing. UA negative CXR with possible pneumonia. Consult hospitalist     DM2 with hyperglycemia - Patient on SSI on transfer. Started on Metformin. SSI discontinue continue Metformin 250 mg     BPH - Patient on Tamsulosin 0.4 mg daily     Insomnia - Patient on Trazodone 100 mg daily     COPD - Patient on Trelegy daily     Pain - Patient on PRN Tylenol and Oxycodone      Skin - Patient at risk for skin breakdown due to debility in areas including sacrum, achilles, elbows and head in addition to other sites. Nursing to assess skin daily.      GI Ppx - Patient on Prilosec for GERD prophylaxis. Patient on Senna-docusate for constipation prophylaxis.      DVT Ppx - Patient Lovenox on transfer. Limited mobility, continue Lovenox    Functional Status at Discharge  Eating:  Stand by Assist (set-up)  Eating Description:     Grooming:  Modified Independent, Seated  Grooming Description:  Increased time, Supervision for safety  Bathing:  Supervision  Bathing Description:   (Sponge bath to periarea (front and back) standing at )  Upper Body Dressing:  Independent  Upper Body Dressing Description:     Lower Body Dressing:  Minimal Assist (to place socks/pants on toes - help \"get him started\")  Lower Body Dressing Description:  Assist with closures, Increased time, Initial preparation for task, Supervision for safety, Verbal cueing (Provided assist to don suspenders)     Walk:  Standby Assist  Distance Walked:  40  Number of Times Distance Was Traveled:  4  Assistive Device:  Front Wheel Walker  Gait Deviation:  Bradykinetic, Shuffled " Gait  Wheelchair:  Supervised  Distance Propelled:  30   Wheelchair Description:  Limited by fatigue  Stairs Contact Guard Assist  Stairs Description Extra time, Hand rails, Limited by fatigue, Oxygen, Safety concerns, Supervision for safety, Verbal cueing (Ascend with step through pattern, descend with step through and step-to pattern,)  Discharge Location: Skilled Nursing Facility  Patient Discharging with Assist of: No One, Patient will be Alone (son, Morgan, available to help transition to SNF)  Level of Supervision Required Upon Discharge: Twenty Four Hour Supervision  Recommended Equipment for Discharge: Front-Wheeled Walker  Criteria for Termination of Services: Maximum Function Achieved for Inpatient Rehabilitation  Comprehension:  Modified Independent  Comprehension Description:   (Extra time.)  Expression:  Modified Independent  Expression Description:   (Extra time.)  Social Interaction:  Independent  Social Interaction Description:     Problem Solving:  Minimal Assist  Problem Solving Description:  Seat belt, Increased time, Supervision, Therapy schedule, Verbal cueing  Memory:  Maximal Assist  Memory Description:  Seat belt, Increased time, Supervision, Therapy schedule, Verbal cueing       Monserrat HERNANDEZ M.D., personally performed a complete drug regimen review and no potential clinically significant medication issues were identified.   Discharge Medication:     Medication List        ASK your doctor about these medications        Instructions   atorvastatin 40 MG Tabs  Commonly known as: Lipitor   TAKE ONE TABLET BY MOUTH AT BEDTIME  Dose: 40 mg     bumetanide 1 MG Tabs  Commonly known as: Bumex   Take 1 mg by mouth 2 times a day.  Dose: 1 mg     carvedilol 12.5 MG Tabs  Commonly known as: Coreg   Take 12.5 mg by mouth 2 times a day.  Dose: 12.5 mg     cyanocobalamin 1000 MCG Tabs  Commonly known as: Vitamin B12   Take 1 Tablet by mouth every day.  Dose: 1,000 mcg     Gemtesa 75 MG  tablet  Generic drug: vibegron   Take 75 mg by mouth every day.  Dose: 75 mg     Jardiance 10 MG Tabs tablet  Generic drug: Empagliflozin   Take 1 Tablet by mouth every day.  Dose: 1 Tablet     metFORMIN 500 MG Tabs  Commonly known as: Glucophage   Take 2 Tablets by mouth 2 times a day with meals.  Dose: 1,000 mg     multivitamin Tabs   Take 1 Tab by mouth every day.  Dose: 1 Tablet     omeprazole 20 MG delayed-release capsule  Commonly known as: PriLOSEC   Take 1 Capsule by mouth every day.  Dose: 20 mg     potassium chloride SA 20 MEQ Tbcr  Commonly known as: Kdur   TAKE ONE TABLET BY MOUTH EVERY DAY  Dose: 20 mEq     QUEtiapine 25 MG Tabs  Commonly known as: SEROquel   Take 0.5 Tablets by mouth every evening.  Dose: 12.5 mg     tamsulosin 0.4 MG capsule  Commonly known as: Flomax   TAKE ONE CAPSULE BY MOUTH EVERY DAY ONE-HALF HOUR AFTER BREAKFAST  Dose: 0.4 mg     thiamine 100 MG tablet  Commonly known as: Thiamine   Take 1 Tablet by mouth every day.  Dose: 100 mg     traZODone 100 MG Tabs  Commonly known as: Desyrel   Take 100 mg by mouth every evening.  Dose: 100 mg     Trelegy Ellipta 200-62.5-25 MCG/ACT inhaler  Generic drug: fluticasone-umeclidinium-vilanterol   Trelegy Ellipta 200 mcg-62.5 mcg-25 mcg powder for inhalation   inhale 1 puff by mouth and INTO THE LUNGS once daily              Discharge Diet:  Current Diet Order   Procedures    Diet Order Diet: Consistent CHO (Diabetic)       Discharge Activity:  As tolerated     Disposition:  Patient to discharge to SNF for ongoing rehabilitation services.    Equipment:  TBD    Follow-up & Discharge Instructions:  Follow up with your primary care provider (PCP) within 7-10 days of discharge to review your medications and take over your care.     If you develop chest pain, fever, chills, change in neurologic function (weakness, sensation changes, vision changes), or other concerning sxs, seek immediate medical attention or call 911.      Future Appointments    Date Time Provider Department Center   11/25/2025  9:00 AM Dylan Bell M.D. [facility] None       Condition on Discharge:  Good    More than 32 minutes was spent on discharging this patient, including face-to-face time, prescription management, and the dictation of this note.    Monserrat Chan M.D.    Date of Service: 2/11/2025

## 2025-02-10 NOTE — FLOWSHEET NOTE
02/10/25 0702   Events/Summary/Plan   Events/Summary/Plan MDI   Vital Signs   Pulse 68   Respiration 16   Pulse Oximetry 93 %   $ Pulse Oximetry (Spot Check) Yes   Respiratory Assessment   Respiratory Pattern Within Normal Limits   Level of Consciousness Alert   Chest Exam   Work Of Breathing / Effort Within Normal Limits   Breath Sounds   RUL Breath Sounds Clear   RML Breath Sounds Clear   RLL Breath Sounds Clear   PRATIK Breath Sounds Clear   LLL Breath Sounds Clear   Oxygen   O2 (LPM) 0   O2 Delivery Device None - Room Air

## 2025-02-10 NOTE — CARE PLAN
Problem: Knowledge Deficit - Standard  Goal: Patient and family/care givers will demonstrate understanding of plan of care, disease process/condition, diagnostic tests and medications  Outcome: Progressing     Problem: Fall Risk - Rehab  Goal: Patient will remain free from falls  Outcome: Progressing     Problem: Mobility  Goal: Patient's capacity to carry out activities will improve  Outcome: Progressing       The patient is Stable - Low risk of patient condition declining or worsening    Shift Goals  Clinical Goals: safety  Patient Goals: safety, sleep  Family Goals: no family present

## 2025-02-10 NOTE — DISCHARGE PLANNING
Case managemt.  Spoke with pt's son imer this am  He applied for [employer] benefits  [facility] can accept pt.   Accepting MD is Dr Campo  Son indicates pt pushing back on transfer to SNF w/ ultimate goal of moving into a halfway in [place].     [facility] on File [id number]        Addendum 1053  Pt in agreement w transfer tomorrow  [facility] will coordinate  time.

## 2025-02-10 NOTE — PROGRESS NOTES
"  Physical Medicine & Rehabilitation Progress Note    Encounter Date: 2/10/2025    Chief Complaint: Decreased mobility    Interval Events (Subjective):  Patient sitting up in room. He reports he is now agreeable to going to skilled. Accepting facility has been found. Will discharge tomorrow.     _____________________________________  Interdisciplinary Team Conference   Most recent IDT on 2/6/2025    Discharge Date/Disposition:  2/11/25  _____________________________________      Objective:  VITAL SIGNS: /60   Pulse 65   Temp 36.5 °C (97.7 °F) (Oral)   Resp 20   Ht 1.803 m (5' 11\")   Wt 108 kg (237 lb 8 oz)   SpO2 93%   BMI 33.12 kg/m²   Gen: NAD  Psych: Mood and affect appropriate  CV: RRR, 0 edema  Resp: CTAB, no upper airway sounds  Abd: NTND  Neuro: AOx4, following commands  Unchanged from 2/9/25    Laboratory Values:  Recent Results (from the past 72 hours)   Basic Metabolic Panel    Collection Time: 02/10/25  6:03 AM   Result Value Ref Range    Sodium 139 135 - 145 mmol/L    Potassium 3.9 3.6 - 5.5 mmol/L    Chloride 103 96 - 112 mmol/L    Co2 25 20 - 33 mmol/L    Glucose 136 (H) 65 - 99 mg/dL    Bun 16 8 - 22 mg/dL    Creatinine 0.96 0.50 - 1.40 mg/dL    Calcium 8.3 (L) 8.5 - 10.5 mg/dL    Anion Gap 11.0 7.0 - 16.0   MAGNESIUM    Collection Time: 02/10/25  6:03 AM   Result Value Ref Range    Magnesium 1.8 1.5 - 2.5 mg/dL   CBC WITH DIFFERENTIAL    Collection Time: 02/10/25  6:03 AM   Result Value Ref Range    WBC 11.0 (H) 4.8 - 10.8 K/uL    RBC 4.08 (L) 4.70 - 6.10 M/uL    Hemoglobin 12.5 (L) 14.0 - 18.0 g/dL    Hematocrit 38.6 (L) 42.0 - 52.0 %    MCV 94.6 81.4 - 97.8 fL    MCH 30.6 27.0 - 33.0 pg    MCHC 32.4 32.3 - 36.5 g/dL    RDW 49.9 35.9 - 50.0 fL    Platelet Count 259 164 - 446 K/uL    MPV 9.9 9.0 - 12.9 fL    Neutrophils-Polys 66.10 44.00 - 72.00 %    Lymphocytes 19.10 (L) 22.00 - 41.00 %    Monocytes 10.90 0.00 - 13.40 %    Eosinophils 2.30 0.00 - 6.90 %    Basophils 0.30 0.00 - 1.80 % "    Immature Granulocytes 1.30 (H) 0.00 - 0.90 %    Nucleated RBC 0.00 0.00 - 0.20 /100 WBC    Neutrophils (Absolute) 7.26 1.82 - 7.42 K/uL    Lymphs (Absolute) 2.09 1.00 - 4.80 K/uL    Monos (Absolute) 1.19 (H) 0.00 - 0.85 K/uL    Eos (Absolute) 0.25 0.00 - 0.51 K/uL    Baso (Absolute) 0.03 0.00 - 0.12 K/uL    Immature Granulocytes (abs) 0.14 (H) 0.00 - 0.11 K/uL    NRBC (Absolute) 0.00 K/uL   ESTIMATED GFR    Collection Time: 02/10/25  6:03 AM   Result Value Ref Range    GFR (CKD-EPI) 80 >60 mL/min/1.73 m 2       Medications:  Scheduled Medications   Medication Dose Frequency    QUEtiapine  50 mg Nightly    metFORMIN  250 mg BID WITH MEALS    escitalopram  10 mg DAILY    carvedilol  6.25 mg BID WITH MEALS    montelukast  10 mg Nightly    enoxaparin (LOVENOX) injection  40 mg DAILY    fluticasone-umeclidinium-vilanterol  1 Puff QDAILY (RT)    senna-docusate  2 Tablet Q EVENING    omeprazole  20 mg DAILY    atorvastatin  40 mg QHS    bumetanide  1 mg BID    cyanocobalamin  1,000 mcg DAILY    multivitamin  1 Tablet DAILY    tamsulosin  0.4 mg AFTER BREAKFAST    thiamine  100 mg DAILY    traZODone  100 mg Nightly     PRN medications: hydrALAZINE, acetaminophen, senna-docusate **AND** polyethylene glycol/lytes, docusate sodium, magnesium hydroxide, carboxymethylcellulose, benzocaine-menthol, mag hydrox-al hydrox-simeth, ondansetron **OR** ondansetron, traZODone, sodium chloride, oxyCODONE immediate-release **OR** oxyCODONE immediate-release, midazolam    Diet:  Current Diet Order   Procedures    Diet Order Diet: Consistent CHO (Diabetic)       Medical Decision Making and Plan:  SDH - Patient with fall on 1/26/25 with SDH with conservative management  -PT and OT for mobility and ADLs. Per guidelines, 15 hours per week between PT, OT and/or SLP.  -Follow-up NSG. On Seroquel 12.5 mg QHS -> 25. Increased to 50 mg due to confusion at night. Continue 50 mg QHS     HTN/CAD/A fib - Patient on Bumex, Coreg 12.5 mg. Coreg  reduced  -SBP into 120, continue Bumex 1 mg and Coreg 6.25 mg     S5 fracture - Conservative management. PRN Tylenol and Oxycodone      Chest pain - Right sided, probable rib fracture. CXR completed, consult hospitalist. Concern for pneumonia, started on Augmentin. Improved cough, completed augmentin    Hyponatremia - Check AM CMP - 135, improving     Anemia - Check AM CBC - 13.5, will monitor     Leukocytosis - Check AM CBC - 13.2, increasing. UA negative CXR with possible pneumonia. Consult hospitalist     DM2 with hyperglycemia - Patient on SSI on transfer. Started on Metformin. SSI discontinue continue Metformin 250 mg     BPH - Patient on Tamsulosin 0.4 mg daily     Insomnia - Patient on Trazodone 100 mg daily     COPD - Patient on Trelegy daily     Pain - Patient on PRN Tylenol and Oxycodone      Skin - Patient at risk for skin breakdown due to debility in areas including sacrum, achilles, elbows and head in addition to other sites. Nursing to assess skin daily.      GI Ppx - Patient on Prilosec for GERD prophylaxis. Patient on Senna-docusate for constipation prophylaxis.      DVT Ppx - Patient Lovenox on transfer. Limited mobility, continue Lovenox  ____________________________________    T. Car Chan MD/PhD  [facility] - Physical Medicine & Rehabilitation   [facility] - Brain Injury Medicine   ____________________________________

## 2025-02-11 VITALS
RESPIRATION RATE: 20 BRPM | WEIGHT: 237.5 LBS | OXYGEN SATURATION: 95 % | DIASTOLIC BLOOD PRESSURE: 73 MMHG | HEIGHT: 71 IN | HEART RATE: 68 BPM | SYSTOLIC BLOOD PRESSURE: 116 MMHG | BODY MASS INDEX: 33.25 KG/M2 | TEMPERATURE: 97.7 F

## 2025-02-11 LAB
BASOPHILS # BLD AUTO: 0.2 % (ref 0–1.8)
BASOPHILS # BLD: 0.02 K/UL (ref 0–0.12)
EOSINOPHIL # BLD AUTO: 0.22 K/UL (ref 0–0.51)
EOSINOPHIL NFR BLD: 2.6 % (ref 0–6.9)
ERYTHROCYTE [DISTWIDTH] IN BLOOD BY AUTOMATED COUNT: 50 FL (ref 35.9–50)
HCT VFR BLD AUTO: 40.2 % (ref 42–52)
HGB BLD-MCNC: 13 G/DL (ref 14–18)
IMM GRANULOCYTES # BLD AUTO: 0.1 K/UL (ref 0–0.11)
IMM GRANULOCYTES NFR BLD AUTO: 1.2 % (ref 0–0.9)
LYMPHOCYTES # BLD AUTO: 1.61 K/UL (ref 1–4.8)
LYMPHOCYTES NFR BLD: 18.7 % (ref 22–41)
MCH RBC QN AUTO: 30.2 PG (ref 27–33)
MCHC RBC AUTO-ENTMCNC: 32.3 G/DL (ref 32.3–36.5)
MCV RBC AUTO: 93.3 FL (ref 81.4–97.8)
MONOCYTES # BLD AUTO: 0.84 K/UL (ref 0–0.85)
MONOCYTES NFR BLD AUTO: 9.8 % (ref 0–13.4)
NEUTROPHILS # BLD AUTO: 5.81 K/UL (ref 1.82–7.42)
NEUTROPHILS NFR BLD: 67.5 % (ref 44–72)
NRBC # BLD AUTO: 0 K/UL
NRBC BLD-RTO: 0 /100 WBC (ref 0–0.2)
PLATELET # BLD AUTO: 256 K/UL (ref 164–446)
PMV BLD AUTO: 10.2 FL (ref 9–12.9)
RBC # BLD AUTO: 4.31 M/UL (ref 4.7–6.1)
WBC # BLD AUTO: 8.6 K/UL (ref 4.8–10.8)

## 2025-02-11 PROCEDURE — A9270 NON-COVERED ITEM OR SERVICE: HCPCS | Performed by: HOSPITALIST

## 2025-02-11 PROCEDURE — A9270 NON-COVERED ITEM OR SERVICE: HCPCS | Performed by: PHYSICAL MEDICINE & REHABILITATION

## 2025-02-11 PROCEDURE — 99239 HOSP IP/OBS DSCHRG MGMT >30: CPT | Performed by: PHYSICAL MEDICINE & REHABILITATION

## 2025-02-11 PROCEDURE — 700111 HCHG RX REV CODE 636 W/ 250 OVERRIDE (IP): Mod: JZ | Performed by: PHYSICAL MEDICINE & REHABILITATION

## 2025-02-11 PROCEDURE — 700102 HCHG RX REV CODE 250 W/ 637 OVERRIDE(OP): Performed by: HOSPITALIST

## 2025-02-11 PROCEDURE — 36415 COLL VENOUS BLD VENIPUNCTURE: CPT

## 2025-02-11 PROCEDURE — 94760 N-INVAS EAR/PLS OXIMETRY 1: CPT

## 2025-02-11 PROCEDURE — 700102 HCHG RX REV CODE 250 W/ 637 OVERRIDE(OP): Performed by: PHYSICAL MEDICINE & REHABILITATION

## 2025-02-11 PROCEDURE — 85025 COMPLETE CBC W/AUTO DIFF WBC: CPT

## 2025-02-11 PROCEDURE — 94640 AIRWAY INHALATION TREATMENT: CPT

## 2025-02-11 RX ADMIN — METFORMIN HYDROCHLORIDE 250 MG: 500 TABLET ORAL at 08:35

## 2025-02-11 RX ADMIN — ESCITALOPRAM OXALATE 10 MG: 10 TABLET ORAL at 08:35

## 2025-02-11 RX ADMIN — BUMETANIDE 1 MG: 1 TABLET ORAL at 08:35

## 2025-02-11 RX ADMIN — TAMSULOSIN HYDROCHLORIDE 0.4 MG: 0.4 CAPSULE ORAL at 08:35

## 2025-02-11 RX ADMIN — THERA TABS 1 TABLET: TAB at 08:35

## 2025-02-11 RX ADMIN — CARVEDILOL 6.25 MG: 3.12 TABLET, FILM COATED ORAL at 08:35

## 2025-02-11 RX ADMIN — Medication 100 MG: at 08:35

## 2025-02-11 RX ADMIN — OMEPRAZOLE 20 MG: 20 CAPSULE, DELAYED RELEASE ORAL at 08:35

## 2025-02-11 RX ADMIN — FLUTICASONE FUROATE, UMECLIDINIUM BROMIDE AND VILANTEROL TRIFENATATE 1 PUFF: 200; 62.5; 25 POWDER RESPIRATORY (INHALATION) at 06:53

## 2025-02-11 RX ADMIN — CYANOCOBALAMIN TAB 500 MCG 1000 MCG: 500 TAB at 08:35

## 2025-02-11 RX ADMIN — ENOXAPARIN SODIUM 40 MG: 100 INJECTION SUBCUTANEOUS at 08:35

## 2025-02-11 ASSESSMENT — PATIENT HEALTH QUESTIONNAIRE - PHQ9
1. LITTLE INTEREST OR PLEASURE IN DOING THINGS: NOT AT ALL
SUM OF ALL RESPONSES TO PHQ9 QUESTIONS 1 AND 2: 0
2. FEELING DOWN, DEPRESSED, IRRITABLE, OR HOPELESS: NOT AT ALL

## 2025-02-11 NOTE — FLOWSHEET NOTE
02/11/25 0917   Events/Summary/Plan   Events/Summary/Plan RA check   Vital Signs   Pulse 67   Respiration 16   Pulse Oximetry 96 %   $ Pulse Oximetry (Spot Check) Yes   Oxygen   O2 (LPM) 0   O2 Delivery Device None - Room Air     Patient Is 96% on RA in the day time. Will need 2 lpm bleed into his BIPAP at [facility] due to his home use.

## 2025-02-11 NOTE — PROGRESS NOTES
NURSING DAILY NOTE    Name: Vince Carr   Date of Admission: 1/28/2025   Admitting Diagnosis: No Principal Problem: There is no principal problem currently on the Problem List. Please update the Problem List and refresh.  Attending Physician: MAGALY RICHARDSON M.D.  Allergies: Iodine and Naproxen    Safety  Patient Assist  MIN/ MOD  Patient Precautions  Fall Risk  Precaution Comments  Hx of multiple falls; wear shoes to protect feet  Bed Transfer Status  Standby Assist  Toilet Transfer Status   Standby Assist  Assistive Devices  Rails, Wheelchair  Oxygen  None - Room Air  Diet/Therapeutic Dining  Current Diet Order   Procedures    Diet Order Diet: Consistent CHO (Diabetic)     Pill Administration  whole  Agitated Behavioral Scale     ABS Level of Severity       Fall Risk  Has the patient had a fall this admission?   No  Ximena Aguiar Fall Risk Scoring  27, HIGH RISK  Fall Risk Safety Measures  bed alarm and chair alarm    Vitals  Temperature: 36.4 °C (97.6 °F)  Temp src: Oral  Pulse: 62  Respiration: 20  Blood Pressure : 134/70  Blood Pressure MAP (Calculated): 91 MM HG  BP Location: Left, Upper Arm  Patient BP Position: Supine     Oxygen  Pulse Oximetry: 92 %  O2 (LPM): 0  FiO2%: 21 %  O2 Delivery Device: None - Room Air    Bowel and Bladder  Last Bowel Movement  02/09/25  Stool Type  Type 7: Watery, no solid pieces-entirely liquid  Bowel Device  Bathroom  Continent  Bladder: Did not void   Bowel: No movement  Bladder Function  Urine Void (mL): 400 ml  Number of Times Voided: 1  Urinary Options: Yes  Urine Color: Yellow  Number of Times Incontinent of Urine: 1  Straight Catheter: 600 ml  Wet Diaper Count: 1  Genitourinary Assessment   Bladder Assessment (WDL):  Within Defined Limits  Diaz Catheter: Not Applicable  Urine Color: Yellow  Number of Bladder Accidents: 1  Total Number of Bladder of Accidents in Last 7 Days: 1  Number of Times  Incontinent of Urine: 1  Bladder Device: Urinal  Time Void: Yes  Bladder Scan: Post Void  $ Bladder Scan Results (mL): 119  Bladder Medications: Yes    Skin  Thanh Score   16  Sensory Interventions   Bed Types: Standard/Trauma Mattress  Skin Preventative Measures: Waffle Overlay  Moisture Interventions  Moisturizers/Barriers: Barrier Wipes      Pain  Pain Rating Scale  0 - No Pain  Pain Location  Knee  Pain Location Orientation  Right  Pain Interventions   Declines    ADLs    Bathing   Patient Refused Bathing  Linen Change   Partial  Personal Hygiene  Change Charley Pads, Moist Charley Wipes, Perineal Care  Chlorhexidine Bath      Oral Care  Brushed Teeth  Teeth/Dentures     Shave     Nutrition Percentage Eaten  Between % Consumed  Environmental Precautions  Treaded Slipper Socks on Patient  Patient Turns/Positioning  Patient turns self independently side to side without assistance, to offload sacral area  Patient Turns Assistance/Tolerance  Assistance of One  Bed Positions  Bed Controls On, Bed Locked  Head of Bed Elevated  Self regulated      Psychosocial/Neurologic Assessment  Psychosocial Assessment  Psychosocial (WDL):  WDL Except  Patient Behaviors: Forgetful  Neurologic Assessment  Neuro (WDL): Exceptions to WDL  Level of Consciousness: Alert  Orientation Level: Oriented to place, Oriented to situation, Oriented to person  Cognition: Appropriate safety awareness, Follows commands  Speech: Clear  Pupil Assesment: No  EENT (WDL):  WDL Except    Cardio/Pulmonary Assessment  Edema   RLE Edema: 1+  LLE Edema: 1+  Respiratory Breath Sounds  RUL Breath Sounds: Clear  RML Breath Sounds: Clear  RLL Breath Sounds: Clear  PRATIK Breath Sounds: Clear  LLL Breath Sounds: Clear  Cardiac Assessment   Cardiac (WDL):  WDL Except (H/O HTN, Afib, CAD, MI.)

## 2025-02-11 NOTE — CARE PLAN
Problem: Self Care  Goal: Patient will have the ability to perform ADLs independently or with assistance  Outcome: Progressing  Note: Patient able to perform regular activities this shift.  Pain controlled this shift.  Pain management includes PRN pain meds as well as non-pharmacological measures such as emotional support, rest, and repositioning.  Will continue to monitor.   The patient is Stable - Low risk of patient condition declining or worsening    Shift Goals  Clinical Goals: safety  Patient Goals: Safety, Sleep  Family Goals: no family present    Progress made toward(s) clinical / shift goals:  pt participates in therapy and is cooperative with nursing    Patient is not progressing towards the following goals:

## 2025-02-11 NOTE — PROGRESS NOTES
NURSING DAILY NOTE    Name: Vince Carr   Date of Admission: 1/28/2025   Admitting Diagnosis: No Principal Problem: There is no principal problem currently on the Problem List. Please update the Problem List and refresh.  Attending Physician: MAGALY RICHARDSON M.D.  Allergies: Iodine and Naproxen    Safety  Patient Assist  MIN/ MOD  Patient Precautions  Fall Risk  Precaution Comments  Hx of multiple falls; wear shoes to protect feet  Bed Transfer Status  Standby Assist  Toilet Transfer Status   Standby Assist  Assistive Devices  Rails, Wheelchair  Oxygen  None - Room Air  Diet/Therapeutic Dining  Current Diet Order   Procedures    Diet Order Diet: Consistent CHO (Diabetic)     Pill Administration  whole  Agitated Behavioral Scale     ABS Level of Severity       Fall Risk  Has the patient had a fall this admission?   No  Ximena Aguiar Fall Risk Scoring  27, HIGH RISK  Fall Risk Safety Measures  bed alarm and chair alarm    Vitals  Temperature: 36.4 °C (97.6 °F)  Temp src: Oral  Pulse: 67  Respiration: 20  Blood Pressure : 122/65  Blood Pressure MAP (Calculated): 84 MM HG  BP Location: Left, Upper Arm  Patient BP Position: Supine     Oxygen  Pulse Oximetry: 92 %  O2 (LPM): 0  FiO2%: 21 %  O2 Delivery Device: None - Room Air    Bowel and Bladder  Last Bowel Movement  02/10/25 (per patient)  Stool Type  Type 7: Watery, no solid pieces-entirely liquid  Bowel Device  Bathroom  Continent  Bladder: Did not void   Bowel: No movement  Bladder Function  Urine Void (mL): 400 ml  Number of Times Voided: 1  Urinary Options: Yes  Urine Color: Yellow  Number of Times Incontinent of Urine: 1  Straight Catheter: 600 ml  Wet Diaper Count: 1  Genitourinary Assessment   Bladder Assessment (WDL):  Within Defined Limits  Diaz Catheter: Not Applicable  Urine Color: Yellow  Number of Bladder Accidents: 1  Total Number of Bladder of Accidents in Last 7 Days: 1  Number  of Times Incontinent of Urine: 1  Bladder Device: Urinal  Time Void: Yes  Bladder Scan: Post Void  $ Bladder Scan Results (mL): 119  Bladder Medications: Yes    Skin  Thanh Score   16  Sensory Interventions   Bed Types: Standard/Trauma Mattress  Skin Preventative Measures: Waffle Overlay  Moisture Interventions  Moisturizers/Barriers: Barrier Wipes, Barrier Cream      Pain  Pain Rating Scale  0 - No Pain  Pain Location  Knee  Pain Location Orientation  Right  Pain Interventions   Declines    ADLs    Bathing   Patient Refused Bathing  Linen Change   Partial  Personal Hygiene  Change Charley Pads, Moist Charley Wipes, Perineal Care  Chlorhexidine Bath      Oral Care  Brushed Teeth  Teeth/Dentures     Shave     Nutrition Percentage Eaten  Between % Consumed  Environmental Precautions  Treaded Slipper Socks on Patient  Patient Turns/Positioning  Patient turns self independently side to side without assistance, to offload sacral area  Patient Turns Assistance/Tolerance  Assistance of One  Bed Positions  Bed Controls On, Bed Locked  Head of Bed Elevated  Self regulated      Psychosocial/Neurologic Assessment  Psychosocial Assessment  Psychosocial (WDL):  WDL Except  Patient Behaviors: Forgetful  Neurologic Assessment  Neuro (WDL): Exceptions to WDL  Level of Consciousness: Alert  Orientation Level: Oriented to place, Oriented to situation, Oriented to person  Cognition: Appropriate safety awareness, Follows commands  Speech: Clear  Pupil Assesment: No  EENT (WDL):  WDL Except    Cardio/Pulmonary Assessment  Edema   RLE Edema: 1+  LLE Edema: 1+  Respiratory Breath Sounds  RUL Breath Sounds: Clear  RML Breath Sounds: Clear  RLL Breath Sounds: Clear  PRATIK Breath Sounds: Clear  LLL Breath Sounds: Clear  Cardiac Assessment   Cardiac (WDL):  WDL Except

## 2025-02-11 NOTE — CARE PLAN
The patient is Stable - Low risk of patient condition declining or worsening    Shift Goals  Clinical Goals: safety  Patient Goals: Safety, Sleep  Family Goals: no family present    Progress made toward(s) clinical / shift goals:    Problem: Knowledge Deficit - Standard  Goal: Patient and family/care givers will demonstrate understanding of plan of care, disease process/condition, diagnostic tests and medications  Outcome: Progressing     Problem: Skin Integrity  Goal: Skin integrity is maintained or improved  Outcome: Progressing     Problem: Fall Risk - Rehab  Goal: Patient will remain free from falls  Outcome: Progressing

## 2025-02-11 NOTE — DISCHARGE PLANNING
Case management   Tc to [facility] /SNF; they have transportation scheduled for 430 pm today. Reviewed signed copy of IMM and answered all questions.  Tc to pt's son, Morgan informing him of above.  I also met w/ pt. Informing him of  time    Dc date /disposition: 2/11 dc to SNF today.

## 2025-02-11 NOTE — FLOWSHEET NOTE
02/11/25 0656   Events/Summary/Plan   Events/Summary/Plan MDI   Skin Integrity Intact   Protective Device Foam Pads   Location ears   Vital Signs   Pulse 65   Respiration 16   Pulse Oximetry 97 %   $ Pulse Oximetry (Spot Check) Yes   Respiratory Assessment   Respiratory Pattern Within Normal Limits   Level of Consciousness Alert   Chest Exam   Work Of Breathing / Effort Within Normal Limits   Breath Sounds   RUL Breath Sounds Clear   RML Breath Sounds Clear   RLL Breath Sounds Clear   PRATIK Breath Sounds Clear   LLL Breath Sounds Clear   Oxygen   O2 (LPM) 2   O2 Delivery Device Nasal Cannula

## 2025-02-11 NOTE — DISCHARGE INSTRUCTIONS
Pain Management after Surgery, Injury or Illness    What should I expect if I have pain?  Some pain may be normal.  It is important to know that it may not be possible to completely eliminate your pain.  Our goal is to help you to manage your pain so that you can get back to your normal routine as soon as possible.  We will work together to create a plan to manage your pain and track the progress of the plan.  If you have questions about your care, please tell your nurse or provider.  How is my pain measured?  Your pain will be measured on a scale of 0 to 10.  The pain rating scale will help you to score your pain based on your ability to function with that pain.  For example, a score of:  0 = No pain  5 = Pain interrupts some activities  10 = Pain is as bad as it can be, nothing else matters  Remember, some pain is expected following illness, injury or surgery.  How will my pain be treated?  You may be offered medication to treat your pain.  You can also use non-medicine treatments to help manage your pain.  If you have severe, uncontrolled pain, you may be prescribed narcotics, also known as opioids.  You have the right to learn about your options and work with your care team to find the best treatment to manage your pain.  Non-Opioid Medicines  Many effective medicines do not need a prescription. Examples include:  Acetaminophen, which you may know as Tylenol®  Ibuprofen, which you may know as Advil® or Motrin®  Aspirin  Other low risk medicines require a prescription and are helpful for treating pain. Examples include:  o Celecoxib, which you may know as Celebrex®  Alternative Therapies  Alternative therapies can be very helpful in managing pain. You can try:  Ice or heat packs as recommended by your care team.  Massage, relaxation techniques or meditation.  Changing positions in bed.  Watching TV or listening to music.  Opioids, also known as Narcotics  Opioids should only be used to treat severe pain that  cannot be controlled by other methods.  Opioids have been shown to increase your risk of complications.  Opioids are highly addictive.  Opioids should only be used in the lowest effective dose, for a limited amount of time.  Opioids require a prescription. Some examples include:  Tramadol, known as Ultram®.  Hydrocodone with acetaminophen, known as Lortab®, Vicodin®, Norco®.  Oxycodone with acetaminophen, known as Percocet®, or Roxicet®.  Oxycodone, known as OxyContin®.  Morphine, known as MS Contin®.  What will happen after I go home from the hospital?  Your care team will discuss your ongoing care plan with you before you leave.  You will be given detailed instructions for any medicine and follow up care.  You may be given a prescription for medicines to take when you go home.  Be sure to tell your care team if you have concerns about caring for yourself at home. Common concerns may include stairs, or living alone.  How should I manage pain when I go home?  Always use non-opioid and non-medicine options first.  Take non-opioid medicine regularly, as instructed by your care team.  Avoid doing things that make your pain worse like heavy lifting or straining.  Use opioids only to treat severe pain that is not controlled by other methods.  Always follow the instructions of your care team.  Always take the lowest effective dose.  Do not take more than prescribed.  Do not mix with sleeping pills or alcohol.  Stop taking opioids when the pain can be managed by other methods listed above.  You may also be referred to a pain specialist when needed.    IMPORTANT INFORMATION:  Side effects of Opiates may include:  Sleepiness  Dizziness  Nausea and/or vomiting  Constipation  Decreased breathing  Addiction  Overdose  Death    Opiate dependency and addiction risk:  The risk of dependency increases after 3 days of continuous use.  There are many local resources to help with dependency issues.  Opiate dependency can develop  easily. Don’t feel ashamed.  Speak to your care team if you have concerns.  General medicine safety:  Keep all medicines in a safe place, out of sight so they cannot be taken by someone else.  Keep out of reach of children.  Never mix opioids with sleeping pills, over the counter sleep aids or alcohol.  Do not drive while taking opioids.  Be careful at home when cooking, bathing, showering or using stairs.  You may be more likely to hurt yourself or fall.  For anyone taking opioids, watch for excessive sleepiness or decreased breathing as a sign of overdose. Try to arouse the person if you are concerned. Call 911 if you are unable to awaken them OR if their breathing is shallow, slow, or irregular.  Proper medicine disposal:  Empty liquid medicine from containers, open capsules or crush tablets and mix with luis angle litter, dirt or old coffee grounds. Place the mixture into a sealed bag or container and throw it in the trash.  Take medicines to a local drug takeback center, for a list visit: https://apps.deadiversion.[employer].gov/pubdispsearch/spring/main?execution=e1s2  Use a home drug disposal pouch.  Contact your local pharmacy for help.

## 2025-02-11 NOTE — PROGRESS NOTES
Attached is e-mail thread sent to writer (Rafia Wills) during pt's hospitalization from son to communicate changes in pt's status.     Good morning Dr. Wills.    If possible, can you check in with my Dad? I think he's struggling with not going home and going to [facility].  As we discussed over the phone, for me he was begrudgingly accepting of the move and seemed completely fine with an assisted living apartment afterwards. Then at 6pm he texted me I have been allowed to go home tomorrow.??so please confirm you are coming tomorrow.  I called him and we had a difficult discussion about his circumstances and his agreements for continued rehab & assisted living. He was quite upset and adamant that he just needed to go home. He also said a nurse told him he was going home, so he started saying things like I was making these decisions not the medical team.    Morgan    On Mon, Feb 10, 2025 at 11:04?AM Morgan Carr <[email or web address]> wrote:  He Wills. I hear you'll be seeing my Dad later this afternoon, I won't be able to be there but feel free to call me if you want [phone].    He did not have any therapy this weekend and slept a lot. With all the rest his cognition seemed to be better today while he was being tested by speech therapy. He just told the occupational therapist he's really mentally exhausted from what he did earlier.Right now he's taking a shower with a therapists.     This morning he said he would refuse to go to [facility] and that he just wants to go home. After speech I talked with him a bit about an apartment at [facility] in [place]. He is very agreeable to this. We discussed his furniture, some pictures, and other items he would like in the apartment. After this discussion the doctor came in and discussed the plan. He begrudgingly agreed to [facility] and then the apartment.     Morgan    On Thu, Feb 6, 2025, 21:09 Morgan Carr <[email or web address]>  "wrote:  Thursday February 6    At 7:40 my Dad called me, he's supposed to be in PT from 7:30 till 8am. He said he wanted to let me know he can't go with me to the meeting because they're here for him right now. He said \"have a good meeting and don't do anything I wouldn't do\", then we said goodbye. It's concerning because his delusional behavior is ongoing and not improving.    I took a break seeing him today but I talked to him a couple of times. He seemed to be in a good mood but is still mildly confused while we talk.    Morgan    On Wed, Feb 5, 2025 at 5:41?PM Morgan Carr <[email or web address]> wrote:  Wednesday February 5    During PT we tried discussing what he learned yesterday regarding getting in and out of a chair, keeping balance, etc. He didn't remember anything about the previous discussions. The therapist went over it again, after that he acted like he remembered it, but it's doubtful.    During a break he wanted to know where his laptop was. I said I didn't know what he was talking about. He said it was right next to his bed and \"they\" took it to clean up some virus stuff. I explained to him that he doesn't have one and he got angry and adamant that he knew what had happened, so I dropped it. About 6 years ago he DID have a laptop for a few weeks, he didn't like it so he gave it to the kid across the street. Other than that I've never known him to have a laptop at all even during his work. He uses a desktop only.     He also didn't remember anything about the multiple discussions with staff or me about needing assistance at home. He says he can live independently just fine. The Therapist and I tried talking to him about it. He got angry and upset about it and told me to shut up. He said I talk too much.    So I decided to call it a day with him.    He seems very pleasant with staff and other patients but not so much with me.   His memory seems to never be well. He usually has some decent periods " in the morning, and a lot of not so good times throughout the day, especially in the evening.   I'm never sure where he's at mentally.    Morgan        On Sat, Feb 1, 2025, 10:52 Morgan Carr <[email or web address]> wrote:  He Wills, it's Chilango's son Morgan. You had asked me to let you know when he has delirium that I notice. Most of the time I've found it happening between 6pm and 8pm.     Last night he called at 8:30pm asking me if I was close, and if so could I come over and help him to the restroom. I found out he also called my Aunt in [facility] regarding the same thing.     Once I explained what his situation was he said OK. I asked him to call for the nurse but don't know if he did.     I'll use this thread to update you if that's OK.    Morgan Carr

## 2025-02-12 NOTE — PROGRESS NOTES
Patient discharged to home per order.  Discharge instructions reviewed with patient; they verbalize understanding.  Patient has all belongings.  Patient left facility at 1625 via wheelchair accompanied by rehab staff and skilled nursing facility transport.  Have enjoyed working with this pleasant patient.

## 2025-02-12 NOTE — CARE PLAN
Problem: Knowledge Deficit - Standard  Goal: Patient and family/care givers will demonstrate understanding of plan of care, disease process/condition, diagnostic tests and medications  Outcome: Progressing     Problem: Skin Integrity  Goal: Skin integrity is maintained or improved  Outcome: Met     Problem: Fall Risk - Rehab  Goal: Patient will remain free from falls  Outcome: Met     Problem: Discharge Barriers/Planning  Goal: Patient's continuum of care needs are met  Outcome: Progressing     Problem: Psychosocial  Goal: Patient's level of anxiety will decrease  Outcome: Met  Goal: Patient's ability to verbalize feelings about condition will improve  Outcome: Met  Goal: Patient's ability to re-evaluate and adapt role responsibilities will improve  Outcome: Met  Goal: Patient and family will demonstrate ability to cope with life altering diagnosis and/or procedure  Outcome: Met  Goal: Spiritual and cultural needs incorporated into hospitalization  Outcome: Met     Problem: Bladder / Voiding  Goal: Patient will establish and maintain regular urinary output  Outcome: Met  Goal: Patient will establish and maintain bladder regimen  Outcome: Met     Problem: Bowel Elimination  Goal: Patient will participate in bowel management program  Outcome: Met     Problem: Skin Integrity  Goal: Patient's skin integrity will be maintained or improve  Outcome: Met     Problem: Nutrition  Goal: Patient's nutritional and fluid intake will be adequate or improve  Outcome: Met  Goal: Patient will display progressive weight gain toward goal have adequate food and fluid intake  Outcome: Met  Goal: Patient will demonstrate ability to administer respiratory medications  Outcome: Met     Problem: Self Care  Goal: Patient will have the ability to perform ADLs independently or with assistance  2/11/2025 1629 by Mingo Ariza R.N.  Outcome: Progressing  2/11/2025 1026 by Mingo Ariza R.N.  Outcome: Progressing  Note: Patient  able to perform regular activities this shift.  Pain controlled this shift.  Pain management includes PRN pain meds as well as non-pharmacological measures such as emotional support, rest, and repositioning.  Will continue to monitor.     Problem: Mobility  Goal: Patient's capacity to carry out activities will improve  Outcome: Progressing     Problem: Infection  Goal: Patient will remain free from infection  Outcome: Met     Problem: VTE Prevention  Goal: Patient will remain free from venous thromboembolism (VTE)  Outcome: Met     Problem: Diabetes Management  Goal: Patient's ability to maintain appropriate glucose levels will be maintained or improve  Outcome: Met     Problem: Pain - Standard  Goal: Alleviation of pain or a reduction in pain to the patient’s comfort goal  Outcome: Met   The patient is Stable - Low risk of patient condition declining or worsening    Shift Goals  Clinical Goals: safety  Patient Goals: Safety, Sleep  Family Goals: no family present    Progress made toward(s) clinical / shift goals:  pt discharged to skilled nursing    Patient is not progressing towards the following goals:

## 2025-02-12 NOTE — CARE PLAN
Problem: OT Long Term Goals  Goal: LTG-By discharge, patient will complete basic self care tasks w/ mod I for UB ADLs and supervision for LB ADLs  Outcome: Met  Goal: LTG-By discharge, patient will perform bathroom transfers w/ supervision w/ LRAD  Outcome: Not Met  Note: Requires SBA  Goal: LTG-By discharge, patient will complete basic home management w/ supervision w/ LRAD   Outcome: Not Met  Note: Requires SBA

## 2025-02-14 NOTE — Clinical Note
REFERRAL APPROVAL NOTICE         Sent on February 14, 2025                   Chilango Carr  [address] [facility] [address] [address]                   Dear Mr. Carr,    After a careful review of the medical information and benefit coverage, [facility] has processed your referral. See below for additional details.    If applicable, you must be actively enrolled with your insurance for coverage of the authorized service. If you have any questions regarding your coverage, please contact your insurance directly.    REFERRAL INFORMATION   Referral #:  [id number]  Referred-To Department    Referred-By Provider:  Behavioral Health    Rafia Wills, Ph.D.   Behavioral Health Outpatient      [address] [id number] [phone] [address]  [facility] [id number] [phone]    Referral Start Date:  01/31/2025  Referral End Date:   01/31/2026             SCHEDULING  If you do not already have an appointment, please call [phone] to make an appointment.     MORE INFORMATION  If you do not already have a [employer] account, sign up at: [email or web address]  You can access your medical information, make appointments, see lab results, billing information, and more.  If you have questions regarding this referral, please contact  the [facility] Referrals department at:             [phone]. Monday - Friday 8:00AM - 5:00PM.     Sincerely,    [facility]

## 2025-03-26 ENCOUNTER — APPOINTMENT (OUTPATIENT)
Dept: RADIOLOGY | Facility: MEDICAL CENTER | Age: 80
End: 2025-03-26
Attending: FAMILY MEDICINE
Payer: MEDICARE